# Patient Record
Sex: MALE | Race: WHITE | Employment: OTHER | URBAN - METROPOLITAN AREA
[De-identification: names, ages, dates, MRNs, and addresses within clinical notes are randomized per-mention and may not be internally consistent; named-entity substitution may affect disease eponyms.]

---

## 2017-03-27 ENCOUNTER — OFFICE VISIT (OUTPATIENT)
Dept: INTERNAL MEDICINE CLINIC | Age: 77
End: 2017-03-27

## 2017-03-27 VITALS
OXYGEN SATURATION: 96 % | DIASTOLIC BLOOD PRESSURE: 60 MMHG | BODY MASS INDEX: 18.46 KG/M2 | SYSTOLIC BLOOD PRESSURE: 122 MMHG | RESPIRATION RATE: 18 BRPM | HEART RATE: 64 BPM | HEIGHT: 69 IN | WEIGHT: 124.6 LBS

## 2017-03-27 DIAGNOSIS — I10 ESSENTIAL HYPERTENSION: ICD-10-CM

## 2017-03-27 DIAGNOSIS — Z00.00 ROUTINE GENERAL MEDICAL EXAMINATION AT A HEALTH CARE FACILITY: ICD-10-CM

## 2017-03-27 DIAGNOSIS — Z13.1 SCREENING FOR DIABETES MELLITUS: ICD-10-CM

## 2017-03-27 DIAGNOSIS — Z13.29 SCREENING FOR THYROID DISORDER: ICD-10-CM

## 2017-03-27 DIAGNOSIS — Z00.00 MEDICARE ANNUAL WELLNESS VISIT, SUBSEQUENT: Primary | ICD-10-CM

## 2017-03-27 DIAGNOSIS — Z13.220 LIPID SCREENING: ICD-10-CM

## 2017-03-27 LAB
A/G RATIO: 1.6 (ref 1.1–2.2)
ALBUMIN SERPL-MCNC: 4.1 G/DL (ref 3.4–5)
ALP BLD-CCNC: 73 U/L (ref 40–129)
ALT SERPL-CCNC: 22 U/L (ref 10–40)
ANION GAP SERPL CALCULATED.3IONS-SCNC: 18 MMOL/L (ref 3–16)
AST SERPL-CCNC: 26 U/L (ref 15–37)
BASOPHILS ABSOLUTE: 0.1 K/UL (ref 0–0.2)
BASOPHILS RELATIVE PERCENT: 1.2 %
BILIRUB SERPL-MCNC: 0.5 MG/DL (ref 0–1)
BUN BLDV-MCNC: 15 MG/DL (ref 7–20)
CALCIUM SERPL-MCNC: 9.6 MG/DL (ref 8.3–10.6)
CHLORIDE BLD-SCNC: 104 MMOL/L (ref 99–110)
CHOLESTEROL, TOTAL: 177 MG/DL (ref 0–199)
CO2: 19 MMOL/L (ref 21–32)
CREAT SERPL-MCNC: 0.8 MG/DL (ref 0.8–1.3)
EOSINOPHILS ABSOLUTE: 0.1 K/UL (ref 0–0.6)
EOSINOPHILS RELATIVE PERCENT: 2.4 %
GFR AFRICAN AMERICAN: >60
GFR NON-AFRICAN AMERICAN: >60
GLOBULIN: 2.6 G/DL
GLUCOSE BLD-MCNC: 87 MG/DL (ref 70–99)
HCT VFR BLD CALC: 41.7 % (ref 40.5–52.5)
HDLC SERPL-MCNC: 92 MG/DL (ref 40–60)
HEMOGLOBIN: 13.9 G/DL (ref 13.5–17.5)
LDL CHOLESTEROL CALCULATED: 75 MG/DL
LYMPHOCYTES ABSOLUTE: 1.3 K/UL (ref 1–5.1)
LYMPHOCYTES RELATIVE PERCENT: 26 %
MCH RBC QN AUTO: 30.2 PG (ref 26–34)
MCHC RBC AUTO-ENTMCNC: 33.3 G/DL (ref 31–36)
MCV RBC AUTO: 90.7 FL (ref 80–100)
MONOCYTES ABSOLUTE: 0.5 K/UL (ref 0–1.3)
MONOCYTES RELATIVE PERCENT: 9.6 %
NEUTROPHILS ABSOLUTE: 3.1 K/UL (ref 1.7–7.7)
NEUTROPHILS RELATIVE PERCENT: 60.8 %
PDW BLD-RTO: 14.4 % (ref 12.4–15.4)
PLATELET # BLD: 196 K/UL (ref 135–450)
PMV BLD AUTO: 11 FL (ref 5–10.5)
POTASSIUM SERPL-SCNC: 4.6 MMOL/L (ref 3.5–5.1)
RBC # BLD: 4.6 M/UL (ref 4.2–5.9)
SODIUM BLD-SCNC: 141 MMOL/L (ref 136–145)
T4 FREE: 1.2 NG/DL (ref 0.9–1.8)
TOTAL PROTEIN: 6.7 G/DL (ref 6.4–8.2)
TRIGL SERPL-MCNC: 48 MG/DL (ref 0–150)
TSH SERPL DL<=0.05 MIU/L-ACNC: 4.1 UIU/ML (ref 0.27–4.2)
VLDLC SERPL CALC-MCNC: 10 MG/DL
WBC # BLD: 5.1 K/UL (ref 4–11)

## 2017-03-27 PROCEDURE — 36415 COLL VENOUS BLD VENIPUNCTURE: CPT | Performed by: FAMILY MEDICINE

## 2017-03-27 PROCEDURE — G0438 PPPS, INITIAL VISIT: HCPCS | Performed by: FAMILY MEDICINE

## 2017-03-27 RX ORDER — TERBINAFINE HYDROCHLORIDE 250 MG/1
250 TABLET ORAL DAILY
Qty: 90 TABLET | Refills: 0 | Status: SHIPPED | OUTPATIENT
Start: 2017-03-27 | End: 2017-06-25

## 2017-03-27 RX ORDER — MIRTAZAPINE 15 MG/1
15 TABLET, ORALLY DISINTEGRATING ORAL NIGHTLY
Qty: 30 TABLET | Refills: 3 | Status: SHIPPED | OUTPATIENT
Start: 2017-03-27 | End: 2017-11-13 | Stop reason: SDUPTHER

## 2017-03-27 RX ORDER — TADALAFIL 20 MG/1
20 TABLET ORAL PRN
Qty: 10 TABLET | Refills: 5 | Status: SHIPPED | OUTPATIENT
Start: 2017-03-27 | End: 2017-07-24 | Stop reason: SDUPTHER

## 2017-03-27 ASSESSMENT — LIFESTYLE VARIABLES
HAS A RELATIVE, FRIEND, DOCTOR, OR ANOTHER HEALTH PROFESSIONAL EXPRESSED CONCERN ABOUT YOUR DRINKING OR SUGGESTED YOU CUT DOWN: 0
AUDIT TOTAL SCORE: 4
HOW OFTEN DO YOU HAVE A DRINK CONTAINING ALCOHOL: 4
HOW OFTEN DO YOU HAVE SIX OR MORE DRINKS ON ONE OCCASION: 0
HOW MANY STANDARD DRINKS CONTAINING ALCOHOL DO YOU HAVE ON A TYPICAL DAY: 0
HOW OFTEN DURING THE LAST YEAR HAVE YOU HAD A FEELING OF GUILT OR REMORSE AFTER DRINKING: 0
HOW OFTEN DURING THE LAST YEAR HAVE YOU FOUND THAT YOU WERE NOT ABLE TO STOP DRINKING ONCE YOU HAD STARTED: 0
HAVE YOU OR SOMEONE ELSE BEEN INJURED AS A RESULT OF YOUR DRINKING: 0
HOW OFTEN DURING THE LAST YEAR HAVE YOU BEEN UNABLE TO REMEMBER WHAT HAPPENED THE NIGHT BEFORE BECAUSE YOU HAD BEEN DRINKING: 0
HOW OFTEN DURING THE LAST YEAR HAVE YOU NEEDED AN ALCOHOLIC DRINK FIRST THING IN THE MORNING TO GET YOURSELF GOING AFTER A NIGHT OF HEAVY DRINKING: 0
HOW OFTEN DURING THE LAST YEAR HAVE YOU FAILED TO DO WHAT WAS NORMALLY EXPECTED FROM YOU BECAUSE OF DRINKING: 0
AUDIT-C TOTAL SCORE: 4

## 2017-03-27 ASSESSMENT — PATIENT HEALTH QUESTIONNAIRE - PHQ9: SUM OF ALL RESPONSES TO PHQ QUESTIONS 1-9: 2

## 2017-03-27 ASSESSMENT — ANXIETY QUESTIONNAIRES: GAD7 TOTAL SCORE: 2

## 2017-05-03 ENCOUNTER — HOSPITAL ENCOUNTER (OUTPATIENT)
Dept: CT IMAGING | Age: 77
Discharge: HOME | End: 2017-05-03
Attending: INTERNAL MEDICINE | Admitting: INTERNAL MEDICINE

## 2017-05-03 DIAGNOSIS — D15.0 BENIGN NEOPLASM OF THYMUS: ICD-10-CM

## 2017-05-03 DIAGNOSIS — C37 THYMOMA, MALIGNANT (HCC): ICD-10-CM

## 2017-05-05 PROBLEM — Z71.2 ENCOUNTER TO DISCUSS TEST RESULTS: Status: ACTIVE | Noted: 2017-05-05

## 2017-07-24 RX ORDER — TADALAFIL 20 MG/1
20 TABLET ORAL PRN
Qty: 30 TABLET | Refills: 0 | Status: SHIPPED | OUTPATIENT
Start: 2017-07-24 | End: 2018-05-08 | Stop reason: SDUPTHER

## 2017-11-08 ENCOUNTER — HOSPITAL ENCOUNTER (OUTPATIENT)
Dept: CT IMAGING | Age: 77
Discharge: HOME | End: 2017-11-08
Attending: NURSE PRACTITIONER | Admitting: NURSE PRACTITIONER

## 2017-11-08 DIAGNOSIS — D49.89 THYMOMA: ICD-10-CM

## 2017-11-08 DIAGNOSIS — D15.0 BENIGN NEOPLASM OF THYMUS: ICD-10-CM

## 2017-11-08 LAB
GFR AFRICAN AMERICAN: >60
GFR NON-AFRICAN AMERICAN: >60
PERFORMED ON: NORMAL
POC CREATININE: 0.8 MG/DL (ref 0.8–1.3)
POC SAMPLE TYPE: NORMAL

## 2017-11-13 ENCOUNTER — OFFICE VISIT (OUTPATIENT)
Dept: INTERNAL MEDICINE CLINIC | Age: 77
End: 2017-11-13

## 2017-11-13 VITALS
HEART RATE: 58 BPM | SYSTOLIC BLOOD PRESSURE: 138 MMHG | WEIGHT: 122.2 LBS | TEMPERATURE: 98.6 F | OXYGEN SATURATION: 99 % | DIASTOLIC BLOOD PRESSURE: 72 MMHG | BODY MASS INDEX: 18.1 KG/M2

## 2017-11-13 DIAGNOSIS — Z71.89 ENCOUNTER TO DISCUSS TREATMENT OPTIONS: ICD-10-CM

## 2017-11-13 DIAGNOSIS — I10 ESSENTIAL HYPERTENSION: ICD-10-CM

## 2017-11-13 DIAGNOSIS — D49.89 THYMOMA: ICD-10-CM

## 2017-11-13 DIAGNOSIS — Z23 NEED FOR INFLUENZA VACCINATION: ICD-10-CM

## 2017-11-13 PROCEDURE — G8419 CALC BMI OUT NRM PARAM NOF/U: HCPCS | Performed by: FAMILY MEDICINE

## 2017-11-13 PROCEDURE — 1123F ACP DISCUSS/DSCN MKR DOCD: CPT | Performed by: FAMILY MEDICINE

## 2017-11-13 PROCEDURE — 90686 IIV4 VACC NO PRSV 0.5 ML IM: CPT | Performed by: FAMILY MEDICINE

## 2017-11-13 PROCEDURE — G8427 DOCREV CUR MEDS BY ELIG CLIN: HCPCS | Performed by: FAMILY MEDICINE

## 2017-11-13 PROCEDURE — G8484 FLU IMMUNIZE NO ADMIN: HCPCS | Performed by: FAMILY MEDICINE

## 2017-11-13 PROCEDURE — G0008 ADMIN INFLUENZA VIRUS VAC: HCPCS | Performed by: FAMILY MEDICINE

## 2017-11-13 PROCEDURE — 99214 OFFICE O/P EST MOD 30 MIN: CPT | Performed by: FAMILY MEDICINE

## 2017-11-13 PROCEDURE — 1036F TOBACCO NON-USER: CPT | Performed by: FAMILY MEDICINE

## 2017-11-13 PROCEDURE — 4040F PNEUMOC VAC/ADMIN/RCVD: CPT | Performed by: FAMILY MEDICINE

## 2017-11-13 RX ORDER — TRIAMCINOLONE ACETONIDE 0.25 MG/G
CREAM TOPICAL EVERY 4 HOURS PRN
COMMUNITY
End: 2018-08-06 | Stop reason: SDUPTHER

## 2017-11-13 RX ORDER — MIRTAZAPINE 30 MG/1
30 TABLET, FILM COATED ORAL NIGHTLY
Qty: 30 TABLET | Refills: 3 | Status: SHIPPED | OUTPATIENT
Start: 2017-11-13 | End: 2018-08-06

## 2017-11-13 RX ORDER — TRIAMCINOLONE ACETONIDE 0.25 MG/G
CREAM TOPICAL EVERY 4 HOURS PRN
Qty: 80 G | Refills: 1 | Status: CANCELLED | OUTPATIENT
Start: 2017-11-13

## 2017-11-13 ASSESSMENT — ENCOUNTER SYMPTOMS
DIARRHEA: 0
TROUBLE SWALLOWING: 0
BLOOD IN STOOL: 0
ABDOMINAL PAIN: 0
SHORTNESS OF BREATH: 0
VOICE CHANGE: 0
CONSTIPATION: 0

## 2017-11-14 ENCOUNTER — TELEPHONE (OUTPATIENT)
Dept: INTERNAL MEDICINE CLINIC | Age: 77
End: 2017-11-14

## 2017-11-14 NOTE — PROGRESS NOTES
Vaccine Information Sheet, \"Influenza - Inactivated\"  given to [employer], or parent/legal guardian of  [employer] and verbalized understanding. Patient responses:    Have you ever had a reaction to a flu vaccine? No  Are you able to eat eggs without adverse effects? Yes  Do you have any current illness? No  Have you ever had Guillian [place] Syndrome? No    Flu vaccine given per order. Please see immunization tab.
normal.       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 he thinks he is losing weight and my be related time I think the weight loss is primarily due to poor oral intake. Will increase the dose of Remeron to 30 mg daily to help stimulate appetite   2. Encounter to discuss treatment options -as above. His oncologist recommended possible ablation or radiation treatment Patient is undecided so he was advised second opinion at the [facility]   3. Essential hypertension -controlled continue low-salt diet   4. Need for influenza vaccination -given            Plan:      As above RTC in 1-2 mos and PRN.  Blood tests next year March 2018

## 2018-03-29 ENCOUNTER — OFFICE VISIT (OUTPATIENT)
Dept: INTERNAL MEDICINE CLINIC | Age: 78
End: 2018-03-29

## 2018-03-29 VITALS
HEIGHT: 68 IN | HEART RATE: 110 BPM | WEIGHT: 131 LBS | BODY MASS INDEX: 19.85 KG/M2 | DIASTOLIC BLOOD PRESSURE: 74 MMHG | OXYGEN SATURATION: 99 % | SYSTOLIC BLOOD PRESSURE: 156 MMHG

## 2018-03-29 DIAGNOSIS — Z13.29 THYROID DISORDER SCREEN: ICD-10-CM

## 2018-03-29 DIAGNOSIS — Z13.1 DIABETES MELLITUS SCREENING: ICD-10-CM

## 2018-03-29 DIAGNOSIS — I10 ESSENTIAL HYPERTENSION: Primary | ICD-10-CM

## 2018-03-29 DIAGNOSIS — Z12.5 SCREENING PSA (PROSTATE SPECIFIC ANTIGEN): ICD-10-CM

## 2018-03-29 DIAGNOSIS — Z13.220 LIPID SCREENING: ICD-10-CM

## 2018-03-29 DIAGNOSIS — D49.89 THYMOMA: ICD-10-CM

## 2018-03-29 DIAGNOSIS — R00.0 TACHYCARDIA: ICD-10-CM

## 2018-03-29 LAB
A/G RATIO: 1.9 (ref 1.1–2.2)
ALBUMIN SERPL-MCNC: 4.5 G/DL (ref 3.4–5)
ALP BLD-CCNC: 70 U/L (ref 40–129)
ALT SERPL-CCNC: 17 U/L (ref 10–40)
ANION GAP SERPL CALCULATED.3IONS-SCNC: 13 MMOL/L (ref 3–16)
AST SERPL-CCNC: 19 U/L (ref 15–37)
BASOPHILS ABSOLUTE: 0.1 K/UL (ref 0–0.2)
BASOPHILS RELATIVE PERCENT: 0.8 %
BILIRUB SERPL-MCNC: 0.8 MG/DL (ref 0–1)
BUN BLDV-MCNC: 18 MG/DL (ref 7–20)
CALCIUM SERPL-MCNC: 9.3 MG/DL (ref 8.3–10.6)
CHLORIDE BLD-SCNC: 101 MMOL/L (ref 99–110)
CHOLESTEROL, TOTAL: 193 MG/DL (ref 0–199)
CO2: 24 MMOL/L (ref 21–32)
CREAT SERPL-MCNC: 0.8 MG/DL (ref 0.8–1.3)
EOSINOPHILS ABSOLUTE: 0.1 K/UL (ref 0–0.6)
EOSINOPHILS RELATIVE PERCENT: 2.1 %
GFR AFRICAN AMERICAN: >60
GFR NON-AFRICAN AMERICAN: >60
GLOBULIN: 2.4 G/DL
GLUCOSE BLD-MCNC: 112 MG/DL (ref 70–99)
HCT VFR BLD CALC: 42.4 % (ref 40.5–52.5)
HDLC SERPL-MCNC: 94 MG/DL (ref 40–60)
HEMOGLOBIN: 14.2 G/DL (ref 13.5–17.5)
LDL CHOLESTEROL CALCULATED: 87 MG/DL
LYMPHOCYTES ABSOLUTE: 1.5 K/UL (ref 1–5.1)
LYMPHOCYTES RELATIVE PERCENT: 22.8 %
MCH RBC QN AUTO: 30.8 PG (ref 26–34)
MCHC RBC AUTO-ENTMCNC: 33.5 G/DL (ref 31–36)
MCV RBC AUTO: 92.1 FL (ref 80–100)
MONOCYTES ABSOLUTE: 0.5 K/UL (ref 0–1.3)
MONOCYTES RELATIVE PERCENT: 7.9 %
NEUTROPHILS ABSOLUTE: 4.3 K/UL (ref 1.7–7.7)
NEUTROPHILS RELATIVE PERCENT: 66.4 %
PDW BLD-RTO: 13.9 % (ref 12.4–15.4)
PLATELET # BLD: 217 K/UL (ref 135–450)
PMV BLD AUTO: 10.8 FL (ref 5–10.5)
POTASSIUM SERPL-SCNC: 4.3 MMOL/L (ref 3.5–5.1)
PROSTATE SPECIFIC ANTIGEN: 0.29 NG/ML (ref 0–4)
RBC # BLD: 4.61 M/UL (ref 4.2–5.9)
SODIUM BLD-SCNC: 138 MMOL/L (ref 136–145)
T4 FREE: 1.2 NG/DL (ref 0.9–1.8)
TOTAL PROTEIN: 6.9 G/DL (ref 6.4–8.2)
TRIGL SERPL-MCNC: 58 MG/DL (ref 0–150)
TSH SERPL DL<=0.05 MIU/L-ACNC: 4.37 UIU/ML (ref 0.27–4.2)
VLDLC SERPL CALC-MCNC: 12 MG/DL
WBC # BLD: 6.4 K/UL (ref 4–11)

## 2018-03-29 PROCEDURE — G8420 CALC BMI NORM PARAMETERS: HCPCS | Performed by: FAMILY MEDICINE

## 2018-03-29 PROCEDURE — G8482 FLU IMMUNIZE ORDER/ADMIN: HCPCS | Performed by: FAMILY MEDICINE

## 2018-03-29 PROCEDURE — 1036F TOBACCO NON-USER: CPT | Performed by: FAMILY MEDICINE

## 2018-03-29 PROCEDURE — G8427 DOCREV CUR MEDS BY ELIG CLIN: HCPCS | Performed by: FAMILY MEDICINE

## 2018-03-29 PROCEDURE — 36415 COLL VENOUS BLD VENIPUNCTURE: CPT | Performed by: FAMILY MEDICINE

## 2018-03-29 PROCEDURE — 1123F ACP DISCUSS/DSCN MKR DOCD: CPT | Performed by: FAMILY MEDICINE

## 2018-03-29 PROCEDURE — 4040F PNEUMOC VAC/ADMIN/RCVD: CPT | Performed by: FAMILY MEDICINE

## 2018-03-29 PROCEDURE — 99214 OFFICE O/P EST MOD 30 MIN: CPT | Performed by: FAMILY MEDICINE

## 2018-03-29 RX ORDER — METOPROLOL SUCCINATE 25 MG/1
25 TABLET, EXTENDED RELEASE ORAL DAILY
Qty: 30 TABLET | Refills: 3 | Status: SHIPPED | OUTPATIENT
Start: 2018-03-29 | End: 2018-05-08 | Stop reason: SDUPTHER

## 2018-03-29 ASSESSMENT — ENCOUNTER SYMPTOMS
ABDOMINAL PAIN: 0
BLOOD IN STOOL: 0
TROUBLE SWALLOWING: 0
CONSTIPATION: 0
SHORTNESS OF BREATH: 0
DIARRHEA: 0
VOICE CHANGE: 0

## 2018-03-29 NOTE — PROGRESS NOTES
Subjective:      Patient ID: Thomas Slaughter is a 65 y.o. male. HPI  Patient with history of thymoma and borderline hypertension presented to the office for follow-up and for blood tests. He is a runner and and usually participate in a marathon. He goes to [facility] once or twice a year -it came back last October 2017 and is planning to go back there May 2018 after the [employer]  Review of Systems   Constitutional: Negative for activity change. HENT: Negative for trouble swallowing and voice change. Eyes: Negative for visual disturbance. Respiratory: Negative for shortness of breath. Cardiovascular: Negative for chest pain and leg swelling. Gastrointestinal: Negative for abdominal pain, blood in stool, constipation and diarrhea. Genitourinary: Negative for difficulty urinating, dysuria, frequency, hematuria and scrotal swelling. Musculoskeletal: Negative for arthralgias and myalgias. Skin: Negative for rash. Neurological: Negative for dizziness. Psychiatric/Behavioral: Negative for behavioral problems. Objective:   Physical Exam   Constitutional: He is oriented to person, place, and time. He appears well-developed and well-nourished. No distress. HENT:   Head: Normocephalic. Eyes: Conjunctivae are normal.   Neck: Neck supple. No thyromegaly present. Cardiovascular: Normal rate, regular rhythm and normal heart sounds. No murmur heard. Pulmonary/Chest: Breath sounds normal. No respiratory distress. He has no wheezes. He has no rales. Abdominal: Soft. He exhibits no distension. Musculoskeletal: Normal range of motion. He exhibits no edema. Neurological: He is alert and oriented to person, place, and time. Skin: Skin is warm. No rash noted. Psychiatric: He has a normal mood and affect. His behavior is normal.       Assessment:      1. Essential hypertension-Start Toprol-XL 25 mg daily. Advise low salt diet    2.  Tachycardia -Heart rate of 110/min. start beta

## 2018-05-08 ENCOUNTER — OFFICE VISIT (OUTPATIENT)
Dept: INTERNAL MEDICINE CLINIC | Age: 78
End: 2018-05-08

## 2018-05-08 VITALS
DIASTOLIC BLOOD PRESSURE: 72 MMHG | SYSTOLIC BLOOD PRESSURE: 130 MMHG | HEART RATE: 114 BPM | OXYGEN SATURATION: 96 % | RESPIRATION RATE: 18 BRPM

## 2018-05-08 DIAGNOSIS — R37 SEXUAL DYSFUNCTION: ICD-10-CM

## 2018-05-08 DIAGNOSIS — R73.01 IFG (IMPAIRED FASTING GLUCOSE): ICD-10-CM

## 2018-05-08 DIAGNOSIS — D49.89 THYMOMA: ICD-10-CM

## 2018-05-08 DIAGNOSIS — I10 ESSENTIAL HYPERTENSION: Primary | ICD-10-CM

## 2018-05-08 DIAGNOSIS — R00.0 CHRONIC TACHYCARDIA: ICD-10-CM

## 2018-05-08 LAB — HBA1C MFR BLD: 5.6 %

## 2018-05-08 PROCEDURE — 83036 HEMOGLOBIN GLYCOSYLATED A1C: CPT | Performed by: FAMILY MEDICINE

## 2018-05-08 PROCEDURE — G8427 DOCREV CUR MEDS BY ELIG CLIN: HCPCS | Performed by: FAMILY MEDICINE

## 2018-05-08 PROCEDURE — 4040F PNEUMOC VAC/ADMIN/RCVD: CPT | Performed by: FAMILY MEDICINE

## 2018-05-08 PROCEDURE — 1036F TOBACCO NON-USER: CPT | Performed by: FAMILY MEDICINE

## 2018-05-08 PROCEDURE — 1123F ACP DISCUSS/DSCN MKR DOCD: CPT | Performed by: FAMILY MEDICINE

## 2018-05-08 PROCEDURE — 99214 OFFICE O/P EST MOD 30 MIN: CPT | Performed by: FAMILY MEDICINE

## 2018-05-08 PROCEDURE — G8420 CALC BMI NORM PARAMETERS: HCPCS | Performed by: FAMILY MEDICINE

## 2018-05-08 RX ORDER — METOPROLOL SUCCINATE 50 MG/1
50 TABLET, EXTENDED RELEASE ORAL DAILY
Qty: 90 TABLET | Refills: 3 | Status: SHIPPED | OUTPATIENT
Start: 2018-05-08 | End: 2018-10-27 | Stop reason: ALTCHOICE

## 2018-05-08 RX ORDER — TADALAFIL 20 MG/1
20 TABLET ORAL PRN
Qty: 30 TABLET | Refills: 0 | Status: SHIPPED | OUTPATIENT
Start: 2018-05-08 | End: 2018-10-27 | Stop reason: ALTCHOICE

## 2018-05-08 ASSESSMENT — PATIENT HEALTH QUESTIONNAIRE - PHQ9
1. LITTLE INTEREST OR PLEASURE IN DOING THINGS: 0
2. FEELING DOWN, DEPRESSED OR HOPELESS: 0
SUM OF ALL RESPONSES TO PHQ9 QUESTIONS 1 & 2: 0
SUM OF ALL RESPONSES TO PHQ QUESTIONS 1-9: 0

## 2018-05-08 ASSESSMENT — ENCOUNTER SYMPTOMS
SHORTNESS OF BREATH: 0
TROUBLE SWALLOWING: 0
DIARRHEA: 0
BLOOD IN STOOL: 0
CONSTIPATION: 0
VOICE CHANGE: 0
ABDOMINAL PAIN: 0

## 2018-05-09 ENCOUNTER — HOSPITAL ENCOUNTER (OUTPATIENT)
Dept: CT IMAGING | Age: 78
Discharge: HOME | End: 2018-05-09
Attending: INTERNAL MEDICINE | Admitting: INTERNAL MEDICINE

## 2018-05-09 DIAGNOSIS — C37 MALIGNANT NEOPLASM OF THYMUS (HCC): ICD-10-CM

## 2018-05-09 LAB
GFR AFRICAN AMERICAN: >60
GFR NON-AFRICAN AMERICAN: >60
PERFORMED ON: ABNORMAL
POC CREATININE: 0.7 MG/DL (ref 0.8–1.3)
POC SAMPLE TYPE: ABNORMAL

## 2018-08-06 ENCOUNTER — OFFICE VISIT (OUTPATIENT)
Dept: INTERNAL MEDICINE CLINIC | Age: 78
End: 2018-08-06

## 2018-08-06 VITALS
OXYGEN SATURATION: 97 % | HEART RATE: 54 BPM | SYSTOLIC BLOOD PRESSURE: 108 MMHG | DIASTOLIC BLOOD PRESSURE: 64 MMHG | WEIGHT: 121.4 LBS | HEIGHT: 67 IN | RESPIRATION RATE: 18 BRPM | BODY MASS INDEX: 19.06 KG/M2

## 2018-08-06 DIAGNOSIS — Z00.00 MEDICARE ANNUAL WELLNESS VISIT, SUBSEQUENT: Primary | ICD-10-CM

## 2018-08-06 DIAGNOSIS — Z00.00 ROUTINE GENERAL MEDICAL EXAMINATION AT A HEALTH CARE FACILITY: ICD-10-CM

## 2018-08-06 PROCEDURE — 4040F PNEUMOC VAC/ADMIN/RCVD: CPT | Performed by: FAMILY MEDICINE

## 2018-08-06 PROCEDURE — G0439 PPPS, SUBSEQ VISIT: HCPCS | Performed by: FAMILY MEDICINE

## 2018-08-06 RX ORDER — TRIAMCINOLONE ACETONIDE 0.25 MG/G
CREAM TOPICAL EVERY 4 HOURS PRN
Qty: 1 TUBE | Refills: 3 | Status: SHIPPED | OUTPATIENT
Start: 2018-08-06 | End: 2018-12-05 | Stop reason: SDUPTHER

## 2018-08-06 ASSESSMENT — LIFESTYLE VARIABLES
HOW OFTEN DURING THE LAST YEAR HAVE YOU FOUND THAT YOU WERE NOT ABLE TO STOP DRINKING ONCE YOU HAD STARTED: 0
AUDIT TOTAL SCORE: 4
HAVE YOU OR SOMEONE ELSE BEEN INJURED AS A RESULT OF YOUR DRINKING: 0
HOW OFTEN DO YOU HAVE A DRINK CONTAINING ALCOHOL: 4
HOW OFTEN DURING THE LAST YEAR HAVE YOU BEEN UNABLE TO REMEMBER WHAT HAPPENED THE NIGHT BEFORE BECAUSE YOU HAD BEEN DRINKING: 0
AUDIT-C TOTAL SCORE: 4
HOW MANY STANDARD DRINKS CONTAINING ALCOHOL DO YOU HAVE ON A TYPICAL DAY: 0
HOW OFTEN DURING THE LAST YEAR HAVE YOU HAD A FEELING OF GUILT OR REMORSE AFTER DRINKING: 0
HOW OFTEN DO YOU HAVE SIX OR MORE DRINKS ON ONE OCCASION: 0
HAS A RELATIVE, FRIEND, DOCTOR, OR ANOTHER HEALTH PROFESSIONAL EXPRESSED CONCERN ABOUT YOUR DRINKING OR SUGGESTED YOU CUT DOWN: 0
HOW OFTEN DURING THE LAST YEAR HAVE YOU FAILED TO DO WHAT WAS NORMALLY EXPECTED FROM YOU BECAUSE OF DRINKING: 0
HOW OFTEN DURING THE LAST YEAR HAVE YOU NEEDED AN ALCOHOLIC DRINK FIRST THING IN THE MORNING TO GET YOURSELF GOING AFTER A NIGHT OF HEAVY DRINKING: 0

## 2018-08-06 ASSESSMENT — PATIENT HEALTH QUESTIONNAIRE - PHQ9: SUM OF ALL RESPONSES TO PHQ QUESTIONS 1-9: 1

## 2018-08-06 ASSESSMENT — ANXIETY QUESTIONNAIRES: GAD7 TOTAL SCORE: 1

## 2018-08-06 NOTE — PROGRESS NOTES
from Last 3 Encounters:   08/06/18 121 lb 6.4 oz (55.1 kg)   03/29/18 131 lb (59.4 kg)   11/13/17 122 lb 3.2 oz (55.4 kg)     Vitals:    08/06/18 1104   BP: 108/64   Pulse: 54   Resp: 18   SpO2: 97%   Weight: 121 lb 6.4 oz (55.1 kg)   Height: 5' 7\" (1.702 m)       General Appearance: alert and oriented to person, place and time, well developed and well- nourished, in no acute distress  Skin: warm and dry, no rash or erythema  Head: normocephalic and atraumatic  Eyes: pupils equal, round, and reactive to light, extraocular eye movements intact, conjunctivae normal  ENT: tympanic membrane, external ear and ear canal normal bilaterally, nose without deformity, nasal mucosa and turbinates normal without polyps  Neck: supple and non-tender without mass, no thyromegaly or thyroid nodules, no cervical lymphadenopathy  Pulmonary/Chest: clear to auscultation bilaterally- no wheezes, rales or rhonchi, normal air movement, no respiratory distress  Cardiovascular: normal rate, regular rhythm, normal S1 and S2, no murmurs, rubs, clicks, or gallops, distal pulses intact, no carotid bruits  Abdomen: soft, non-tender, non-distended, normal bowel sounds, no masses or organomegaly  Extremities: no cyanosis, clubbing or edema  Musculoskeletal: normal range of motion, no joint swelling, deformity or tenderness  Neurologic: reflexes normal and symmetric, no cranial nerve deficit, gait, coordination and speech normal    Patient's complete Health Risk Assessment and screening values have been reviewed and are found in Flowsheets. The following problems were reviewed today and where indicated follow up appointments were made and/or referrals ordered.     Positive Risk Factor Screenings with Interventions:     General Health:  General  In general, how would you say your health is?: Good  In the past 7 days, have you experienced any of the following?: None of These  Do you get the social and emotional support that you need?: Yes  Do you have a Living Will?: (!) No (**see additional notes)  General Health Risk Interventions:  · No Living Will: additional information provided    Safety:  Safety  Do you have working smoke detectors?: (!) No  Have all throw rugs been removed or fastened?: Yes  Do you have non-slip mats in all bathtubs?: (!) No  Do all of your stairways have a railing or banister?: Yes  Are your doorways, halls and stairs free of clutter?: Yes  Do you always fasten your seatbelt when you are in a car?: Yes  Safety Interventions:  · None indicated    Personalized Preventive Plan   Current Health Maintenance Status  Immunization History   Administered Date(s) Administered    Influenza Virus Vaccine 02/09/2015, 01/18/2016, 01/13/2017    Influenza, High Dose 12/02/2013    Influenza, Quadv, 3 yrs and older, IM, Preservative Free 11/13/2017    Pneumococcal 13-valent Conjugate ([id number]) 03/27/2017    Pneumococcal Conjugate 7-valent 01/09/2004    Pneumococcal Polysaccharide ([id number]) 12/02/2013    Tdap (Boostrix, Adacel) 03/30/2018    Tetanus 02/03/2009    Zoster Live (Zostavax) 02/10/2015        Health Maintenance   Topic Date Due    Shingles Vaccine (1 of 2 - 2 Dose Series) 04/10/2015    Flu vaccine (1) 09/01/2018    Potassium monitoring  03/29/2019    Creatinine monitoring  03/29/2019    Colon cancer screen colonoscopy  12/16/2019    DTaP/Tdap/Td vaccine (2 - Td) 03/30/2028    Pneumococcal low/med risk  Completed     Recommendations for Preventive Services Due: see orders.   Recommended screening schedule for the next 5-10 years is provided to the patient in written form: see Patient Instructions/AVS.

## 2018-08-06 NOTE — PROGRESS NOTES
Discussed advanced directives with patient. They state that they do not have anything in place at this time. I gave them blank copies of 1) DNR form, 2) [address], 3) Power of [employer], and 4) Organ donor registration. I also gave them instruction information for these. Asked that at this point, they take them home, review the information, and their practitioner will discuss this further with them at the next office visit.

## 2018-08-06 NOTE — PATIENT INSTRUCTIONS
Personalized Preventive Plan for Luis F Sow - 8/6/2018  Medicare offers a range of preventive health benefits. Some of the tests and screenings are paid in full while other may be subject to a deductible, co-insurance, and/or copay. Some of these benefits include a comprehensive review of your medical history including lifestyle, illnesses that may run in your family, and various assessments and screenings as appropriate. After reviewing your medical record and screening and assessments performed today your provider may have ordered immunizations, labs, imaging, and/or referrals for you. A list of these orders (if applicable) as well as your Preventive Care list are included within your After Visit Summary for your review. Other Preventive Recommendations:    · A preventive eye exam performed by an eye specialist is recommended every 1-2 years to screen for glaucoma; cataracts, macular degeneration, and other eye disorders. · A preventive dental visit is recommended every 6 months. · Try to get at least 150 minutes of exercise per week or 10,000 steps per day on a pedometer . · Order or download the FREE \"Exercise & Physical Activity: Your Everyday Guide\" from [employer]. Call 2-189.842.1473 or search [employer] online. · You need 9553-0349 mg of calcium and 8688-5679 IU of vitamin D per day. It is possible to meet your calcium requirement with diet alone, but a vitamin D supplement is usually necessary to meet this goal.  · When exposed to the sun, use a sunscreen that protects against both UVA and UVB radiation with an SPF of 30 or greater. Reapply every 2 to 3 hours or after sweating, drying off with a towel, or swimming. · Always wear a seat belt when traveling in a car. Always wear a helmet when riding a bicycle or motorcycle.

## 2018-09-26 PROBLEM — Z71.2 ENCOUNTER TO DISCUSS TEST RESULTS: Status: RESOLVED | Noted: 2017-05-05 | Resolved: 2018-09-26

## 2018-10-01 ENCOUNTER — NURSE ONLY (OUTPATIENT)
Dept: INTERNAL MEDICINE CLINIC | Age: 78
End: 2018-10-01
Payer: MEDICARE

## 2018-10-01 DIAGNOSIS — Z23 FLU VACCINE NEED: Primary | ICD-10-CM

## 2018-10-01 PROCEDURE — 90682 RIV4 VACC RECOMBINANT DNA IM: CPT | Performed by: FAMILY MEDICINE

## 2018-10-01 PROCEDURE — G0008 ADMIN INFLUENZA VIRUS VAC: HCPCS | Performed by: FAMILY MEDICINE

## 2018-10-27 ENCOUNTER — APPOINTMENT (OUTPATIENT)
Dept: GENERAL RADIOLOGY | Age: 78
DRG: 193 | End: 2018-10-27
Payer: MEDICARE

## 2018-10-27 ENCOUNTER — HOSPITAL ENCOUNTER (INPATIENT)
Age: 78
LOS: 8 days | Discharge: HOME | DRG: 193 | End: 2018-11-04
Attending: EMERGENCY MEDICINE | Admitting: INTERNAL MEDICINE
Payer: MEDICARE

## 2018-10-27 ENCOUNTER — APPOINTMENT (OUTPATIENT)
Dept: CT IMAGING | Age: 78
DRG: 193 | End: 2018-10-27
Payer: MEDICARE

## 2018-10-27 DIAGNOSIS — R79.89 ELEVATED TSH: ICD-10-CM

## 2018-10-27 DIAGNOSIS — I48.91 ATRIAL FIBRILLATION WITH RAPID VENTRICULAR RESPONSE (HCC): Primary | ICD-10-CM

## 2018-10-27 DIAGNOSIS — J18.9 PNEUMONIA DUE TO ORGANISM: ICD-10-CM

## 2018-10-27 DIAGNOSIS — R22.2 CHEST WALL MASS: ICD-10-CM

## 2018-10-27 LAB
ANION GAP SERPL CALCULATED.3IONS-SCNC: 14 MMOL/L (ref 3–16)
BASOPHILS ABSOLUTE: 0.1 K/UL (ref 0–0.2)
BASOPHILS RELATIVE PERCENT: 1.2 %
BUN BLDV-MCNC: 19 MG/DL (ref 7–20)
CALCIUM SERPL-MCNC: 9.3 MG/DL (ref 8.3–10.6)
CHLORIDE BLD-SCNC: 104 MMOL/L (ref 99–110)
CO2: 23 MMOL/L (ref 21–32)
CREAT SERPL-MCNC: 0.8 MG/DL (ref 0.8–1.3)
D DIMER: 832 NG/ML DDU (ref 0–229)
EOSINOPHILS ABSOLUTE: 0.1 K/UL (ref 0–0.6)
EOSINOPHILS RELATIVE PERCENT: 1.2 %
GFR AFRICAN AMERICAN: >60
GFR NON-AFRICAN AMERICAN: >60
GLUCOSE BLD-MCNC: 92 MG/DL (ref 70–99)
HCT VFR BLD CALC: 39.9 % (ref 40.5–52.5)
HEMOGLOBIN: 13.3 G/DL (ref 13.5–17.5)
LYMPHOCYTES ABSOLUTE: 1.4 K/UL (ref 1–5.1)
LYMPHOCYTES RELATIVE PERCENT: 17.9 %
MCH RBC QN AUTO: 30.8 PG (ref 26–34)
MCHC RBC AUTO-ENTMCNC: 33.3 G/DL (ref 31–36)
MCV RBC AUTO: 92.5 FL (ref 80–100)
MONOCYTES ABSOLUTE: 0.8 K/UL (ref 0–1.3)
MONOCYTES RELATIVE PERCENT: 11 %
NEUTROPHILS ABSOLUTE: 5.3 K/UL (ref 1.7–7.7)
NEUTROPHILS RELATIVE PERCENT: 68.7 %
PDW BLD-RTO: 14.1 % (ref 12.4–15.4)
PLATELET # BLD: 381 K/UL (ref 135–450)
PMV BLD AUTO: 10 FL (ref 5–10.5)
POTASSIUM SERPL-SCNC: 3.5 MMOL/L (ref 3.5–5.1)
RBC # BLD: 4.31 M/UL (ref 4.2–5.9)
SODIUM BLD-SCNC: 141 MMOL/L (ref 136–145)
T4 FREE: 1.5 NG/DL (ref 0.9–1.8)
TROPONIN: <0.01 NG/ML
TSH REFLEX: 5.77 UIU/ML (ref 0.27–4.2)
WBC # BLD: 7.7 K/UL (ref 4–11)

## 2018-10-27 PROCEDURE — 1200000000 HC SEMI PRIVATE

## 2018-10-27 PROCEDURE — 87801 DETECT AGNT MULT DNA AMPLI: CPT

## 2018-10-27 PROCEDURE — 84484 ASSAY OF TROPONIN QUANT: CPT

## 2018-10-27 PROCEDURE — 96365 THER/PROPH/DIAG IV INF INIT: CPT

## 2018-10-27 PROCEDURE — 96361 HYDRATE IV INFUSION ADD-ON: CPT

## 2018-10-27 PROCEDURE — 2580000003 HC RX 258: Performed by: INTERNAL MEDICINE

## 2018-10-27 PROCEDURE — 80048 BASIC METABOLIC PNL TOTAL CA: CPT

## 2018-10-27 PROCEDURE — 71260 CT THORAX DX C+: CPT

## 2018-10-27 PROCEDURE — 6370000000 HC RX 637 (ALT 250 FOR IP): Performed by: NURSE PRACTITIONER

## 2018-10-27 PROCEDURE — 6370000000 HC RX 637 (ALT 250 FOR IP): Performed by: INTERNAL MEDICINE

## 2018-10-27 PROCEDURE — 6360000002 HC RX W HCPCS: Performed by: INTERNAL MEDICINE

## 2018-10-27 PROCEDURE — 2580000003 HC RX 258: Performed by: EMERGENCY MEDICINE

## 2018-10-27 PROCEDURE — 96375 TX/PRO/DX INJ NEW DRUG ADDON: CPT

## 2018-10-27 PROCEDURE — 84439 ASSAY OF FREE THYROXINE: CPT

## 2018-10-27 PROCEDURE — 85025 COMPLETE CBC W/AUTO DIFF WBC: CPT

## 2018-10-27 PROCEDURE — 6360000004 HC RX CONTRAST MEDICATION: Performed by: EMERGENCY MEDICINE

## 2018-10-27 PROCEDURE — 99285 EMERGENCY DEPT VISIT HI MDM: CPT

## 2018-10-27 PROCEDURE — 85379 FIBRIN DEGRADATION QUANT: CPT

## 2018-10-27 PROCEDURE — 87040 BLOOD CULTURE FOR BACTERIA: CPT

## 2018-10-27 PROCEDURE — 2500000003 HC RX 250 WO HCPCS: Performed by: EMERGENCY MEDICINE

## 2018-10-27 PROCEDURE — 6360000002 HC RX W HCPCS: Performed by: EMERGENCY MEDICINE

## 2018-10-27 PROCEDURE — 96367 TX/PROPH/DG ADDL SEQ IV INF: CPT

## 2018-10-27 PROCEDURE — 93005 ELECTROCARDIOGRAM TRACING: CPT | Performed by: EMERGENCY MEDICINE

## 2018-10-27 PROCEDURE — 36415 COLL VENOUS BLD VENIPUNCTURE: CPT

## 2018-10-27 PROCEDURE — 71046 X-RAY EXAM CHEST 2 VIEWS: CPT

## 2018-10-27 PROCEDURE — 84443 ASSAY THYROID STIM HORMONE: CPT

## 2018-10-27 RX ORDER — DILTIAZEM HYDROCHLORIDE 5 MG/ML
7.5 INJECTION INTRAVENOUS ONCE
Status: COMPLETED | OUTPATIENT
Start: 2018-10-27 | End: 2018-10-27

## 2018-10-27 RX ORDER — ASPIRIN 81 MG/1
81 TABLET, CHEWABLE ORAL DAILY
Status: DISCONTINUED | OUTPATIENT
Start: 2018-10-27 | End: 2018-11-04 | Stop reason: HOSPADM

## 2018-10-27 RX ORDER — 0.9 % SODIUM CHLORIDE 0.9 %
250 INTRAVENOUS SOLUTION INTRAVENOUS ONCE
Status: COMPLETED | OUTPATIENT
Start: 2018-10-27 | End: 2018-10-27

## 2018-10-27 RX ORDER — SODIUM CHLORIDE 0.9 % (FLUSH) 0.9 %
10 SYRINGE (ML) INJECTION PRN
Status: DISCONTINUED | OUTPATIENT
Start: 2018-10-27 | End: 2018-11-04 | Stop reason: HOSPADM

## 2018-10-27 RX ORDER — ONDANSETRON 2 MG/ML
4 INJECTION INTRAMUSCULAR; INTRAVENOUS EVERY 6 HOURS PRN
Status: DISCONTINUED | OUTPATIENT
Start: 2018-10-27 | End: 2018-11-04 | Stop reason: HOSPADM

## 2018-10-27 RX ORDER — 0.9 % SODIUM CHLORIDE 0.9 %
500 INTRAVENOUS SOLUTION INTRAVENOUS ONCE
Status: COMPLETED | OUTPATIENT
Start: 2018-10-27 | End: 2018-10-27

## 2018-10-27 RX ORDER — SODIUM CHLORIDE 0.9 % (FLUSH) 0.9 %
10 SYRINGE (ML) INJECTION EVERY 12 HOURS SCHEDULED
Status: DISCONTINUED | OUTPATIENT
Start: 2018-10-27 | End: 2018-11-04 | Stop reason: HOSPADM

## 2018-10-27 RX ORDER — ALBUTEROL SULFATE 2.5 MG/3ML
2.5 SOLUTION RESPIRATORY (INHALATION)
Status: DISCONTINUED | OUTPATIENT
Start: 2018-10-27 | End: 2018-11-04 | Stop reason: HOSPADM

## 2018-10-27 RX ADMIN — SODIUM CHLORIDE 500 ML: 9 INJECTION, SOLUTION INTRAVENOUS at 11:43

## 2018-10-27 RX ADMIN — AZITHROMYCIN MONOHYDRATE 500 MG: 500 INJECTION, POWDER, LYOPHILIZED, FOR SOLUTION INTRAVENOUS at 14:38

## 2018-10-27 RX ADMIN — SODIUM CHLORIDE 250 ML: 9 INJECTION, SOLUTION INTRAVENOUS at 10:54

## 2018-10-27 RX ADMIN — Medication 10 ML: at 20:44

## 2018-10-27 RX ADMIN — ASPIRIN 81 MG CHEWABLE TABLET 81 MG: 81 TABLET CHEWABLE at 18:49

## 2018-10-27 RX ADMIN — METOPROLOL TARTRATE 25 MG: 25 TABLET ORAL at 23:56

## 2018-10-27 RX ADMIN — ENOXAPARIN SODIUM 50 MG: 60 INJECTION SUBCUTANEOUS at 20:44

## 2018-10-27 RX ADMIN — DILTIAZEM HYDROCHLORIDE 7.5 MG: 5 INJECTION INTRAVENOUS at 10:54

## 2018-10-27 RX ADMIN — IOPAMIDOL 75 ML: 755 INJECTION, SOLUTION INTRAVENOUS at 12:09

## 2018-10-27 RX ADMIN — DILTIAZEM HYDROCHLORIDE 5 MG/HR: 5 INJECTION INTRAVENOUS at 20:44

## 2018-10-27 RX ADMIN — CEFTRIAXONE 1 G: 1 INJECTION, POWDER, FOR SOLUTION INTRAMUSCULAR; INTRAVENOUS at 13:58

## 2018-10-27 ASSESSMENT — ENCOUNTER SYMPTOMS
EYE REDNESS: 0
ABDOMINAL PAIN: 0
SORE THROAT: 0
COUGH: 1
RHINORRHEA: 0
SHORTNESS OF BREATH: 1

## 2018-10-27 ASSESSMENT — PAIN DESCRIPTION - PROGRESSION: CLINICAL_PROGRESSION: NOT CHANGED

## 2018-10-27 ASSESSMENT — PAIN SCALES - GENERAL
PAINLEVEL_OUTOF10: 0
PAINLEVEL_OUTOF10: 1

## 2018-10-27 ASSESSMENT — PAIN DESCRIPTION - FREQUENCY: FREQUENCY: INTERMITTENT

## 2018-10-27 ASSESSMENT — PAIN DESCRIPTION - DESCRIPTORS: DESCRIPTORS: DULL

## 2018-10-27 ASSESSMENT — PAIN DESCRIPTION - LOCATION: LOCATION: BACK

## 2018-10-27 ASSESSMENT — PAIN DESCRIPTION - PAIN TYPE: TYPE: ACUTE PAIN

## 2018-10-27 ASSESSMENT — PAIN DESCRIPTION - ONSET: ONSET: ON-GOING

## 2018-10-27 ASSESSMENT — PAIN DESCRIPTION - ORIENTATION: ORIENTATION: MID

## 2018-10-27 NOTE — ED PROVIDER NOTES
sounds are normal. He exhibits no distension and no mass. There is no tenderness. There is no guarding. Thin. Soft. Nontender nondistended. Musculoskeletal: Normal range of motion. No leg swelling. No calf tenderness. Neurological: He is alert and oriented to person, place, and time. Skin: Skin is warm and dry. He is not diaphoretic. Psychiatric: He has a normal mood and affect. His behavior is normal.   Nursing note and vitals reviewed. DIAGNOSTIC RESULTS     EKG: All EKG's are interpreted by the Emergency Department Physician who either signs or Co-signsthis chart in the absence of a cardiologist.  The Ekg interpreted by me shows  atrial fibrillation with a rate of 160  Axis is   Normal  QTc is  460 msec  Intervals and Durations are unremarkable. ST Segments: Lateral ST depression - may be rate related  When compared to an EKG performed on March 20, 2017 the patient sinus rhythm has been replaced with atrial fibrillation with rapid ventricular response. There is new lateral ST depression          RADIOLOGY:   Non-plain filmimages such as CT, Ultrasound and MRI are read by the radiologist. Plain radiographic images are visualized and preliminarily interpreted by the emergency physician with the below findings:        Interpretation per the Radiologist below, if available at the time ofthis note:    CT CHEST PULMONARY EMBOLISM W CONTRAST   Final Result   No gross findings of pulmonary embolism. Bilateral heterogeneous ground-glass and nodular infiltrate with mucous   plugging, likely reflecting pneumonia. Follow-up to resolution is   recommended. 5.2 cm anterior right chest wall mass is similar to slightly increased in   size as compared to prior. XR CHEST STANDARD (2 VW)   Final Result   Right perihilar mass is increased in size as compared to prior. Increasing left basilar pulmonary opacity, which may reflect progressive   fibrosis or superimposed pneumonia. ventricular response ([address]):   Chest wall mass:   Elevated TSH:   Pneumonia due to organism:   Diagnosis management comments: DDX: Atrial Fib secondary to: Ischemia, electrolyte abnormality, thyroid disease, PE, other. Workup of the patient revealed no white count. Mild anemia at 13.3, 39.9. Troponin normal.  Free T4 normal.  D-dimer elevated but CT of the chest shows no evidence of pulmonary emboli. There are some nonacute findings on the patient's chest CT to include pneumonia. While the patient has pneumonia and his initial pulse is 128 his pulse is now in the 70s and he has no fever no white counts I do not suspect that he is septic. I did send blood cultures in the event the patient was to worsen or have ordered some IV antibiotic for him. The patient will need to be admitted for his new onset atrial fibrillation. I will consult the hospitalist service to see if they feel this patient would be appropriate for their team.    CRITICAL CARE TIME   Total Critical Care time was 0 minutes, excluding separately reportable procedures. There was a high probability of clinically significant/life threatening deterioration in the patient's condition which required my urgent intervention. CONSULTS:  IP CONSULT TO INTERNAL MEDICINE    PROCEDURES:  Unless otherwise noted below, none     Procedures    FINAL IMPRESSION      1. Atrial fibrillation with rapid ventricular response ([address]    2. Pneumonia due to organism    3. Chest wall mass    4. Elevated TSH          DISPOSITION/PLAN   DISPOSITION Decision To Admit 10/27/2018 01:24:33 PM      PATIENT REFERRED TO:  No follow-up provider specified.     DISCHARGE MEDICATIONS:  New Prescriptions    No medications on file          (Please note that portions of this note were completed with a voice recognition program.Efforts were made to edit the dictations but occasionally words are mis-transcribed.)    Maren Gaitan MD (electronically signed)  Attending

## 2018-10-27 NOTE — PROGRESS NOTES
Call placed to pharmacy to let them know that the patient will need a Cardizem drip and that he is moving to [id number].  Electronically signed by Andrzej Cervantes RN on 10/27/2018 at 7:13 PM

## 2018-10-27 NOTE — PROGRESS NOTES
Pt transferred to [address] [place] [address]. Nurse in room with patient.  Electronically signed by Gianfranco Ivan RN on 10/27/2018 at 7:12 PM

## 2018-10-28 LAB
BASOPHILS ABSOLUTE: 0.1 K/UL (ref 0–0.2)
BASOPHILS RELATIVE PERCENT: 0.9 %
EOSINOPHILS ABSOLUTE: 0.1 K/UL (ref 0–0.6)
EOSINOPHILS RELATIVE PERCENT: 2.1 %
HCT VFR BLD CALC: 34.3 % (ref 40.5–52.5)
HEMOGLOBIN: 11.7 G/DL (ref 13.5–17.5)
L. PNEUMOPHILA SEROGP 1 UR AG: NORMAL
LYMPHOCYTES ABSOLUTE: 1.2 K/UL (ref 1–5.1)
LYMPHOCYTES RELATIVE PERCENT: 19.2 %
MCH RBC QN AUTO: 31.3 PG (ref 26–34)
MCHC RBC AUTO-ENTMCNC: 34.2 G/DL (ref 31–36)
MCV RBC AUTO: 91.6 FL (ref 80–100)
MONOCYTES ABSOLUTE: 0.6 K/UL (ref 0–1.3)
MONOCYTES RELATIVE PERCENT: 9.9 %
NEUTROPHILS ABSOLUTE: 4.1 K/UL (ref 1.7–7.7)
NEUTROPHILS RELATIVE PERCENT: 67.9 %
PDW BLD-RTO: 14.2 % (ref 12.4–15.4)
PLATELET # BLD: 302 K/UL (ref 135–450)
PMV BLD AUTO: 10.3 FL (ref 5–10.5)
RBC # BLD: 3.74 M/UL (ref 4.2–5.9)
REPORT: NORMAL
STREP PNEUMONIAE ANTIGEN, URINE: NORMAL
WBC # BLD: 6.1 K/UL (ref 4–11)

## 2018-10-28 PROCEDURE — 6360000002 HC RX W HCPCS: Performed by: INTERNAL MEDICINE

## 2018-10-28 PROCEDURE — 1200000000 HC SEMI PRIVATE

## 2018-10-28 PROCEDURE — 6370000000 HC RX 637 (ALT 250 FOR IP): Performed by: INTERNAL MEDICINE

## 2018-10-28 PROCEDURE — 93010 ELECTROCARDIOGRAM REPORT: CPT | Performed by: INTERNAL MEDICINE

## 2018-10-28 PROCEDURE — 36415 COLL VENOUS BLD VENIPUNCTURE: CPT

## 2018-10-28 PROCEDURE — 85025 COMPLETE CBC W/AUTO DIFF WBC: CPT

## 2018-10-28 PROCEDURE — 2580000003 HC RX 258: Performed by: INTERNAL MEDICINE

## 2018-10-28 PROCEDURE — 94760 N-INVAS EAR/PLS OXIMETRY 1: CPT

## 2018-10-28 PROCEDURE — 99223 1ST HOSP IP/OBS HIGH 75: CPT | Performed by: INTERNAL MEDICINE

## 2018-10-28 PROCEDURE — 87449 NOS EACH ORGANISM AG IA: CPT

## 2018-10-28 PROCEDURE — 6370000000 HC RX 637 (ALT 250 FOR IP): Performed by: NURSE PRACTITIONER

## 2018-10-28 RX ORDER — POTASSIUM CHLORIDE 20 MEQ/1
20 TABLET, EXTENDED RELEASE ORAL
Status: DISCONTINUED | OUTPATIENT
Start: 2018-10-28 | End: 2018-10-31

## 2018-10-28 RX ADMIN — CEFTRIAXONE 1 G: 1 INJECTION, POWDER, FOR SOLUTION INTRAMUSCULAR; INTRAVENOUS at 10:05

## 2018-10-28 RX ADMIN — AZITHROMYCIN MONOHYDRATE 500 MG: 500 INJECTION, POWDER, LYOPHILIZED, FOR SOLUTION INTRAVENOUS at 09:02

## 2018-10-28 RX ADMIN — POTASSIUM CHLORIDE 20 MEQ: 20 TABLET, EXTENDED RELEASE ORAL at 12:58

## 2018-10-28 RX ADMIN — ENOXAPARIN SODIUM: 60 INJECTION SUBCUTANEOUS at 22:13

## 2018-10-28 RX ADMIN — Medication 10 ML: at 22:14

## 2018-10-28 RX ADMIN — ENOXAPARIN SODIUM 50 MG: 60 INJECTION SUBCUTANEOUS at 09:02

## 2018-10-28 RX ADMIN — METOPROLOL TARTRATE 25 MG: 25 TABLET ORAL at 09:01

## 2018-10-28 RX ADMIN — METOPROLOL TARTRATE 25 MG: 25 TABLET ORAL at 22:14

## 2018-10-28 RX ADMIN — Medication 10 ML: at 09:02

## 2018-10-28 RX ADMIN — ASPIRIN 81 MG CHEWABLE TABLET 81 MG: 81 TABLET CHEWABLE at 09:01

## 2018-10-28 ASSESSMENT — PAIN SCALES - GENERAL: PAINLEVEL_OUTOF10: 0

## 2018-10-28 NOTE — PROGRESS NOTES
non-distended with normal bowel sounds. Musculoskeletal: No clubbing, cyanosis or edema bilaterally. Full range of motion without deformity. Skin: Skin color, texture, turgor normal.  No rashes or lesions. Neurologic:  Neurovascularly intact without any focal sensory/motor deficits. Cranial nerves: II-XII intact, grossly non-focal.  Psychiatric: Alert and oriented, thought content appropriate, normal insight  Capillary Refill: Brisk,< 3 seconds   Peripheral Pulses: +2 palpable, equal bilaterally       Labs:   Recent Labs      10/27/18   1043  10/28/18   0524   WBC  7.7  6.1   HGB  13.3*  11.7*   HCT  39.9*  34.3*   PLT  381  302     Recent Labs      10/27/18   1043   NA  141   K  3.5   CL  104   CO2  23   BUN  19   CREATININE  0.8   CALCIUM  9.3     No results for input(s): AST, ALT, BILIDIR, BILITOT, ALKPHOS in the last 72 hours. No results for input(s): INR in the last 72 hours. Recent Labs      10/27/18   1043  10/27/18   1602  10/27/18   2018   TROPONINI  <0.01  <0.01  <0.01       Urinalysis:    Lab Results   Component Value Date    NITRU Negative 03/20/2017    WBCUA 2 03/20/2017    RBCUA 6 03/20/2017    BLOODU Negative 03/20/2017    SPECGRAV 1.017 03/20/2017    GLUCOSEU Negative 03/20/2017       Radiology:  CT CHEST PULMONARY EMBOLISM W CONTRAST   Final Result   No gross findings of pulmonary embolism. Bilateral heterogeneous ground-glass and nodular infiltrate with mucous   plugging, likely reflecting pneumonia. Follow-up to resolution is   recommended. 5.2 cm anterior right chest wall mass is similar to slightly increased in   size as compared to prior. XR CHEST STANDARD (2 VW)   Final Result   Right perihilar mass is increased in size as compared to prior. Increasing left basilar pulmonary opacity, which may reflect progressive   fibrosis or superimposed pneumonia.                  Assessment/Plan:    Active Hospital Problems    Diagnosis Date Noted    Pneumonia [J18.9]

## 2018-10-29 LAB
ANION GAP SERPL CALCULATED.3IONS-SCNC: 11 MMOL/L (ref 3–16)
BUN BLDV-MCNC: 13 MG/DL (ref 7–20)
CALCIUM SERPL-MCNC: 8.9 MG/DL (ref 8.3–10.6)
CHLORIDE BLD-SCNC: 106 MMOL/L (ref 99–110)
CO2: 24 MMOL/L (ref 21–32)
CREAT SERPL-MCNC: 0.7 MG/DL (ref 0.8–1.3)
GFR AFRICAN AMERICAN: >60
GFR NON-AFRICAN AMERICAN: >60
GLUCOSE BLD-MCNC: 98 MG/DL (ref 70–99)
POTASSIUM SERPL-SCNC: 4.4 MMOL/L (ref 3.5–5.1)
SODIUM BLD-SCNC: 141 MMOL/L (ref 136–145)

## 2018-10-29 PROCEDURE — 1200000000 HC SEMI PRIVATE

## 2018-10-29 PROCEDURE — 6370000000 HC RX 637 (ALT 250 FOR IP): Performed by: INTERNAL MEDICINE

## 2018-10-29 PROCEDURE — 6360000002 HC RX W HCPCS: Performed by: INTERNAL MEDICINE

## 2018-10-29 PROCEDURE — 36415 COLL VENOUS BLD VENIPUNCTURE: CPT

## 2018-10-29 PROCEDURE — 99233 SBSQ HOSP IP/OBS HIGH 50: CPT | Performed by: INTERNAL MEDICINE

## 2018-10-29 PROCEDURE — 97161 PT EVAL LOW COMPLEX 20 MIN: CPT

## 2018-10-29 PROCEDURE — 2580000003 HC RX 258: Performed by: INTERNAL MEDICINE

## 2018-10-29 PROCEDURE — G8988 SELF CARE GOAL STATUS: HCPCS

## 2018-10-29 PROCEDURE — G8978 MOBILITY CURRENT STATUS: HCPCS

## 2018-10-29 PROCEDURE — 97165 OT EVAL LOW COMPLEX 30 MIN: CPT

## 2018-10-29 PROCEDURE — G8979 MOBILITY GOAL STATUS: HCPCS

## 2018-10-29 PROCEDURE — 2060000000 HC ICU INTERMEDIATE R&B

## 2018-10-29 PROCEDURE — 97116 GAIT TRAINING THERAPY: CPT

## 2018-10-29 PROCEDURE — 6370000000 HC RX 637 (ALT 250 FOR IP): Performed by: NURSE PRACTITIONER

## 2018-10-29 PROCEDURE — 80048 BASIC METABOLIC PNL TOTAL CA: CPT

## 2018-10-29 PROCEDURE — 93306 TTE W/DOPPLER COMPLETE: CPT

## 2018-10-29 PROCEDURE — 94760 N-INVAS EAR/PLS OXIMETRY 1: CPT

## 2018-10-29 PROCEDURE — G8987 SELF CARE CURRENT STATUS: HCPCS

## 2018-10-29 PROCEDURE — 97530 THERAPEUTIC ACTIVITIES: CPT

## 2018-10-29 RX ORDER — DIPHENHYDRAMINE HCL 25 MG
25 TABLET ORAL NIGHTLY PRN
Status: DISCONTINUED | OUTPATIENT
Start: 2018-10-29 | End: 2018-11-04 | Stop reason: HOSPADM

## 2018-10-29 RX ADMIN — ENOXAPARIN SODIUM 50 MG: 60 INJECTION SUBCUTANEOUS at 08:32

## 2018-10-29 RX ADMIN — DIPHENHYDRAMINE HCL 25 MG: 25 TABLET ORAL at 21:50

## 2018-10-29 RX ADMIN — METOPROLOL TARTRATE 25 MG: 25 TABLET ORAL at 21:50

## 2018-10-29 RX ADMIN — CEFTRIAXONE 1 G: 1 INJECTION, POWDER, FOR SOLUTION INTRAMUSCULAR; INTRAVENOUS at 10:49

## 2018-10-29 RX ADMIN — POTASSIUM CHLORIDE 20 MEQ: 20 TABLET, EXTENDED RELEASE ORAL at 08:32

## 2018-10-29 RX ADMIN — APIXABAN 5 MG: 5 TABLET, FILM COATED ORAL at 17:34

## 2018-10-29 RX ADMIN — AMIODARONE HYDROCHLORIDE 1 MG/MIN: 50 INJECTION, SOLUTION INTRAVENOUS at 15:33

## 2018-10-29 RX ADMIN — Medication 10 ML: at 08:33

## 2018-10-29 RX ADMIN — AZITHROMYCIN MONOHYDRATE 500 MG: 500 INJECTION, POWDER, LYOPHILIZED, FOR SOLUTION INTRAVENOUS at 08:32

## 2018-10-29 RX ADMIN — ASPIRIN 81 MG CHEWABLE TABLET 81 MG: 81 TABLET CHEWABLE at 08:32

## 2018-10-29 RX ADMIN — DEXTROSE MONOHYDRATE 150 MG: 50 INJECTION, SOLUTION INTRAVENOUS at 15:16

## 2018-10-29 RX ADMIN — METOPROLOL TARTRATE 25 MG: 25 TABLET ORAL at 08:32

## 2018-10-29 ASSESSMENT — PAIN SCALES - GENERAL
PAINLEVEL_OUTOF10: 0

## 2018-10-29 NOTE — CARE COORDINATION
Met w/pt and son in law to address barriers to dc. Pt gave me permission to speak with visitors present. HOME: pt lives w/son in a [address], 1 [address] home. Pt droves self here and intends to drive self home-I deferred to MD.    DME: no    ACTIVE SERVICES: no    TRANSPORTATION: self or family    PHARMACY: [facility] [place], denies difficulty obtaining/taking meds. Anticipate pt will need Eliquis at dc. Pt agrees to meds to beds for this medication. He wants the rest to be sent to his home pharmacy.       PCP:  Marilin Mills    DEMOGRAPHICS: verified address/phone number as correct    INSURANCE:  [facility]    HD/PD/O2: no    THERAPY RECS: home w/assist of family prn, no equipment recs    F/U: eliquis script    PLAN: home no needs, send eliquis script to retail pharmacy    Obi Woodruff, RN  Case management  [phone]

## 2018-10-29 NOTE — PROGRESS NOTES
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 He noticed that he was  starting to feel short of breath and was having  cough with some foamy  sputum production. He also started to get weak. He came back to  [place] yesterday. His symptoms persisted and he came to the  Emergency Room and started to have rapid atrial fibrillation and also  evidence of pneumonia. He denied any chest tightness, any dizziness  or passing out spells.     Subjective:  Pt with no acute overnight events. Still has A fib & has RVR with minimal exertion    Objective:   /82   Pulse 83   Temp 98.4 °F (36.9 °C) (Oral)   Resp 16   Ht 5' 9\" (1.753 m)   Wt 118 lb 6.2 oz (53.7 kg)   SpO2 96%   BMI 17.48 kg/m²     Intake/Output Summary (Last 24 hours) at 10/29/18 1427  Last data filed at 10/29/18 1405   Gross per 24 hour   Intake              480 ml   Output                0 ml   Net              480 ml     Wt Readings from Last 3 Encounters:   10/29/18 118 lb 6.2 oz (53.7 kg)   08/06/18 121 lb 6.4 oz (55.1 kg)   03/29/18 131 lb (59.4 kg)     Telemetry:A fib    Physical Exam:  General:  NAD, Awake, alert and oriented X4  Skin:  Warm and dry  Neck:  Supple, no JVP appreciated, no bruit  Chest:  Clear to auscultation, no wheezes/rhonchi/rales  Cardiovascular:  Regular rate.  S1S2  Abdomen:  Soft, nontender, +bowel sounds  Extremities:  No LE edema    Cardiac Diagnosis:  hypertension and atrial fibrillation    Medications:    cefTRIAXone (ROCEPHIN) IV  1 g Intravenous Q24H    And    azithromycin  500 mg Intravenous Q24H    apixaban  5 mg Oral BID    potassium chloride  20 mEq Oral Daily with breakfast    sodium chloride flush  10 mL Intravenous 2 times per day    aspirin  81 mg Oral Daily    metoprolol tartrate  25 mg Oral BID      diltiazem (CARDIZEM) 125 mg in dextrose 5% 125 mL infusion

## 2018-10-29 NOTE — PROGRESS NOTES
9.3  8.9     No results for input(s): AST, ALT, BILIDIR, BILITOT, ALKPHOS in the last 72 hours. No results for input(s): INR in the last 72 hours. Recent Labs      10/27/18   1043  10/27/18   1602  10/27/18   2018   TROPONINI  <0.01  <0.01  <0.01       Urinalysis:    Lab Results   Component Value Date    NITRU Negative 03/20/2017    WBCUA 2 03/20/2017    RBCUA 6 03/20/2017    BLOODU Negative 03/20/2017    SPECGRAV 1.017 03/20/2017    GLUCOSEU Negative 03/20/2017       Radiology:  CT CHEST PULMONARY EMBOLISM W CONTRAST   Final Result   No gross findings of pulmonary embolism. Bilateral heterogeneous ground-glass and nodular infiltrate with mucous   plugging, likely reflecting pneumonia. Follow-up to resolution is   recommended. 5.2 cm anterior right chest wall mass is similar to slightly increased in   size as compared to prior. XR CHEST STANDARD (2 VW)   Final Result   Right perihilar mass is increased in size as compared to prior. Increasing left basilar pulmonary opacity, which may reflect progressive   fibrosis or superimposed pneumonia. Assessment/Plan:    Active Hospital Problems    Diagnosis Date Noted    Pneumonia [J18.9] 10/27/2018     1. Pneumonia, community-acquired, Continue ceftriaxone and azithromycin, one blood culture is positive for coag neg staph, I believe that is a contaminant, change to po antibiotics upon discharge likely in am.   2. Atrial fibrillation with rapid ventricular response, patient has past history of tachycardia episodes but was never diagnosed with atrial fibrillation. CHADS2 score is 3, requiring anticoagulation. On therapeutic Lovenox, will change to eliquis, discussed with patient in details risk of stroke and bleeding with [address] [place] [address], he verbalized understanding and ok to continue [address] [place] [address]. Amelia Leonardo Echocardiogram pending. 3. 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  Follow-up with own oncologist as outpatient. 4. Hypertension, controlled  5.

## 2018-10-30 ENCOUNTER — APPOINTMENT (OUTPATIENT)
Dept: GENERAL RADIOLOGY | Age: 78
DRG: 193 | End: 2018-10-30
Payer: MEDICARE

## 2018-10-30 LAB
A/G RATIO: 1.3 (ref 1.1–2.2)
ALBUMIN SERPL-MCNC: 4 G/DL (ref 3.4–5)
ALP BLD-CCNC: 124 U/L (ref 40–129)
ALT SERPL-CCNC: 123 U/L (ref 10–40)
ANION GAP SERPL CALCULATED.3IONS-SCNC: 18 MMOL/L (ref 3–16)
AST SERPL-CCNC: 183 U/L (ref 15–37)
BASE EXCESS ARTERIAL: -9 MMOL/L (ref -3–3)
BASOPHILS ABSOLUTE: 0 K/UL (ref 0–0.2)
BASOPHILS RELATIVE PERCENT: 0.5 %
BILIRUB SERPL-MCNC: 0.7 MG/DL (ref 0–1)
BLOOD CULTURE, ROUTINE: ABNORMAL
BUN BLDV-MCNC: 18 MG/DL (ref 7–20)
CALCIUM SERPL-MCNC: 9.6 MG/DL (ref 8.3–10.6)
CARBOXYHEMOGLOBIN ARTERIAL: 0.7 % (ref 0–1.5)
CHLORIDE BLD-SCNC: 105 MMOL/L (ref 99–110)
CO2: 18 MMOL/L (ref 21–32)
CREAT SERPL-MCNC: 1 MG/DL (ref 0.8–1.3)
EOSINOPHILS ABSOLUTE: 0 K/UL (ref 0–0.6)
EOSINOPHILS RELATIVE PERCENT: 0.4 %
GFR AFRICAN AMERICAN: >60
GFR NON-AFRICAN AMERICAN: >60
GLOBULIN: 3.1 G/DL
GLUCOSE BLD-MCNC: 114 MG/DL (ref 70–99)
HCO3 ARTERIAL: 14.3 MMOL/L (ref 21–29)
HCT VFR BLD CALC: 42.6 % (ref 40.5–52.5)
HEMOGLOBIN, ART, EXTENDED: 12.8 G/DL (ref 13.5–17.5)
HEMOGLOBIN: 14 G/DL (ref 13.5–17.5)
LACTIC ACID: 3.6 MMOL/L (ref 0.4–2)
LACTIC ACID: 4.8 MMOL/L (ref 0.4–2)
LACTIC ACID: 8.6 MMOL/L (ref 0.4–2)
LYMPHOCYTES ABSOLUTE: 1.8 K/UL (ref 1–5.1)
LYMPHOCYTES RELATIVE PERCENT: 18.5 %
MAGNESIUM: 2.4 MG/DL (ref 1.8–2.4)
MCH RBC QN AUTO: 30.6 PG (ref 26–34)
MCHC RBC AUTO-ENTMCNC: 32.8 G/DL (ref 31–36)
MCV RBC AUTO: 93.3 FL (ref 80–100)
METHEMOGLOBIN ARTERIAL: 0.7 %
MONOCYTES ABSOLUTE: 0.7 K/UL (ref 0–1.3)
MONOCYTES RELATIVE PERCENT: 7.1 %
NEUTROPHILS ABSOLUTE: 7.1 K/UL (ref 1.7–7.7)
NEUTROPHILS RELATIVE PERCENT: 73.5 %
O2 CONTENT ARTERIAL: 17 ML/DL
O2 SAT, ARTERIAL: 93.8 %
O2 THERAPY: ABNORMAL
ORGANISM: ABNORMAL
ORGANISM: ABNORMAL
PCO2 ARTERIAL: 24.7 MMHG (ref 35–45)
PDW BLD-RTO: 14.7 % (ref 12.4–15.4)
PH ARTERIAL: 7.38 (ref 7.35–7.45)
PLATELET # BLD: 515 K/UL (ref 135–450)
PMV BLD AUTO: 10.7 FL (ref 5–10.5)
PO2 ARTERIAL: 72.8 MMHG (ref 75–108)
POTASSIUM SERPL-SCNC: 4.9 MMOL/L (ref 3.5–5.1)
PRO-BNP: 5509 PG/ML (ref 0–449)
PROCALCITONIN: 0.08 NG/ML (ref 0–0.15)
PROCALCITONIN: 0.29 NG/ML (ref 0–0.15)
RBC # BLD: 4.57 M/UL (ref 4.2–5.9)
SODIUM BLD-SCNC: 141 MMOL/L (ref 136–145)
TCO2 ARTERIAL: 15 MMOL/L
TOTAL PROTEIN: 7.1 G/DL (ref 6.4–8.2)
WBC # BLD: 9.6 K/UL (ref 4–11)

## 2018-10-30 PROCEDURE — 2580000003 HC RX 258: Performed by: INTERNAL MEDICINE

## 2018-10-30 PROCEDURE — 36415 COLL VENOUS BLD VENIPUNCTURE: CPT

## 2018-10-30 PROCEDURE — 83605 ASSAY OF LACTIC ACID: CPT

## 2018-10-30 PROCEDURE — 6370000000 HC RX 637 (ALT 250 FOR IP): Performed by: INTERNAL MEDICINE

## 2018-10-30 PROCEDURE — 82803 BLOOD GASES ANY COMBINATION: CPT

## 2018-10-30 PROCEDURE — 3430000000 HC RX DIAGNOSTIC RADIOPHARMACEUTICAL: Performed by: INTERNAL MEDICINE

## 2018-10-30 PROCEDURE — 93005 ELECTROCARDIOGRAM TRACING: CPT | Performed by: INTERNAL MEDICINE

## 2018-10-30 PROCEDURE — 84145 PROCALCITONIN (PCT): CPT

## 2018-10-30 PROCEDURE — 83880 ASSAY OF NATRIURETIC PEPTIDE: CPT

## 2018-10-30 PROCEDURE — 1200000000 HC SEMI PRIVATE

## 2018-10-30 PROCEDURE — 99223 1ST HOSP IP/OBS HIGH 75: CPT | Performed by: INTERNAL MEDICINE

## 2018-10-30 PROCEDURE — 83735 ASSAY OF MAGNESIUM: CPT

## 2018-10-30 PROCEDURE — 6360000002 HC RX W HCPCS: Performed by: INTERNAL MEDICINE

## 2018-10-30 PROCEDURE — 71045 X-RAY EXAM CHEST 1 VIEW: CPT

## 2018-10-30 PROCEDURE — A9502 TC99M TETROFOSMIN: HCPCS | Performed by: INTERNAL MEDICINE

## 2018-10-30 PROCEDURE — 80053 COMPREHEN METABOLIC PANEL: CPT

## 2018-10-30 PROCEDURE — 94760 N-INVAS EAR/PLS OXIMETRY 1: CPT

## 2018-10-30 PROCEDURE — 2060000000 HC ICU INTERMEDIATE R&B

## 2018-10-30 PROCEDURE — 94664 DEMO&/EVAL PT USE INHALER: CPT

## 2018-10-30 PROCEDURE — 36600 WITHDRAWAL OF ARTERIAL BLOOD: CPT

## 2018-10-30 PROCEDURE — 99233 SBSQ HOSP IP/OBS HIGH 50: CPT | Performed by: INTERNAL MEDICINE

## 2018-10-30 PROCEDURE — 85025 COMPLETE CBC W/AUTO DIFF WBC: CPT

## 2018-10-30 RX ORDER — AMIODARONE HYDROCHLORIDE 200 MG/1
200 TABLET ORAL DAILY
Status: DISCONTINUED | OUTPATIENT
Start: 2018-10-30 | End: 2018-10-30

## 2018-10-30 RX ORDER — SODIUM CHLORIDE 9 MG/ML
INJECTION, SOLUTION INTRAVENOUS
Status: DISPENSED
Start: 2018-10-30 | End: 2018-10-31

## 2018-10-30 RX ORDER — MORPHINE SULFATE 2 MG/ML
0.5 INJECTION, SOLUTION INTRAMUSCULAR; INTRAVENOUS ONCE
Status: COMPLETED | OUTPATIENT
Start: 2018-10-30 | End: 2018-10-30

## 2018-10-30 RX ORDER — ACETAMINOPHEN 325 MG/1
650 TABLET ORAL EVERY 6 HOURS PRN
Status: DISCONTINUED | OUTPATIENT
Start: 2018-10-30 | End: 2018-11-04 | Stop reason: HOSPADM

## 2018-10-30 RX ORDER — AMIODARONE HYDROCHLORIDE 200 MG/1
200 TABLET ORAL 2 TIMES DAILY
Status: DISCONTINUED | OUTPATIENT
Start: 2018-10-30 | End: 2018-10-31

## 2018-10-30 RX ORDER — SODIUM CHLORIDE 9 MG/ML
INJECTION, SOLUTION INTRAVENOUS CONTINUOUS
Status: DISCONTINUED | OUTPATIENT
Start: 2018-10-30 | End: 2018-11-03

## 2018-10-30 RX ORDER — 0.9 % SODIUM CHLORIDE 0.9 %
30 INTRAVENOUS SOLUTION INTRAVENOUS ONCE
Status: COMPLETED | OUTPATIENT
Start: 2018-10-30 | End: 2018-10-30

## 2018-10-30 RX ORDER — 0.9 % SODIUM CHLORIDE 0.9 %
250 INTRAVENOUS SOLUTION INTRAVENOUS ONCE
Status: COMPLETED | OUTPATIENT
Start: 2018-10-30 | End: 2018-10-30

## 2018-10-30 RX ORDER — METOPROLOL SUCCINATE 50 MG/1
50 TABLET, EXTENDED RELEASE ORAL DAILY
Status: DISCONTINUED | OUTPATIENT
Start: 2018-10-30 | End: 2018-11-04 | Stop reason: HOSPADM

## 2018-10-30 RX ORDER — AMIODARONE HYDROCHLORIDE 200 MG/1
200 TABLET ORAL DAILY
Status: DISCONTINUED | OUTPATIENT
Start: 2018-11-04 | End: 2018-10-31

## 2018-10-30 RX ADMIN — MORPHINE SULFATE 0.5 MG: 2 INJECTION, SOLUTION INTRAMUSCULAR; INTRAVENOUS at 11:09

## 2018-10-30 RX ADMIN — MORPHINE SULFATE 0.5 MG: 2 INJECTION, SOLUTION INTRAMUSCULAR; INTRAVENOUS at 12:14

## 2018-10-30 RX ADMIN — Medication 10 ML: at 09:25

## 2018-10-30 RX ADMIN — CEFTRIAXONE 1 G: 1 INJECTION, POWDER, FOR SOLUTION INTRAMUSCULAR; INTRAVENOUS at 10:41

## 2018-10-30 RX ADMIN — SODIUM CHLORIDE 250 ML: 9 INJECTION, SOLUTION INTRAVENOUS at 18:39

## 2018-10-30 RX ADMIN — AMIODARONE HYDROCHLORIDE 200 MG: 200 TABLET ORAL at 21:18

## 2018-10-30 RX ADMIN — AMIODARONE HYDROCHLORIDE 200 MG: 200 TABLET ORAL at 09:25

## 2018-10-30 RX ADMIN — APIXABAN 5 MG: 5 TABLET, FILM COATED ORAL at 21:18

## 2018-10-30 RX ADMIN — Medication 10 ML: at 21:18

## 2018-10-30 RX ADMIN — APIXABAN 5 MG: 5 TABLET, FILM COATED ORAL at 09:25

## 2018-10-30 RX ADMIN — SODIUM CHLORIDE 1662 ML: 9 INJECTION, SOLUTION INTRAVENOUS at 13:30

## 2018-10-30 RX ADMIN — ASPIRIN 81 MG CHEWABLE TABLET 81 MG: 81 TABLET CHEWABLE at 09:25

## 2018-10-30 RX ADMIN — TETROFOSMIN 10 MILLICURIE: 0.23 INJECTION, POWDER, LYOPHILIZED, FOR SOLUTION INTRAVENOUS at 09:20

## 2018-10-30 RX ADMIN — POTASSIUM CHLORIDE 20 MEQ: 20 TABLET, EXTENDED RELEASE ORAL at 09:25

## 2018-10-30 RX ADMIN — ONDANSETRON 4 MG: 2 INJECTION INTRAMUSCULAR; INTRAVENOUS at 10:41

## 2018-10-30 RX ADMIN — AZITHROMYCIN MONOHYDRATE 500 MG: 500 INJECTION, POWDER, LYOPHILIZED, FOR SOLUTION INTRAVENOUS at 09:25

## 2018-10-30 RX ADMIN — ACETAMINOPHEN 650 MG: 325 TABLET, FILM COATED ORAL at 09:25

## 2018-10-30 RX ADMIN — METOPROLOL SUCCINATE 50 MG: 50 TABLET, EXTENDED RELEASE ORAL at 09:25

## 2018-10-30 ASSESSMENT — PAIN DESCRIPTION - LOCATION
LOCATION: GENERALIZED

## 2018-10-30 ASSESSMENT — PAIN DESCRIPTION - PAIN TYPE
TYPE: ACUTE PAIN

## 2018-10-30 ASSESSMENT — PAIN SCALES - GENERAL
PAINLEVEL_OUTOF10: 10
PAINLEVEL_OUTOF10: 8
PAINLEVEL_OUTOF10: 0
PAINLEVEL_OUTOF10: 10
PAINLEVEL_OUTOF10: 10
PAINLEVEL_OUTOF10: 8
PAINLEVEL_OUTOF10: 10
PAINLEVEL_OUTOF10: 7

## 2018-10-30 NOTE — PROGRESS NOTES
Physical Therapy  Follow-up attempt    Entered patient room to the patient sitting up in bed moaning. He refuses PT, states he needs morphine. Placed call to nursing and they are aware. Will follow later as time permits.     Electronically signed by Gianni Cadet PT on 10/30/2018 at 11:34 AM

## 2018-10-30 NOTE — PLAN OF CARE
Sepsis Focus Note     Suspected source:  lungs  Blood cultures collected before antibiotics:  Yes  Empiric Antibiotics given:  Yes  Fluid resuscitation (30 ml/kg) given:  Yes  Initial Lactate: 8.6  Repeat Lactate: pending     Initial fluid resuscitation has been given. The following was performed to re-assess volume status and tissue perfusion. Focused Sepsis Exam Performed:  /67   Pulse 56   Temp 97.2 °F (36.2 °C) (Axillary)   Resp 18   Ht 5' 9\" (1.753 m)   Wt 122 lb 2.2 oz (55.4 kg)   SpO2 95%   BMI 18.04 kg/m²   Cardiovascular exam shows regular rate and rhythm with normal S1/S2 without murmurs, rubs or gallops. Pulmonary exam shows a normal respiratory effort, clear to auscultation, bilaterally without Rales/Wheezes/Rhonchi. Skin exam shows normal color/perfusion. Extremity exam shows brisk capillary refill. Peripheral pulses were 2+ in the lower extremities.

## 2018-10-30 NOTE — PROGRESS NOTES
13   CREATININE  0.8  0.7*   CALCIUM  9.3  8.9     No results for input(s): AST, ALT, BILIDIR, BILITOT, ALKPHOS in the last 72 hours. No results for input(s): INR in the last 72 hours. Recent Labs      10/27/18   1043  10/27/18   1602  10/27/18   2018   TROPONINI  <0.01  <0.01  <0.01       Urinalysis:    Lab Results   Component Value Date    NITRU Negative 03/20/2017    WBCUA 2 03/20/2017    RBCUA 6 03/20/2017    BLOODU Negative 03/20/2017    SPECGRAV 1.017 03/20/2017    GLUCOSEU Negative 03/20/2017       Radiology:  CT CHEST PULMONARY EMBOLISM W CONTRAST   Final Result   No gross findings of pulmonary embolism. Bilateral heterogeneous ground-glass and nodular infiltrate with mucous   plugging, likely reflecting pneumonia. Follow-up to resolution is   recommended. 5.2 cm anterior right chest wall mass is similar to slightly increased in   size as compared to prior. XR CHEST STANDARD (2 VW)   Final Result   Right perihilar mass is increased in size as compared to prior. Increasing left basilar pulmonary opacity, which may reflect progressive   fibrosis or superimposed pneumonia. Assessment/Plan:    Active Hospital Problems    Diagnosis Date Noted    Pneumonia [J18.9] 10/27/2018     1. Pneumonia, community-acquired, Continue ceftriaxone and azithromycin, one blood culture is positive for coag neg staph, I believe that is a contaminant, change to po antibiotics upon discharge, clinically improving. 2. Atrial fibrillation with rapid ventricular response, patient has past history of tachycardia episodes but was never diagnosed with atrial fibrillation. CHADS2 score is 3, requiring anticoagulation. Was on therapeutic Lovenox, changed to eliquis, discussed with patient in details risk of stroke and bleeding with [address] [place] [address], he verbalized understanding and ok to continue [address] [place] [address]. Started on amiodarone drip by cardiology. Echocardiogram with depressed EF, might need further workup.   3.

## 2018-10-31 ENCOUNTER — APPOINTMENT (OUTPATIENT)
Dept: ULTRASOUND IMAGING | Age: 78
DRG: 193 | End: 2018-10-31
Payer: MEDICARE

## 2018-10-31 LAB
A/G RATIO: 1.6 (ref 1.1–2.2)
ACETAMINOPHEN LEVEL: <5 UG/ML (ref 10–30)
ALBUMIN SERPL-MCNC: 3.5 G/DL (ref 3.4–5)
ALP BLD-CCNC: 95 U/L (ref 40–129)
ALT SERPL-CCNC: 3771 U/L (ref 10–40)
ANION GAP SERPL CALCULATED.3IONS-SCNC: 14 MMOL/L (ref 3–16)
AST SERPL-CCNC: >7000 U/L (ref 15–37)
BASOPHILS ABSOLUTE: 0.1 K/UL (ref 0–0.2)
BASOPHILS RELATIVE PERCENT: 0.4 %
BILIRUB SERPL-MCNC: 0.8 MG/DL (ref 0–1)
BUN BLDV-MCNC: 26 MG/DL (ref 7–20)
CALCIUM SERPL-MCNC: 8.8 MG/DL (ref 8.3–10.6)
CHLORIDE BLD-SCNC: 106 MMOL/L (ref 99–110)
CO2: 19 MMOL/L (ref 21–32)
CREAT SERPL-MCNC: 1.2 MG/DL (ref 0.8–1.3)
EKG ATRIAL RATE: 300 BPM
EKG ATRIAL RATE: 34 BPM
EKG DIAGNOSIS: NORMAL
EKG DIAGNOSIS: NORMAL
EKG Q-T INTERVAL: 282 MS
EKG Q-T INTERVAL: 428 MS
EKG QRS DURATION: 90 MS
EKG QRS DURATION: 98 MS
EKG QTC CALCULATION (BAZETT): 416 MS
EKG QTC CALCULATION (BAZETT): 460 MS
EKG R AXIS: 80 DEGREES
EKG R AXIS: 88 DEGREES
EKG T AXIS: -7 DEGREES
EKG T AXIS: 62 DEGREES
EKG VENTRICULAR RATE: 160 BPM
EKG VENTRICULAR RATE: 57 BPM
EOSINOPHILS ABSOLUTE: 0 K/UL (ref 0–0.6)
EOSINOPHILS RELATIVE PERCENT: 0.1 %
GAMMA GLUTAMYL TRANSFERASE: 83 U/L (ref 8–61)
GFR AFRICAN AMERICAN: >60
GFR NON-AFRICAN AMERICAN: 58
GLOBULIN: 2.2 G/DL
GLUCOSE BLD-MCNC: 90 MG/DL (ref 70–99)
HAV IGM SER IA-ACNC: NORMAL
HCT VFR BLD CALC: 37.9 % (ref 40.5–52.5)
HEMOGLOBIN: 12.2 G/DL (ref 13.5–17.5)
HEPATITIS B CORE IGM ANTIBODY: NORMAL
HEPATITIS B SURFACE ANTIGEN INTERPRETATION: NORMAL
HEPATITIS C ANTIBODY INTERPRETATION: NORMAL
INR BLD: 4.64 (ref 0.86–1.14)
LACTIC ACID: 2 MMOL/L (ref 0.4–2)
LACTIC ACID: 3.5 MMOL/L (ref 0.4–2)
LACTIC ACID: 3.5 MMOL/L (ref 0.4–2)
LACTIC ACID: 6 MMOL/L (ref 0.4–2)
LYMPHOCYTES ABSOLUTE: 2.2 K/UL (ref 1–5.1)
LYMPHOCYTES RELATIVE PERCENT: 13.7 %
MAGNESIUM: 2.4 MG/DL (ref 1.8–2.4)
MCH RBC QN AUTO: 30.3 PG (ref 26–34)
MCHC RBC AUTO-ENTMCNC: 32.3 G/DL (ref 31–36)
MCV RBC AUTO: 94.1 FL (ref 80–100)
MONOCYTES ABSOLUTE: 1.3 K/UL (ref 0–1.3)
MONOCYTES RELATIVE PERCENT: 8.2 %
NEUTROPHILS ABSOLUTE: 12.3 K/UL (ref 1.7–7.7)
NEUTROPHILS RELATIVE PERCENT: 77.6 %
ORGANISM: ABNORMAL
PDW BLD-RTO: 14.9 % (ref 12.4–15.4)
PLATELET # BLD: 332 K/UL (ref 135–450)
PMV BLD AUTO: 10.3 FL (ref 5–10.5)
POTASSIUM SERPL-SCNC: 5.6 MMOL/L (ref 3.5–5.1)
PROCALCITONIN: 0.74 NG/ML (ref 0–0.15)
PROTHROMBIN TIME: 52.9 SEC (ref 9.8–13)
RBC # BLD: 4.03 M/UL (ref 4.2–5.9)
REPORT: NORMAL
RESPIRATORY PANEL PCR: ABNORMAL
RESPIRATORY PANEL PCR: ABNORMAL
SODIUM BLD-SCNC: 139 MMOL/L (ref 136–145)
TOTAL CK: 395 U/L (ref 39–308)
TOTAL PROTEIN: 5.7 G/DL (ref 6.4–8.2)
WBC # BLD: 15.9 K/UL (ref 4–11)

## 2018-10-31 PROCEDURE — 6370000000 HC RX 637 (ALT 250 FOR IP): Performed by: INTERNAL MEDICINE

## 2018-10-31 PROCEDURE — 87798 DETECT AGENT NOS DNA AMP: CPT

## 2018-10-31 PROCEDURE — 87070 CULTURE OTHR SPECIMN AEROBIC: CPT

## 2018-10-31 PROCEDURE — 80074 ACUTE HEPATITIS PANEL: CPT

## 2018-10-31 PROCEDURE — 6360000002 HC RX W HCPCS: Performed by: INTERNAL MEDICINE

## 2018-10-31 PROCEDURE — 94640 AIRWAY INHALATION TREATMENT: CPT

## 2018-10-31 PROCEDURE — 82550 ASSAY OF CK (CPK): CPT

## 2018-10-31 PROCEDURE — 1200000000 HC SEMI PRIVATE

## 2018-10-31 PROCEDURE — 97530 THERAPEUTIC ACTIVITIES: CPT

## 2018-10-31 PROCEDURE — 2580000003 HC RX 258: Performed by: INTERNAL MEDICINE

## 2018-10-31 PROCEDURE — 87205 SMEAR GRAM STAIN: CPT

## 2018-10-31 PROCEDURE — 99223 1ST HOSP IP/OBS HIGH 75: CPT | Performed by: INTERNAL MEDICINE

## 2018-10-31 PROCEDURE — 97116 GAIT TRAINING THERAPY: CPT

## 2018-10-31 PROCEDURE — 84145 PROCALCITONIN (PCT): CPT

## 2018-10-31 PROCEDURE — 94664 DEMO&/EVAL PT USE INHALER: CPT

## 2018-10-31 PROCEDURE — 99232 SBSQ HOSP IP/OBS MODERATE 35: CPT | Performed by: INTERNAL MEDICINE

## 2018-10-31 PROCEDURE — 80053 COMPREHEN METABOLIC PANEL: CPT

## 2018-10-31 PROCEDURE — 97535 SELF CARE MNGMENT TRAINING: CPT

## 2018-10-31 PROCEDURE — 83735 ASSAY OF MAGNESIUM: CPT

## 2018-10-31 PROCEDURE — 2060000000 HC ICU INTERMEDIATE R&B

## 2018-10-31 PROCEDURE — 76705 ECHO EXAM OF ABDOMEN: CPT

## 2018-10-31 PROCEDURE — 87633 RESP VIRUS 12-25 TARGETS: CPT

## 2018-10-31 PROCEDURE — G0480 DRUG TEST DEF 1-7 CLASSES: HCPCS

## 2018-10-31 PROCEDURE — 94760 N-INVAS EAR/PLS OXIMETRY 1: CPT

## 2018-10-31 PROCEDURE — 83605 ASSAY OF LACTIC ACID: CPT

## 2018-10-31 PROCEDURE — 6370000000 HC RX 637 (ALT 250 FOR IP): Performed by: NURSE PRACTITIONER

## 2018-10-31 PROCEDURE — 87581 M.PNEUMON DNA AMP PROBE: CPT

## 2018-10-31 PROCEDURE — 87486 CHLMYD PNEUM DNA AMP PROBE: CPT

## 2018-10-31 PROCEDURE — 85610 PROTHROMBIN TIME: CPT

## 2018-10-31 PROCEDURE — 93010 ELECTROCARDIOGRAM REPORT: CPT | Performed by: INTERNAL MEDICINE

## 2018-10-31 PROCEDURE — 36415 COLL VENOUS BLD VENIPUNCTURE: CPT

## 2018-10-31 PROCEDURE — 82977 ASSAY OF GGT: CPT

## 2018-10-31 PROCEDURE — 85025 COMPLETE CBC W/AUTO DIFF WBC: CPT

## 2018-10-31 RX ORDER — 0.9 % SODIUM CHLORIDE 0.9 %
500 INTRAVENOUS SOLUTION INTRAVENOUS ONCE
Status: COMPLETED | OUTPATIENT
Start: 2018-10-31 | End: 2018-10-31

## 2018-10-31 RX ORDER — OSELTAMIVIR PHOSPHATE 30 MG/1
30 CAPSULE ORAL 2 TIMES DAILY
Status: DISCONTINUED | OUTPATIENT
Start: 2018-10-31 | End: 2018-11-04

## 2018-10-31 RX ORDER — PHYTONADIONE 5 MG/1
5 TABLET ORAL ONCE
Status: COMPLETED | OUTPATIENT
Start: 2018-10-31 | End: 2018-10-31

## 2018-10-31 RX ADMIN — SODIUM CHLORIDE: 9 INJECTION, SOLUTION INTRAVENOUS at 00:00

## 2018-10-31 RX ADMIN — CEFTRIAXONE 1 G: 1 INJECTION, POWDER, FOR SOLUTION INTRAMUSCULAR; INTRAVENOUS at 11:06

## 2018-10-31 RX ADMIN — POTASSIUM CHLORIDE 20 MEQ: 20 TABLET, EXTENDED RELEASE ORAL at 09:46

## 2018-10-31 RX ADMIN — APIXABAN 5 MG: 5 TABLET, FILM COATED ORAL at 09:46

## 2018-10-31 RX ADMIN — PHYTONADIONE 5 MG: 5 TABLET ORAL at 15:52

## 2018-10-31 RX ADMIN — Medication 10 ML: at 09:48

## 2018-10-31 RX ADMIN — AMIODARONE HYDROCHLORIDE 200 MG: 200 TABLET ORAL at 09:47

## 2018-10-31 RX ADMIN — ASPIRIN 81 MG CHEWABLE TABLET 81 MG: 81 TABLET CHEWABLE at 09:47

## 2018-10-31 RX ADMIN — METOPROLOL SUCCINATE 50 MG: 50 TABLET, EXTENDED RELEASE ORAL at 09:47

## 2018-10-31 RX ADMIN — ALBUTEROL SULFATE 2.5 MG: 2.5 SOLUTION RESPIRATORY (INHALATION) at 02:06

## 2018-10-31 RX ADMIN — AZITHROMYCIN MONOHYDRATE 500 MG: 500 INJECTION, POWDER, LYOPHILIZED, FOR SOLUTION INTRAVENOUS at 09:47

## 2018-10-31 RX ADMIN — DIPHENHYDRAMINE HCL 25 MG: 25 TABLET ORAL at 22:45

## 2018-10-31 RX ADMIN — CEFEPIME HYDROCHLORIDE 1 G: 1 INJECTION, POWDER, FOR SOLUTION INTRAMUSCULAR; INTRAVENOUS at 19:03

## 2018-10-31 RX ADMIN — OSELTAMIVIR PHOSPHATE 30 MG: 30 CAPSULE ORAL at 15:52

## 2018-10-31 RX ADMIN — Medication 10 ML: at 20:16

## 2018-10-31 RX ADMIN — SODIUM CHLORIDE 500 ML: 9 INJECTION, SOLUTION INTRAVENOUS at 15:54

## 2018-10-31 ASSESSMENT — PAIN SCALES - GENERAL
PAINLEVEL_OUTOF10: 0

## 2018-10-31 NOTE — CONSULTS
may be below the detection limit of the test.   Normal Range:Presumptive Negative    Narrative:     ORDER#: [id number]                          ORDERED BY: Ashley Hurley  SOURCE: Urine Clean Catch                  COLLECTED:  10/28/18 13:00  ANTIBIOTICS AT PRIMO. :                      RECEIVED :  10/28/18 13:06  Performed at:  [facility]  [address] [facility]   Phone [phone]   Legionella Antigen, Urine [[id number]] Collected: 10/28/18 1300   Order Status: Completed Specimen: Urine, clean catch Updated: 10/28/18 1525    L. pneumophila Serogp 1 Ur Ag --    Presumptive Negative   Presumptive negative suggests no recent or current infections   with Legionella pneumophilia serogroup 1. Infection to   Legionella cannot be ruled out since other serogroups and   species may cause infection, antigen may not be present in   early infection, or level of antigen may be below the   detection limit. Normal Range: Presumptive Negative    Narrative:     ORDER#: [id number]                          ORDERED BY: HUMERA Roach  SOURCE: Urine Clean Catch                  COLLECTED:  10/28/18 13:00  ANTIBIOTICS AT [facility]. :                      RECEIVED :  10/28/18 13:06  Performed at:  [facility]  [address] [facility], [place] [address] [address]   Phone [phone]   Culture Blood #2 [[id number]] Collected: 10/27/18 1406   Order Status: Completed Specimen: Blood Updated: 10/28/18 1515    Culture, Blood 2 No Growth to date.  Any change in status will be called. Narrative:     ORDER#: [id number]                          ORDERED BY: Manoj Sauceda  SOURCE: Blood Antecubital-Right            COLLECTED:  10/27/18 14:06  ANTIBIOTICS AT [facility]. :                      RECEIVED :  10/27/18 14:17  If child <=2 yrs old please draw pediatric bottle. ~Blood Culture #2  Performed at:  [address] [place] [address].,

## 2018-10-31 NOTE — PROGRESS NOTES
Physical Therapy  Facility/Department: [facility] 5N PROGRESSIVE CARE  Daily Treatment Note  This note serves as D/C summary if patient is discharged prior to next treatment session. Assessment: Pt agreeable to attempting activity, able to ambulate several extended trials without AD support, supervision, no LOB. He has been completing mobility tasks in room without assist.  PT continues to anticipate safe return home upon D/C with prn assist.  Pt would likely benefit from f/u with cardiopulmonary rehab to progress his endurance. NAME: Glenn Sanchez  : 1940  MRN: [id number]    Date of Service: 10/31/2018    Discharge Recommendations:  Home with assist PRN   PT Equipment Recommendations  Equipment Needed: No    Patient Diagnosis(es): The primary encounter diagnosis was Atrial fibrillation with rapid ventricular response ([address] Diagnoses of Pneumonia due to organism, Chest wall mass, and Elevated TSH were also pertinent to this visit. has a past medical history of Erectile dysfunction; Hypertension; Osteoarthritis; Pneumonia; Thymoma, benign; and Tubulovillous adenoma polyp of colon. has a past surgical history that includes Prostate surgery; Hemorrhoid surgery; and Thymectomy. Restrictions  Restrictions/Precautions  Restrictions/Precautions: Fall Risk  Subjective   General  Chart Reviewed: Yes  Additional Pertinent Hx: Pt presented to ED 10/27 with cough and SOB. Diagnosed with pneumonia and afib with RVR. PMHx: HTN, OA  Family / Caregiver Present: Yes  Referring Practitioner: Mike Tavares MD  Subjective  Subjective: Pt agreeable to session. Pain Screening  Patient Currently in Pain: Denies  Vital Signs  Patient Currently in Pain: Denies       Orientation  Orientation  Overall Orientation Status: Within Functional Limits     Objective      Bed mobility  Supine to Sit: Independent  Sit to Supine: Independent     Transfers  Sit to Stand: Independent  Stand to sit:  Independent Ambulation  Ambulation?: Yes  Ambulation 1  Surface: level tile  Device: No Device  Assistance: Supervision  Quality of Gait: step through gait pattern, decreased valarie, steady with turns  Distance: 140', 300'  Comments: HR in 50s at rest; Increased to mid 60's with ambulation. Pt steady without AD. Other Activities: Other (see comment)  Comment: Pt able to don socks without assist; however, SPO2 decreased to high 80's with this activity (pt leaned back to supine position in order to lift leg up to don sock). Assessment   Body structures, Functions, Activity limitations: Decreased endurance  Assessment: Pt agreeable to attempting activity, able to ambulate several extended trials without AD support, supervision, no LOB. He has been completing mobility tasks in room without assist.  PT continues to anticipate safe return home upon D/C with prn assist.  Pt would likely benefit from f/u with cardiopulmonary rehab to progress his endurance. Activity Tolerance  Activity Tolerance: Patient Tolerated treatment well;Patient limited by endurance     Goals  Short term goals  Time Frame for Short term goals: 2 days  Short term goal 1: Pt will ambulate 200' without AD mod I  Short term goal 2: Pt will negotiate 12 steps with HR and supervision  Short term goal 3: Pt will perform transfers mod I  Patient Goals   Patient goals : \"to go home today\"    Plan    Plan  Times per week: 3-5x/wk  Current Treatment Recommendations: Functional Mobility Training, Gait Training, Stair training, Endurance Training  Safety Devices  Type of devices:  All fall risk precautions in place, Call light within reach, Left in bed  Restraints  Initially in place: No     Therapy Time   Individual Concurrent Group Co-treatment   Time In 1330         Time Out 1354         Minutes 24         Timed Code Treatment Minutes: 24 Minutes       Leisa Litten, PT    Electronically signed by Leisa Litten, PT [id number] on 10/31/2018 at 1:59

## 2018-10-31 NOTE — PROGRESS NOTES
chloride  20 mEq Oral Daily with breakfast    sodium chloride flush  10 mL Intravenous 2 times per day    aspirin  81 mg Oral Daily       Continuous Infusions:   sodium chloride 75 mL/hr at 10/31/18 0000    amiodarone 450mg/250ml D5W infusion 1 mg/min (10/29/18 1533)    diltiazem (CARDIZEM) 125 mg in dextrose 5% 125 mL infusion Stopped (10/28/18 0111)       PRN Meds:  acetaminophen, regadenoson, diphenhydrAMINE, sodium chloride flush, magnesium hydroxide, ondansetron, albuterol    Labs reviewed:  CBC: Recent Labs      10/30/18   0913  10/31/18   0552   WBC  9.6  15.9*   HGB  14.0  12.2*   HCT  42.6  37.9*   MCV  93.3  94.1   PLT  515*  332     BMP: Recent Labs      10/29/18   0559  10/30/18   0913  10/31/18   0552   NA  141  141  139   K  4.4  4.9  5.6*   CL  106  105  106   CO2  24  18*  19*   BUN  13  18  26*   CREATININE  0.7*  1.0  1.2     LIVER PROFILE:   Recent Labs      10/30/18   0913  10/31/18   0552   AST  183*  >7,000*   ALT  123*  3,771*   BILITOT  0.7  0.8   ALKPHOS  124  95     PT/INR: No results for input(s): PROTIME, INR in the last 72 hours. APTT: No results for input(s): APTT in the last 72 hours. UA:No results for input(s): NITRITE, COLORU, PHUR, LABCAST, WBCUA, RBCUA, MUCUS, TRICHOMONAS, YEAST, BACTERIA, CLARITYU, SPECGRAV, LEUKOCYTESUR, UROBILINOGEN, BILIRUBINUR, BLOODU, GLUCOSEU, AMORPHOUS in the last 72 hours. Invalid input(s): [facility]  No results for input(s): PH, PCO2, PO2 in the last 72 hours. Films:  Radiology Review:  Pertinent images / reports were reviewed as a part of this visit.         Thank you for this consult,    Luis Herring [address] [place] [address] Pulmonary, Critical Care, and Sleep Medicine

## 2018-11-01 PROBLEM — E43 SEVERE MALNUTRITION (HCC): Status: ACTIVE | Noted: 2018-11-01

## 2018-11-01 LAB
A/G RATIO: 1 (ref 1.1–2.2)
ALBUMIN SERPL-MCNC: 2.8 G/DL (ref 3.4–5)
ALP BLD-CCNC: 103 U/L (ref 40–129)
ALT SERPL-CCNC: 2476 U/L (ref 10–40)
ANION GAP SERPL CALCULATED.3IONS-SCNC: 11 MMOL/L (ref 3–16)
AST SERPL-CCNC: 2175 U/L (ref 15–37)
BASOPHILS ABSOLUTE: 0.1 K/UL (ref 0–0.2)
BASOPHILS RELATIVE PERCENT: 0.9 %
BILIRUB SERPL-MCNC: 1.1 MG/DL (ref 0–1)
BUN BLDV-MCNC: 24 MG/DL (ref 7–20)
CALCIUM SERPL-MCNC: 8.5 MG/DL (ref 8.3–10.6)
CHLORIDE BLD-SCNC: 104 MMOL/L (ref 99–110)
CO2: 20 MMOL/L (ref 21–32)
CREAT SERPL-MCNC: 1 MG/DL (ref 0.8–1.3)
CULTURE, BLOOD 2: NORMAL
EOSINOPHILS ABSOLUTE: 0 K/UL (ref 0–0.6)
EOSINOPHILS RELATIVE PERCENT: 0.2 %
GFR AFRICAN AMERICAN: >60
GFR NON-AFRICAN AMERICAN: >60
GLOBULIN: 2.8 G/DL
GLUCOSE BLD-MCNC: 89 MG/DL (ref 70–99)
HCT VFR BLD CALC: 43.1 % (ref 40.5–52.5)
HEMOGLOBIN: 14.5 G/DL (ref 13.5–17.5)
INR BLD: 2.75 (ref 0.86–1.14)
LACTIC ACID: 1.9 MMOL/L (ref 0.4–2)
LACTIC ACID: 2.1 MMOL/L (ref 0.4–2)
LYMPHOCYTES ABSOLUTE: 1.1 K/UL (ref 1–5.1)
LYMPHOCYTES RELATIVE PERCENT: 10.8 %
MCH RBC QN AUTO: 30.9 PG (ref 26–34)
MCHC RBC AUTO-ENTMCNC: 33.6 G/DL (ref 31–36)
MCV RBC AUTO: 92 FL (ref 80–100)
MONOCYTES ABSOLUTE: 0.7 K/UL (ref 0–1.3)
MONOCYTES RELATIVE PERCENT: 6.7 %
NEUTROPHILS ABSOLUTE: 8.5 K/UL (ref 1.7–7.7)
NEUTROPHILS RELATIVE PERCENT: 81.4 %
PDW BLD-RTO: 14.6 % (ref 12.4–15.4)
PLATELET # BLD: 361 K/UL (ref 135–450)
PMV BLD AUTO: 9.9 FL (ref 5–10.5)
POTASSIUM SERPL-SCNC: 4.7 MMOL/L (ref 3.5–5.1)
PROTHROMBIN TIME: 31.3 SEC (ref 9.8–13)
RBC # BLD: 4.69 M/UL (ref 4.2–5.9)
SODIUM BLD-SCNC: 135 MMOL/L (ref 136–145)
TOTAL PROTEIN: 5.6 G/DL (ref 6.4–8.2)
WBC # BLD: 10.4 K/UL (ref 4–11)

## 2018-11-01 PROCEDURE — 99232 SBSQ HOSP IP/OBS MODERATE 35: CPT | Performed by: INTERNAL MEDICINE

## 2018-11-01 PROCEDURE — 6370000000 HC RX 637 (ALT 250 FOR IP): Performed by: INTERNAL MEDICINE

## 2018-11-01 PROCEDURE — 2580000003 HC RX 258: Performed by: INTERNAL MEDICINE

## 2018-11-01 PROCEDURE — 94760 N-INVAS EAR/PLS OXIMETRY 1: CPT

## 2018-11-01 PROCEDURE — 2060000000 HC ICU INTERMEDIATE R&B

## 2018-11-01 PROCEDURE — 97535 SELF CARE MNGMENT TRAINING: CPT

## 2018-11-01 PROCEDURE — 85025 COMPLETE CBC W/AUTO DIFF WBC: CPT

## 2018-11-01 PROCEDURE — 85610 PROTHROMBIN TIME: CPT

## 2018-11-01 PROCEDURE — 6360000002 HC RX W HCPCS: Performed by: INTERNAL MEDICINE

## 2018-11-01 PROCEDURE — 6370000000 HC RX 637 (ALT 250 FOR IP): Performed by: NURSE PRACTITIONER

## 2018-11-01 PROCEDURE — 80053 COMPREHEN METABOLIC PANEL: CPT

## 2018-11-01 PROCEDURE — 87040 BLOOD CULTURE FOR BACTERIA: CPT

## 2018-11-01 PROCEDURE — 36415 COLL VENOUS BLD VENIPUNCTURE: CPT

## 2018-11-01 PROCEDURE — 97530 THERAPEUTIC ACTIVITIES: CPT

## 2018-11-01 PROCEDURE — 83605 ASSAY OF LACTIC ACID: CPT

## 2018-11-01 PROCEDURE — 1200000000 HC SEMI PRIVATE

## 2018-11-01 RX ORDER — LANOLIN ALCOHOL/MO/W.PET/CERES
3 CREAM (GRAM) TOPICAL NIGHTLY PRN
Status: DISCONTINUED | OUTPATIENT
Start: 2018-11-01 | End: 2018-11-04 | Stop reason: HOSPADM

## 2018-11-01 RX ADMIN — SODIUM CHLORIDE: 9 INJECTION, SOLUTION INTRAVENOUS at 04:46

## 2018-11-01 RX ADMIN — CEFEPIME HYDROCHLORIDE 1 G: 1 INJECTION, POWDER, FOR SOLUTION INTRAMUSCULAR; INTRAVENOUS at 18:13

## 2018-11-01 RX ADMIN — OSELTAMIVIR PHOSPHATE 30 MG: 30 CAPSULE ORAL at 20:48

## 2018-11-01 RX ADMIN — Medication 3 MG: at 00:50

## 2018-11-01 RX ADMIN — Medication 10 ML: at 20:48

## 2018-11-01 RX ADMIN — METOPROLOL SUCCINATE 50 MG: 50 TABLET, EXTENDED RELEASE ORAL at 09:19

## 2018-11-01 RX ADMIN — OSELTAMIVIR PHOSPHATE 30 MG: 30 CAPSULE ORAL at 09:19

## 2018-11-01 RX ADMIN — ASPIRIN 81 MG CHEWABLE TABLET 81 MG: 81 TABLET CHEWABLE at 09:18

## 2018-11-01 RX ADMIN — CEFEPIME HYDROCHLORIDE 1 G: 1 INJECTION, POWDER, FOR SOLUTION INTRAMUSCULAR; INTRAVENOUS at 04:47

## 2018-11-01 RX ADMIN — SODIUM CHLORIDE: 9 INJECTION, SOLUTION INTRAVENOUS at 18:15

## 2018-11-01 ASSESSMENT — PAIN SCALES - GENERAL: PAINLEVEL_OUTOF10: 0

## 2018-11-01 NOTE — PROGRESS NOTES
yeast     Lactic Acid, Plasma    Collection Time: 10/31/18 11:55 AM   Result Value Ref Range    Lactic Acid 6.0 (HH) 0.4 - 2.0 mmol/L   Protime-INR    Collection Time: 10/31/18 11:55 AM   Result Value Ref Range    Protime 52.9 (H) 9.8 - 13.0 sec    INR 4.64 (HH) 0.86 - 1.14   Hepatitis Panel, Acute    Collection Time: 10/31/18 11:55 AM   Result Value Ref Range    Hep A IgM Non-reactive Non-reactive    Hep B Core Ab, IgM Non-reactive Non-reactive    Hep B S Ag Interp Non-reactive Non-reactive    Hep C Ab Interp Non-reactive Non-reactive   Respiratory Panel, Film Array    Collection Time: 10/31/18  1:04 PM   Result Value Ref Range    Respiratory Panel PCR      Organism Influenza A H1-2009 by PCR (A)     Respiratory Panel PCR       POSITIVE FOR  See additional report for complete Respiratory Pathogen Panel     Respiratory Panel Film Array Report    Collection Time: 10/31/18  1:04 PM   Result Value Ref Range    Report SEE IMAGE    Lactic Acid, Plasma    Collection Time: 10/31/18  7:41 PM   Result Value Ref Range    Lactic Acid 2.0 0.4 - 2.0 mmol/L   Lactic Acid, Plasma    Collection Time: 10/31/18 11:56 PM   Result Value Ref Range    Lactic Acid 2.1 (H) 0.4 - 2.0 mmol/L     Other Labs    Imaging  US ABDOMEN LIMITED   Final Result   Mild right upper quadrant ascites. XR CHEST PORTABLE   Final Result   Stable right infrahilar masslike opacity. Generalized interstitial prominence and bibasilar atelectasis/scarring. Persistent elevation of the right hemidiaphragm. CT CHEST PULMONARY EMBOLISM W CONTRAST   Final Result   No gross findings of pulmonary embolism. Bilateral heterogeneous ground-glass and nodular infiltrate with mucous   plugging, likely reflecting pneumonia. Follow-up to resolution is   recommended. 5.2 cm anterior right chest wall mass is similar to slightly increased in   size as compared to prior.          XR CHEST STANDARD (2 VW)   Final Result   Right perihilar mass is

## 2018-11-01 NOTE — PROGRESS NOTES
perihilar mass is increased in size as compared to prior. Increasing left basilar pulmonary opacity, which may reflect progressive   fibrosis or superimposed pneumonia. All the pertinent images and reports for the current Hospitalization were reviewed by me     Scheduled Meds:   oseltamivir  30 mg Oral BID    cefepime  1 g Intravenous Q12H    metoprolol succinate  50 mg Oral Daily    sodium chloride flush  10 mL Intravenous 2 times per day    aspirin  81 mg Oral Daily       Continuous Infusions:   sodium chloride 75 mL/hr at 11/01/18 0446       PRN Meds:  melatonin, acetaminophen, regadenoson, diphenhydrAMINE, sodium chloride flush, magnesium hydroxide, ondansetron, albuterol      Assessment:   Sepsis  Pneumonia  Abnormal Lfts with Hepatitis picture as AST/ALT very elevated ? Drug induced he was on Azithromycin and Amiodarone    INR elevated  A FIB from PNA  Influenza A +  Recent Trip to [facility] ? Metastatic followed by   Lactic acidosis   WBC elevation      Hypothermia, confusion and lactic acid elevation likely from septic process and given Influenza there is risk for secondary bacterial infectious process- will adjust IV abx        Labs, Microbiology, Radiology and all the pertinent results from current hospitalization and  care every where were reviewed  by me as a part of the evaluation   Plan:   1. Tamiflu x 30 mg x bid x 7 days   2. Cont  IV Cefepime   3. Off Amiodarone and Azithromycin  4. Blood cx done   5. Trend WBC and Lactic acid better   6. Isolation for Influenza  7. Cont supportive care   8. Check immune globulins     Discussed with patient/Family d/w RN     Thanks for allowing me to participate in your patient's care and please call me with any questions or concerns.     Chai Galarza MD  Infectious Disease  [facility]) Physician  Phone: [phone]   Fax : [phone]

## 2018-11-01 NOTE — PROGRESS NOTES
Nutrition Assessment    Type and Reason for Visit: Initial (LOS)    Nutrition Recommendations:   Start [employer] bid     Malnutrition Assessment:  · Malnutrition Status: Meets the criteria for severe malnutrition  · Context: Acute illness or injury  · Findings of the 6 clinical characteristics of malnutrition (Minimum of 2 out of 6 clinical characteristics is required to make the diagnosis of moderate or severe Protein Calorie Malnutrition based on AND/ASPEN Guidelines):  1. Energy Intake-Less than or equal to 75%, greater than or equal to 5 days    2. Weight Loss-Unable to assess,    3. Fat Loss-Severe subcutaneous fat loss, Fat overlying the ribs  4. Muscle Loss-Severe muscle mass loss, Temples (temporalis muscle), Clavicles (pectoralis and deltoids)  5. Fluid Accumulation-No significant fluid accumulation, Extremities  6.  Strength-Not measured    Nutrition Diagnosis:   · Problem: Inadequate oral intake  · Etiology: related to Insufficient energy/nutrient consumption     Signs and symptoms:  as evidenced by Severe muscle loss, Severe loss of subcutaneous fat, Weight loss, Patient report of    Nutrition Assessment:  · Subjective Assessment: Pt admitted & found to have pneumonia. PMH includes; HTN, Thyoma, marathon runner. Diet advanced to general. Intake reported at less than usual, 25-50%. Agreed to [employer] bid. Pt reported that UBW was 150#, 15 years ago with slow loss over time. Current UBW is 120 lbs per pt. · Nutrition-Focused Physical Findings: BM 10/31. Pt +5.9 liters. Noted trace edema to lower extremities.   · Wound Type: None  · Current Nutrition Therapies:  · Oral Diet Orders: General   · Oral Diet intake: 26-50%  · Anthropometric Measures:  · Ht: 5' 9\" (175.3 cm)   · Current Body Wt: 123 lb (55.8 kg)  · Admission Body Wt: 120 lb (54.4 kg)  · Usual Body Wt: 120 lb (54.4 kg)  · % Weight Change: 20% loss but time frame not known,     · Ideal Body Wt: 160 lb (72.6 kg), % Ideal Body 75%  · BMI

## 2018-11-01 NOTE — PROGRESS NOTES
of tachycardia episodes but was never diagnosed with atrial fibrillation. CHADS2 score is 3, requiring anticoagulation, on hold for now due to high INR. 3. Elevated LFTs with hepatitis picture, hep panel negative, GI, ID consulted. Awaiting LFT this am, US noted. 4. Elevated LA, improved with iv fluids  5. Acute metabolic encephalopathy, improved. 6. influenza A, tamiflu. 7. Thymoma, follow up outpatient. 8. Hypertension, controlled  9. Elevated TSH, repeat as outpatient.      DVT Prophylaxis: SCD  Diet: DIET GENERAL;  Code Status: Full Code        Dispo - 2-3 days    Houssam Carmencita Moritz, MD

## 2018-11-01 NOTE — PROGRESS NOTES
Occupational Therapy  Facility/Department: [address] PROGRESSIVE CARE  Daily Treatment Note  NAME: Art Flowers  : 1940  MRN: [id number]    Date of Service: 2018    Discharge Recommendations:  Home with assist PRN       Patient Diagnosis(es): The primary encounter diagnosis was Atrial fibrillation with rapid ventricular response ([address] Diagnoses of Pneumonia due to organism, Chest wall mass, and Elevated TSH were also pertinent to this visit. has a past medical history of Erectile dysfunction; Hypertension; Osteoarthritis; Pneumonia; Thymoma, benign; and Tubulovillous adenoma polyp of colon. has a past surgical history that includes Prostate surgery; Hemorrhoid surgery; and Thymectomy. Restrictions  Restrictions/Precautions  Restrictions/Precautions: Fall Risk  Position Activity Restriction  Other position/activity restrictions: IV  Subjective   General  Chart Reviewed: Yes  Patient assessed for rehabilitation services?: Yes  Additional Pertinent Hx: Patient is a 77 y.o. male who presents d/t pneumonia and A fib with RVR. Family / Caregiver Present: No  Referring Practitioner: Anuradha Tapia MD  Diagnosis: elevated LFT, A-fib, RVR, CAP, cardiomyopathy  Subjective  Subjective: Pt seen BS. Pt in bed and agreeable to OT   General Comment  Comments: . Pain Assessment  Patient Currently in Pain: No  Vital Signs  Patient Currently in Pain: No   Orientation  Orientation  Overall Orientation Status: Within Functional Limits  Objective    ADL  Grooming: Setup;Supervision  UE Bathing: Setup;Supervision  LE Bathing: Setup;Supervision  UE Dressing:  (assist to doff/sindy shirt with IV still attached.  Anticipate pt would be Independent without IV in place)  LE Dressing: Setup;Supervision (to change undershorts, pants.)        Standing Balance  Activity: SB/Supervsion for ADL's and amb in room-pt with up ad ritesh orders (IV in tow)  Wheelchair Bed Transfers  Wheelchair/Bed - Technique: Ambulating  Equipment Used: Bed (chair with arms, recliner)  Level of Asssistance: Supervision  Wheelchair Transfers Comments: no device  Bed mobility  Supine to Sit: Supervision  Sit to Supine: Unable to assess (up to chair)         Cognition  Overall Cognitive Status: Exceptions  Cognition Comment: Mild confusion      Assessment   Performance deficits / Impairments: Decreased functional mobility ; Decreased ADL status; Decreased high-level IADLs  Assessment: Pt tolerated treatment well this pm. Pt limited by decreased activity tolerance, endurance and required SBA/Supervision for ADL's, functional transfers and mob (no device). Pt's 02 sts WNL on room air. Prognosis: Good  Patient Education: Role of OT, d/c planning  REQUIRES OT FOLLOW UP: Yes  Activity Tolerance  Activity Tolerance: Patient Tolerated treatment well  Safety Devices  Safety Devices in place: Yes (RNSolange)  Type of devices: Call light within reach; Left in chair;Nurse notified          Plan   Plan  Times per week: 3-5x  Times per day: Daily  Current Treatment Recommendations: Functional Mobility Training, Equipment Evaluation, Education, & procurement, Self-Care / ADL, Patient/Caregiver Education & Training, Cognitive Reorientation       AM-PAC Score        AM-[facility] Inpatient Daily Activity Raw Score: 21  AM-PAC Inpatient ADL T-Scale Score : 44.27  ADL Inpatient CMS 0-100% Score: 32.79  ADL Inpatient CMS G-Code Modifier : CJ    Goals  Short term goals  Time Frame for Short term goals: status: goals ongoing  Short term goal 1: Fxl mobility/transfers to/from BR independently. Short term goal 2: Bathing mod I  Short term goal 3: Dressing mod I. Short term goal 4: 3 grooming tasks at sink independently. Short term goal 5: Toileting mod I. Patient Goals   Patient goals : \"To be able to get back home and get back to normal\".         Therapy Time   Individual Concurrent Group Co-treatment   Time In 1530         Time Out 1554         Minutes 24

## 2018-11-02 LAB
A/G RATIO: 1.2 (ref 1.1–2.2)
ALBUMIN SERPL-MCNC: 3.1 G/DL (ref 3.4–5)
ALP BLD-CCNC: 109 U/L (ref 40–129)
ALT SERPL-CCNC: 2143 U/L (ref 10–40)
ANION GAP SERPL CALCULATED.3IONS-SCNC: 12 MMOL/L (ref 3–16)
AST SERPL-CCNC: 1383 U/L (ref 15–37)
BASOPHILS ABSOLUTE: 0.1 K/UL (ref 0–0.2)
BASOPHILS RELATIVE PERCENT: 0.9 %
BILIRUB SERPL-MCNC: 1 MG/DL (ref 0–1)
BUN BLDV-MCNC: 17 MG/DL (ref 7–20)
CALCIUM SERPL-MCNC: 8.6 MG/DL (ref 8.3–10.6)
CHLORIDE BLD-SCNC: 107 MMOL/L (ref 99–110)
CO2: 20 MMOL/L (ref 21–32)
CREAT SERPL-MCNC: 0.8 MG/DL (ref 0.8–1.3)
CULTURE, RESPIRATORY: NORMAL
EOSINOPHILS ABSOLUTE: 0.1 K/UL (ref 0–0.6)
EOSINOPHILS RELATIVE PERCENT: 1.4 %
GFR AFRICAN AMERICAN: >60
GFR NON-AFRICAN AMERICAN: >60
GLOBULIN: 2.6 G/DL
GLUCOSE BLD-MCNC: 90 MG/DL (ref 70–99)
GRAM STAIN RESULT: NORMAL
HCT VFR BLD CALC: 39.1 % (ref 40.5–52.5)
HEMOGLOBIN: 13.4 G/DL (ref 13.5–17.5)
IGA: 326 MG/DL (ref 70–400)
IGG: 1102 MG/DL (ref 700–1600)
IGM: 38 MG/DL (ref 40–230)
INR BLD: 1.68 (ref 0.86–1.14)
LYMPHOCYTES ABSOLUTE: 1.4 K/UL (ref 1–5.1)
LYMPHOCYTES RELATIVE PERCENT: 14.1 %
MCH RBC QN AUTO: 31.1 PG (ref 26–34)
MCHC RBC AUTO-ENTMCNC: 34.1 G/DL (ref 31–36)
MCV RBC AUTO: 91.2 FL (ref 80–100)
MONOCYTES ABSOLUTE: 0.7 K/UL (ref 0–1.3)
MONOCYTES RELATIVE PERCENT: 6.9 %
NEUTROPHILS ABSOLUTE: 7.3 K/UL (ref 1.7–7.7)
NEUTROPHILS RELATIVE PERCENT: 76.7 %
PDW BLD-RTO: 14.4 % (ref 12.4–15.4)
PLATELET # BLD: 340 K/UL (ref 135–450)
PMV BLD AUTO: 10.1 FL (ref 5–10.5)
POTASSIUM SERPL-SCNC: 4.2 MMOL/L (ref 3.5–5.1)
PROTHROMBIN TIME: 19.2 SEC (ref 9.8–13)
RBC # BLD: 4.29 M/UL (ref 4.2–5.9)
SODIUM BLD-SCNC: 139 MMOL/L (ref 136–145)
TOTAL PROTEIN: 5.7 G/DL (ref 6.4–8.2)
WBC # BLD: 9.5 K/UL (ref 4–11)

## 2018-11-02 PROCEDURE — 6370000000 HC RX 637 (ALT 250 FOR IP): Performed by: NURSE PRACTITIONER

## 2018-11-02 PROCEDURE — 97116 GAIT TRAINING THERAPY: CPT

## 2018-11-02 PROCEDURE — 80053 COMPREHEN METABOLIC PANEL: CPT

## 2018-11-02 PROCEDURE — 6370000000 HC RX 637 (ALT 250 FOR IP): Performed by: INTERNAL MEDICINE

## 2018-11-02 PROCEDURE — 2580000003 HC RX 258: Performed by: INTERNAL MEDICINE

## 2018-11-02 PROCEDURE — 99232 SBSQ HOSP IP/OBS MODERATE 35: CPT | Performed by: INTERNAL MEDICINE

## 2018-11-02 PROCEDURE — 85025 COMPLETE CBC W/AUTO DIFF WBC: CPT

## 2018-11-02 PROCEDURE — 82784 ASSAY IGA/IGD/IGG/IGM EACH: CPT

## 2018-11-02 PROCEDURE — 6360000002 HC RX W HCPCS: Performed by: INTERNAL MEDICINE

## 2018-11-02 PROCEDURE — 94760 N-INVAS EAR/PLS OXIMETRY 1: CPT

## 2018-11-02 PROCEDURE — G8978 MOBILITY CURRENT STATUS: HCPCS

## 2018-11-02 PROCEDURE — 99232 SBSQ HOSP IP/OBS MODERATE 35: CPT | Performed by: NURSE PRACTITIONER

## 2018-11-02 PROCEDURE — 85610 PROTHROMBIN TIME: CPT

## 2018-11-02 PROCEDURE — 97110 THERAPEUTIC EXERCISES: CPT

## 2018-11-02 PROCEDURE — 1200000000 HC SEMI PRIVATE

## 2018-11-02 PROCEDURE — 93308 TTE F-UP OR LMTD: CPT

## 2018-11-02 PROCEDURE — 97530 THERAPEUTIC ACTIVITIES: CPT

## 2018-11-02 PROCEDURE — 36415 COLL VENOUS BLD VENIPUNCTURE: CPT

## 2018-11-02 PROCEDURE — 2060000000 HC ICU INTERMEDIATE R&B

## 2018-11-02 RX ADMIN — SODIUM CHLORIDE: 9 INJECTION, SOLUTION INTRAVENOUS at 20:49

## 2018-11-02 RX ADMIN — CEFEPIME HYDROCHLORIDE 1 G: 1 INJECTION, POWDER, FOR SOLUTION INTRAMUSCULAR; INTRAVENOUS at 18:42

## 2018-11-02 RX ADMIN — Medication 10 ML: at 20:49

## 2018-11-02 RX ADMIN — Medication 10 ML: at 10:24

## 2018-11-02 RX ADMIN — OSELTAMIVIR PHOSPHATE 30 MG: 30 CAPSULE ORAL at 20:49

## 2018-11-02 RX ADMIN — METOPROLOL SUCCINATE 50 MG: 50 TABLET, EXTENDED RELEASE ORAL at 10:17

## 2018-11-02 RX ADMIN — SODIUM CHLORIDE: 9 INJECTION, SOLUTION INTRAVENOUS at 05:43

## 2018-11-02 RX ADMIN — CEFEPIME HYDROCHLORIDE 1 G: 1 INJECTION, POWDER, FOR SOLUTION INTRAMUSCULAR; INTRAVENOUS at 05:42

## 2018-11-02 RX ADMIN — APIXABAN 5 MG: 5 TABLET, FILM COATED ORAL at 14:10

## 2018-11-02 RX ADMIN — DIPHENHYDRAMINE HCL 25 MG: 25 TABLET ORAL at 03:28

## 2018-11-02 RX ADMIN — ASPIRIN 81 MG CHEWABLE TABLET 81 MG: 81 TABLET CHEWABLE at 10:17

## 2018-11-02 RX ADMIN — APIXABAN 5 MG: 5 TABLET, FILM COATED ORAL at 20:49

## 2018-11-02 RX ADMIN — OSELTAMIVIR PHOSPHATE 30 MG: 30 CAPSULE ORAL at 10:17

## 2018-11-02 ASSESSMENT — PAIN SCALES - GENERAL
PAINLEVEL_OUTOF10: 0
PAINLEVEL_OUTOF10: 0

## 2018-11-02 NOTE — PROGRESS NOTES
Intravenous 2 times per day    aspirin  81 mg Oral Daily       Continuous Infusions:   sodium chloride 75 mL/hr at 11/02/18 0543       PRN Meds:  melatonin, acetaminophen, regadenoson, diphenhydrAMINE, sodium chloride flush, magnesium hydroxide, ondansetron, albuterol    Labs reviewed:  CBC:   Recent Labs      10/31/18   0552  11/01/18   1234  11/02/18   0713   WBC  15.9*  10.4  9.5   HGB  12.2*  14.5  13.4*   HCT  37.9*  43.1  39.1*   MCV  94.1  92.0  91.2   PLT  332  361  340     BMP:   Recent Labs      10/31/18   0552  11/01/18   1234  11/02/18   0713   NA  139  135*  139   K  5.6*  4.7  4.2   CL  106  104  107   CO2  19*  20*  20*   BUN  26*  24*  17   CREATININE  1.2  1.0  0.8     LIVER PROFILE:   Recent Labs      10/31/18   0552  11/01/18   1234  11/02/18   0713   AST  >7,000*  2,175*  1,383*   ALT  3,771*  2,476*  2,143*   BILITOT  0.8  1.1*  1.0   ALKPHOS  95  103  109     PT/INR:   Recent Labs      10/31/18   1155  11/01/18   1234  11/02/18   0713   PROTIME  52.9*  31.3*  19.2*   INR  4.64*  2.75*  1.68*     APTT: No results for input(s): APTT in the last 72 hours. UA:No results for input(s): NITRITE, COLORU, PHUR, LABCAST, WBCUA, RBCUA, MUCUS, TRICHOMONAS, YEAST, BACTERIA, CLARITYU, SPECGRAV, LEUKOCYTESUR, UROBILINOGEN, BILIRUBINUR, BLOODU, GLUCOSEU, AMORPHOUS in the last 72 hours. Invalid input(s): Nell Shetty  No results for input(s): PH, PCO2, PO2 in the last 72 hours. Films:  Radiology Review:  Pertinent images / reports were reviewed as a part of this visit.       Thank you for this consult,    uLis Herring [address] [place] [address] Pulmonary, Critical Care, and Sleep Medicine

## 2018-11-02 NOTE — PROGRESS NOTES
Respiratory Panel Film Array Report [[id number]] Collected: 10/31/18 1304   Order Status: Completed Updated: 10/31/18 1458    Report SEE IMAGE   Narrative:     CALL Spencer Dueñas [phone],  Microbiology results called to and read back by Rc Pantoja, 10/31/2018  14:58, by [facility]  Referred out by:  [facility]  [address] [facility] [employer] [place] [address]   Phone [phone]   Respiratory Panel, Film Array [[id number]] (Abnormal) Collected: 10/31/18 1304   Order Status: Completed Specimen: Nasal from Nasopharyngeal Updated: 10/31/18 1456    Respiratory Panel PCR --    Organism Influenza A H1-2009 by PCR (A)    Respiratory Panel PCR --    POSITIVE FOR   See additional report for complete Respiratory Pathogen Panel    Narrative:     ORDER#: [id number]                          ORDERED BY: LILIAN Serra  SOURCE: Nasopharyngeal                     COLLECTED:  10/31/18 13:04  ANTIBIOTICS AT [facility]IMO. :                      RECEIVED :  10/31/18 13:13  Performed at:  [facility]  [address] [facility] [address] [employer] [facility] [address] [address]   Phone [phone]   Legionella Antigen, Urine [[id number]]    Order Status: Canceled Specimen: Urine, clean catch    Culture Blood #1 [[id number]] (Abnormal) Collected: 10/27/18 1406   Order Status: Completed Specimen: Blood from Blood Updated: 10/30/18 1043    Blood Culture, Routine --    Organism Staphylococcus coagulase negative DNA Detected (A)    Blood Culture, Routine See additional report for complete BCID panel. Organism Staphylococcus coagulase-negative (A)    Blood Culture, Routine --    POSITIVE for   This organism was isolated from one bottle only. Susceptibility testing is not routinely done as this   organism frequently represents skin contamination. Additional testing can be ordered by calling the   Microbiology Department within 30 days.     Narrative:     ORDER#: [id number]                          ORDERED BY: HUMERA TATE  SOURCE: Urine Clean Catch                  COLLECTED:  10/28/18 13:00  ANTIBIOTICS AT [facility]. :                      RECEIVED :  10/28/18 13:06  Performed at:  [facility]  [address] [facility] [place] [address]   Phone [phone]   Culture Blood #2 [[id number]] Collected: 10/27/18 1406   Order Status: Completed Specimen: Blood Updated: 10/28/18 1515    Culture, Blood 2 No Growth to date.  Any change in status will be called. Narrative:     ORDER#: [id number]                          ORDERED BY: Cuba Harris  SOURCE: Blood Antecubital-Right            COLLECTED:  10/27/18 14:06  ANTIBIOTICS AT [facility]. :                      RECEIVED :  10/27/18 14:17  If child <=2 yrs old please draw pediatric bottle. ~Blood Culture #2  Performed at:  [facility]  [address] [facility] [place] [address]   Phone [phone]   Culture, Blood PCR Report [[id number]] Collected: 10/27/18 1406   Order Status: Completed Updated: 10/28/18 1122    Report SEE IMAGE   Narrative:     Referred out by:  [facility]  [address] [facility] [place] [address]   Phone [phone]         IMAGING:    US ABDOMEN LIMITED   Final Result   Mild right upper quadrant ascites. XR CHEST PORTABLE   Final Result   Stable right infrahilar masslike opacity. Generalized interstitial prominence and bibasilar atelectasis/scarring. Persistent elevation of the right hemidiaphragm. CT CHEST PULMONARY EMBOLISM W CONTRAST   Final Result   No gross findings of pulmonary embolism. Bilateral heterogeneous ground-glass and nodular infiltrate with mucous   plugging, likely reflecting pneumonia. Follow-up to resolution is   recommended. 5.2 cm anterior right chest wall mass is similar to slightly increased in   size as compared to prior.          XR CHEST STANDARD (2 VW)   Final Result   Right perihilar mass is

## 2018-11-02 NOTE — PROGRESS NOTES
Lymphocytes # 1.4 1.0 - 5.1 K/uL    Monocytes # 0.7 0.0 - 1.3 K/uL    Eosinophils # 0.1 0.0 - 0.6 K/uL    Basophils # 0.1 0.0 - 0.2 K/uL   Comprehensive Metabolic Panel    Collection Time: 11/02/18  7:13 AM   Result Value Ref Range    Sodium 139 136 - 145 mmol/L    Potassium 4.2 3.5 - 5.1 mmol/L    Chloride 107 99 - 110 mmol/L    CO2 20 (L) 21 - 32 mmol/L    Anion Gap 12 3 - 16    Glucose 90 70 - 99 mg/dL    BUN 17 7 - 20 mg/dL    CREATININE 0.8 0.8 - 1.3 mg/dL    GFR Non-African American >60 >60    GFR African American >60 >60    Calcium 8.6 8.3 - 10.6 mg/dL    Total Protein 5.7 (L) 6.4 - 8.2 g/dL    Alb 3.1 (L) 3.4 - 5.0 g/dL    Albumin/Globulin Ratio 1.2 1.1 - 2.2    Total Bilirubin 1.0 0.0 - 1.0 mg/dL    Alkaline Phosphatase 109 40 - 129 U/L    ALT 2,143 (H) 10 - 40 U/L    AST 1,383 (H) 15 - 37 U/L    Globulin 2.6 g/dL   Protime-INR    Collection Time: 11/02/18  7:13 AM   Result Value Ref Range    Protime 19.2 (H) 9.8 - 13.0 sec    INR 1.68 (H) 0.86 - 1.14   IgG, IgA, IgM    Collection Time: 11/02/18  7:13 AM   Result Value Ref Range    IgA 326.0 70.0 - 400.0 mg/dL    IgG 1102.0 700.0 - 1600.0 mg/dL    IgM 38.0 (L) 40.0 - 230.0 mg/dL     Other Labs    Imaging  US ABDOMEN LIMITED   Final Result   Mild right upper quadrant ascites. XR CHEST PORTABLE   Final Result   Stable right infrahilar masslike opacity. Generalized interstitial prominence and bibasilar atelectasis/scarring. Persistent elevation of the right hemidiaphragm. CT CHEST PULMONARY EMBOLISM W CONTRAST   Final Result   No gross findings of pulmonary embolism. Bilateral heterogeneous ground-glass and nodular infiltrate with mucous   plugging, likely reflecting pneumonia. Follow-up to resolution is   recommended. 5.2 cm anterior right chest wall mass is similar to slightly increased in   size as compared to prior.          XR CHEST STANDARD (2 VW)   Final Result   Right perihilar mass is increased in size as compared to

## 2018-11-02 NOTE — PROGRESS NOTES
Casey Daugherty   Daily Progress Note      Admit Date:  10/27/2018    Reason for follow up visit: Atrial fib    CC: \"Feeling some better today. SOB and cough better. \"    Interval History:  Feeling better. BP noted. Maintaining SR    Subjective:  Pt with no acute overnight cardiac events. Denies chest pain, palpitations, SOB, edema, dizziness. Review of Systems:   · Constitutional:  No fevers, chills. · Eyes: No visual changes or diplopia. No scleral icterus. · ENT: No headaches or vertigo. No mouth sores or sore throat. · Cardiovascular: as reviewed in HPI  · Respiratory: + cough improving. No hematemesis. · Gastrointestinal: No abdominal pain, appetite loss, blood in stools. No change in bowel or bladder habits. · Genitourinary: No dysuria, trouble voiding, or hematuria. · Musculoskeletal:  No joint complaints. · Integumentary: No rash or pruritis. · Neurological: No headache, diplopia,  numbness or tingling. · Psychiatric: No anxiety or depression. · Endocrine: No temperature intolerance. No excessive thirst, fluid intake, or urination. No tremor. · Hematologic/Lymphatic: No abnormal bruising or bleeding, blood clots or swollen lymph nodes. · Allergic/Immunologic: No nasal congestion or hives. Objective:   /74   Pulse 59   Temp 98 °F (36.7 °C) (Oral)   Resp 18   Ht 5' 9\" (1.753 m)   Wt 128 lb 4.9 oz (58.2 kg)   SpO2 92%   BMI 18.95 kg/m²     Intake/Output Summary (Last 24 hours) at 11/02/18 0832  Last data filed at 11/02/18 0544   Gross per 24 hour   Intake             2506 ml   Output              600 ml   Net             1906 ml     Wt Readings from Last 3 Encounters:   11/02/18 128 lb 4.9 oz (58.2 kg)   08/06/18 121 lb 6.4 oz (55.1 kg)   03/29/18 131 lb (59.4 kg)       Physical Exam:  General: In no acute distress. Awake, alert, and oriented X4. Resting comfortably in bed  Skin:  Warm and dry. No new appearing rashes or lesions. Neck:  Supple.  No JVD or

## 2018-11-02 NOTE — PROGRESS NOTES
Hospitalist Progress Note      PCP: Fahad Navas MD    Date of Admission: 10/27/2018        Subjective: feels ok, no chest pain or SOB, still feeling weak, no fever or chills. Medications:  Reviewed    Infusion Medications    sodium chloride 75 mL/hr at 11/02/18 0543     Scheduled Medications    oseltamivir  30 mg Oral BID    cefepime  1 g Intravenous Q12H    metoprolol succinate  50 mg Oral Daily    sodium chloride flush  10 mL Intravenous 2 times per day    aspirin  81 mg Oral Daily     PRN Meds: melatonin, acetaminophen, regadenoson, diphenhydrAMINE, sodium chloride flush, magnesium hydroxide, ondansetron, albuterol      Intake/Output Summary (Last 24 hours) at 11/02/18 0704  Last data filed at 11/02/18 0544   Gross per 24 hour   Intake             2506 ml   Output              600 ml   Net             1906 ml       Physical Exam Performed:    /74   Pulse 59   Temp 98 °F (36.7 °C) (Oral)   Resp 18   Ht 5' 9\" (1.753 m)   Wt 128 lb 4.9 oz (58.2 kg)   SpO2 92%   BMI 18.95 kg/m²     General appearance: No apparent distress. Neck: Supple  Respiratory:  Normal respiratory effort. Clear to auscultation, bilaterally without Rales/Wheezes/Rhonchi. Cardiovascular: Regular rate and rhythm with normal S1/S2 without murmurs, rubs or gallops. Abdomen: Soft, non-tender  Musculoskeletal: No clubbing  Skin: Skin color, texture, turgor normal.  No rashes or lesions.   Neurologic:  No focal weakness   Psychiatric: Alert and oriented  Capillary Refill: Brisk,< 3 seconds         Labs:   Recent Labs      10/30/18   0913  10/31/18   0552  11/01/18   1234   WBC  9.6  15.9*  10.4   HGB  14.0  12.2*  14.5   HCT  42.6  37.9*  43.1   PLT  515*  332  361     Recent Labs      10/30/18   0913  10/31/18   0552  11/01/18   1234   NA  141  139  135*   K  4.9  5.6*  4.7   CL  105  106  104   CO2  18*  19*  20*   BUN  18  26*  24*   CREATININE  1.0  1.2  1.0   CALCIUM  9.6  8.8  8.5     Recent Labs

## 2018-11-02 NOTE — PROGRESS NOTES
Physical Therapy    Facility/Department: [address] PROGRESSIVE CARE  Daily Treatment Note    This note serves as patient discharge summary if pt discharges prior to next PT visit      NAME: Dilia Maciel  : 1940  MRN: [id number]    Date of Service: 2018    Discharge Recommendations:  Home with assist PRN   Dilia Maciel scored a 23/24 on the AM-PAC short mobility form. At this time, no further PT is recommended upon discharge. Assessment: Pt agreeable to attempting activity, able to ambulate several extended trials without AD support, supervision, no LOB. He has been completing mobility tasks in room without assist.  PT continues to anticipate safe return home upon D/C with prn assist.  Pt would likely benefit from f/u with cardiopulmonary rehab to progress his endurance. PT Equipment Recommendations  Equipment Needed: No    Patient Diagnosis(es): The primary encounter diagnosis was Atrial fibrillation with rapid ventricular response ([address] Diagnoses of Pneumonia due to organism, Chest wall mass, and Elevated TSH were also pertinent to this visit. has a past medical history of Erectile dysfunction; Hypertension; Osteoarthritis; Pneumonia; Thymoma, benign; and Tubulovillous adenoma polyp of colon. has a past surgical history that includes Prostate surgery; Hemorrhoid surgery; and Thymectomy. Restrictions  Restrictions/Precautions  Restrictions/Precautions: Contact Precautions  Position Activity Restriction  Other position/activity restrictions: 18:  Droplet precautions. IV          Subjective  General  Chart Reviewed: Yes  Additional Pertinent Hx: 78 yo male presented to ED 10/27 with cough and SOB. Diagnosed with pneumonia and afib with RVR. PMHx: HTN, OA  Response To Previous Treatment: Patient with no complaints from previous session.   Family / Caregiver Present: No  Referring Practitioner: Lilly Harris MD  General Comment  Comments: Pt reclined in bed upon

## 2018-11-03 LAB
ALBUMIN SERPL-MCNC: 2.8 G/DL (ref 3.4–5)
ALP BLD-CCNC: 100 U/L (ref 40–129)
ALT SERPL-CCNC: 1630 U/L (ref 10–40)
AST SERPL-CCNC: 852 U/L (ref 15–37)
BILIRUB SERPL-MCNC: 1 MG/DL (ref 0–1)
BILIRUBIN DIRECT: 0.5 MG/DL (ref 0–0.3)
BILIRUBIN, INDIRECT: 0.5 MG/DL (ref 0–1)
INR BLD: 2.12 (ref 0.86–1.14)
PROTHROMBIN TIME: 24.2 SEC (ref 9.8–13)
TOTAL PROTEIN: 5.1 G/DL (ref 6.4–8.2)

## 2018-11-03 PROCEDURE — 2580000003 HC RX 258: Performed by: INTERNAL MEDICINE

## 2018-11-03 PROCEDURE — 36415 COLL VENOUS BLD VENIPUNCTURE: CPT

## 2018-11-03 PROCEDURE — 99232 SBSQ HOSP IP/OBS MODERATE 35: CPT | Performed by: INTERNAL MEDICINE

## 2018-11-03 PROCEDURE — 6370000000 HC RX 637 (ALT 250 FOR IP): Performed by: INTERNAL MEDICINE

## 2018-11-03 PROCEDURE — 85610 PROTHROMBIN TIME: CPT

## 2018-11-03 PROCEDURE — 6360000002 HC RX W HCPCS: Performed by: INTERNAL MEDICINE

## 2018-11-03 PROCEDURE — 6370000000 HC RX 637 (ALT 250 FOR IP): Performed by: NURSE PRACTITIONER

## 2018-11-03 PROCEDURE — 94760 N-INVAS EAR/PLS OXIMETRY 1: CPT

## 2018-11-03 PROCEDURE — 2060000000 HC ICU INTERMEDIATE R&B

## 2018-11-03 PROCEDURE — 80076 HEPATIC FUNCTION PANEL: CPT

## 2018-11-03 RX ADMIN — CEFEPIME HYDROCHLORIDE 1 G: 1 INJECTION, POWDER, FOR SOLUTION INTRAMUSCULAR; INTRAVENOUS at 17:54

## 2018-11-03 RX ADMIN — APIXABAN 5 MG: 5 TABLET, FILM COATED ORAL at 20:30

## 2018-11-03 RX ADMIN — OSELTAMIVIR PHOSPHATE 30 MG: 30 CAPSULE ORAL at 20:30

## 2018-11-03 RX ADMIN — Medication 10 ML: at 20:30

## 2018-11-03 RX ADMIN — APIXABAN 5 MG: 5 TABLET, FILM COATED ORAL at 09:15

## 2018-11-03 RX ADMIN — METOPROLOL SUCCINATE 50 MG: 50 TABLET, EXTENDED RELEASE ORAL at 09:16

## 2018-11-03 RX ADMIN — OSELTAMIVIR PHOSPHATE 30 MG: 30 CAPSULE ORAL at 09:15

## 2018-11-03 RX ADMIN — ASPIRIN 81 MG CHEWABLE TABLET 81 MG: 81 TABLET CHEWABLE at 09:16

## 2018-11-03 RX ADMIN — CEFEPIME HYDROCHLORIDE 1 G: 1 INJECTION, POWDER, FOR SOLUTION INTRAMUSCULAR; INTRAVENOUS at 05:02

## 2018-11-03 ASSESSMENT — PAIN SCALES - GENERAL
PAINLEVEL_OUTOF10: 0

## 2018-11-03 NOTE — PROGRESS NOTES
Hospitalist Progress Note      PCP: Shannon Lennon MD    Date of Admission: 10/27/2018        Subjective: feels better, no fever, chills or nausea, no abdominal pain. Medications:  Reviewed    Infusion Medications   Scheduled Medications    apixaban  5 mg Oral BID    oseltamivir  30 mg Oral BID    cefepime  1 g Intravenous Q12H    metoprolol succinate  50 mg Oral Daily    sodium chloride flush  10 mL Intravenous 2 times per day    aspirin  81 mg Oral Daily     PRN Meds: perflutren lipid microspheres, melatonin, acetaminophen, regadenoson, diphenhydrAMINE, sodium chloride flush, magnesium hydroxide, ondansetron, albuterol      Intake/Output Summary (Last 24 hours) at 11/03/18 1205  Last data filed at 11/03/18 1000   Gross per 24 hour   Intake             3550 ml   Output              425 ml   Net             3125 ml       Physical Exam Performed:    /65   Pulse 60   Temp 97.8 °F (36.6 °C) (Oral)   Resp 17   Ht 5' 9\" (1.753 m)   Wt 133 lb 13.1 oz (60.7 kg)   SpO2 92%   BMI 19.76 kg/m²     General appearance: No apparent distress  Neck: Supple  Respiratory:  Normal respiratory effort. Clear to auscultation, bilaterally without Rales/Wheezes/Rhonchi. Cardiovascular: Regular rate and rhythm with normal S1/S2 without murmurs, rubs or gallops. Abdomen: Soft, non-tender  Musculoskeletal: No clubbing  Skin: Skin color, texture, turgor normal.  No rashes or lesions.   Neurologic:  No focal weakness   Psychiatric: Alert and oriented  Capillary Refill: Brisk,< 3 seconds         Labs:   Recent Labs      11/01/18   1234  11/02/18   0713   WBC  10.4  9.5   HGB  14.5  13.4*   HCT  43.1  39.1*   PLT  361  340     Recent Labs      11/01/18   1234  11/02/18   0713   NA  135*  139   K  4.7  4.2   CL  104  107   CO2  20*  20*   BUN  24*  17   CREATININE  1.0  0.8   CALCIUM  8.5  8.6     Recent Labs      11/01/18   1234  11/02/18   0713  11/03/18   0602   AST  2,175*  1,383*  852*   ALT  2,476*  2,143*

## 2018-11-03 NOTE — PROGRESS NOTES
Pulmonary Progress Note    CC: Multifocal pneumonia  Influenza A  Transaminitis  Thymoma     Subjective:  He reports mild exertional shortness of breath. He has an intermittent cough. ROS:  +VILLELA  +cough  No vomiting     PHYSICAL EXAM:  Blood pressure 134/70, pulse 51, temperature 98 °F (36.7 °C), temperature source Oral, resp. rate 18, height 5' 9\" (1.753 m), weight 133 lb 13.1 oz (60.7 kg), SpO2 92 %. on RA    Intake/Output Summary (Last 24 hours) at 11/03/18 1628  Last data filed at 11/03/18 1206   Gross per 24 hour   Intake           2907.5 ml   Output              200 ml   Net           2707.5 ml       Gen: Well developed; well nourished  Eyes: No scleral icterus. No conjunctival injection. ENT: External appearance of ears and nose normal.  Neck: Trachea midline. No obvious mass. No visible thyroid enlargement    Respiratory: Clear to auscultation bilaterally, no accessory muscle use  Cardiovascular: Regular rate and rhythm, no appreciable murmurs. +BLE edema. Skin: Warm and dry. No rashes or ulcers on visible areas. Normal texture and turgor. Known right anterior chest wall lesion  Musculoskeletal: No cyanosis, clubbing or joint deformity.     Psychiatric: Normal mood and affect; exhibits normal insight and judgement     Medications:  Current Facility-Administered Medications: perflutren lipid microspheres (DEFINITY) injection 1.65 mg, 1.5 mL, Intravenous, ONCE PRN  apixaban (ELIQUIS) tablet 5 mg, 5 mg, Oral, BID  melatonin tablet 3 mg, 3 mg, Oral, Nightly PRN  oseltamivir (TAMIFLU) capsule 30 mg, 30 mg, Oral, BID  cefepime (MAXIPIME) 1 g IVPB minibag, 1 g, Intravenous, Q12H  acetaminophen (TYLENOL) tablet 650 mg, 650 mg, Oral, Q6H PRN  metoprolol succinate (TOPROL XL) extended release tablet 50 mg, 50 mg, Oral, Daily  regadenoson (LEXISCAN) injection 0.4 mg, 0.4 mg, Intravenous, ONCE PRN  diphenhydrAMINE (BENADRYL) tablet 25 mg, 25 mg, Oral, Nightly PRN  sodium chloride flush 0.9 % injection 10 mL, 10 mL, Critical Care and Sleep

## 2018-11-03 NOTE — PROGRESS NOTES
oseltamivir (TAMIFLU) capsule 30 mg BID   cefepime (MAXIPIME) 1 g IVPB minibag Q12H   acetaminophen (TYLENOL) tablet 650 mg Q6H PRN   metoprolol succinate (TOPROL XL) extended release tablet 50 mg Daily   regadenoson (LEXISCAN) injection 0.4 mg ONCE PRN   0.9 % sodium chloride infusion Continuous   diphenhydrAMINE (BENADRYL) tablet 25 mg Nightly PRN   sodium chloride flush 0.9 % injection 10 mL 2 times per day   sodium chloride flush 0.9 % injection 10 mL PRN   magnesium hydroxide (MILK OF MAGNESIA) 400 MG/5ML suspension 30 mL Daily PRN   ondansetron (ZOFRAN) injection 4 mg Q6H PRN   albuterol (PROVENTIL) nebulizer solution 2.5 mg Q2H PRN   aspirin chewable tablet 81 mg Daily       Allergies:  Patient has no known allergies. Review of Systems:     Constitutional: negative  Eyes: negative  Ears, nose, mouth, throat, and face: negative  Respiratory: negative  Cardiovascular: negative  Gastrointestinal: negative  Genitourinary:negative  Integument/breast: negative  Hematologic/lymphatic: negative  Musculoskeletal:negative  Neurological: negative  Behavioral/Psych: negative  Endocrine: negative  Allergic/Immunologic: negative        Objective:     PHYSICAL EXAM:      Vitals:    11/03/18 1105   BP: 108/65   Pulse: 60   Resp:    Temp: 97.8 °F (36.6 °C)   SpO2: 92%    Weight: 133 lb 13.1 oz (60.7 kg)       General Appearance:  Alert, cooperative, no distress, appears stated age. Head:  Normocephalic, without obvious abnormality, atraumatic. Eyes:  Pupils equal and round. No scleral icterus. Mouth: Moist mucosa, no pharyngeal erythema. Nose: Nares normal. No drainage or sinus tenderness. Neck: Supple, symmetrical, trachea midline. No adenopathy. No tenderness/mass/nodules. No carotid bruit or elevated JVD. Lungs:   Respiratory Effort: Normal   Auscultation: Clear to auscultation bilaterally, respirations unlabored. No wheeze, rales   Chest Wall:  No tenderness or deformity.    Cardiovascular:    Pulses Palpation: normal   Ascultation: Regular rate, S1/ S2 normal. No murmur, rub, or gallop. 2+ radial and pedal pulses, symmetric  Carotid  Femoral   Abdomen and Gastrointestinal:   Soft, non-tender, bowel sounds active. Liver and Spleen  Masses   Musculoskeletal: No muscle wasting  Back  Gait   Extremities: Extremities normal, atraumatic. No cyanosis or edema. No cyanosis clubbing       Skin: Inspection and palpation performed, no rashes or lesions. Pysch: Normal mood and affect.  Alert and oriented to time place person   Neurologic: Normal gross motor and sensory exam.       Labs     CBC: Lab Results   Component Value Date    WBC 9.5 11/02/2018    RBC 4.29 11/02/2018    HGB 13.4 11/02/2018    HCT 39.1 11/02/2018    MCV 91.2 11/02/2018    RDW 14.4 11/02/2018     11/02/2018     CMP:  Lab Results   Component Value Date     11/02/2018    K 4.2 11/02/2018     11/02/2018    CO2 20 11/02/2018    BUN 17 11/02/2018    CREATININE 0.8 11/02/2018    GFRAA >60 11/02/2018    AGRATIO 1.2 11/02/2018    LABGLOM >60 11/02/2018    GLUCOSE 90 11/02/2018    PROT 5.1 11/03/2018    CALCIUM 8.6 11/02/2018    BILITOT 1.0 11/03/2018    ALKPHOS 100 11/03/2018     11/03/2018    ALT 1,630 11/03/2018     PT/INR:  No results found for: PTINR  HgBA1c:  Lab Results   Component Value Date    LABA1C 5.6 05/08/2018     Lab Results   Component Value Date    RXLDYRO 049 (H) 10/31/2018    TROPONINI <0.01 10/27/2018       Cardiac, Vascular and Imaging Data all Personally Reviewed in Detail by Myself      EKG: NSR    Echocardiogram: Left ventricular cavity size is normal.   Normal left ventricular wall thickness.   Difficult to estimate EF due to increase rate and rhythm but appears   depressed   Mitral valve leaflets appear mildly thickened.   Mild mitral regurgitation.   The left atrium is dilated.   Aortic valve appears sclerotic but opens adequately.   Trivial aortic regurgitation.   Tricuspid valve is structurally

## 2018-11-04 VITALS
OXYGEN SATURATION: 95 % | DIASTOLIC BLOOD PRESSURE: 74 MMHG | HEIGHT: 69 IN | RESPIRATION RATE: 18 BRPM | TEMPERATURE: 97.8 F | SYSTOLIC BLOOD PRESSURE: 111 MMHG | HEART RATE: 56 BPM | BODY MASS INDEX: 20.02 KG/M2 | WEIGHT: 135.14 LBS

## 2018-11-04 LAB
A/G RATIO: 1.1 (ref 1.1–2.2)
ALBUMIN SERPL-MCNC: 2.9 G/DL (ref 3.4–5)
ALP BLD-CCNC: 101 U/L (ref 40–129)
ALT SERPL-CCNC: 1429 U/L (ref 10–40)
ANION GAP SERPL CALCULATED.3IONS-SCNC: 12 MMOL/L (ref 3–16)
AST SERPL-CCNC: 549 U/L (ref 15–37)
BASOPHILS ABSOLUTE: 0.1 K/UL (ref 0–0.2)
BASOPHILS RELATIVE PERCENT: 0.8 %
BILIRUB SERPL-MCNC: 1.1 MG/DL (ref 0–1)
BUN BLDV-MCNC: 14 MG/DL (ref 7–20)
CALCIUM SERPL-MCNC: 8.5 MG/DL (ref 8.3–10.6)
CHLORIDE BLD-SCNC: 103 MMOL/L (ref 99–110)
CO2: 22 MMOL/L (ref 21–32)
CREAT SERPL-MCNC: 0.7 MG/DL (ref 0.8–1.3)
EOSINOPHILS ABSOLUTE: 0.2 K/UL (ref 0–0.6)
EOSINOPHILS RELATIVE PERCENT: 2.2 %
GFR AFRICAN AMERICAN: >60
GFR NON-AFRICAN AMERICAN: >60
GLOBULIN: 2.7 G/DL
GLUCOSE BLD-MCNC: 95 MG/DL (ref 70–99)
HCT VFR BLD CALC: 38.1 % (ref 40.5–52.5)
HEMOGLOBIN: 12.7 G/DL (ref 13.5–17.5)
INR BLD: 1.83 (ref 0.86–1.14)
LYMPHOCYTES ABSOLUTE: 1.1 K/UL (ref 1–5.1)
LYMPHOCYTES RELATIVE PERCENT: 14.5 %
MCH RBC QN AUTO: 30.6 PG (ref 26–34)
MCHC RBC AUTO-ENTMCNC: 33.3 G/DL (ref 31–36)
MCV RBC AUTO: 91.9 FL (ref 80–100)
MONOCYTES ABSOLUTE: 0.7 K/UL (ref 0–1.3)
MONOCYTES RELATIVE PERCENT: 8.5 %
NEUTROPHILS ABSOLUTE: 5.8 K/UL (ref 1.7–7.7)
NEUTROPHILS RELATIVE PERCENT: 74 %
PDW BLD-RTO: 14.8 % (ref 12.4–15.4)
PLATELET # BLD: 311 K/UL (ref 135–450)
PMV BLD AUTO: 9.8 FL (ref 5–10.5)
POTASSIUM SERPL-SCNC: 3.6 MMOL/L (ref 3.5–5.1)
PROTHROMBIN TIME: 20.9 SEC (ref 9.8–13)
RBC # BLD: 4.15 M/UL (ref 4.2–5.9)
SODIUM BLD-SCNC: 137 MMOL/L (ref 136–145)
TOTAL PROTEIN: 5.6 G/DL (ref 6.4–8.2)
WBC # BLD: 7.9 K/UL (ref 4–11)

## 2018-11-04 PROCEDURE — 6370000000 HC RX 637 (ALT 250 FOR IP): Performed by: INTERNAL MEDICINE

## 2018-11-04 PROCEDURE — 80053 COMPREHEN METABOLIC PANEL: CPT

## 2018-11-04 PROCEDURE — 6360000002 HC RX W HCPCS: Performed by: INTERNAL MEDICINE

## 2018-11-04 PROCEDURE — 2580000003 HC RX 258: Performed by: INTERNAL MEDICINE

## 2018-11-04 PROCEDURE — 6370000000 HC RX 637 (ALT 250 FOR IP): Performed by: NURSE PRACTITIONER

## 2018-11-04 PROCEDURE — 36415 COLL VENOUS BLD VENIPUNCTURE: CPT

## 2018-11-04 PROCEDURE — 85610 PROTHROMBIN TIME: CPT

## 2018-11-04 PROCEDURE — 85025 COMPLETE CBC W/AUTO DIFF WBC: CPT

## 2018-11-04 RX ORDER — OSELTAMIVIR PHOSPHATE 75 MG/1
75 CAPSULE ORAL 2 TIMES DAILY
Qty: 7 CAPSULE | Refills: 0 | Status: SHIPPED | OUTPATIENT
Start: 2018-11-04 | End: 2018-11-07

## 2018-11-04 RX ORDER — OSELTAMIVIR PHOSPHATE 30 MG/1
30 CAPSULE ORAL 2 TIMES DAILY
Qty: 6 CAPSULE | Refills: 0 | Status: SHIPPED | OUTPATIENT
Start: 2018-11-04 | End: 2018-11-04 | Stop reason: HOSPADM

## 2018-11-04 RX ORDER — OSELTAMIVIR PHOSPHATE 75 MG/1
75 CAPSULE ORAL 2 TIMES DAILY
Status: DISCONTINUED | OUTPATIENT
Start: 2018-11-04 | End: 2018-11-04 | Stop reason: HOSPADM

## 2018-11-04 RX ORDER — METOPROLOL SUCCINATE 50 MG/1
50 TABLET, EXTENDED RELEASE ORAL DAILY
Qty: 30 TABLET | Refills: 0 | Status: SHIPPED | OUTPATIENT
Start: 2018-11-05 | End: 2018-12-05 | Stop reason: SDUPTHER

## 2018-11-04 RX ADMIN — CEFEPIME HYDROCHLORIDE 1 G: 1 INJECTION, POWDER, FOR SOLUTION INTRAMUSCULAR; INTRAVENOUS at 05:30

## 2018-11-04 RX ADMIN — APIXABAN 5 MG: 5 TABLET, FILM COATED ORAL at 09:07

## 2018-11-04 RX ADMIN — Medication 10 ML: at 09:07

## 2018-11-04 RX ADMIN — OSELTAMIVIR PHOSPHATE 30 MG: 30 CAPSULE ORAL at 09:07

## 2018-11-04 RX ADMIN — METOPROLOL SUCCINATE 50 MG: 50 TABLET, EXTENDED RELEASE ORAL at 09:06

## 2018-11-04 RX ADMIN — ASPIRIN 81 MG CHEWABLE TABLET 81 MG: 81 TABLET CHEWABLE at 09:06

## 2018-11-04 ASSESSMENT — PAIN SCALES - GENERAL: PAINLEVEL_OUTOF10: 0

## 2018-11-04 NOTE — PROGRESS NOTES
All verbal and written discharge instructions given to the pt regarding new meds and follow up appointments. Pt verbalized understandings. Iv taken out-catheter intact. Pt discharge to home ambulatory. Family members present at discharge.  Electronically signed by Thaddeus Hughes RN on 11/4/2018 at 2:56 PM

## 2018-11-04 NOTE — DISCHARGE SUMMARY
Hospital Medicine Discharge Summary    Patient ID: Kash Wilcox      Patient's PCP: Ofelia Coelho MD    Admit Date: 10/27/2018     Discharge Date:   11/04/2018    Admitting Physician: Margarita Wilson MD     Discharge Physician: Jorge Desir MD     Discharge Diagnoses: Active Hospital Problems    Diagnosis Date Noted    Multifocal pneumonia [J18.9]     Influenza A (H1N1) [J10.1]     Transaminitis [R74.0]     Cardiomyopathy ([address] [I42.9]     Severe malnutrition ([address] [E43] 11/01/2018    Chest wall mass [R22.2]     Sepsis ([address] [A41.9]     Influenza A [J10.1]     Lactic acid acidosis [E87.2]     Abnormal LFTs [R94.5]     Atrial fibrillation with rapid ventricular response ([address] [I48.91]     Pneumonia due to organism [J18.9] 10/27/2018       The patient was seen and examined on day of discharge and this discharge summary is in conjunction with any daily progress note from day of discharge. Hospital Course:     From HPI:\"78 y.o. male who presented to [facility] with weakness, and palpitations.     Patient has noticed shortness of breath past two weeks, started when he was on vacation in [facility]. He also had cough with white foamy sputum. Past two days he started feeling progressively weak and had to come to the ED today  In the ED, he was also found to have AF with RVR. Never had AF before, but review of PCP notes showed he had episodes of sinus tachycardia in the past.     No blood in sputum, no chest pain, no pleuritic chest pain, no fever or chills. Nothing that makes the symptoms better or worse. No other accompanying symptoms except for occasional palpitations.      Is known to have benign thymoma. Following with oncology.  \"      1.  Possible Pneumonia, community-acquired, was on ceftriaxone and azithromycin, azithromycin discontinued and ceftriaxone changed to cefepime by ID, one blood culture is positive for coag neg staph, I believe that is a contaminant, had elevated LA , continue to clinically improve. Called and discussed with ID on call, Dr Mamie Rios, will discontinue antibiotics, will keep tamiflu for full 7 days. Patient advised to seek immediate medical help in case of any worsening. 2. Atrial fibrillation with rapid ventricular response, patient has past history of tachycardia episodes but was never diagnosed with atrial fibrillation. CHADS2 score is 3, requiring anticoagulation, restarted on eliquis. 3. Elevated LFTs with hepatitis picture, hep panel negative, GI, ID consulted. Per GI suspecting ischemic hepatitis, US noted, LFTs continue to improve. 4. Elevated LA, improved with iv fluids  5. Acute metabolic encephalopathy, improved. 6. influenza A, tamiflu, no upper respiratory symptoms. 7. Thymoma, follow up outpatient. 8. Hypertension, controlled  9. Elevated TSH, repeat as outpatient. Physical Exam Performed:     BP (!) 158/81   Pulse 62   Temp 99 °F (37.2 °C) (Oral)   Resp 18   Ht 5' 9\" (1.753 m)   Wt 135 lb 2.3 oz (61.3 kg)   SpO2 95%   BMI 19.96 kg/m²       General appearance:  No apparent distress  HEENT:  Normal cephalic  Neck: Supple  Respiratory:  Normal respiratory effort. Clear to auscultation, bilaterally without Rales/Wheezes/Rhonchi. Cardiovascular:  Regular rate and rhythm with normal S1/S2 without murmurs, rubs or gallops. Abdomen: Soft, non-tender  Musculoskeletal:  No clubbing  Skin: Skin color, texture, turgor normal.  No rashes or lesions. Neurologic:  No focal weakness   Psychiatric:  Alert and oriented  Capillary Refill: Brisk,< 3 seconds         Labs:  For convenience and continuity at follow-up the following most recent labs are provided:      CBC:    Lab Results   Component Value Date    WBC 7.9 11/04/2018    HGB 12.7 11/04/2018    HCT 38.1 11/04/2018     11/04/2018       Renal:    Lab Results   Component Value Date     11/04/2018    K 3.6 11/04/2018     11/04/2018    CO2 22 11/04/2018 BUN 14 11/04/2018    CREATININE 0.7 11/04/2018    CALCIUM 8.5 11/04/2018         Significant Diagnostic Studies    Radiology:   US ABDOMEN LIMITED   Final Result   Mild right upper quadrant ascites. XR CHEST PORTABLE   Final Result   Stable right infrahilar masslike opacity. Generalized interstitial prominence and bibasilar atelectasis/scarring. Persistent elevation of the right hemidiaphragm. CT CHEST PULMONARY EMBOLISM W CONTRAST   Final Result   No gross findings of pulmonary embolism. Bilateral heterogeneous ground-glass and nodular infiltrate with mucous   plugging, likely reflecting pneumonia. Follow-up to resolution is   recommended. 5.2 cm anterior right chest wall mass is similar to slightly increased in   size as compared to prior. XR CHEST STANDARD (2 VW)   Final Result   Right perihilar mass is increased in size as compared to prior. Increasing left basilar pulmonary opacity, which may reflect progressive   fibrosis or superimposed pneumonia.                 Consults:     IP CONSULT TO CARDIOLOGY  IP CONSULT TO PULMONOLOGY  IP CONSULT TO GI  IP CONSULT TO INFECTIOUS DISEASES    Disposition:  home     Condition at Discharge: Stable    Discharge Instructions/Follow-up:  PCP, GI, PULMONARY     Code Status:  Full Code     Activity: activity as tolerated    Diet: cardiac diet      Discharge Medications:     Current Discharge Medication List           Details   apixaban (ELIQUIS) 5 MG TABS tablet Take 1 tablet by mouth 2 times daily  Qty: 60 tablet, Refills: 0      oseltamivir (TAMIFLU) 30 MG capsule Take 1 capsule by mouth 2 times daily  Qty: 6 capsule, Refills: 0      metoprolol succinate (TOPROL XL) 50 MG extended release tablet Take 1 tablet by mouth daily  Qty: 30 tablet, Refills: 0              Details   triamcinolone (KENALOG) 0.025 % cream Apply topically every 4 hours as needed (rash) Apply Topically  Qty: 1 Tube, Refills: 3      Multiple Vitamins-Minerals

## 2018-11-04 NOTE — PLAN OF CARE
Problem: Pain:  Goal: Pain level will decrease  Pain level will decrease   Outcome: Ongoing    Goal: Control of acute pain  Control of acute pain   Outcome: Ongoing    Goal: Control of chronic pain  Control of chronic pain   Outcome: Ongoing      Problem: Skin Integrity:  Goal: Will show no infection signs and symptoms  Will show no infection signs and symptoms   Outcome: Ongoing    Goal: Absence of new skin breakdown  Absence of new skin breakdown   Outcome: Ongoing      Problem: Falls - Risk of:  Goal: Will remain free from falls  Will remain free from falls   Outcome: Ongoing      Problem:  Activity:  Goal: Ability to tolerate increased activity will improve  Ability to tolerate increased activity will improve   Outcome: Ongoing    Goal: Expression of feelings of increased energy will increase  Expression of feelings of increased energy will increase   Outcome: Ongoing      Problem: Cardiac:  Goal: Ability to maintain an adequate cardiac output will improve  Ability to maintain an adequate cardiac output will improve   Outcome: Ongoing      Problem: Airway Clearance - Ineffective:  Goal: Clear lung sounds  Clear lung sounds   Outcome: Ongoing    Goal: Ability to maintain a clear airway will improve  Ability to maintain a clear airway will improve   Outcome: Ongoing      Problem: Gas Exchange - Impaired:  Goal: Levels of oxygenation will improve  Levels of oxygenation will improve   Outcome: Ongoing      Problem: Nutrition  Goal: Optimal nutrition therapy  Outcome: Ongoing

## 2018-11-05 ENCOUNTER — TELEPHONE (OUTPATIENT)
Dept: PHARMACY | Facility: CLINIC | Age: 78
End: 2018-11-05

## 2018-11-05 ENCOUNTER — CARE COORDINATION (OUTPATIENT)
Dept: CASE MANAGEMENT | Age: 78
End: 2018-11-05

## 2018-11-05 ENCOUNTER — TELEPHONE (OUTPATIENT)
Dept: PULMONOLOGY | Age: 78
End: 2018-11-05

## 2018-11-05 NOTE — LETTER
[address], [address]  Phone: [phone]  Fax: [place] [address]           11/06/18     Dear Adrián Allison,    We tried to reach you recently, after your hospital stay, to review your medications. I was unable to reach you on the telephone. We understand that medications can be confusing after a hospital stay. If you are interested in a pharmacist reviewing your medications, please call [phone] option #7. If we do not hear from you after 2 weeks, we will assume you do not have questions or concerns.          Sincerely,     Dougie Youssef, PharmD  [facility]  Phone: [phone], option 7

## 2018-11-05 NOTE — TELEPHONE ENCOUNTER
----- Message from Kassi Morin MD sent at 11/3/2018  5:44 PM EDT -----  Please give patient an appointment to see me for hospital follow-up at next available time. He needs a chest x-ray prior.

## 2018-11-06 LAB
BLOOD CULTURE, ROUTINE: NORMAL
CULTURE, BLOOD 2: NORMAL

## 2018-11-06 NOTE — TELEPHONE ENCOUNTER
Second and final attempt made to contact patient in regards to post-discharge medication review. Left message for patient to return my call. Will send letter to patient today and will sign off at this time.        Case Watts, PharmD, [address]: [phone] C: [phone], toll free [phone], option 1800 [facility] Call Made?: Yes  [facility] ([facility] Select Patient?: Yes  Total # of Interventions Recommended: 0  Total # Interventions Accepted: 0  Outreach Status: Patient Unreachable  Care Coordinator Outreach to Patient?: No  Provider Contacted?: No  Waiting on response from: n/a  Time Spent (min): 15

## 2018-11-06 NOTE — CARE COORDINATION
[facility] Transitions Initial Follow Up Call    Call within 2 business days of discharge: Yes    Patient: Art Flowers Patient : 1940   MRN: [id number]  Reason for Admission: There are no discharge diagnoses documented for the most recent discharge. Discharge Date: 18 RARS: Readmission Risk Score: 15       Facility: [address] [place] [address],[address] Transitions 24 Hour Call    Care Transitions Interventions         Follow Up: Second and final attempt at 24 hour discharge call, no answer, CTC left VM with contact information and request for return call. CTC will remain available. No future appointments.     Noe Lamb RN

## 2018-11-20 ENCOUNTER — TELEPHONE (OUTPATIENT)
Dept: CARDIOLOGY CLINIC | Age: 78
End: 2018-11-20

## 2018-11-20 NOTE — TELEPHONE ENCOUNTER
Please call patient back and schedule an appt with Dr. Mindy Contreras, since he was followed in the hospital by him.     Thank you

## 2018-11-20 NOTE — TELEPHONE ENCOUNTER
Gely Vaz, please see note below. Could you please advise how soon the pt should be seen and if seeing Radha Zhang CNP is ok. Thanks.

## 2018-11-30 ENCOUNTER — TELEPHONE (OUTPATIENT)
Dept: INTERNAL MEDICINE CLINIC | Age: 78
End: 2018-11-30

## 2018-12-05 ENCOUNTER — OFFICE VISIT (OUTPATIENT)
Dept: INTERNAL MEDICINE CLINIC | Age: 78
End: 2018-12-05
Payer: MEDICARE

## 2018-12-05 VITALS
RESPIRATION RATE: 18 BRPM | HEART RATE: 98 BPM | BODY MASS INDEX: 17.57 KG/M2 | WEIGHT: 119 LBS | SYSTOLIC BLOOD PRESSURE: 122 MMHG | DIASTOLIC BLOOD PRESSURE: 66 MMHG | OXYGEN SATURATION: 96 %

## 2018-12-05 DIAGNOSIS — E43 SEVERE MALNUTRITION (HCC): ICD-10-CM

## 2018-12-05 DIAGNOSIS — I48.91 ATRIAL FIBRILLATION WITH RAPID VENTRICULAR RESPONSE (HCC): ICD-10-CM

## 2018-12-05 DIAGNOSIS — J10.1 INFLUENZA A (H1N1): ICD-10-CM

## 2018-12-05 DIAGNOSIS — J18.9 MULTIFOCAL PNEUMONIA: Primary | ICD-10-CM

## 2018-12-05 DIAGNOSIS — D49.89 THYMOMA: ICD-10-CM

## 2018-12-05 DIAGNOSIS — A41.9 SEPSIS, DUE TO UNSPECIFIED ORGANISM: ICD-10-CM

## 2018-12-05 PROBLEM — I48.0 PAF (PAROXYSMAL ATRIAL FIBRILLATION) (HCC): Status: ACTIVE | Noted: 2018-12-05

## 2018-12-05 PROCEDURE — 4040F PNEUMOC VAC/ADMIN/RCVD: CPT | Performed by: FAMILY MEDICINE

## 2018-12-05 PROCEDURE — 1101F PT FALLS ASSESS-DOCD LE1/YR: CPT | Performed by: FAMILY MEDICINE

## 2018-12-05 PROCEDURE — 1123F ACP DISCUSS/DSCN MKR DOCD: CPT | Performed by: FAMILY MEDICINE

## 2018-12-05 PROCEDURE — G8427 DOCREV CUR MEDS BY ELIG CLIN: HCPCS | Performed by: FAMILY MEDICINE

## 2018-12-05 PROCEDURE — G8419 CALC BMI OUT NRM PARAM NOF/U: HCPCS | Performed by: FAMILY MEDICINE

## 2018-12-05 PROCEDURE — G8482 FLU IMMUNIZE ORDER/ADMIN: HCPCS | Performed by: FAMILY MEDICINE

## 2018-12-05 PROCEDURE — 1036F TOBACCO NON-USER: CPT | Performed by: FAMILY MEDICINE

## 2018-12-05 PROCEDURE — 99214 OFFICE O/P EST MOD 30 MIN: CPT | Performed by: FAMILY MEDICINE

## 2018-12-05 RX ORDER — OSELTAMIVIR PHOSPHATE 30 MG/1
CAPSULE ORAL 2 TIMES DAILY
COMMUNITY
End: 2018-12-05 | Stop reason: ALTCHOICE

## 2018-12-05 RX ORDER — TRIAMCINOLONE ACETONIDE 0.25 MG/G
CREAM TOPICAL 2 TIMES DAILY
Qty: 1 TUBE | Refills: 3 | Status: SHIPPED | OUTPATIENT
Start: 2018-12-05 | End: 2019-06-28

## 2018-12-05 RX ORDER — METOPROLOL SUCCINATE 50 MG/1
TABLET, EXTENDED RELEASE ORAL
Qty: 90 TABLET | Refills: 1 | Status: SHIPPED | OUTPATIENT
Start: 2018-12-05 | End: 2018-12-27 | Stop reason: SDUPTHER

## 2018-12-05 ASSESSMENT — ENCOUNTER SYMPTOMS
EYE REDNESS: 0
SORE THROAT: 0
SHORTNESS OF BREATH: 0
DIARRHEA: 0
EYE DISCHARGE: 0
CONSTIPATION: 0
BLOOD IN STOOL: 0
BACK PAIN: 0
NAUSEA: 0
VOICE CHANGE: 0
ABDOMINAL PAIN: 0
TROUBLE SWALLOWING: 0
VOMITING: 0
COUGH: 0
WHEEZING: 0

## 2018-12-05 NOTE — PROGRESS NOTES
TABS tablet Take 1 tablet by mouth 2 times daily Yes Shen Norton MD   triamcinolone (KENALOG) 0.025 % cream Apply topically 2 times daily Apply Topically Yes Shen Norton MD   Multiple Vitamins-Minerals (MULTIVITAMIN PO) Take by mouth daily  Historical Provider, MD        Social History   Substance Use Topics    Smoking status: Never Smoker    Smokeless tobacco: Never Used    Alcohol use Yes      Comment: socially        Vitals:    12/05/18 1132   BP: 122/66   Pulse: 98   Resp: 18   SpO2: 96%   Weight: 119 lb (54 kg)     Estimated body mass index is 17.57 kg/m² as calculated from the following:    Height as of 10/27/18: 5' 9\" (1.753 m). Weight as of this encounter: 119 lb (54 kg). Physical Exam   Constitutional: He is oriented to person, place, and time. He appears well-developed and well-nourished. No distress. HENT:   Head: Normocephalic. Eyes: Conjunctivae are normal.   Neck: Neck supple. No thyromegaly present. Cardiovascular: Normal rate, regular rhythm and normal heart sounds. No murmur heard. Pulmonary/Chest: Breath sounds normal. No respiratory distress. He has no wheezes. He has no rales. Abdominal: Soft. He exhibits no distension. Musculoskeletal: Normal range of motion. He exhibits no edema. Neurological: He is alert and oriented to person, place, and time. Skin: Skin is warm. No rash noted. Psychiatric: He has a normal mood and affect. His behavior is normal.       ASSESSMENT/PLAN:  1. Multifocal pneumonia  Resolved    2. Sepsis, due to unspecified organism ([address]  Resolved    3. Influenza A (H1N1)  Treated    4. Atrial fibrillation with rapid ventricular response ([address]  Now with controlled V. Rate. Continue Toprol-XL 50 mg in A.m. and 25 mg at PM    5. 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  Will consider referral to the thoracic surgeon for second opinion     Follow-up on

## 2018-12-07 ENCOUNTER — OFFICE VISIT (OUTPATIENT)
Dept: CARDIOLOGY CLINIC | Age: 78
End: 2018-12-07
Payer: MEDICARE

## 2018-12-07 VITALS
OXYGEN SATURATION: 98 % | WEIGHT: 122 LBS | BODY MASS INDEX: 18.07 KG/M2 | HEIGHT: 69 IN | HEART RATE: 84 BPM | SYSTOLIC BLOOD PRESSURE: 118 MMHG | DIASTOLIC BLOOD PRESSURE: 64 MMHG

## 2018-12-07 DIAGNOSIS — I42.9 CARDIOMYOPATHY, UNSPECIFIED TYPE (HCC): ICD-10-CM

## 2018-12-07 DIAGNOSIS — I10 ESSENTIAL HYPERTENSION: ICD-10-CM

## 2018-12-07 DIAGNOSIS — I50.22 CHRONIC SYSTOLIC CONGESTIVE HEART FAILURE (HCC): ICD-10-CM

## 2018-12-07 DIAGNOSIS — I48.0 PAF (PAROXYSMAL ATRIAL FIBRILLATION) (HCC): Primary | ICD-10-CM

## 2018-12-07 PROCEDURE — G8419 CALC BMI OUT NRM PARAM NOF/U: HCPCS | Performed by: INTERNAL MEDICINE

## 2018-12-07 PROCEDURE — 1036F TOBACCO NON-USER: CPT | Performed by: INTERNAL MEDICINE

## 2018-12-07 PROCEDURE — 1101F PT FALLS ASSESS-DOCD LE1/YR: CPT | Performed by: INTERNAL MEDICINE

## 2018-12-07 PROCEDURE — 99214 OFFICE O/P EST MOD 30 MIN: CPT | Performed by: INTERNAL MEDICINE

## 2018-12-07 PROCEDURE — G8482 FLU IMMUNIZE ORDER/ADMIN: HCPCS | Performed by: INTERNAL MEDICINE

## 2018-12-07 PROCEDURE — 93000 ELECTROCARDIOGRAM COMPLETE: CPT | Performed by: INTERNAL MEDICINE

## 2018-12-07 PROCEDURE — 4040F PNEUMOC VAC/ADMIN/RCVD: CPT | Performed by: INTERNAL MEDICINE

## 2018-12-07 PROCEDURE — G8427 DOCREV CUR MEDS BY ELIG CLIN: HCPCS | Performed by: INTERNAL MEDICINE

## 2018-12-07 PROCEDURE — 1123F ACP DISCUSS/DSCN MKR DOCD: CPT | Performed by: INTERNAL MEDICINE

## 2018-12-07 RX ORDER — LISINOPRIL 10 MG/1
10 TABLET ORAL DAILY
Qty: 30 TABLET | Refills: 5 | Status: SHIPPED | OUTPATIENT
Start: 2018-12-07 | End: 2019-05-28 | Stop reason: SDUPTHER

## 2018-12-11 ENCOUNTER — TELEPHONE (OUTPATIENT)
Dept: PULMONOLOGY | Age: 78
End: 2018-12-11

## 2018-12-11 DIAGNOSIS — J18.9 MULTIFOCAL PNEUMONIA: Primary | ICD-10-CM

## 2018-12-11 NOTE — TELEPHONE ENCOUNTER
Last  Note in November mentions need for HFU appt and CXR, do you want it done prior to his appt on 12/18? If so, please issue an order.

## 2018-12-14 LAB
ALBUMIN SERPL-MCNC: 4.2 G/DL (ref 3.4–5)
ALP BLD-CCNC: 79 U/L (ref 40–129)
ALT SERPL-CCNC: 16 U/L (ref 10–40)
AST SERPL-CCNC: 19 U/L (ref 15–37)
BILIRUB SERPL-MCNC: 1.2 MG/DL (ref 0–1)
BILIRUBIN DIRECT: 0.4 MG/DL (ref 0–0.3)
BILIRUBIN, INDIRECT: 0.8 MG/DL (ref 0–1)
TOTAL PROTEIN: 7 G/DL (ref 6.4–8.2)

## 2018-12-18 ENCOUNTER — HOSPITAL ENCOUNTER (OUTPATIENT)
Dept: GENERAL RADIOLOGY | Age: 78
Discharge: HOME | End: 2018-12-18
Payer: MEDICARE

## 2018-12-18 ENCOUNTER — HOSPITAL ENCOUNTER (OUTPATIENT)
Age: 78
Discharge: HOME | End: 2018-12-18
Payer: MEDICARE

## 2018-12-18 ENCOUNTER — OFFICE VISIT (OUTPATIENT)
Dept: PULMONOLOGY | Age: 78
End: 2018-12-18
Payer: MEDICARE

## 2018-12-18 VITALS
HEART RATE: 73 BPM | BODY MASS INDEX: 17.8 KG/M2 | SYSTOLIC BLOOD PRESSURE: 127 MMHG | OXYGEN SATURATION: 98 % | HEIGHT: 69 IN | TEMPERATURE: 97.6 F | RESPIRATION RATE: 16 BRPM | DIASTOLIC BLOOD PRESSURE: 82 MMHG | WEIGHT: 120.2 LBS

## 2018-12-18 DIAGNOSIS — J18.9 MULTIFOCAL PNEUMONIA: Primary | ICD-10-CM

## 2018-12-18 DIAGNOSIS — J18.9 MULTIFOCAL PNEUMONIA: ICD-10-CM

## 2018-12-18 DIAGNOSIS — D49.89 THYMOMA: ICD-10-CM

## 2018-12-18 DIAGNOSIS — R91.1 PULMONARY NODULE: ICD-10-CM

## 2018-12-18 PROCEDURE — 4040F PNEUMOC VAC/ADMIN/RCVD: CPT | Performed by: INTERNAL MEDICINE

## 2018-12-18 PROCEDURE — 71046 X-RAY EXAM CHEST 2 VIEWS: CPT

## 2018-12-18 PROCEDURE — 1101F PT FALLS ASSESS-DOCD LE1/YR: CPT | Performed by: INTERNAL MEDICINE

## 2018-12-18 PROCEDURE — G8482 FLU IMMUNIZE ORDER/ADMIN: HCPCS | Performed by: INTERNAL MEDICINE

## 2018-12-18 PROCEDURE — G8419 CALC BMI OUT NRM PARAM NOF/U: HCPCS | Performed by: INTERNAL MEDICINE

## 2018-12-18 PROCEDURE — G8427 DOCREV CUR MEDS BY ELIG CLIN: HCPCS | Performed by: INTERNAL MEDICINE

## 2018-12-18 PROCEDURE — 1123F ACP DISCUSS/DSCN MKR DOCD: CPT | Performed by: INTERNAL MEDICINE

## 2018-12-18 PROCEDURE — 99213 OFFICE O/P EST LOW 20 MIN: CPT | Performed by: INTERNAL MEDICINE

## 2018-12-18 PROCEDURE — 1036F TOBACCO NON-USER: CPT | Performed by: INTERNAL MEDICINE

## 2018-12-21 ENCOUNTER — HOSPITAL ENCOUNTER (OUTPATIENT)
Dept: NON INVASIVE DIAGNOSTICS | Age: 78
Discharge: HOME | End: 2018-12-21
Payer: MEDICARE

## 2018-12-21 DIAGNOSIS — I50.22 CHRONIC SYSTOLIC CONGESTIVE HEART FAILURE (HCC): ICD-10-CM

## 2018-12-21 DIAGNOSIS — I48.0 PAF (PAROXYSMAL ATRIAL FIBRILLATION) (HCC): ICD-10-CM

## 2018-12-21 LAB
LV EF: 51 %
LVEF MODALITY: NORMAL

## 2018-12-21 PROCEDURE — 78452 HT MUSCLE IMAGE SPECT MULT: CPT

## 2018-12-21 PROCEDURE — 3430000000 HC RX DIAGNOSTIC RADIOPHARMACEUTICAL: Performed by: INTERNAL MEDICINE

## 2018-12-21 PROCEDURE — A9502 TC99M TETROFOSMIN: HCPCS | Performed by: INTERNAL MEDICINE

## 2018-12-21 PROCEDURE — 93017 CV STRESS TEST TRACING ONLY: CPT

## 2018-12-21 RX ADMIN — TETROFOSMIN 10 MILLICURIE: 0.23 INJECTION, POWDER, LYOPHILIZED, FOR SOLUTION INTRAVENOUS at 08:59

## 2018-12-21 RX ADMIN — TETROFOSMIN 30 MILLICURIE: 0.23 INJECTION, POWDER, LYOPHILIZED, FOR SOLUTION INTRAVENOUS at 10:16

## 2018-12-27 RX ORDER — METOPROLOL SUCCINATE 50 MG/1
TABLET, EXTENDED RELEASE ORAL
Qty: 90 TABLET | Refills: 1 | Status: SHIPPED | OUTPATIENT
Start: 2018-12-27 | End: 2019-04-24 | Stop reason: SDUPTHER

## 2019-01-02 ENCOUNTER — OFFICE VISIT (OUTPATIENT)
Dept: CARDIOLOGY CLINIC | Age: 79
End: 2019-01-02
Payer: MEDICARE

## 2019-01-02 VITALS
HEART RATE: 58 BPM | BODY MASS INDEX: 18.22 KG/M2 | OXYGEN SATURATION: 98 % | HEIGHT: 69 IN | SYSTOLIC BLOOD PRESSURE: 130 MMHG | WEIGHT: 123 LBS | DIASTOLIC BLOOD PRESSURE: 84 MMHG

## 2019-01-02 DIAGNOSIS — I50.22 CHRONIC SYSTOLIC HEART FAILURE (HCC): ICD-10-CM

## 2019-01-02 DIAGNOSIS — I42.9 CARDIOMYOPATHY, UNSPECIFIED TYPE (HCC): ICD-10-CM

## 2019-01-02 DIAGNOSIS — I48.0 PAF (PAROXYSMAL ATRIAL FIBRILLATION) (HCC): Primary | ICD-10-CM

## 2019-01-02 PROCEDURE — G8427 DOCREV CUR MEDS BY ELIG CLIN: HCPCS | Performed by: INTERNAL MEDICINE

## 2019-01-02 PROCEDURE — G8482 FLU IMMUNIZE ORDER/ADMIN: HCPCS | Performed by: INTERNAL MEDICINE

## 2019-01-02 PROCEDURE — 99214 OFFICE O/P EST MOD 30 MIN: CPT | Performed by: INTERNAL MEDICINE

## 2019-01-02 PROCEDURE — 1101F PT FALLS ASSESS-DOCD LE1/YR: CPT | Performed by: INTERNAL MEDICINE

## 2019-01-02 PROCEDURE — 1123F ACP DISCUSS/DSCN MKR DOCD: CPT | Performed by: INTERNAL MEDICINE

## 2019-01-02 PROCEDURE — G8419 CALC BMI OUT NRM PARAM NOF/U: HCPCS | Performed by: INTERNAL MEDICINE

## 2019-01-02 PROCEDURE — 93000 ELECTROCARDIOGRAM COMPLETE: CPT | Performed by: INTERNAL MEDICINE

## 2019-01-02 PROCEDURE — 4040F PNEUMOC VAC/ADMIN/RCVD: CPT | Performed by: INTERNAL MEDICINE

## 2019-01-02 PROCEDURE — 1036F TOBACCO NON-USER: CPT | Performed by: INTERNAL MEDICINE

## 2019-01-07 ENCOUNTER — TELEPHONE (OUTPATIENT)
Dept: CARDIOLOGY CLINIC | Age: 79
End: 2019-01-07

## 2019-01-08 PROCEDURE — 93225 XTRNL ECG REC<48 HRS REC: CPT | Performed by: INTERNAL MEDICINE

## 2019-02-07 ENCOUNTER — TELEPHONE (OUTPATIENT)
Dept: CARDIOLOGY CLINIC | Age: 79
End: 2019-02-07

## 2019-02-13 PROCEDURE — 93227 XTRNL ECG REC<48 HR R&I: CPT | Performed by: INTERNAL MEDICINE

## 2019-02-14 DIAGNOSIS — I48.0 PAF (PAROXYSMAL ATRIAL FIBRILLATION) (HCC): ICD-10-CM

## 2019-03-11 ENCOUNTER — HOSPITAL ENCOUNTER (OUTPATIENT)
Dept: CT IMAGING | Age: 79
Discharge: HOME | End: 2019-03-11
Payer: MEDICARE

## 2019-03-11 DIAGNOSIS — C37 THYMUS CANCER (HCC): ICD-10-CM

## 2019-03-11 LAB
GFR AFRICAN AMERICAN: >60
GFR NON-AFRICAN AMERICAN: >60
PERFORMED ON: NORMAL
POC CREATININE: 1.1 MG/DL (ref 0.8–1.3)
POC SAMPLE TYPE: NORMAL

## 2019-03-11 PROCEDURE — 71260 CT THORAX DX C+: CPT

## 2019-03-11 PROCEDURE — 6360000004 HC RX CONTRAST MEDICATION: Performed by: INTERNAL MEDICINE

## 2019-03-11 PROCEDURE — 82565 ASSAY OF CREATININE: CPT

## 2019-03-11 RX ADMIN — IOPAMIDOL 75 ML: 755 INJECTION, SOLUTION INTRAVENOUS at 09:42

## 2019-04-09 ENCOUNTER — OFFICE VISIT (OUTPATIENT)
Dept: CARDIOLOGY CLINIC | Age: 79
End: 2019-04-09
Payer: MEDICARE

## 2019-04-09 VITALS
DIASTOLIC BLOOD PRESSURE: 66 MMHG | HEIGHT: 70 IN | HEART RATE: 60 BPM | OXYGEN SATURATION: 98 % | WEIGHT: 125 LBS | BODY MASS INDEX: 17.9 KG/M2 | SYSTOLIC BLOOD PRESSURE: 118 MMHG

## 2019-04-09 DIAGNOSIS — I42.9 CARDIOMYOPATHY, UNSPECIFIED TYPE (HCC): ICD-10-CM

## 2019-04-09 DIAGNOSIS — I48.0 PAF (PAROXYSMAL ATRIAL FIBRILLATION) (HCC): Primary | ICD-10-CM

## 2019-04-09 PROCEDURE — 4040F PNEUMOC VAC/ADMIN/RCVD: CPT | Performed by: INTERNAL MEDICINE

## 2019-04-09 PROCEDURE — 1036F TOBACCO NON-USER: CPT | Performed by: INTERNAL MEDICINE

## 2019-04-09 PROCEDURE — G8419 CALC BMI OUT NRM PARAM NOF/U: HCPCS | Performed by: INTERNAL MEDICINE

## 2019-04-09 PROCEDURE — G8427 DOCREV CUR MEDS BY ELIG CLIN: HCPCS | Performed by: INTERNAL MEDICINE

## 2019-04-09 PROCEDURE — 93000 ELECTROCARDIOGRAM COMPLETE: CPT | Performed by: INTERNAL MEDICINE

## 2019-04-09 PROCEDURE — 99214 OFFICE O/P EST MOD 30 MIN: CPT | Performed by: INTERNAL MEDICINE

## 2019-04-09 PROCEDURE — 1123F ACP DISCUSS/DSCN MKR DOCD: CPT | Performed by: INTERNAL MEDICINE

## 2019-04-09 NOTE — PROGRESS NOTES
[address]      Cardiology Consult    Damian Mosqueda  1940        CC: \"Everything is good. \"     HPI:  The patient is 77 y.o. male with a past medical history significant for atrial fibrillation, hypertension, and systolic heart failure who presents today as a follow up. He was admitted 10/27 - 18 for worsening shortness of breath and weakness. He is a marathon runner and first noticed the symptoms while in [facility]. In the ER he was noted to be in Atrial fibrillation with RVR and also evidence of pneumonia. He had an echocardiogram completed that showed an LVEF of 30-35%. He was treated with amiodarone but was discontinued due to abnormal LFTs. He was discharged home on a BB and Eliquis     Today he is here for management of his afib. He says that he is feeling well. He is inquiring about his holter monitor. Patient denies exertional chest pain/pressure, dyspnea at rest, VILLELA, PND, orthopnea, palpitations, lightheadedness, weight changes, changes in LE edema, and syncope.       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    also sessile serrated adenoma     Past Surgical History:   Procedure Laterality Date    HEMORRHOID SURGERY      PROSTATE SURGERY      S/P TURP    THYMECTOMY       Family History   Problem Relation Age of Onset    Heart Disease Father     Cancer Sister         Colon    Cancer Brother         Colon    Heart Disease Brother     Heart Disease Brother      Social History     Tobacco Use    Smoking status: Former Smoker     Packs/day: 1.00     Types: Cigarettes     Start date: 1955     Last attempt to quit: 1993     Years since quittin.2    Smokeless tobacco: Never Used   Substance Use Topics    Alcohol use: Yes     Comment: socially  Drug use: No       Allergies   Allergen Reactions    Amiodarone      Current Outpatient Medications   Medication Sig Dispense Refill    apixaban (ELIQUIS) 5 MG TABS tablet Take 1 tablet by mouth 2 times daily 60 tablet 1    metoprolol succinate (TOPROL XL) 50 MG extended release tablet Take 1 tablet in AM and 1/2 tablet at PM 90 tablet 1    lisinopril (PRINIVIL;ZESTRIL) 10 MG tablet Take 1 tablet by mouth daily 30 tablet 5    GuaiFENesin (MUCUS RELIEF ADULT PO) Take by mouth as needed       triamcinolone (KENALOG) 0.025 % cream Apply topically 2 times daily Apply Topically 1 Tube 3    Multiple Vitamins-Minerals (MULTIVITAMIN PO) Take by mouth daily       No current facility-administered medications for this visit. Review of Systems:  · Constitutional: no unanticipated weight loss. There's been no change in energy level, sleep pattern, or activity level. No fevers, chills. · Eyes: No visual changes or diplopia. No scleral icterus. · ENT: No Headaches, hearing loss or vertigo. No mouth sores or sore throat. · Cardiovascular: as reviewed in HPI  · Respiratory: No cough or wheezing, no sputum production. No hematemesis. · Gastrointestinal: No abdominal pain, appetite loss, blood in stools. No change in bowel or bladder habits. · Genitourinary: No dysuria, trouble voiding, or hematuria. · Musculoskeletal:  No gait disturbance, no joint complaints. · Integumentary: No rash or pruritis. · Neurological: No headache, diplopia, change in muscle strength, numbness or tingling. · Psychiatric: No anxiety or depression. · Endocrine: No temperature intolerance. No excessive thirst, fluid intake, or urination. No tremor. · Hematologic/Lymphatic: No abnormal bruising or bleeding, blood clots or swollen lymph nodes. · Allergic/Immunologic: No nasal congestion or hives.     Physical Exam:   /66   Pulse 60   Ht 5' 9.5\" (1.765 m)   Wt 125 lb (56.7 kg) Comment: did not wish to remove shoes  SpO2 98%   BMI 18.19 kg/m²   Wt Readings from Last 3 Encounters:   04/09/19 125 lb (56.7 kg)   01/02/19 123 lb (55.8 kg)   12/18/18 120 lb 3.2 oz (54.5 kg)     Constitutional: He is oriented to person, place, and time. He appears well-developed and well-nourished. In no acute distress. Head: Normocephalic and atraumatic. Pupils equal and round. Neck: Neck supple. No JVP or carotid bruit appreciated. No mass and no thyromegaly present. No lymphadenopathy present. Cardiovascular: regularly irregular . Normal heart sounds. Exam reveals no gallop and no friction rub. mumur at the base  Pulmonary/Chest: Effort normal and breath sounds normal. No respiratory distress. He has no wheezes, rhonchi or rales. Abdominal: Soft, non-tender. Bowel sounds are normal. He exhibits no organomegaly, mass or bruit. Extremities: No edema, cyanosis, or clubbing. Pulses are 2+ radial/dorsalis pedis/posterior tibial/carotid bilaterally. Neurological: No gross cranial nerve deficit. Coordination normal.   Skin: Skin is warm and dry. There is no rash or diaphoresis. Psychiatric: He has a normal mood and affect. His speech is normal and behavior is normal.     Lab Review:   FLP:    Lab Results   Component Value Date    TRIG 58 03/29/2018    HDL 94 03/29/2018    LDLCALC 87 03/29/2018    LABVLDL 12 03/29/2018     BUN/Creatinine:    Lab Results   Component Value Date    BUN 14 11/04/2018    CREATININE 1.1 03/11/2019    CREATININE 0.7 11/04/2018       EKG Interpretation: 12/7/18 SR with PACs      1/2/19 Sinus bradycardia with PAC's    Image Review:   Echo 10/29/18  Left ventricular cavity size is normal.  Normal left ventricular wall thickness. Difficult to estimate EF due to increase rate and rhythm but appears depressed  Mitral valve leaflets appear mildly thickened. Mild mitral regurgitation. The left atrium is dilated. Aortic valve appears sclerotic but opens adequately. Trivial aortic regurgitation.   Tricuspid valve is structurally normal.  Mild tricuspid regurgitation with RVSP of 26 mmHg. The right atrium is dilated. Suggest repeat limited echo with bubble study to r/o ASD or PFO     Echo limited 11/2/18   Left ventricular cavity size is normal.  Normal left ventricular wall thickness. Ejection fraction is visually estimated to be 30-35%, though difficult to adequately assess due to arrhythmia. Severe hypo to akinesis of the septum. Abnormal (paradoxical) septal motion is present. Tricuspid valve appears thickened with redundancy. The right atrium is mildly dilated. IVC not well visualized. A bubble study was performed and fails to show evidence of shunting. Stress 12/21/18  1. Technically a satisfactory study.    2. Positive treadmill exercise stress portion of the study.    3. No evidence of Ischemia by Myocardial Perfusion Imaging.    4. Gated Study shows normal LV size and Systolic function; EF is 69%.    5. The patient exercised for 6 min. on the Ramírez protocol to achieve a HR of    133, which is 94% of PMHR. The study was stopped due to elevated BP. There    was no chest pain . 1mm ST depression in lateral leads.    6. Diastolic BP crept upto 311 with peak exercise     Event monitor 2/13/19  NSR. Few episodes if PSVT. Assessment/Plan:     Paroxsymal atrial fibrillation  Patient denies any symptoms of palpitation dizziness or shortness of breath EKG today confirms NSR with PAC's. [place] Continue Eliquis 5 mg BID. event monitor  from 2/2019 showed sinus rhythm with few episodes of PSVT. Mary Lou Crowley Will repeat CMP and Mg level. Cardiomyopathy/Chronic systolic heart failure  LVEF 30-35 %. Pt appears euvolemic today. Continue with medical therapy including B-blocker and will lisinopril 5 mg daily. Encouraged a low sodium diet. I have told him that he can gradually start exercising.   I will order a repeat echocardiogram.      Abnormal LFTs  Improved, amiodarone discontinued.       Follow up in 4 months    Thank you very much for allowing me to participate in the care of your patient. Please do not hesitate to contact me if you have any questions. This note was scribed in the presence of Dr Doug Llanos, by Christopher Florez RN    Physician Attestation:  The scribes documentation has been prepared under my direction and personally reviewed by me in its entirety. I confirm that the note above accurately reflects all work, treatment, procedures, and medical decision making performed by me. Sincerely,  Leslie Estrella M.D.       [facility], [address] [place], [place] [address]LukasJose Charlton [facility]  Ph: [phone]  Fax: [phone]

## 2019-04-10 DIAGNOSIS — I42.9 CARDIOMYOPATHY, UNSPECIFIED TYPE (HCC): ICD-10-CM

## 2019-04-10 DIAGNOSIS — I48.0 PAF (PAROXYSMAL ATRIAL FIBRILLATION) (HCC): ICD-10-CM

## 2019-04-10 LAB
A/G RATIO: 1.7 (ref 1.1–2.2)
ALBUMIN SERPL-MCNC: 4.3 G/DL (ref 3.4–5)
ALP BLD-CCNC: 76 U/L (ref 40–129)
ALT SERPL-CCNC: 18 U/L (ref 10–40)
ANION GAP SERPL CALCULATED.3IONS-SCNC: 15 MMOL/L (ref 3–16)
AST SERPL-CCNC: 22 U/L (ref 15–37)
BILIRUB SERPL-MCNC: 0.9 MG/DL (ref 0–1)
BUN BLDV-MCNC: 20 MG/DL (ref 7–20)
CALCIUM SERPL-MCNC: 9.7 MG/DL (ref 8.3–10.6)
CHLORIDE BLD-SCNC: 106 MMOL/L (ref 99–110)
CO2: 24 MMOL/L (ref 21–32)
CREAT SERPL-MCNC: 0.9 MG/DL (ref 0.8–1.3)
GFR AFRICAN AMERICAN: >60
GFR NON-AFRICAN AMERICAN: >60
GLOBULIN: 2.6 G/DL
GLUCOSE BLD-MCNC: 100 MG/DL (ref 70–99)
MAGNESIUM: 2.2 MG/DL (ref 1.8–2.4)
POTASSIUM SERPL-SCNC: 4.7 MMOL/L (ref 3.5–5.1)
SODIUM BLD-SCNC: 145 MMOL/L (ref 136–145)
TOTAL PROTEIN: 6.9 G/DL (ref 6.4–8.2)

## 2019-04-25 RX ORDER — METOPROLOL SUCCINATE 50 MG/1
TABLET, EXTENDED RELEASE ORAL
Qty: 135 TABLET | Refills: 1 | Status: SHIPPED | OUTPATIENT
Start: 2019-04-25 | End: 2019-06-28 | Stop reason: SDUPTHER

## 2019-05-15 ENCOUNTER — HOSPITAL ENCOUNTER (OUTPATIENT)
Dept: NON INVASIVE DIAGNOSTICS | Age: 79
Discharge: HOME | End: 2019-05-15
Payer: MEDICARE

## 2019-05-15 DIAGNOSIS — I42.9 CARDIOMYOPATHY, UNSPECIFIED TYPE (HCC): ICD-10-CM

## 2019-05-15 DIAGNOSIS — I48.0 PAF (PAROXYSMAL ATRIAL FIBRILLATION) (HCC): ICD-10-CM

## 2019-05-15 LAB
LV EF: 43 %
LVEF MODALITY: NORMAL

## 2019-05-15 PROCEDURE — 93306 TTE W/DOPPLER COMPLETE: CPT

## 2019-05-29 RX ORDER — LISINOPRIL 10 MG/1
TABLET ORAL
Qty: 90 TABLET | Refills: 4 | Status: SHIPPED | OUTPATIENT
Start: 2019-05-29 | End: 2019-06-28 | Stop reason: SDUPTHER

## 2019-06-28 ENCOUNTER — OFFICE VISIT (OUTPATIENT)
Dept: INTERNAL MEDICINE CLINIC | Age: 79
End: 2019-06-28
Payer: MEDICARE

## 2019-06-28 VITALS
SYSTOLIC BLOOD PRESSURE: 136 MMHG | WEIGHT: 123 LBS | DIASTOLIC BLOOD PRESSURE: 62 MMHG | OXYGEN SATURATION: 98 % | HEART RATE: 56 BPM | BODY MASS INDEX: 17.9 KG/M2

## 2019-06-28 DIAGNOSIS — E43 SEVERE MALNUTRITION (HCC): ICD-10-CM

## 2019-06-28 DIAGNOSIS — I48.0 PAF (PAROXYSMAL ATRIAL FIBRILLATION) (HCC): ICD-10-CM

## 2019-06-28 DIAGNOSIS — Z13.220 LIPID SCREENING: ICD-10-CM

## 2019-06-28 DIAGNOSIS — D49.89 THYMOMA: ICD-10-CM

## 2019-06-28 DIAGNOSIS — I10 ESSENTIAL HYPERTENSION: Primary | ICD-10-CM

## 2019-06-28 DIAGNOSIS — Z13.29 THYROID DISORDER SCREENING: ICD-10-CM

## 2019-06-28 DIAGNOSIS — I50.22 CHRONIC SYSTOLIC CHF (CONGESTIVE HEART FAILURE) (HCC): ICD-10-CM

## 2019-06-28 DIAGNOSIS — Z13.1 DIABETES MELLITUS SCREENING: ICD-10-CM

## 2019-06-28 LAB
BASOPHILS ABSOLUTE: 0.1 K/UL (ref 0–0.2)
BASOPHILS RELATIVE PERCENT: 1.1 %
EOSINOPHILS ABSOLUTE: 0.2 K/UL (ref 0–0.6)
EOSINOPHILS RELATIVE PERCENT: 3.3 %
HCT VFR BLD CALC: 40 % (ref 40.5–52.5)
HEMOGLOBIN: 13.3 G/DL (ref 13.5–17.5)
LYMPHOCYTES ABSOLUTE: 1.3 K/UL (ref 1–5.1)
LYMPHOCYTES RELATIVE PERCENT: 26.7 %
MCH RBC QN AUTO: 31.2 PG (ref 26–34)
MCHC RBC AUTO-ENTMCNC: 33.2 G/DL (ref 31–36)
MCV RBC AUTO: 93.9 FL (ref 80–100)
MONOCYTES ABSOLUTE: 0.4 K/UL (ref 0–1.3)
MONOCYTES RELATIVE PERCENT: 8.6 %
NEUTROPHILS ABSOLUTE: 2.9 K/UL (ref 1.7–7.7)
NEUTROPHILS RELATIVE PERCENT: 60.3 %
PDW BLD-RTO: 14.4 % (ref 12.4–15.4)
PLATELET # BLD: 160 K/UL (ref 135–450)
PMV BLD AUTO: 11.3 FL (ref 5–10.5)
RBC # BLD: 4.26 M/UL (ref 4.2–5.9)
WBC # BLD: 4.8 K/UL (ref 4–11)

## 2019-06-28 PROCEDURE — 1123F ACP DISCUSS/DSCN MKR DOCD: CPT | Performed by: FAMILY MEDICINE

## 2019-06-28 PROCEDURE — G8419 CALC BMI OUT NRM PARAM NOF/U: HCPCS | Performed by: FAMILY MEDICINE

## 2019-06-28 PROCEDURE — 99214 OFFICE O/P EST MOD 30 MIN: CPT | Performed by: FAMILY MEDICINE

## 2019-06-28 PROCEDURE — 36415 COLL VENOUS BLD VENIPUNCTURE: CPT | Performed by: FAMILY MEDICINE

## 2019-06-28 PROCEDURE — 4040F PNEUMOC VAC/ADMIN/RCVD: CPT | Performed by: FAMILY MEDICINE

## 2019-06-28 PROCEDURE — G8427 DOCREV CUR MEDS BY ELIG CLIN: HCPCS | Performed by: FAMILY MEDICINE

## 2019-06-28 PROCEDURE — 1036F TOBACCO NON-USER: CPT | Performed by: FAMILY MEDICINE

## 2019-06-28 RX ORDER — MIRTAZAPINE 15 MG/1
15 TABLET, FILM COATED ORAL NIGHTLY
Qty: 30 TABLET | Refills: 2 | Status: SHIPPED | OUTPATIENT
Start: 2019-06-28 | End: 2019-11-01

## 2019-06-28 RX ORDER — LISINOPRIL 10 MG/1
10 TABLET ORAL DAILY
Qty: 90 TABLET | Refills: 1 | Status: SHIPPED | OUTPATIENT
Start: 2019-06-28 | End: 2019-11-01 | Stop reason: SDUPTHER

## 2019-06-28 RX ORDER — METOPROLOL SUCCINATE 50 MG/1
TABLET, EXTENDED RELEASE ORAL
Qty: 135 TABLET | Refills: 1 | Status: SHIPPED | OUTPATIENT
Start: 2019-06-28 | End: 2019-11-01 | Stop reason: SDUPTHER

## 2019-06-28 ASSESSMENT — ENCOUNTER SYMPTOMS
BLOOD IN STOOL: 0
ABDOMINAL PAIN: 0
TROUBLE SWALLOWING: 0
SHORTNESS OF BREATH: 0
DIARRHEA: 0
VOICE CHANGE: 0
CONSTIPATION: 0

## 2019-06-28 ASSESSMENT — PATIENT HEALTH QUESTIONNAIRE - PHQ9
SUM OF ALL RESPONSES TO PHQ9 QUESTIONS 1 & 2: 0
SUM OF ALL RESPONSES TO PHQ QUESTIONS 1-9: 0
2. FEELING DOWN, DEPRESSED OR HOPELESS: 0
SUM OF ALL RESPONSES TO PHQ QUESTIONS 1-9: 0
1. LITTLE INTEREST OR PLEASURE IN DOING THINGS: 0

## 2019-06-28 NOTE — PROGRESS NOTES
2019     Jeanmarie Gannon (:  1940) is a 77 y.o. male, here for evaluation of the following medical concerns:  Patient presented to the office for follow-up. About 6 months ago 2018 he was admitted at [facility] due to pneumonia related to influenza A, developed sepsis-treated. Then he later developed atrial fibrillation with rapid ventricular response. His echocardiogram showed depressed depressed LV function estimated ejection fraction at 30 to 35%. Patient was treated with beta-blocker, ACE and is started on long-term anticoagulation Eliquis. Follow-up echocardiogram May 15, 2019 which showed improvement of LV function with ejection fraction estimated at 40 to 45%. Thus 2018 catheter ablation was considered but was told he needed Holter monitor to show evidence of recurrent atrial fibrillation and if he did will be referred to elective physiologist.  Patient is stated that she has no more recurrence of palpitation. His breathing is better, he is feeling better,has no leg edema. He is not sure if he can go back to running. He participate in marathon  Congestive Heart Failure   Presents for follow-up visit. Pertinent negatives include no abdominal pain, chest pain, palpitations or shortness of breath. The symptoms have been stable. Compliance with diet is %. Compliance with medications is %. Hypertension:  Home blood pressure monitoring: No.  He is adherent to a low sodium diet. Patient denies chest pain and shortness of breath. Antihypertensive medication side effects: no medication side effects noted. Use of agents associated with hypertension: none.                                         Sodium (mmol/L)   Date Value   04/10/2019 145    BUN (mg/dL)   Date Value   04/10/2019 20    Glucose (mg/dL)   Date Value   04/10/2019 100 (H)      Potassium (mmol/L)   Date Value   04/10/2019 4.7    CREATININE (mg/dL)   Date Value   04/10/2019 0.9       He has  history of thymoma currently on observation management. He goes to Dr. Maria Antonia Gonzalez of Systems   Constitutional: Negative for activity change. HENT: Negative for trouble swallowing and voice change. Eyes: Negative for visual disturbance. Respiratory: Negative for shortness of breath. Cardiovascular: Negative for chest pain, palpitations and leg swelling. Gastrointestinal: Negative for abdominal pain, blood in stool, constipation and diarrhea. Genitourinary: Negative for difficulty urinating, dysuria, frequency, hematuria and scrotal swelling. Musculoskeletal: Negative for arthralgias and myalgias. Skin: Negative for rash. Neurological: Negative for dizziness. Psychiatric/Behavioral: Negative for behavioral problems. Prior to Visit Medications    Medication Sig Taking? Authorizing Provider   apixaban (ELIQUIS) 5 MG TABS tablet Take 1 tablet by mouth 2 times daily Pt needs to come in and be seen before more rx's are given.  Yes Jaylen Medina MD   lisinopril (PRINIVIL;ZESTRIL) 10 MG tablet Take 1 tablet by mouth daily Yes Jaylen Medina MD   metoprolol succinate (TOPROL XL) 50 MG extended release tablet TAKE ONE TABLET BY MOUTH EVERY MORNING AND TAKE ONE-HALF TABLET BY MOUTH EVERY EVENING Yes Jaylen Medina MD   mirtazapine (REMERON) 15 MG tablet Take 1 tablet by mouth nightly Yes Jaylen Medina MD   Multiple Vitamins-Minerals (MULTIVITAMIN PO) Take by mouth daily Yes Historical Provider, MD        Social History     Tobacco Use    Smoking status: Former Smoker     Packs/day: 1.00     Types: Cigarettes     Start date: 1955     Last attempt to quit: 1993     Years since quittin.5    Smokeless tobacco: Never Used   Substance Use Topics    Alcohol use: Yes     Comment: socially        Vitals:    19 0856   BP: 136/62   Pulse: 56   SpO2: 98%   Weight: 123 lb (55.8 kg)     Estimated body mass index is 17.9 kg/m² as calculated from the following: Height as of 4/9/19: 5' 9.5\" (1.765 m). Weight as of this encounter: 123 lb (55.8 kg). Physical Exam   Constitutional: He is oriented to person, place, and time. He appears well-developed and well-nourished. No distress. HENT:   Head: Normocephalic. Eyes: Conjunctivae are normal.   Neck: Neck supple. No thyromegaly present. Cardiovascular: Normal rate, regular rhythm and normal heart sounds. No murmur heard. Pulmonary/Chest: Breath sounds normal. No respiratory distress. He has no wheezes. He has no rales. Abdominal: Soft. He exhibits no distension. Musculoskeletal: Normal range of motion. He exhibits no edema. Neurological: He is alert and oriented to person, place, and time. Skin: Skin is warm. No rash noted. Psychiatric: He has a normal mood and affect. His behavior is normal.       ASSESSMENT/PLAN:  1. Essential hypertension  Controlled. Continue lisinopril 10 mg daily and Toprol-XL 50 mg daily    2. Chronic systolic CHF (congestive heart failure) (HCC)  His LV systolic function was depressed following episode of A. fib with rapid ventricular response. Ejection fraction was estimated at 30 to 35% in November 2, 2018. Six mos later  LV function improved with ejection fraction estimated at 40 to 45% per echo dated 5/15/2019. Continue lisinopril and Toprol. Follow-up with cardiologist.  It does not appear that he can tolerate competitive running like running in marathon  so before engaging in that kind of activities should have clearance from his cardiologist    3. PAF (paroxysmal atrial fibrillation) (HCC)  Controlled ventricular rate. Patient did not tolerate amiodarone due to abnormal LFT. He is doing fairly well with  beta-blocker Toprol-XL 50 mg daily. He is on long-term anticoagulation with Eliquis 5 mg twice daily. The last visit with a cardiologist catheter ablation was considered so patient has to discuss this option on his next visit    4. Thymoma  Stable.   He goes to oncologist Dr. Forrest Kinds    5. Severe malnutrition [facility])  Patient stated he has poor appetite and requested something. Will prescribe Remeron 15 mg at night    6. Thyroid disorder screening  - T4, Free  - TSH without Reflex    7. Lipid screening  - Lipid, Fasting    8. Diabetes mellitus screening  - Comprehensive Metabolic Panel, Fasting      RTC in 3 mos. He plans of going back to [facility] very soon.   Was told to checkup with us before he goes out of the country    An electronic signature was used to authenticate this note.    --Nereida Carbajal MD on 6/28/2019 at 4:47 PM

## 2019-06-29 LAB
A/G RATIO: 1.8 (ref 1.1–2.2)
ALBUMIN SERPL-MCNC: 4.3 G/DL (ref 3.4–5)
ALP BLD-CCNC: 70 U/L (ref 40–129)
ALT SERPL-CCNC: 17 U/L (ref 10–40)
ANION GAP SERPL CALCULATED.3IONS-SCNC: 14 MMOL/L (ref 3–16)
AST SERPL-CCNC: 21 U/L (ref 15–37)
BILIRUB SERPL-MCNC: 0.9 MG/DL (ref 0–1)
BUN BLDV-MCNC: 17 MG/DL (ref 7–20)
CALCIUM SERPL-MCNC: 9.5 MG/DL (ref 8.3–10.6)
CHLORIDE BLD-SCNC: 105 MMOL/L (ref 99–110)
CHOLESTEROL, FASTING: 169 MG/DL (ref 0–199)
CO2: 24 MMOL/L (ref 21–32)
CREAT SERPL-MCNC: 0.8 MG/DL (ref 0.8–1.3)
GFR AFRICAN AMERICAN: >60
GFR NON-AFRICAN AMERICAN: >60
GLOBULIN: 2.4 G/DL
GLUCOSE FASTING: 99 MG/DL (ref 70–99)
HDLC SERPL-MCNC: 78 MG/DL (ref 40–60)
LDL CHOLESTEROL CALCULATED: 76 MG/DL
POTASSIUM SERPL-SCNC: 4.4 MMOL/L (ref 3.5–5.1)
SODIUM BLD-SCNC: 143 MMOL/L (ref 136–145)
T4 FREE: 1.3 NG/DL (ref 0.9–1.8)
TOTAL PROTEIN: 6.7 G/DL (ref 6.4–8.2)
TRIGLYCERIDE, FASTING: 77 MG/DL (ref 0–150)
TSH SERPL DL<=0.05 MIU/L-ACNC: 3.98 UIU/ML (ref 0.27–4.2)
VLDLC SERPL CALC-MCNC: 15 MG/DL

## 2019-07-05 RX ORDER — CEPHALEXIN 500 MG/1
500 CAPSULE ORAL 3 TIMES DAILY
Qty: 9 CAPSULE | Refills: 0 | Status: SHIPPED | OUTPATIENT
Start: 2019-07-05 | End: 2019-07-08

## 2019-07-06 ENCOUNTER — PATIENT MESSAGE (OUTPATIENT)
Dept: INTERNAL MEDICINE CLINIC | Age: 79
End: 2019-07-06

## 2019-07-09 ENCOUNTER — TELEPHONE (OUTPATIENT)
Dept: INTERNAL MEDICINE CLINIC | Age: 79
End: 2019-07-09
Payer: MEDICARE

## 2019-07-09 DIAGNOSIS — R30.0 DYSURIA: Primary | ICD-10-CM

## 2019-07-09 LAB
BILIRUBIN, POC: NEGATIVE
BLOOD URINE, POC: NEGATIVE
CLARITY, POC: CLEAR
COLOR, POC: NORMAL
GLUCOSE URINE, POC: NEGATIVE
KETONES, POC: NEGATIVE
LEUKOCYTE EST, POC: NEGATIVE
NITRITE, POC: NEGATIVE
PH, POC: 5
PROTEIN, POC: NEGATIVE
SPECIFIC GRAVITY, POC: 1
UROBILINOGEN, POC: 1

## 2019-07-09 PROCEDURE — 81002 URINALYSIS NONAUTO W/O SCOPE: CPT | Performed by: FAMILY MEDICINE

## 2019-07-09 RX ORDER — TRIAMCINOLONE ACETONIDE 5 MG/G
CREAM TOPICAL EVERY 4 HOURS PRN
Qty: 15 G | Refills: 3 | Status: SHIPPED | OUTPATIENT
Start: 2019-07-09 | End: 2021-11-24 | Stop reason: SDUPTHER

## 2019-07-09 NOTE — TELEPHONE ENCOUNTER
BECK PLEASE SEND SCRIPT TO Cameron Cason        triamcinolone (KENALOG) 0.025 % cream [[id number]]  DISCONTINUED     Order Details   Dose: -- Route: Topical Frequency: EVERY 4 HOURS PRN for rash   Dispense Quantity: 1 Tube Refills: 3 Fills remaining: --           Sig: Apply topically every 4 hours as needed (rash) Apply Topically          Discontinue Date:  12/5/2018 11:59 Discontinue User:  Mata Terrazas MD Discontinue Reason:  REORDER   Written Date: 08/06/18 Expiration Date: 08/06/19     Start Date: 08/06/18 End Date: 12/05/18     Ordering Provider:  -- Authorizing Provider:  Mata Terrazas MD Ordering User:  Mata Terrazas MD             Original Order:  triamcinolone (KENALOG) 0.025 % cream [[id number]]      Pharmacy:  [facility] [address] [phone] - F [phone]

## 2019-08-02 ENCOUNTER — OFFICE VISIT (OUTPATIENT)
Dept: INTERNAL MEDICINE CLINIC | Age: 79
End: 2019-08-02
Payer: MEDICARE

## 2019-08-02 VITALS
BODY MASS INDEX: 17.96 KG/M2 | HEART RATE: 53 BPM | OXYGEN SATURATION: 98 % | RESPIRATION RATE: 18 BRPM | SYSTOLIC BLOOD PRESSURE: 138 MMHG | WEIGHT: 123.4 LBS | DIASTOLIC BLOOD PRESSURE: 64 MMHG

## 2019-08-02 DIAGNOSIS — I50.22 CHRONIC SYSTOLIC CHF (CONGESTIVE HEART FAILURE), NYHA CLASS 1 (HCC): ICD-10-CM

## 2019-08-02 DIAGNOSIS — D49.89 THYMOMA: ICD-10-CM

## 2019-08-02 DIAGNOSIS — I10 ESSENTIAL HYPERTENSION: ICD-10-CM

## 2019-08-02 DIAGNOSIS — I48.0 PAF (PAROXYSMAL ATRIAL FIBRILLATION) (HCC): ICD-10-CM

## 2019-08-02 PROCEDURE — G8419 CALC BMI OUT NRM PARAM NOF/U: HCPCS | Performed by: FAMILY MEDICINE

## 2019-08-02 PROCEDURE — 1036F TOBACCO NON-USER: CPT | Performed by: FAMILY MEDICINE

## 2019-08-02 PROCEDURE — 4040F PNEUMOC VAC/ADMIN/RCVD: CPT | Performed by: FAMILY MEDICINE

## 2019-08-02 PROCEDURE — 1123F ACP DISCUSS/DSCN MKR DOCD: CPT | Performed by: FAMILY MEDICINE

## 2019-08-02 PROCEDURE — G8427 DOCREV CUR MEDS BY ELIG CLIN: HCPCS | Performed by: FAMILY MEDICINE

## 2019-08-02 PROCEDURE — 99214 OFFICE O/P EST MOD 30 MIN: CPT | Performed by: FAMILY MEDICINE

## 2019-08-03 PROBLEM — J18.9 PNEUMONIA DUE TO ORGANISM: Status: RESOLVED | Noted: 2018-10-27 | Resolved: 2019-08-03

## 2019-08-03 ASSESSMENT — ENCOUNTER SYMPTOMS
CONSTIPATION: 0
DIARRHEA: 0
BLOOD IN STOOL: 0
TROUBLE SWALLOWING: 0
VOICE CHANGE: 0
SHORTNESS OF BREATH: 0
ABDOMINAL PAIN: 0

## 2019-08-03 NOTE — PROGRESS NOTES
behavioral problems. Prior to Visit Medications    Medication Sig Taking? Authorizing Provider   triamcinolone (ARISTOCORT) 0.5 % cream Apply topically every 4 hours as needed (eczema) Apply topically 3 times daily. Yes Timmy Velazquez MD   apixaban (ELIQUIS) 5 MG TABS tablet Take 1 tablet by mouth 2 times daily Pt needs to come in and be seen before more rx's are given. Yes Timmy Velazquez MD   lisinopril (PRINIVIL;ZESTRIL) 10 MG tablet Take 1 tablet by mouth daily Yes Timmy Velazquez MD   metoprolol succinate (TOPROL XL) 50 MG extended release tablet TAKE ONE TABLET BY MOUTH EVERY MORNING AND TAKE ONE-HALF TABLET BY MOUTH EVERY EVENING Yes Timmy Velazquez MD   mirtazapine (REMERON) 15 MG tablet Take 1 tablet by mouth nightly Yes Timmy Velazquez MD   Multiple Vitamins-Minerals (MULTIVITAMIN PO) Take by mouth daily Yes Historical Provider, MD        Social History     Tobacco Use    Smoking status: Former Smoker     Packs/day: 1.00     Types: Cigarettes     Start date: 1955     Last attempt to quit: 1993     Years since quittin.6    Smokeless tobacco: Never Used   Substance Use Topics    Alcohol use: Yes     Comment: socially        Vitals:    19 1103   BP: 138/64   Pulse: 53   Resp: 18   SpO2: 98%   Weight: 123 lb 6.4 oz (56 kg)     Estimated body mass index is 17.96 kg/m² as calculated from the following:    Height as of 19: 5' 9.5\" (1.765 m). Weight as of this encounter: 123 lb 6.4 oz (56 kg). Physical Exam   Constitutional: He is oriented to person, place, and time. He appears well-developed and well-nourished. No distress. HENT:   Head: Normocephalic. Eyes: Conjunctivae are normal.   Neck: Neck supple. No thyromegaly present. Cardiovascular: Normal rate, regular rhythm and normal heart sounds. No murmur heard. Pulmonary/Chest: Breath sounds normal. No respiratory distress. He has no wheezes. He has no rales. Abdominal: Soft. He exhibits no distension. Musculoskeletal: Normal range of motion. He exhibits no edema. Neurological: He is alert and oriented to person, place, and time. Skin: Skin is warm. No rash noted. Psychiatric: He has a normal mood and affect. His behavior is normal.       ASSESSMENT/PLAN:  1. Essential hypertension  Controlled. Continue lisinopril 10 mg daily and Toprol-XL 50 mg daily. 2. Chronic systolic CHF (congestive heart failure), NYHA class 1 ([address]  His cardiac function was decompensated following A. fib with rapid ventricular rate. At the time echo showed ejection fraction of 30 to 35%. But stress test December 21, 2018 shows normal LV size and systolic function and ejection fraction improved to 51%. Continue Toprol and lisinopril. 3. PAF (paroxysmal atrial fibrillation) (HCC)  Controlled ventricular rate. Continue Toprol XL 50 mg daily and long-term anticoagulation with Eliquis 5 mg twice daily. 4. Thymoma  Stable. And asymptomatic.   Follow-up with oncologist Dr. Ariela Fair      [facility] in 3 mos and PRN    An electronic signature was used to authenticate this note.    --Nam Andersen MD on 8/3/2019 at 2:24 PM

## 2019-08-15 ENCOUNTER — OFFICE VISIT (OUTPATIENT)
Dept: CARDIOLOGY CLINIC | Age: 79
End: 2019-08-15
Payer: MEDICARE

## 2019-08-15 VITALS
OXYGEN SATURATION: 98 % | HEIGHT: 69 IN | BODY MASS INDEX: 18.07 KG/M2 | DIASTOLIC BLOOD PRESSURE: 60 MMHG | WEIGHT: 122 LBS | SYSTOLIC BLOOD PRESSURE: 136 MMHG | HEART RATE: 50 BPM

## 2019-08-15 DIAGNOSIS — I42.9 CARDIOMYOPATHY, UNSPECIFIED TYPE (HCC): ICD-10-CM

## 2019-08-15 DIAGNOSIS — I48.0 PAF (PAROXYSMAL ATRIAL FIBRILLATION) (HCC): Primary | ICD-10-CM

## 2019-08-15 PROCEDURE — 1123F ACP DISCUSS/DSCN MKR DOCD: CPT | Performed by: INTERNAL MEDICINE

## 2019-08-15 PROCEDURE — G8419 CALC BMI OUT NRM PARAM NOF/U: HCPCS | Performed by: INTERNAL MEDICINE

## 2019-08-15 PROCEDURE — 99214 OFFICE O/P EST MOD 30 MIN: CPT | Performed by: INTERNAL MEDICINE

## 2019-08-15 PROCEDURE — 93000 ELECTROCARDIOGRAM COMPLETE: CPT | Performed by: INTERNAL MEDICINE

## 2019-08-15 PROCEDURE — 4040F PNEUMOC VAC/ADMIN/RCVD: CPT | Performed by: INTERNAL MEDICINE

## 2019-08-15 PROCEDURE — G8427 DOCREV CUR MEDS BY ELIG CLIN: HCPCS | Performed by: INTERNAL MEDICINE

## 2019-08-15 PROCEDURE — 1036F TOBACCO NON-USER: CPT | Performed by: INTERNAL MEDICINE

## 2019-08-15 NOTE — PROGRESS NOTES
[email or web address]    Smokeless tobacco: Never Used   Substance Use Topics    Alcohol use: Yes     Comment: socially    Drug use: No       Allergies   Allergen Reactions    Amiodarone      Current Outpatient Medications   Medication Sig Dispense Refill    triamcinolone (ARISTOCORT) 0.5 % cream Apply topically every 4 hours as needed (eczema) Apply topically 3 times daily. 15 g 3    apixaban (ELIQUIS) 5 MG TABS tablet Take 1 tablet by mouth 2 times daily Pt needs to come in and be seen before more rx's are given. 180 tablet 1    lisinopril (PRINIVIL;ZESTRIL) 10 MG tablet Take 1 tablet by mouth daily 90 tablet 1    metoprolol succinate (TOPROL XL) 50 MG extended release tablet TAKE ONE TABLET BY MOUTH EVERY MORNING AND TAKE ONE-HALF TABLET BY MOUTH EVERY EVENING 135 tablet 1    mirtazapine (REMERON) 15 MG tablet Take 1 tablet by mouth nightly 30 tablet 2    Multiple Vitamins-Minerals (MULTIVITAMIN PO) Take by mouth daily       No current facility-administered medications for this visit. Review of Systems:  · Constitutional: no unanticipated weight loss. There's been no change in energy level, sleep pattern, or activity level. No fevers, chills. · Eyes: No visual changes or diplopia. No scleral icterus. · ENT: No Headaches, hearing loss or vertigo. No mouth sores or sore throat. · Cardiovascular: as reviewed in HPI  · Respiratory: No cough or wheezing, no sputum production. No hematemesis. · Gastrointestinal: No abdominal pain, appetite loss, blood in stools. No change in bowel or bladder habits. · Genitourinary: No dysuria, trouble voiding, or hematuria. · Musculoskeletal:  No gait disturbance, no joint complaints. · Integumentary: No rash or pruritis. · Neurological: No headache, diplopia, change in muscle strength, numbness or tingling. · Psychiatric: No anxiety or depression. · Endocrine: No temperature intolerance. No excessive thirst, fluid intake, or urination.  No

## 2019-09-09 ENCOUNTER — HOSPITAL ENCOUNTER (OUTPATIENT)
Dept: CT IMAGING | Age: 79
Discharge: HOME | End: 2019-09-09
Payer: MEDICARE

## 2019-09-09 DIAGNOSIS — C37: ICD-10-CM

## 2019-09-09 PROCEDURE — 82565 ASSAY OF CREATININE: CPT

## 2019-09-09 PROCEDURE — 71260 CT THORAX DX C+: CPT

## 2019-09-09 PROCEDURE — 6360000004 HC RX CONTRAST MEDICATION: Performed by: INTERNAL MEDICINE

## 2019-09-09 RX ADMIN — IOPAMIDOL 75 ML: 755 INJECTION, SOLUTION INTRAVENOUS at 10:08

## 2019-09-22 NOTE — TELEPHONE ENCOUNTER
He thought you were going to give him something for losing weight.   [facility] [phone]
Per Dr Zak Menard increased from 15 mg to 30mg. The patient has been notified of this information on VM and call if any question.
Unknown if ever smoked

## 2019-09-27 ENCOUNTER — NURSE ONLY (OUTPATIENT)
Dept: INTERNAL MEDICINE CLINIC | Age: 79
End: 2019-09-27
Payer: MEDICARE

## 2019-09-27 DIAGNOSIS — Z23 NEED FOR INFLUENZA VACCINATION: Primary | ICD-10-CM

## 2019-09-27 PROCEDURE — 90653 IIV ADJUVANT VACCINE IM: CPT | Performed by: FAMILY MEDICINE

## 2019-09-27 PROCEDURE — G0008 ADMIN INFLUENZA VIRUS VAC: HCPCS | Performed by: FAMILY MEDICINE

## 2019-09-27 NOTE — PROGRESS NOTES
Vaccine Information Sheet, \"Influenza - Inactivated\"  given to Israel Milton, or parent/legal guardian of  Israel Milton and verbalized understanding. Patient responses:    Have you ever had a reaction to a flu vaccine? No  Do you have any current illness? No  Have you ever had Guillian Hiddenite Syndrome? No  Do you have a serious allergy to any of the follow: Neomycin, Polymyxin, Thimerosal, eggs or egg products? No    Flu vaccine given per order. Please see immunization tab. Risks and benefits explained. Current VIS given.

## 2019-11-01 ENCOUNTER — OFFICE VISIT (OUTPATIENT)
Dept: INTERNAL MEDICINE CLINIC | Age: 79
End: 2019-11-01
Payer: MEDICARE

## 2019-11-01 VITALS
DIASTOLIC BLOOD PRESSURE: 70 MMHG | BODY MASS INDEX: 18.55 KG/M2 | WEIGHT: 125.6 LBS | RESPIRATION RATE: 16 BRPM | SYSTOLIC BLOOD PRESSURE: 132 MMHG | HEART RATE: 50 BPM | OXYGEN SATURATION: 96 %

## 2019-11-01 DIAGNOSIS — I48.0 PAF (PAROXYSMAL ATRIAL FIBRILLATION) (HCC): ICD-10-CM

## 2019-11-01 DIAGNOSIS — I10 ESSENTIAL HYPERTENSION: Primary | ICD-10-CM

## 2019-11-01 DIAGNOSIS — D49.89 THYMOMA: ICD-10-CM

## 2019-11-01 DIAGNOSIS — I50.22 CHRONIC SYSTOLIC CHF (CONGESTIVE HEART FAILURE), NYHA CLASS 1 (HCC): ICD-10-CM

## 2019-11-01 PROCEDURE — G8420 CALC BMI NORM PARAMETERS: HCPCS | Performed by: FAMILY MEDICINE

## 2019-11-01 PROCEDURE — 3288F FALL RISK ASSESSMENT DOCD: CPT | Performed by: FAMILY MEDICINE

## 2019-11-01 PROCEDURE — 4040F PNEUMOC VAC/ADMIN/RCVD: CPT | Performed by: FAMILY MEDICINE

## 2019-11-01 PROCEDURE — 1123F ACP DISCUSS/DSCN MKR DOCD: CPT | Performed by: FAMILY MEDICINE

## 2019-11-01 PROCEDURE — 99214 OFFICE O/P EST MOD 30 MIN: CPT | Performed by: FAMILY MEDICINE

## 2019-11-01 PROCEDURE — G8482 FLU IMMUNIZE ORDER/ADMIN: HCPCS | Performed by: FAMILY MEDICINE

## 2019-11-01 PROCEDURE — 1036F TOBACCO NON-USER: CPT | Performed by: FAMILY MEDICINE

## 2019-11-01 PROCEDURE — G8427 DOCREV CUR MEDS BY ELIG CLIN: HCPCS | Performed by: FAMILY MEDICINE

## 2019-11-01 RX ORDER — LISINOPRIL 10 MG/1
10 TABLET ORAL DAILY
Qty: 90 TABLET | Refills: 1 | Status: SHIPPED | OUTPATIENT
Start: 2019-11-01 | End: 2020-08-24

## 2019-11-01 RX ORDER — METOPROLOL SUCCINATE 50 MG/1
TABLET, EXTENDED RELEASE ORAL
Qty: 135 TABLET | Refills: 1 | Status: ON HOLD | OUTPATIENT
Start: 2019-11-01 | End: 2019-12-29 | Stop reason: HOSPADM

## 2019-11-01 ASSESSMENT — ENCOUNTER SYMPTOMS
TROUBLE SWALLOWING: 0
CONSTIPATION: 0
BLOOD IN STOOL: 0
SHORTNESS OF BREATH: 0
ABDOMINAL PAIN: 0
VOICE CHANGE: 0
DIARRHEA: 0

## 2019-12-12 RX ORDER — AMOXICILLIN 875 MG/1
875 TABLET, COATED ORAL 2 TIMES DAILY
Qty: 20 TABLET | Refills: 0 | Status: SHIPPED | OUTPATIENT
Start: 2019-12-12 | End: 2019-12-22

## 2019-12-27 ENCOUNTER — APPOINTMENT (OUTPATIENT)
Dept: CT IMAGING | Age: 79
DRG: 308 | End: 2019-12-27
Payer: MEDICARE

## 2019-12-27 ENCOUNTER — APPOINTMENT (OUTPATIENT)
Dept: GENERAL RADIOLOGY | Age: 79
DRG: 308 | End: 2019-12-27
Payer: MEDICARE

## 2019-12-27 ENCOUNTER — HOSPITAL ENCOUNTER (INPATIENT)
Age: 79
LOS: 2 days | Discharge: HOME | DRG: 308 | End: 2019-12-29
Attending: EMERGENCY MEDICINE | Admitting: FAMILY MEDICINE
Payer: MEDICARE

## 2019-12-27 DIAGNOSIS — J90 PLEURAL EFFUSION: Primary | ICD-10-CM

## 2019-12-27 DIAGNOSIS — I48.91 ATRIAL FIBRILLATION, UNSPECIFIED TYPE (HCC): ICD-10-CM

## 2019-12-27 DIAGNOSIS — I48.91 ATRIAL FIBRILLATION WITH RVR (HCC): ICD-10-CM

## 2019-12-27 LAB
ANION GAP SERPL CALCULATED.3IONS-SCNC: 15 MMOL/L (ref 3–16)
APTT: 33.5 SEC (ref 24.2–36.2)
BASOPHILS ABSOLUTE: 0 K/UL (ref 0–0.2)
BASOPHILS RELATIVE PERCENT: 0.6 %
BUN BLDV-MCNC: 17 MG/DL (ref 7–20)
CALCIUM SERPL-MCNC: 9.2 MG/DL (ref 8.3–10.6)
CHLORIDE BLD-SCNC: 103 MMOL/L (ref 99–110)
CO2: 18 MMOL/L (ref 21–32)
CREAT SERPL-MCNC: 0.8 MG/DL (ref 0.8–1.3)
EKG ATRIAL RATE: 163 BPM
EKG DIAGNOSIS: NORMAL
EKG Q-T INTERVAL: 294 MS
EKG QRS DURATION: 88 MS
EKG QTC CALCULATION (BAZETT): 469 MS
EKG R AXIS: 96 DEGREES
EKG T AXIS: -34 DEGREES
EKG VENTRICULAR RATE: 153 BPM
EOSINOPHILS ABSOLUTE: 0.1 K/UL (ref 0–0.6)
EOSINOPHILS RELATIVE PERCENT: 1.4 %
GFR AFRICAN AMERICAN: >60
GFR NON-AFRICAN AMERICAN: >60
GLUCOSE BLD-MCNC: 103 MG/DL (ref 70–99)
HCT VFR BLD CALC: 42.1 % (ref 40.5–52.5)
HEMOGLOBIN: 14 G/DL (ref 13.5–17.5)
INR BLD: 1.71 (ref 0.86–1.14)
LYMPHOCYTES ABSOLUTE: 1.4 K/UL (ref 1–5.1)
LYMPHOCYTES RELATIVE PERCENT: 19.7 %
MCH RBC QN AUTO: 30.7 PG (ref 26–34)
MCHC RBC AUTO-ENTMCNC: 33.3 G/DL (ref 31–36)
MCV RBC AUTO: 92.1 FL (ref 80–100)
MONOCYTES ABSOLUTE: 0.7 K/UL (ref 0–1.3)
MONOCYTES RELATIVE PERCENT: 9.7 %
NEUTROPHILS ABSOLUTE: 4.7 K/UL (ref 1.7–7.7)
NEUTROPHILS RELATIVE PERCENT: 68.6 %
PDW BLD-RTO: 14.1 % (ref 12.4–15.4)
PLATELET # BLD: 186 K/UL (ref 135–450)
PLATELET SLIDE REVIEW: ADEQUATE
PMV BLD AUTO: 10.7 FL (ref 5–10.5)
POTASSIUM REFLEX MAGNESIUM: 4.3 MMOL/L (ref 3.5–5.1)
PRO-BNP: 2266 PG/ML (ref 0–449)
PROTHROMBIN TIME: 20 SEC (ref 10–13.2)
RBC # BLD: 4.57 M/UL (ref 4.2–5.9)
SLIDE REVIEW: ABNORMAL
SODIUM BLD-SCNC: 136 MMOL/L (ref 136–145)
WBC # BLD: 6.9 K/UL (ref 4–11)

## 2019-12-27 PROCEDURE — 96365 THER/PROPH/DIAG IV INF INIT: CPT

## 2019-12-27 PROCEDURE — 85025 COMPLETE CBC W/AUTO DIFF WBC: CPT

## 2019-12-27 PROCEDURE — 93005 ELECTROCARDIOGRAM TRACING: CPT | Performed by: EMERGENCY MEDICINE

## 2019-12-27 PROCEDURE — 36415 COLL VENOUS BLD VENIPUNCTURE: CPT

## 2019-12-27 PROCEDURE — 71046 X-RAY EXAM CHEST 2 VIEWS: CPT

## 2019-12-27 PROCEDURE — 2500000003 HC RX 250 WO HCPCS: Performed by: EMERGENCY MEDICINE

## 2019-12-27 PROCEDURE — 96376 TX/PRO/DX INJ SAME DRUG ADON: CPT

## 2019-12-27 PROCEDURE — 85610 PROTHROMBIN TIME: CPT

## 2019-12-27 PROCEDURE — 6370000000 HC RX 637 (ALT 250 FOR IP): Performed by: FAMILY MEDICINE

## 2019-12-27 PROCEDURE — 94760 N-INVAS EAR/PLS OXIMETRY 1: CPT

## 2019-12-27 PROCEDURE — 80048 BASIC METABOLIC PNL TOTAL CA: CPT

## 2019-12-27 PROCEDURE — 99285 EMERGENCY DEPT VISIT HI MDM: CPT

## 2019-12-27 PROCEDURE — 2580000003 HC RX 258: Performed by: EMERGENCY MEDICINE

## 2019-12-27 PROCEDURE — 1200000000 HC SEMI PRIVATE

## 2019-12-27 PROCEDURE — 83880 ASSAY OF NATRIURETIC PEPTIDE: CPT

## 2019-12-27 PROCEDURE — 93010 ELECTROCARDIOGRAM REPORT: CPT | Performed by: INTERNAL MEDICINE

## 2019-12-27 PROCEDURE — 71250 CT THORAX DX C-: CPT

## 2019-12-27 PROCEDURE — 85730 THROMBOPLASTIN TIME PARTIAL: CPT

## 2019-12-27 RX ORDER — ONDANSETRON 2 MG/ML
4 INJECTION INTRAMUSCULAR; INTRAVENOUS EVERY 6 HOURS PRN
Status: DISCONTINUED | OUTPATIENT
Start: 2019-12-27 | End: 2019-12-29 | Stop reason: HOSPADM

## 2019-12-27 RX ORDER — DILTIAZEM HYDROCHLORIDE 5 MG/ML
10 INJECTION INTRAVENOUS ONCE
Status: COMPLETED | OUTPATIENT
Start: 2019-12-27 | End: 2019-12-27

## 2019-12-27 RX ORDER — SODIUM CHLORIDE 0.9 % (FLUSH) 0.9 %
10 SYRINGE (ML) INJECTION EVERY 12 HOURS SCHEDULED
Status: DISCONTINUED | OUTPATIENT
Start: 2019-12-27 | End: 2019-12-29 | Stop reason: HOSPADM

## 2019-12-27 RX ORDER — ACETAMINOPHEN 325 MG/1
650 TABLET ORAL EVERY 4 HOURS PRN
Status: DISCONTINUED | OUTPATIENT
Start: 2019-12-27 | End: 2019-12-29 | Stop reason: HOSPADM

## 2019-12-27 RX ORDER — LISINOPRIL 10 MG/1
10 TABLET ORAL DAILY
Status: DISCONTINUED | OUTPATIENT
Start: 2019-12-27 | End: 2019-12-29 | Stop reason: HOSPADM

## 2019-12-27 RX ORDER — SODIUM CHLORIDE 0.9 % (FLUSH) 0.9 %
10 SYRINGE (ML) INJECTION PRN
Status: DISCONTINUED | OUTPATIENT
Start: 2019-12-27 | End: 2019-12-29 | Stop reason: HOSPADM

## 2019-12-27 RX ORDER — METOPROLOL SUCCINATE 25 MG/1
25 TABLET, EXTENDED RELEASE ORAL DAILY
Status: DISCONTINUED | OUTPATIENT
Start: 2019-12-27 | End: 2019-12-28

## 2019-12-27 RX ADMIN — APIXABAN 5 MG: 5 TABLET, FILM COATED ORAL at 22:50

## 2019-12-27 RX ADMIN — DILTIAZEM HYDROCHLORIDE 10 MG: 5 INJECTION INTRAVENOUS at 16:01

## 2019-12-27 RX ADMIN — METOPROLOL SUCCINATE 25 MG: 25 TABLET, EXTENDED RELEASE ORAL at 22:50

## 2019-12-27 RX ADMIN — DILTIAZEM HYDROCHLORIDE 5 MG/HR: 5 INJECTION INTRAVENOUS at 20:05

## 2019-12-27 ASSESSMENT — PAIN SCALES - GENERAL
PAINLEVEL_OUTOF10: 0

## 2019-12-28 LAB
ANION GAP SERPL CALCULATED.3IONS-SCNC: 14 MMOL/L (ref 3–16)
BASOPHILS ABSOLUTE: 0.1 K/UL (ref 0–0.2)
BASOPHILS RELATIVE PERCENT: 1.1 %
BUN BLDV-MCNC: 13 MG/DL (ref 7–20)
CALCIUM SERPL-MCNC: 9.1 MG/DL (ref 8.3–10.6)
CHLORIDE BLD-SCNC: 101 MMOL/L (ref 99–110)
CO2: 23 MMOL/L (ref 21–32)
CREAT SERPL-MCNC: 0.8 MG/DL (ref 0.8–1.3)
EOSINOPHILS ABSOLUTE: 0.1 K/UL (ref 0–0.6)
EOSINOPHILS RELATIVE PERCENT: 1.7 %
GFR AFRICAN AMERICAN: >60
GFR NON-AFRICAN AMERICAN: >60
GLUCOSE BLD-MCNC: 142 MG/DL (ref 70–99)
HCT VFR BLD CALC: 40.8 % (ref 40.5–52.5)
HEMOGLOBIN: 13.8 G/DL (ref 13.5–17.5)
LYMPHOCYTES ABSOLUTE: 1.5 K/UL (ref 1–5.1)
LYMPHOCYTES RELATIVE PERCENT: 20.1 %
MAGNESIUM: 1.8 MG/DL (ref 1.8–2.4)
MCH RBC QN AUTO: 31 PG (ref 26–34)
MCHC RBC AUTO-ENTMCNC: 33.9 G/DL (ref 31–36)
MCV RBC AUTO: 91.4 FL (ref 80–100)
MONOCYTES ABSOLUTE: 0.6 K/UL (ref 0–1.3)
MONOCYTES RELATIVE PERCENT: 7.5 %
NEUTROPHILS ABSOLUTE: 5.1 K/UL (ref 1.7–7.7)
NEUTROPHILS RELATIVE PERCENT: 69.6 %
PDW BLD-RTO: 14 % (ref 12.4–15.4)
PLATELET # BLD: 194 K/UL (ref 135–450)
PMV BLD AUTO: 10.8 FL (ref 5–10.5)
POTASSIUM REFLEX MAGNESIUM: 3.8 MMOL/L (ref 3.5–5.1)
RBC # BLD: 4.47 M/UL (ref 4.2–5.9)
REPORT: NORMAL
RESPIRATORY PANEL PCR: NORMAL
SODIUM BLD-SCNC: 138 MMOL/L (ref 136–145)
WBC # BLD: 7.3 K/UL (ref 4–11)

## 2019-12-28 PROCEDURE — 6360000002 HC RX W HCPCS: Performed by: INTERNAL MEDICINE

## 2019-12-28 PROCEDURE — 85025 COMPLETE CBC W/AUTO DIFF WBC: CPT

## 2019-12-28 PROCEDURE — 2060000000 HC ICU INTERMEDIATE R&B

## 2019-12-28 PROCEDURE — 6370000000 HC RX 637 (ALT 250 FOR IP): Performed by: FAMILY MEDICINE

## 2019-12-28 PROCEDURE — 83735 ASSAY OF MAGNESIUM: CPT

## 2019-12-28 PROCEDURE — 2500000003 HC RX 250 WO HCPCS: Performed by: EMERGENCY MEDICINE

## 2019-12-28 PROCEDURE — 6370000000 HC RX 637 (ALT 250 FOR IP): Performed by: INTERNAL MEDICINE

## 2019-12-28 PROCEDURE — 2500000003 HC RX 250 WO HCPCS: Performed by: INTERNAL MEDICINE

## 2019-12-28 PROCEDURE — 2580000003 HC RX 258: Performed by: EMERGENCY MEDICINE

## 2019-12-28 PROCEDURE — 99223 1ST HOSP IP/OBS HIGH 75: CPT | Performed by: INTERNAL MEDICINE

## 2019-12-28 PROCEDURE — 0100U HC RESPIRPTHGN MULT REV TRANS & AMP PRB TECH 21 TRGT: CPT

## 2019-12-28 PROCEDURE — 36415 COLL VENOUS BLD VENIPUNCTURE: CPT

## 2019-12-28 PROCEDURE — 2580000003 HC RX 258: Performed by: FAMILY MEDICINE

## 2019-12-28 PROCEDURE — 94760 N-INVAS EAR/PLS OXIMETRY 1: CPT

## 2019-12-28 PROCEDURE — 6360000002 HC RX W HCPCS: Performed by: NURSE PRACTITIONER

## 2019-12-28 PROCEDURE — 80048 BASIC METABOLIC PNL TOTAL CA: CPT

## 2019-12-28 RX ORDER — METOPROLOL SUCCINATE 50 MG/1
50 TABLET, EXTENDED RELEASE ORAL 2 TIMES DAILY
Status: DISCONTINUED | OUTPATIENT
Start: 2019-12-28 | End: 2019-12-28

## 2019-12-28 RX ORDER — METOPROLOL TARTRATE 5 MG/5ML
5 INJECTION INTRAVENOUS ONCE
Status: COMPLETED | OUTPATIENT
Start: 2019-12-28 | End: 2019-12-28

## 2019-12-28 RX ORDER — FUROSEMIDE 10 MG/ML
20 INJECTION INTRAMUSCULAR; INTRAVENOUS ONCE
Status: COMPLETED | OUTPATIENT
Start: 2019-12-28 | End: 2019-12-28

## 2019-12-28 RX ORDER — DIGOXIN 0.25 MG/ML
125 INJECTION INTRAMUSCULAR; INTRAVENOUS ONCE
Status: COMPLETED | OUTPATIENT
Start: 2019-12-28 | End: 2019-12-28

## 2019-12-28 RX ORDER — METOPROLOL TARTRATE 50 MG/1
50 TABLET, FILM COATED ORAL 2 TIMES DAILY
Status: DISCONTINUED | OUTPATIENT
Start: 2019-12-28 | End: 2019-12-29 | Stop reason: HOSPADM

## 2019-12-28 RX ADMIN — SODIUM CHLORIDE, PRESERVATIVE FREE 10 ML: 5 INJECTION INTRAVENOUS at 21:11

## 2019-12-28 RX ADMIN — APIXABAN 5 MG: 5 TABLET, FILM COATED ORAL at 09:47

## 2019-12-28 RX ADMIN — DIGOXIN 125 MCG: 0.25 INJECTION INTRAMUSCULAR; INTRAVENOUS at 19:06

## 2019-12-28 RX ADMIN — METOPROLOL SUCCINATE 50 MG: 50 TABLET, EXTENDED RELEASE ORAL at 21:11

## 2019-12-28 RX ADMIN — APIXABAN 5 MG: 5 TABLET, FILM COATED ORAL at 21:11

## 2019-12-28 RX ADMIN — FUROSEMIDE 20 MG: 10 INJECTION, SOLUTION INTRAMUSCULAR; INTRAVENOUS at 12:22

## 2019-12-28 RX ADMIN — METOPROLOL SUCCINATE 25 MG: 25 TABLET, EXTENDED RELEASE ORAL at 09:47

## 2019-12-28 RX ADMIN — LISINOPRIL 10 MG: 10 TABLET ORAL at 09:47

## 2019-12-28 RX ADMIN — DILTIAZEM HYDROCHLORIDE 2.5 MG/HR: 5 INJECTION INTRAVENOUS at 05:50

## 2019-12-28 RX ADMIN — METOPROLOL TARTRATE 5 MG: 5 INJECTION INTRAVENOUS at 19:06

## 2019-12-28 ASSESSMENT — PAIN SCALES - GENERAL
PAINLEVEL_OUTOF10: 0

## 2019-12-29 VITALS
HEIGHT: 69 IN | TEMPERATURE: 98.5 F | BODY MASS INDEX: 18.22 KG/M2 | RESPIRATION RATE: 16 BRPM | DIASTOLIC BLOOD PRESSURE: 78 MMHG | OXYGEN SATURATION: 96 % | SYSTOLIC BLOOD PRESSURE: 120 MMHG | WEIGHT: 123.02 LBS | HEART RATE: 83 BPM

## 2019-12-29 LAB
ANION GAP SERPL CALCULATED.3IONS-SCNC: 14 MMOL/L (ref 3–16)
BASOPHILS ABSOLUTE: 0 K/UL (ref 0–0.2)
BASOPHILS RELATIVE PERCENT: 0.8 %
BUN BLDV-MCNC: 17 MG/DL (ref 7–20)
CALCIUM SERPL-MCNC: 8.8 MG/DL (ref 8.3–10.6)
CHLORIDE BLD-SCNC: 106 MMOL/L (ref 99–110)
CO2: 21 MMOL/L (ref 21–32)
CREAT SERPL-MCNC: 0.8 MG/DL (ref 0.8–1.3)
EOSINOPHILS ABSOLUTE: 0.2 K/UL (ref 0–0.6)
EOSINOPHILS RELATIVE PERCENT: 2.8 %
GFR AFRICAN AMERICAN: >60
GFR NON-AFRICAN AMERICAN: >60
GLUCOSE BLD-MCNC: 87 MG/DL (ref 70–99)
HCT VFR BLD CALC: 40.6 % (ref 40.5–52.5)
HEMOGLOBIN: 13.5 G/DL (ref 13.5–17.5)
LYMPHOCYTES ABSOLUTE: 1.4 K/UL (ref 1–5.1)
LYMPHOCYTES RELATIVE PERCENT: 22.6 %
MCH RBC QN AUTO: 30.5 PG (ref 26–34)
MCHC RBC AUTO-ENTMCNC: 33.2 G/DL (ref 31–36)
MCV RBC AUTO: 91.7 FL (ref 80–100)
MONOCYTES ABSOLUTE: 0.7 K/UL (ref 0–1.3)
MONOCYTES RELATIVE PERCENT: 11.3 %
NEUTROPHILS ABSOLUTE: 3.9 K/UL (ref 1.7–7.7)
NEUTROPHILS RELATIVE PERCENT: 62.5 %
PDW BLD-RTO: 13.7 % (ref 12.4–15.4)
PLATELET # BLD: 160 K/UL (ref 135–450)
PMV BLD AUTO: 10.8 FL (ref 5–10.5)
POTASSIUM REFLEX MAGNESIUM: 3.9 MMOL/L (ref 3.5–5.1)
PRO-BNP: 1532 PG/ML (ref 0–449)
RBC # BLD: 4.43 M/UL (ref 4.2–5.9)
SODIUM BLD-SCNC: 141 MMOL/L (ref 136–145)
WBC # BLD: 6.2 K/UL (ref 4–11)

## 2019-12-29 PROCEDURE — 6370000000 HC RX 637 (ALT 250 FOR IP): Performed by: FAMILY MEDICINE

## 2019-12-29 PROCEDURE — 99233 SBSQ HOSP IP/OBS HIGH 50: CPT | Performed by: INTERNAL MEDICINE

## 2019-12-29 PROCEDURE — 85025 COMPLETE CBC W/AUTO DIFF WBC: CPT

## 2019-12-29 PROCEDURE — 6360000002 HC RX W HCPCS: Performed by: NURSE PRACTITIONER

## 2019-12-29 PROCEDURE — 2580000003 HC RX 258: Performed by: FAMILY MEDICINE

## 2019-12-29 PROCEDURE — 80048 BASIC METABOLIC PNL TOTAL CA: CPT

## 2019-12-29 PROCEDURE — 83880 ASSAY OF NATRIURETIC PEPTIDE: CPT

## 2019-12-29 PROCEDURE — 6370000000 HC RX 637 (ALT 250 FOR IP): Performed by: NURSE PRACTITIONER

## 2019-12-29 PROCEDURE — 36415 COLL VENOUS BLD VENIPUNCTURE: CPT

## 2019-12-29 PROCEDURE — 94760 N-INVAS EAR/PLS OXIMETRY 1: CPT

## 2019-12-29 RX ORDER — DIGOXIN 0.25 MG/ML
500 INJECTION INTRAMUSCULAR; INTRAVENOUS ONCE
Status: COMPLETED | OUTPATIENT
Start: 2019-12-29 | End: 2019-12-29

## 2019-12-29 RX ORDER — DIGOXIN 125 MCG
125 TABLET ORAL DAILY
Status: DISCONTINUED | OUTPATIENT
Start: 2019-12-30 | End: 2019-12-29 | Stop reason: HOSPADM

## 2019-12-29 RX ORDER — METOPROLOL TARTRATE 50 MG/1
50 TABLET, FILM COATED ORAL 2 TIMES DAILY
Qty: 60 TABLET | Refills: 3 | Status: SHIPPED | OUTPATIENT
Start: 2019-12-29 | End: 2020-04-29 | Stop reason: SDUPTHER

## 2019-12-29 RX ORDER — FUROSEMIDE 20 MG/1
20 TABLET ORAL DAILY
Status: DISCONTINUED | OUTPATIENT
Start: 2019-12-29 | End: 2019-12-29 | Stop reason: HOSPADM

## 2019-12-29 RX ORDER — FUROSEMIDE 20 MG/1
20 TABLET ORAL DAILY
Qty: 60 TABLET | Refills: 3 | Status: SHIPPED | OUTPATIENT
Start: 2019-12-30 | End: 2020-08-26 | Stop reason: SDUPTHER

## 2019-12-29 RX ORDER — DIGOXIN 125 MCG
125 TABLET ORAL DAILY
Qty: 30 TABLET | Refills: 3 | Status: SHIPPED | OUTPATIENT
Start: 2019-12-30 | End: 2020-04-29 | Stop reason: SDUPTHER

## 2019-12-29 RX ADMIN — LISINOPRIL 10 MG: 10 TABLET ORAL at 08:23

## 2019-12-29 RX ADMIN — APIXABAN 5 MG: 5 TABLET, FILM COATED ORAL at 08:23

## 2019-12-29 RX ADMIN — METOPROLOL TARTRATE 50 MG: 50 TABLET, FILM COATED ORAL at 08:23

## 2019-12-29 RX ADMIN — DIGOXIN 500 MCG: 0.25 INJECTION INTRAMUSCULAR; INTRAVENOUS at 14:51

## 2019-12-29 RX ADMIN — SODIUM CHLORIDE, PRESERVATIVE FREE 10 ML: 5 INJECTION INTRAVENOUS at 08:24

## 2019-12-29 RX ADMIN — FUROSEMIDE 20 MG: 20 TABLET ORAL at 14:11

## 2019-12-29 ASSESSMENT — PAIN SCALES - GENERAL
PAINLEVEL_OUTOF10: 0

## 2019-12-30 ENCOUNTER — CARE COORDINATION (OUTPATIENT)
Dept: CASE MANAGEMENT | Age: 79
End: 2019-12-30

## 2019-12-31 ENCOUNTER — CARE COORDINATION (OUTPATIENT)
Dept: CASE MANAGEMENT | Age: 79
End: 2019-12-31

## 2020-01-02 ENCOUNTER — CARE COORDINATION (OUTPATIENT)
Dept: CASE MANAGEMENT | Age: 80
End: 2020-01-02

## 2020-01-02 ENCOUNTER — TELEPHONE (OUTPATIENT)
Dept: INTERNAL MEDICINE CLINIC | Age: 80
End: 2020-01-02

## 2020-01-02 PROCEDURE — 1111F DSCHRG MED/CURRENT MED MERGE: CPT | Performed by: FAMILY MEDICINE

## 2020-01-02 NOTE — TELEPHONE ENCOUNTER
Dr Justo Quinteros office called. Pt is scheduled for colonoscopy in a couple of weeks. They would like to stop his Eliquis for a couple of days prior. Instructions? I told them that I would review this with Dr Mat Skinner tomorrow, Friday, 1/3/20. When he returns, and call them back at:     Jan,  [facility]:  [phone] direct line    [phone] main line.

## 2020-01-02 NOTE — CARE COORDINATION
Niru [facility] Transitions Initial Follow Up Call    Call within 2 business days of discharge: Yes    Patient: Jimmy Crump Patient : 1940   MRN: [id number]  Reason for Admission: pleural effusion  Discharge Date: 19 RARS: Readmission Risk Score: 10      Last Discharge [facility]       Complaint Diagnosis Description Type Department Provider    19 Cough Pleural effusion . .. ED to Hosp-Admission (Discharged) (ADMITTED) YUKO 5N Ariela Elizabeth, ; PLASTIQ&fsboWOW. .. Spoke with: patient, [facility]    Non-face-to-face services provided:  Obtained and reviewed discharge summary and/or continuity of care documents  Assessment and support for treatment adherence and medication management-1111f completed    Care Transitions 24 Hour Call    Do you have any ongoing symptoms?:  Yes  Patient-reported symptoms:  Shortness of Breath  Do you have a copy of your discharge instructions?:  Yes  Do you have all of your prescriptions and are they filled?:  Yes  Have you scheduled your follow up appointment?:  No  Were you discharged with any Home Care or Post Acute Services:  No  Care Transitions Interventions                             Spoke with patient, Lennette Krabbe, he stated his weight today was 123#, which is the same as discharge weight. He stated did receive copy of discharge instructions. He stated the only time he is SOB is when he is laying down flat. He stated has no CP and no edema. Writer discussed with patient the importance of low sodium diet and educated the patient on foods that were high in sodium. Patient stated had appointment with Dr. Joy Adams suggested patient discuss low sodium diet with Dr. Anitra Matthews. Fluid restriction was also discussed. Medication review (9756R) completed with patient. Patient stated would call Dr. Misael Anderson and Dr. Omar Fortune about F/U appointments. Denies needs at present time. Agreeable to f/u calls.   Educated on

## 2020-01-03 ENCOUNTER — OFFICE VISIT (OUTPATIENT)
Dept: CARDIOLOGY CLINIC | Age: 80
End: 2020-01-03
Payer: MEDICARE

## 2020-01-03 ENCOUNTER — TELEPHONE (OUTPATIENT)
Dept: CARDIOLOGY CLINIC | Age: 80
End: 2020-01-03

## 2020-01-03 VITALS
DIASTOLIC BLOOD PRESSURE: 60 MMHG | HEART RATE: 68 BPM | SYSTOLIC BLOOD PRESSURE: 110 MMHG | WEIGHT: 123 LBS | HEIGHT: 69 IN | BODY MASS INDEX: 18.22 KG/M2 | OXYGEN SATURATION: 98 %

## 2020-01-03 PROCEDURE — G8419 CALC BMI OUT NRM PARAM NOF/U: HCPCS | Performed by: INTERNAL MEDICINE

## 2020-01-03 PROCEDURE — 1036F TOBACCO NON-USER: CPT | Performed by: INTERNAL MEDICINE

## 2020-01-03 PROCEDURE — 99214 OFFICE O/P EST MOD 30 MIN: CPT | Performed by: INTERNAL MEDICINE

## 2020-01-03 PROCEDURE — G8427 DOCREV CUR MEDS BY ELIG CLIN: HCPCS | Performed by: INTERNAL MEDICINE

## 2020-01-03 PROCEDURE — 4040F PNEUMOC VAC/ADMIN/RCVD: CPT | Performed by: INTERNAL MEDICINE

## 2020-01-03 PROCEDURE — G8482 FLU IMMUNIZE ORDER/ADMIN: HCPCS | Performed by: INTERNAL MEDICINE

## 2020-01-03 PROCEDURE — 93000 ELECTROCARDIOGRAM COMPLETE: CPT | Performed by: INTERNAL MEDICINE

## 2020-01-03 PROCEDURE — 1111F DSCHRG MED/CURRENT MED MERGE: CPT | Performed by: INTERNAL MEDICINE

## 2020-01-03 PROCEDURE — 1123F ACP DISCUSS/DSCN MKR DOCD: CPT | Performed by: INTERNAL MEDICINE

## 2020-01-03 NOTE — PROGRESS NOTES
bruising or bleeding, blood clots or swollen lymph nodes. · Allergic/Immunologic: No nasal congestion or hives. Physical Exam:   /60 (Site: Left Upper Arm, Position: Sitting)   Pulse 68   Ht 5' 9\" (1.753 m)   Wt 123 lb (55.8 kg)   SpO2 98%   BMI 18.16 kg/m²   Wt Readings from Last 3 Encounters:   01/03/20 123 lb (55.8 kg)   12/29/19 123 lb 0.3 oz (55.8 kg)   11/01/19 125 lb 9.6 oz (57 kg)     Constitutional: He is oriented to person, place, and time. He appears well-developed and well-nourished. In no acute distress. Head: Normocephalic and atraumatic. Pupils equal and round. Neck: Neck supple. No JVP or carotid bruit appreciated. No mass and no thyromegaly present. No lymphadenopathy present. Cardiovascular: irregularly irregular . Normal heart sounds. Exam reveals no gallop and no friction DZK.4/-5/8 systolic mumur at the base  Pulmonary/Chest: Effort normal and breath sounds normal. No respiratory distress. He has no wheezes, rhonchi or rales. Abdominal: Soft, non-tender. Bowel sounds are normal. He exhibits no organomegaly, mass or bruit. Extremities: No edema, cyanosis, or clubbing. Pulses are 2+ radial/dorsalis pedis/posterior tibial/carotid bilaterally. Neurological: No gross cranial nerve deficit. Coordination normal.   Skin: Skin is warm and dry. There is no rash or diaphoresis. Psychiatric: He has a normal mood and affect.  His speech is normal and behavior is normal.     Lab Review:   FLP:    Lab Results   Component Value Date    TRIG 58 03/29/2018    HDL 78 06/28/2019    LDLCALC 76 06/28/2019    LABVLDL 15 06/28/2019     BUN/Creatinine:    Lab Results   Component Value Date    BUN 17 12/29/2019    CREATININE 0.8 12/29/2019       EKG Interpretation: 12/7/18 SR with PACs      1/2/19 Sinus bradycardia with PAC's     8/15/19 Sinus bradycardia with PAC's     1/3/20 Atrial fibrillation    Image Review:   Echo 10/29/18  Left ventricular cavity size is normal.  Normal left ventricular wall thickness. Difficult to estimate EF due to increase rate and rhythm but appears depressed  Mitral valve leaflets appear mildly thickened. Mild mitral regurgitation. The left atrium is dilated. Aortic valve appears sclerotic but opens adequately. Trivial aortic regurgitation. Tricuspid valve is structurally normal.  Mild tricuspid regurgitation with RVSP of 26 mmHg. The right atrium is dilated. Suggest repeat limited echo with bubble study to r/o ASD or PFO     Echo limited 11/2/18   Left ventricular cavity size is normal.  Normal left ventricular wall thickness. Ejection fraction is visually estimated to be 30-35%, though difficult to adequately assess due to arrhythmia. Severe hypo to akinesis of the septum. Abnormal (paradoxical) septal motion is present. Tricuspid valve appears thickened with redundancy. The right atrium is mildly dilated. IVC not well visualized. A bubble study was performed and fails to show evidence of shunting. Stress 12/21/18  1. Technically a satisfactory study.    2. Positive treadmill exercise stress portion of the study.    3. No evidence of Ischemia by Myocardial Perfusion Imaging.    4. Gated Study shows normal LV size and Systolic function; EF is 52%.    5. The patient exercised for 6 min. on the Ramírez protocol to achieve a HR of    133, which is 94% of PMHR. The study was stopped due to elevated BP. There    was no chest pain . 1mm ST depression in lateral leads.    6. Diastolic BP crept upto 221 with peak exercise     ECHO 5/15/19  Mitral valve leaflets appear thickened with mitral valve prolapse. moderate mitral regurgitation is present. The left atrium is mildly dilated. Tricuspid valve appear redundant. Mild to moderate tricuspid regurgitation. The right atrium is moderately dilated. IVC size is normal (<2.1 cm) but collapses < 50% with respiration consistent  with elevated RA pressure (8 mmHg).   Estimated pulmonary artery systolic pressure is mildly elevated at 34 mmHg  assuming a right atrial pressure of 8 mmHg. Ejection fraction is visually estimated to be 40-45%. Event monitor 2/13/19  NSR. Few episodes if PSVT. Assessment/Plan:     Paroxsymal atrial fibrillation  He was recently in the hospital on 12/27/19-12/29/19 with increased heart rate and shortness of breath. His EKG today confirms atrial fibrillation today with CVR. . I have discussed with him about a cardioversion since he congestive heart failure during his recent hospitalization. I have discussed with him the risks/benefits and he is agreeable. I have briefly discussed with him about an ablation. Continue Eliquis 5 mg BID    Cardiomyopathy/Chronic systolic heart failure  Recently hospitalized for increased shortness of breath. BNP at that time was 2,266. He appears compensated on exam today. LVEF 40-45 %. Continue with medical therapy including B-blocker and lisinopril 10 mg daily. Encouraged a low sodium diet and daily weights.        Follow up in 3 months. He had his flu vaccine this season. Thank you very much for allowing me to participate in the care of your patient. Please do not hesitate to contact me if you have any questions. This note was scribed in the presence of Dr Brian Rosales, by Maia Myrick RN    Physician Attestation:  The scribes documentation has been prepared under my direction and personally reviewed by me in its entirety. I confirm that the note above accurately reflects all work, treatment, procedures, and medical decision making performed by me. Sincerely,  Santy Bustamante M.D.       [address] [place] [address] Jose Minor [facility]  Ph: [phone]  Fax: [phone]

## 2020-01-07 ENCOUNTER — OFFICE VISIT (OUTPATIENT)
Dept: INTERNAL MEDICINE CLINIC | Age: 80
End: 2020-01-07
Payer: MEDICARE

## 2020-01-07 VITALS
OXYGEN SATURATION: 98 % | WEIGHT: 122 LBS | SYSTOLIC BLOOD PRESSURE: 110 MMHG | BODY MASS INDEX: 18.07 KG/M2 | DIASTOLIC BLOOD PRESSURE: 64 MMHG | HEIGHT: 69 IN | HEART RATE: 78 BPM

## 2020-01-07 PROCEDURE — 1111F DSCHRG MED/CURRENT MED MERGE: CPT | Performed by: FAMILY MEDICINE

## 2020-01-07 PROCEDURE — 99495 TRANSJ CARE MGMT MOD F2F 14D: CPT | Performed by: FAMILY MEDICINE

## 2020-01-07 PROCEDURE — G8510 SCR DEP NEG, NO PLAN REQD: HCPCS | Performed by: FAMILY MEDICINE

## 2020-01-07 ASSESSMENT — PATIENT HEALTH QUESTIONNAIRE - PHQ9
1. LITTLE INTEREST OR PLEASURE IN DOING THINGS: 0
SUM OF ALL RESPONSES TO PHQ QUESTIONS 1-9: 0
2. FEELING DOWN, DEPRESSED OR HOPELESS: 0
SUM OF ALL RESPONSES TO PHQ9 QUESTIONS 1 & 2: 0
SUM OF ALL RESPONSES TO PHQ QUESTIONS 1-9: 0

## 2020-01-07 ASSESSMENT — ENCOUNTER SYMPTOMS
SHORTNESS OF BREATH: 0
VOICE CHANGE: 0
TROUBLE SWALLOWING: 0
CONSTIPATION: 0
ABDOMINAL PAIN: 0
BLOOD IN STOOL: 0
DIARRHEA: 0

## 2020-01-07 NOTE — PROGRESS NOTES
Post-Discharge Transitional Care Management Services or Hospital Follow Up      Brayan Schmidt [id number]   YOB: 1940    Date of Office Visit:  1/7/2020  Date of Hospital Admission: 12/27/19  Date of Hospital Discharge: 12/29/19  Readmission Risk Score(high >=14%. Medium >=10%):Readmission Risk Score: 10    Care management risk score Rising risk (score 2-5) and Complex Care (Scores >=6): 5     Non face to face  following discharge, date last encounter closed (first attempt may have been earlier): 1/2/2020  3:41 PM 1/2/2020  3:41 PM    Call initiated 2 business days of discharge: Yes     Patient Active Problem List   Diagnosis    OA (osteoarthritis)    Thymoma    Essential hypertension    Encounter to discuss treatment options    Chronic tachycardia    Chest wall mass    Severe malnutrition ([address]    Cardiomyopathy ([address]    PAF (paroxysmal atrial fibrillation) (HCC)    Chronic systolic CHF (congestive heart failure), NYHA class 1 ([address]    Atrial fibrillation with RVR ([address]       Allergies   Allergen Reactions    Amiodarone        Medications listed as ordered at the time of discharge from [facility], [facility] Medication Instructions [place]:    Printed on:01/07/20 9233   Medication Information                      apixaban (ELIQUIS) 5 MG TABS tablet  Take 1 tablet by mouth 2 times daily Pt needs to come in and be seen before more rx's are given. digoxin (LANOXIN) 125 MCG tablet  Take 1 tablet by mouth daily             furosemide (LASIX) 20 MG tablet  Take 1 tablet by mouth daily             lisinopril (PRINIVIL;ZESTRIL) 10 MG tablet  Take 1 tablet by mouth daily             metoprolol tartrate (LOPRESSOR) 50 MG tablet  Take 1 tablet by mouth 2 times daily             Multiple Vitamins-Minerals (MULTIVITAMIN PO)  Take by mouth daily             triamcinolone (ARISTOCORT) 0.5 % cream  Apply topically every 4 hours as needed (eczema) Apply topically 3 times daily. Medications marked \"taking\" at this time  Outpatient Medications Marked as Taking for the 1/7/20 encounter (Office Visit) with Carlie Durán MD   Medication Sig Dispense Refill    metoprolol tartrate (LOPRESSOR) 50 MG tablet Take 1 tablet by mouth 2 times daily 60 tablet 3    digoxin (LANOXIN) 125 MCG tablet Take 1 tablet by mouth daily 30 tablet 3    furosemide (LASIX) 20 MG tablet Take 1 tablet by mouth daily 60 tablet 3    apixaban (ELIQUIS) 5 MG TABS tablet Take 1 tablet by mouth 2 times daily Pt needs to come in and be seen before more rx's are given. 180 tablet 1    lisinopril (PRINIVIL;ZESTRIL) 10 MG tablet Take 1 tablet by mouth daily 90 tablet 1    triamcinolone (ARISTOCORT) 0.5 % cream Apply topically every 4 hours as needed (eczema) Apply topically 3 times daily. 15 g 3    Multiple Vitamins-Minerals (MULTIVITAMIN PO) Take by mouth daily          Medications patient taking as of now reconciled against medications ordered at time of hospital discharge: Yes    Chief Complaint   Patient presents with   [address] from Hospital       HPI    Inpatient course: Discharge summary reviewed- see chart. Interval history/Current status:     Du Mckay is a [de-identified] y.o. male here for evaluation for follow up following a recent hospitalization for \"pleural effusion,\" atrial fibrillation and congestive heart failure. Patient reports he was admitted due to shortness of breath and cough. Patient has a history of congestive heart failure and reports compliance with his Lasix medication. Patient has history of hypertension and reports compliance with his Toprol XL and Lisinopril. Patient is reports he is concerned about his heart issues and about the prognosis of his thymoma. Review of Systems   Constitutional: Negative for activity change. HENT: Negative for trouble swallowing and voice change. Eyes: Negative for visual disturbance. Respiratory: Negative for shortness of breath.

## 2020-01-08 NOTE — PROGRESS NOTES
[facility] time____0830________        Surgery time___1000_________    Take the following medications with a sip of water: Follow your MD/Surgeons pre-procedure instructions regarding your medications    Do not eat or drink anything after 12:00 midnight prior to your surgery. This includes water chewing gum, mints and ice chips. You may brush your teeth and gargle the morning of your surgery, but do not swallow the water     Please see your family doctor/pediatrician for a history and physical and/or concerning medications. Bring any test results/reports from your physicians office. If you are under the care of a heart doctor or specialist doctor, please be aware that you may be asked to them for clearance    You may be asked to stop blood thinners such as Coumadin, Plavix, Fragmin, Lovenox, etc., or any anti-inflammatories such as:  Aspirin, Ibuprofen, Advil, Naproxen prior to your surgery. We also ask that you stop any OTC medications such as fish oil, vitamin E, glucosamine, garlic, Multivitamins, COQ 10, etc.    We ask that you do not smoke 24 hours prior to surgery  We ask that you do not  drink any alcoholic beverages 24 hours prior to surgery     You must make arrangements for a responsible adult to take you home after your surgery. For your safety you will not be allowed to leave alone or drive yourself home. Your surgery will be cancelled if you do not have a ride home. Also for your safety, it is strongly suggested that someone stay with you the first 24 hours after your surgery. A parent or legal guardian must accompany a child scheduled for surgery and plan to stay at the hospital until the child is discharged. Please do not bring other children with you. For your comfort, please wear simple loose fitting clothing to the hospital.  Please do not bring valuables.     Do not wear any make-up or nail polish on your fingers or toes      For your safety, please do not wear any jewelry or body piercing's on the day of surgery. All jewelry must be removed. If you have dentures, they will be removed before going to operating room. For your convenience, we will provide you with a container. If you wear contact lenses or glasses, they will be removed, please bring a case for them. If you have a living will and a durable power of  for healthcare, please bring in a copy. As part of our patient safety program to minimize surgical site infections, we ask you to do the following:    · Please notify your surgeon if you develop any illness between         now and the  day of your surgery. · This includes a cough, cold, fever, sore throat, nausea,         or vomiting, and diarrhea, etc.  ·  Please notify your surgeon if you experience dizziness, shortness         of breath or blurred vision between now and the time of your surgery. Do not shave your operative site 96 hours prior to surgery. For face and neck surgery, men may use an electric razor 48 hours   prior to surgery. You may shower the night before surgery or the morning of   your surgery with an antibacterial soap. You will need to bring a photo ID and insurance card    [facility] has an onsite pharmacy, would you like to utilize our pharmacy     If you will be staying overnight and use a C-pap machine, please bring   your C-pap to hospital     Our goal is to provide you with excellent care, therefore, visitors will be limited to two(2) in the room at a time so that we may focus on providing this care for you. Please contact pre-admission testing if you have any further questions. [facility] phone number:  [phone] [facility] fax number:  [phone]  Please note these are generalized instructions for all surgical cases, you may be provided with more specific instructions according to your surgery.

## 2020-01-10 ENCOUNTER — HOSPITAL ENCOUNTER (OUTPATIENT)
Dept: CARDIAC CATH/INVASIVE PROCEDURES | Age: 80
Discharge: HOME | End: 2020-01-10
Payer: MEDICARE

## 2020-01-10 VITALS
HEIGHT: 69 IN | DIASTOLIC BLOOD PRESSURE: 78 MMHG | WEIGHT: 121.69 LBS | SYSTOLIC BLOOD PRESSURE: 142 MMHG | HEART RATE: 68 BPM | BODY MASS INDEX: 18.02 KG/M2 | RESPIRATION RATE: 16 BRPM

## 2020-01-10 PROCEDURE — 92960 CARDIOVERSION ELECTRIC EXT: CPT

## 2020-01-10 PROCEDURE — 2500000003 HC RX 250 WO HCPCS

## 2020-01-10 PROCEDURE — 92960 CARDIOVERSION ELECTRIC EXT: CPT | Performed by: INTERNAL MEDICINE

## 2020-01-10 PROCEDURE — 2580000003 HC RX 258

## 2020-01-10 PROCEDURE — 93005 ELECTROCARDIOGRAM TRACING: CPT | Performed by: INTERNAL MEDICINE

## 2020-01-10 RX ORDER — SODIUM CHLORIDE 0.9 % (FLUSH) 0.9 %
10 SYRINGE (ML) INJECTION EVERY 12 HOURS SCHEDULED
Status: DISCONTINUED | OUTPATIENT
Start: 2020-01-10 | End: 2020-01-11 | Stop reason: HOSPADM

## 2020-01-10 RX ORDER — SODIUM CHLORIDE 9 MG/ML
INJECTION, SOLUTION INTRAVENOUS CONTINUOUS
Status: DISCONTINUED | OUTPATIENT
Start: 2020-01-10 | End: 2020-01-11 | Stop reason: HOSPADM

## 2020-01-10 RX ORDER — SODIUM CHLORIDE 0.9 % (FLUSH) 0.9 %
10 SYRINGE (ML) INJECTION PRN
Status: DISCONTINUED | OUTPATIENT
Start: 2020-01-10 | End: 2020-01-11 | Stop reason: HOSPADM

## 2020-01-10 NOTE — PRE SEDATION
Brief Pre-Op Note/Sedation Assessment      Karen Mosqueda  1940  Room/bed info not found      [id number]  2:13 PM    Planned Procedure: Cardiac Catheterization Procedure    Post Procedure Plan: Return to same level of care    Consent: I have discussed with the patient and/or the patient representative the indication, alternatives, and the possible risks and/or complications of the planned procedure and the anesthesia methods. The patient and/or patient representative appear to understand and agree to proceed. Chief Complaint: Dyspnea      Indications for Cath Procedure: Other A fib  Anginal Classification within 2 weeks:  No symptoms  NYHA Heart Failure Class within 2 weeks: Class II - Symptoms of HF on ordinary exertion, Newly Diagnosed? No, Heart Failure Type: Unknown  Is Cath Lab Visit Valve-related?: No  Surgical Risk: Low  Functional Type: Unknown    Anti- Anginal Meds within 2 weeks:   Yes: Beta Blockers    Stress or Imaging Studies Performed:  None     Vital Signs:  BP (!) 142/78   Pulse 68   Resp 16   Ht 5' 9\" (1.753 m)   Wt 121 lb 11.1 oz (55.2 kg)   BMI 17.97 kg/m²     Allergies:   Allergies   Allergen Reactions    Amiodarone        Past Medical History:  Past Medical History:   Diagnosis Date    At[facility]     Cardiomyopathy ([facility]     Erectile dysfunction     Hypertension     Osteoarthritis     Pneumonia     Thymoma, benign     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    also sessile serrated adenoma    Wears glasses          Surgical History:  Past Surgical History:   Procedure Laterality Date    HEMORRHOID SURGERY      PROSTATE SURGERY      S/P TURP    THYMECTOMY           Medications:  Current Outpatient Medications   Medication Sig Dispense Refill    metoprolol tartrate (LOPRESSOR) 50 MG tablet Take 1 tablet by mouth 2 times daily 60 tablet 3    digoxin (LANOXIN) 125 MCG tablet Take 1 tablet by mouth daily 30 tablet 3    furosemide (LASIX) 20 MG tablet Take 1 tablet by mouth daily 60 tablet 3    apixaban (ELIQUIS) 5 MG TABS tablet Take 1 tablet by mouth 2 times daily Pt needs to come in and be seen before more rx's are given. 180 tablet 1    lisinopril (PRINIVIL;ZESTRIL) 10 MG tablet Take 1 tablet by mouth daily 90 tablet 1    triamcinolone (ARISTOCORT) 0.5 % cream Apply topically every 4 hours as needed (eczema) Apply topically 3 times daily. 15 g 3    Multiple Vitamins-Minerals (MULTIVITAMIN PO) Take by mouth daily       Current Facility-Administered Medications   Medication Dose Route Frequency Provider Last Rate Last Dose    0.9 % sodium chloride infusion   Intravenous Continuous Keith Will MD        sodium chloride flush 0.9 % injection 10 mL  10 mL Intravenous 2 times per day Keith Will MD        sodium chloride flush 0.9 % injection 10 mL  10 mL Intravenous PRN Pat Laureano MD               Pre-Sedation:    Pre-Sedation Documentation and Exam:  I have personally completed a history, physical exam & review of systems for this patient (see notes). Prior History of Anesthesia Complications:   none    Modified Mallampati:  I (soft palate, uvula, fauces, tonsillar pillars visible)    ASA Classification:  Class 3 - A patient with severe systemic disease that limits activity but is not incapacitating      Christy Scale: Activity:  2 - Able to move 4 extremities voluntarily on command  Respiration:  2 - Able to breathe deeply and cough freely  Circulation:  2 - BP+/- 20mmHg of normal  Consciousness:  2 - Fully awake  Oxygen Saturation (color):  2 - Able to maintain oxygen saturation >92% on room air    Sedation/Anesthesia Plan:  Guard the patient's safety and welfare. Minimize physical discomfort and pain. Minimize negative psychological responses to treatment by providing sedation and analgesia and maximize the potential amnesia. Patient to meet pre-procedure discharge plan.     Medication Planned:  midazolam intravenously

## 2020-01-10 NOTE — BRIEF OP NOTE
Brief Postoperative Note    Ashley Mosqueda  YOB: 1940  [id number]    Pre-operative Diagnosis: A fib    Post-operative Diagnosis: Same    Procedure: Cardioversion    Anesthesia: Deep Sedation    Surgeons/Assistants: Daphne Marcelo    Estimated Blood Loss: none    Complications: None    Specimens: Was Not Obtained    Findings: Successful cardioversion using 200 Joules of synchronised current.  Post procedure ECG requested    Electronically signed by Karyn Rocha MD on 1/10/2020 at 2:26 PM

## 2020-01-14 ENCOUNTER — CARE COORDINATION (OUTPATIENT)
Dept: CASE MANAGEMENT | Age: 80
End: 2020-01-14

## 2020-01-14 ENCOUNTER — ANESTHESIA EVENT (OUTPATIENT)
Dept: ENDOSCOPY | Age: 80
End: 2020-01-14
Payer: MEDICARE

## 2020-01-14 LAB
EKG ATRIAL RATE: 80 BPM
EKG DIAGNOSIS: NORMAL
EKG Q-T INTERVAL: 404 MS
EKG QRS DURATION: 90 MS
EKG QTC CALCULATION (BAZETT): 413 MS
EKG R AXIS: 83 DEGREES
EKG T AXIS: -35 DEGREES
EKG VENTRICULAR RATE: 63 BPM

## 2020-01-14 NOTE — CARE COORDINATION
Nir[facility] 45 Transitions Follow Up Call    2020    Patient: Scheryl Solid  Patient : 1940   MRN: [id number]  Reason for Admission: Pleural Effusion  Discharge Date: 19 RARS: Readmission Risk Score: 10         Spoke with: no one    Care Transitions Subsequent and Final Call    Subsequent and Final Calls  Care Transitions Interventions                          Other Interventions:          Final attempt at [facility] outreach. Unable to reach patient. Left message. Informed Devin Oglesby this would be the final CTN call. Encouraged him to reach out to one of his physicians with any future issues or concerns.     Follow Up  Future Appointments   Date Time Provider Rod Villalobos   2020 11:30 AM MD MALCOLM Patterson   4/15/2020  2:45 PM MD MALCOLM Patterson RN

## 2020-01-15 ENCOUNTER — HOSPITAL ENCOUNTER (OUTPATIENT)
Age: 80
Setting detail: OUTPATIENT SURGERY
Discharge: HOME | End: 2020-01-15
Attending: INTERNAL MEDICINE | Admitting: INTERNAL MEDICINE
Payer: MEDICARE

## 2020-01-15 ENCOUNTER — ANESTHESIA (OUTPATIENT)
Dept: ENDOSCOPY | Age: 80
End: 2020-01-15
Payer: MEDICARE

## 2020-01-15 VITALS
RESPIRATION RATE: 18 BRPM | WEIGHT: 119.38 LBS | OXYGEN SATURATION: 98 % | DIASTOLIC BLOOD PRESSURE: 74 MMHG | HEART RATE: 77 BPM | TEMPERATURE: 97 F | SYSTOLIC BLOOD PRESSURE: 120 MMHG | BODY MASS INDEX: 17.68 KG/M2 | HEIGHT: 69 IN

## 2020-01-15 VITALS
SYSTOLIC BLOOD PRESSURE: 142 MMHG | RESPIRATION RATE: 20 BRPM | OXYGEN SATURATION: 99 % | DIASTOLIC BLOOD PRESSURE: 82 MMHG

## 2020-01-15 PROCEDURE — 6360000002 HC RX W HCPCS: Performed by: NURSE ANESTHETIST, CERTIFIED REGISTERED

## 2020-01-15 PROCEDURE — 2500000003 HC RX 250 WO HCPCS: Performed by: NURSE ANESTHETIST, CERTIFIED REGISTERED

## 2020-01-15 PROCEDURE — 2580000003 HC RX 258: Performed by: ANESTHESIOLOGY

## 2020-01-15 PROCEDURE — 7100000010 HC PHASE II RECOVERY - FIRST 15 MIN: Performed by: INTERNAL MEDICINE

## 2020-01-15 PROCEDURE — 7100000000 HC PACU RECOVERY - FIRST 15 MIN: Performed by: INTERNAL MEDICINE

## 2020-01-15 PROCEDURE — 7100000001 HC PACU RECOVERY - ADDTL 15 MIN: Performed by: INTERNAL MEDICINE

## 2020-01-15 PROCEDURE — 7100000011 HC PHASE II RECOVERY - ADDTL 15 MIN: Performed by: INTERNAL MEDICINE

## 2020-01-15 PROCEDURE — 2709999900 HC NON-CHARGEABLE SUPPLY: Performed by: INTERNAL MEDICINE

## 2020-01-15 PROCEDURE — 3700000000 HC ANESTHESIA ATTENDED CARE: Performed by: INTERNAL MEDICINE

## 2020-01-15 PROCEDURE — 3609027000 HC COLONOSCOPY: Performed by: INTERNAL MEDICINE

## 2020-01-15 PROCEDURE — 3700000001 HC ADD 15 MINUTES (ANESTHESIA): Performed by: INTERNAL MEDICINE

## 2020-01-15 RX ORDER — SODIUM CHLORIDE 9 MG/ML
INJECTION, SOLUTION INTRAVENOUS CONTINUOUS
Status: DISCONTINUED | OUTPATIENT
Start: 2020-01-15 | End: 2020-01-15 | Stop reason: HOSPADM

## 2020-01-15 RX ORDER — LIDOCAINE HYDROCHLORIDE 20 MG/ML
INJECTION, SOLUTION EPIDURAL; INFILTRATION; INTRACAUDAL; PERINEURAL PRN
Status: DISCONTINUED | OUTPATIENT
Start: 2020-01-15 | End: 2020-01-15 | Stop reason: SDUPTHER

## 2020-01-15 RX ORDER — SODIUM CHLORIDE 0.9 % (FLUSH) 0.9 %
10 SYRINGE (ML) INJECTION EVERY 12 HOURS SCHEDULED
Status: DISCONTINUED | OUTPATIENT
Start: 2020-01-15 | End: 2020-01-15 | Stop reason: HOSPADM

## 2020-01-15 RX ORDER — PROPOFOL 10 MG/ML
INJECTION, EMULSION INTRAVENOUS PRN
Status: DISCONTINUED | OUTPATIENT
Start: 2020-01-15 | End: 2020-01-15 | Stop reason: SDUPTHER

## 2020-01-15 RX ORDER — SODIUM CHLORIDE 0.9 % (FLUSH) 0.9 %
10 SYRINGE (ML) INJECTION PRN
Status: DISCONTINUED | OUTPATIENT
Start: 2020-01-15 | End: 2020-01-15 | Stop reason: HOSPADM

## 2020-01-15 RX ADMIN — PROPOFOL 100 MG: 10 INJECTION, EMULSION INTRAVENOUS at 10:21

## 2020-01-15 RX ADMIN — PROPOFOL 50 MG: 10 INJECTION, EMULSION INTRAVENOUS at 10:17

## 2020-01-15 RX ADMIN — SODIUM CHLORIDE: 9 INJECTION, SOLUTION INTRAVENOUS at 09:12

## 2020-01-15 RX ADMIN — PROPOFOL 100 MG: 10 INJECTION, EMULSION INTRAVENOUS at 10:11

## 2020-01-15 RX ADMIN — PROPOFOL 50 MG: 10 INJECTION, EMULSION INTRAVENOUS at 10:14

## 2020-01-15 RX ADMIN — LIDOCAINE HYDROCHLORIDE 100 MG: 20 INJECTION, SOLUTION EPIDURAL; INFILTRATION; INTRACAUDAL; PERINEURAL at 10:11

## 2020-01-15 ASSESSMENT — PULMONARY FUNCTION TESTS
PIF_VALUE: 0

## 2020-01-15 ASSESSMENT — PAIN SCALES - GENERAL
PAINLEVEL_OUTOF10: 0

## 2020-01-15 ASSESSMENT — PAIN - FUNCTIONAL ASSESSMENT: PAIN_FUNCTIONAL_ASSESSMENT: 0-10

## 2020-01-15 ASSESSMENT — LIFESTYLE VARIABLES: SMOKING_STATUS: 0

## 2020-01-15 NOTE — ANESTHESIA POSTPROCEDURE EVALUATION
Department of Anesthesiology  Postprocedure Note    Patient: Amando Cox  MRN: [id number]  YOB: 1940  Date of evaluation: 1/15/2020  Time:  12:26 PM     Procedure Summary     Date:  01/15/20 Room / Location:  [address] [place] [address]    Anesthesia Start:  1006 Anesthesia Stop:  2763    Procedure:  COLONOSCOPY (N/A ) Diagnosis:       Family history of colon cancer      (FAMILY HISTORY COLON CANCER)    Surgeon:  Marissa Hong MD Responsible Provider:  Citlalli Stephens MD    Anesthesia Type:  MAC ASA Status:  3          Anesthesia Type: MAC    Christy Phase I: Christy Score: 10    Christy Phase II: Christy Score: 10    Last vitals: Reviewed and per EMR flowsheets. Anesthesia Post Evaluation    Patient location during evaluation: PACU  Patient participation: complete - patient participated  Level of consciousness: awake and alert  Pain score: 0  Airway patency: patent  Nausea & Vomiting: no nausea and no vomiting  Complications: no  Cardiovascular status: blood pressure returned to baseline  Respiratory status: acceptable  Hydration status: euvolemic  Comments: Pt went into Afib during procedure, which he has a known history of. Rate controlled. HD stable. Pt will contact cardiologist tomorrow.

## 2020-01-15 NOTE — ANESTHESIA PRE PROCEDURE
[facility] Department of Anesthesiology  Pre-Anesthesia Evaluation/Consultation       Name:  Mary Samano  : 1940  Age:  [de-identified] y.o. MRN:  [id number]  Date: 1/15/2020           Surgeon: Christa Boast):  Maylon Severin, MD    Procedure: Procedure(s):  COLONOSCOPY     Allergies   Allergen Reactions    Amiodarone      Patient Active Problem List   Diagnosis    OA (osteoarthritis)    Thymoma    Essential hypertension    Encounter to discuss treatment options    Chronic tachycardia    Chest wall mass    Severe malnutrition ([address]    Cardiomyopathy ([address]    PAF (paroxysmal atrial fibrillation) (HCC)    Chronic systolic CHF (congestive heart failure), NYHA class 1 ([address]    Atrial fibrillation, new onset ([address]     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       Current Outpatient Medications on File Prior to Encounter   Medication Sig Dispense Refill    metoprolol tartrate (LOPRESSOR) 50 MG tablet Take 1 tablet by mouth 2 times daily 60 tablet 3    digoxin (LANOXIN) 125 MCG tablet Take 1 tablet by mouth daily 30 tablet 3    furosemide (LASIX) 20 MG tablet Take 1 110/64       BMI  Body mass index is 17.63 kg/m². Estimated body mass index is 17.63 kg/m² as calculated from the following:    Height as of this encounter: 5' 9\" (1.753 m). Weight as of this encounter: 119 lb 6.1 oz (54.2 kg). CBC   Lab Results   Component Value Date    WBC 6.2 12/29/2019    RBC 4.43 12/29/2019    HGB 13.5 12/29/2019    HCT 40.6 12/29/2019    MCV 91.7 12/29/2019    RDW 13.7 12/29/2019     12/29/2019     CMP    Lab Results   Component Value Date     12/29/2019    K 3.9 12/29/2019     12/29/2019    CO2 21 12/29/2019    BUN 17 12/29/2019    CREATININE 0.8 12/29/2019    GFRAA >60 12/29/2019    AGRATIO 1.8 06/28/2019    LABGLOM >60 12/29/2019    GLUCOSE 87 12/29/2019    PROT 6.7 06/28/2019    CALCIUM 8.8 12/29/2019    BILITOT 0.9 06/28/2019    ALKPHOS 70 06/28/2019    AST 21 06/28/2019    ALT 17 06/28/2019     BMP    Lab Results   Component Value Date     12/29/2019    K 3.9 12/29/2019     12/29/2019    CO2 21 12/29/2019    BUN 17 12/29/2019    CREATININE 0.8 12/29/2019    CALCIUM 8.8 12/29/2019    GFRAA >60 12/29/2019    LABGLOM >60 12/29/2019    GLUCOSE 87 12/29/2019     POCGlucose  No results for input(s): GLUCOSE in the last 72 hours.    Coags    Lab Results   Component Value Date    PROTIME 20.0 12/27/2019    INR 1.71 12/27/2019    APTT 33.5 52/52/3930     HCG (If Applicable) No results found for: Erin Do, HCG, HCGQUANT   ABGs   Lab Results   Component Value Date    PHART 7.379 10/30/2018    PO2ART 72.8 10/30/2018    VFN1UVC 24.7 10/30/2018    SBT6EUH 14.3 10/30/2018    BEART -9.0 10/30/2018    M3LTQIOT 93.8 10/30/2018      Type & Screen (If Applicable)  No results found for: LABABO, LABRH                         BMI: Wt Readings from Last 3 Encounters:       NPO Status:   Date of last liquid consumption: 01/14/20   Time of last liquid consumption: 2300   Date of last solid food consumption: 01/13/20      Time of last solid consumption: 2000 Anesthesia Evaluation  Patient summary reviewed no history of anesthetic complications:   Airway: Mallampati: II  TM distance: >3 FB   Neck ROM: full  Mouth opening: > = 3 FB Dental: normal exam         Pulmonary: breath sounds clear to auscultation      (-) COPD, asthma, sleep apnea and not a current smoker                           Cardiovascular:  Exercise tolerance: good (>4 METS),   (+) hypertension:, dysrhythmias: atrial fibrillation, CHF:,         Rhythm: regular  Rate: normal                    Neuro/Psych:      (-) seizures, TIA and CVA           GI/Hepatic/Renal:        (-) GERD, hepatitis, liver disease, no renal disease and no morbid obesity       Endo/Other:        (-) diabetes mellitus, hypothyroidism                ROS comment: Thymoma - no hx of MG Abdominal:           Vascular:     - DVT and PE. Anesthesia Plan      MAC     ASA 3       Induction: intravenous. Anesthetic plan and risks discussed with patient. Plan discussed with CRNA. This pre-anesthesia assessment may be used as a history and physical.    DOS STAFF ADDENDUM:    Pt seen and examined, chart reviewed (including anesthesia, drug and allergy history). No interval changes to history and physical examination. Anesthetic plan, risks, benefits, alternatives, and personnel involved discussed with patient. Patient verbalized an understanding and agrees to proceed.       Kimmy Ramos MD  January 15, 2020  9:08 AM

## 2020-01-15 NOTE — H&P
[facility]   Pre-operative History and Physical    Patient: Danielle Bagley  : 1940  Acct#:         HISTORY OF PRESENT ILLNESS:    The patient is a [de-identified] y.o. 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 Authorizing Provider   metoprolol tartrate (LOPRESSOR) 50 MG tablet Take 1 tablet by mouth 2 times daily 19  Yes AYLIN Stephens CNP   digoxin (LANOXIN) 125 MCG tablet Take 1 tablet by mouth daily 19  Yes AYLIN Stephens CNP   furosemide (LASIX) 20 MG tablet Take 1 tablet by mouth daily 19  Yes AYLIN Stephens CNP   apixaban (ELIQUIS) 5 MG TABS tablet Take 1 tablet by mouth 2 times daily Pt needs to come in and be seen before more rx's are given. 19  Yes Brianna Lwaler MD   lisinopril (PRINIVIL;ZESTRIL) 10 MG tablet Take 1 tablet by mouth daily 19  Yes Brianna Lawler MD   triamcinolone (ARISTOCORT) 0.5 % cream Apply topically every 4 hours as needed (eczema) Apply topically 3 times daily. 19  Yes Brianna Lawler MD   Multiple Vitamins-Minerals (MULTIVITAMIN PO) Take by mouth daily   Yes Historical Provider, MD     Allergies:    Amiodarone  Social History:   Social History     Socioeconomic History    Marital status:       Spouse name: Not on file    Number of children: Not on file    Years of education: Not on file    Highest education level: Not on file Occupational History    Not on file   Social Needs    Financial resource strain: Not on file    Food insecurity:     Worry: Not on file     Inability: Not on file    Transportation needs:     Medical: Not on file     Non-medical: Not on file   Tobacco Use    Smoking status: Former Smoker     Packs/day: 1.00     Types: Cigarettes     Start date: 1955     Last attempt to quit: 1993     Years since quittin.0    Smokeless tobacco: Never Used   Substance and Sexual Activity    Alcohol use: Yes     Comment: socially    Drug use: Not Currently    Sexual activity: Not Currently   Lifestyle    Physical activity:     Days per week: Not on file     Minutes per session: Not on file    Stress: Not on file   Relationships    Social connections:     Talks on phone: Not on file     Gets together: Not on file     Attends [employer] service: Not on file     Active member of club or organization: Not on file     Attends meetings of clubs or organizations: Not on file     Relationship status: Not on file    Intimate partner violence:     Fear of current or ex partner: Not on file     Emotionally abused: Not on file     Physically abused: Not on file     Forced sexual activity: Not on file   Other Topics Concern    Not on file   Social History Narrative    Not on file           Family History:   Family History   Problem Relation Age of Onset    Heart Disease Father     Cancer Sister         Colon    Cancer Brother         Colon    Heart Disease Brother     Heart Disease Brother          PHYSICAL EXAM:      /82   Pulse 61   Temp 98.5 °F (36.9 °C) (Temporal)   Resp 16   Ht 5' 9\" (1.753 m)   Wt 119 lb 6.1 oz (54.2 kg)   SpO2 98%   BMI 17.63 kg/m²  I        Heart: Normal    Lungs: Normal    Abdomen: Normal      ASA Grade: ASA 3 - Patient with moderate systemic disease with functional limitations    II (soft palate, uvula, fauces visible)  ASSESSMENT AND PLAN:    1.   Patient is a [de-identified] y.o. male

## 2020-01-16 NOTE — PROCEDURES
[facility]           [address]                               COLONOSCOPY REPORT    PATIENT NAME: Kevin Covert               :        1940  MED REC NO:   [id number]                          ROOM:  ACCOUNT NO:   [de-identified]                           ADMIT DATE: 01/15/2020  PROVIDER:     Suhail Williamson MD      DATE OF PROCEDURE:  01/15/2020    REFERRING PROVIDER:  Lester Ocasio MD    INSTRUMENT USED:  Olympus PCF-H190L. ANESTHESIA:  The patient was premedicated with Diprivan intravenously as  administered by the anesthesiology service. INDICATIONS:  The patient has a history of colonic polyps. PROCEDURE:  The digital and anal exams were remarkable for one prominent  external skin tag. The colonoscope was inserted to the cecum. The prep  was good to fair. Where the prep was fair, lesions such as small polyps  or vascular ectasias could have been missed. There was diverticulosis  throughout the colon. No mucosal lesions were identified. ESTIMATED BLOOD LOSS:  None. IMPRESSION:  Diffuse diverticulosis. PLAN:  Due to age, the patient will not require surveillance  colonoscopies in the future.         Demetrius Dumont MD    D: 01/15/2020 10:42:51       T: 01/15/2020 11:49:06     [id number]  Job#: [id number]     Doc#: [id number]    CC:  Suhail Williamson MD

## 2020-02-21 ENCOUNTER — OFFICE VISIT (OUTPATIENT)
Dept: CARDIOLOGY CLINIC | Age: 80
End: 2020-02-21
Payer: MEDICARE

## 2020-02-21 VITALS
DIASTOLIC BLOOD PRESSURE: 66 MMHG | BODY MASS INDEX: 18.51 KG/M2 | HEART RATE: 50 BPM | WEIGHT: 125 LBS | HEIGHT: 69 IN | SYSTOLIC BLOOD PRESSURE: 118 MMHG | OXYGEN SATURATION: 96 %

## 2020-02-21 PROCEDURE — 1123F ACP DISCUSS/DSCN MKR DOCD: CPT | Performed by: INTERNAL MEDICINE

## 2020-02-21 PROCEDURE — 4040F PNEUMOC VAC/ADMIN/RCVD: CPT | Performed by: INTERNAL MEDICINE

## 2020-02-21 PROCEDURE — 99214 OFFICE O/P EST MOD 30 MIN: CPT | Performed by: INTERNAL MEDICINE

## 2020-02-21 PROCEDURE — 1036F TOBACCO NON-USER: CPT | Performed by: INTERNAL MEDICINE

## 2020-02-21 PROCEDURE — G8419 CALC BMI OUT NRM PARAM NOF/U: HCPCS | Performed by: INTERNAL MEDICINE

## 2020-02-21 PROCEDURE — 93000 ELECTROCARDIOGRAM COMPLETE: CPT | Performed by: INTERNAL MEDICINE

## 2020-02-21 PROCEDURE — G8482 FLU IMMUNIZE ORDER/ADMIN: HCPCS | Performed by: INTERNAL MEDICINE

## 2020-02-21 PROCEDURE — G8427 DOCREV CUR MEDS BY ELIG CLIN: HCPCS | Performed by: INTERNAL MEDICINE

## 2020-02-21 NOTE — PROGRESS NOTES
Smoking status: Former Smoker     Packs/day: 1.00     Types: Cigarettes     Start date: 1955     Last attempt to quit: 1993     Years since quittin.1    Smokeless tobacco: Never Used   Substance Use Topics    Alcohol use: Yes     Comment: socially    Drug use: Not Currently       Allergies   Allergen Reactions    Amiodarone      Current Outpatient Medications   Medication Sig Dispense Refill    metoprolol tartrate (LOPRESSOR) 50 MG tablet Take 1 tablet by mouth 2 times daily 60 tablet 3    digoxin (LANOXIN) 125 MCG tablet Take 1 tablet by mouth daily 30 tablet 3    furosemide (LASIX) 20 MG tablet Take 1 tablet by mouth daily 60 tablet 3    apixaban (ELIQUIS) 5 MG TABS tablet Take 1 tablet by mouth 2 times daily Pt needs to come in and be seen before more rx's are given. 180 tablet 1    lisinopril (PRINIVIL;ZESTRIL) 10 MG tablet Take 1 tablet by mouth daily 90 tablet 1    Multiple Vitamins-Minerals (MULTIVITAMIN PO) Take by mouth daily      triamcinolone (ARISTOCORT) 0.5 % cream Apply topically every 4 hours as needed (eczema) Apply topically 3 times daily. 15 g 3     No current facility-administered medications for this visit. Review of Systems:  · Constitutional: no unanticipated weight loss. There's been no change in energy level, sleep pattern, or activity level. No fevers, chills. · Eyes: No visual changes or diplopia. No scleral icterus. · ENT: No Headaches, hearing loss or vertigo. No mouth sores or sore throat. · Cardiovascular: as reviewed in HPI  · Respiratory: No cough or wheezing, no sputum production. No hematemesis. · Gastrointestinal: No abdominal pain, appetite loss, blood in stools. No change in bowel or bladder habits. · Genitourinary: No dysuria, trouble voiding, or hematuria. · Musculoskeletal:  No gait disturbance, no joint complaints. · Integumentary: No rash or pruritis.   · Neurological: No headache, diplopia, change in muscle strength, numbness or tingling. · Psychiatric: No anxiety or depression. · Endocrine: No temperature intolerance. No excessive thirst, fluid intake, or urination. No tremor. · Hematologic/Lymphatic: No abnormal bruising or bleeding, blood clots or swollen lymph nodes. · Allergic/Immunologic: No nasal congestion or hives. Physical Exam:   /66   Pulse 50   Ht 5' 9\" (1.753 m)   Wt 125 lb (56.7 kg) Comment: with shoes  SpO2 96%   BMI 18.46 kg/m²   Wt Readings from Last 3 Encounters:   02/21/20 125 lb (56.7 kg)   01/15/20 119 lb 6.1 oz (54.2 kg)   01/10/20 121 lb 11.1 oz (55.2 kg)     Physical Exam:   Constitutional: The patient is oriented to person, place, and time. Appears well-developed and well-nourished. In no acute distress. Head: Normocephalic and atraumatic. Pupils equal and round. Neck: Neck supple. No JVP or carotid bruit appreciated. No mass and no thyromegaly present. No lymphadenopathy present. Cardiovascular: Irregularly irregular. Normal heart sounds. Exam reveals no gallop and no friction rub. 8-6/0 systolic murmur at the base  Pulmonary/Chest: Effort normal and breath sounds normal. No respiratory distress. No wheezes, rhonchi or rales. Abdominal: Soft, non-tender. Bowel sounds are normal. Exhibits no organomegaly, mass or bruit. Extremities: No edema. No cyanosis or clubbing. Pulses are 2+ radial and carotid bilaterally. Neurological: No gross cranial nerve deficit. Coordination normal.   Skin: Skin is warm and dry. There is no rash or diaphoresis. Psychiatric: Patient has a normal mood and affect.  Speech is normal and behavior is normal.       Lab Review:   FLP:    Lab Results   Component Value Date    TRIG 58 03/29/2018    HDL 78 06/28/2019    LDLCALC 76 06/28/2019    LABVLDL 15 06/28/2019     BUN/Creatinine:    Lab Results   Component Value Date    BUN 17 12/29/2019    CREATININE 0.8 12/29/2019       EKG Interpretation: 12/7/18 SR with PACs      1/2/19 Sinus bradycardia with PAC's     8/15/19 Sinus bradycardia with PAC's     1/3/20 Atrial fibrillation     2/21/2020 Atrial fibrillation    Image Review:   Echo 10/29/18  Left ventricular cavity size is normal.  Normal left ventricular wall thickness. Difficult to estimate EF due to increase rate and rhythm but appears depressed  Mitral valve leaflets appear mildly thickened. Mild mitral regurgitation. The left atrium is dilated. Aortic valve appears sclerotic but opens adequately. Trivial aortic regurgitation. Tricuspid valve is structurally normal.  Mild tricuspid regurgitation with RVSP of 26 mmHg. The right atrium is dilated. Suggest repeat limited echo with bubble study to r/o ASD or PFO     Echo limited 11/2/18   Left ventricular cavity size is normal.  Normal left ventricular wall thickness. Ejection fraction is visually estimated to be 30-35%, though difficult to adequately assess due to arrhythmia. Severe hypo to akinesis of the septum. Abnormal (paradoxical) septal motion is present. Tricuspid valve appears thickened with redundancy. The right atrium is mildly dilated. IVC not well visualized. A bubble study was performed and fails to show evidence of shunting. Stress 12/21/18  1. Technically a satisfactory study.    2. Positive treadmill exercise stress portion of the study.    3. No evidence of Ischemia by Myocardial Perfusion Imaging.    4. Gated Study shows normal LV size and Systolic function; EF is 98%.    5. The patient exercised for 6 min. on the Ramírez protocol to achieve a HR of    133, which is 94% of PMHR. The study was stopped due to elevated BP. There    was no chest pain . 1mm ST depression in lateral leads.    6. Diastolic BP crept upto 461 with peak exercise     ECHO 5/15/19  Mitral valve leaflets appear thickened with mitral valve prolapse. moderate mitral regurgitation is present. The left atrium is mildly dilated. Tricuspid valve appear redundant.   Mild to moderate tricuspid regurgitation. The right atrium is moderately dilated. IVC size is normal (<2.1 cm) but collapses < 50% with respiration consistent  with elevated RA pressure (8 mmHg). Estimated pulmonary artery systolic pressure is mildly elevated at 34 mmHg  assuming a right atrial pressure of 8 mmHg. Ejection fraction is visually estimated to be 40-45%. Event monitor 2/13/19  NSR. Few episodes if PSVT. Assessment/Plan:     Persistent atrial fibrillation  Patient is here here for a follow-up after he underwent cardioversion  1/10/2020. He overall is feeling better but complains of feeling slightly tired EKG today confirms atrial fibrillation. Continue Eliquis 5 mg BID. I will refer him to Dr James Keyes to see if he is a candidate for an ablation. Cardiomyopathy/Chronic systolic heart failure  He appears compensated today. LVEF 40-45 %. Continue with medical therapy including B-blocker and lisinopril 10 mg daily. Encouraged a low sodium diet and daily weights.        Follow up in 6 months. He had a flu vaccine this season. Thank you very much for allowing me to participate in the care of your patient. Please do not hesitate to contact me if you have any questions. This note was scribed in the presence of Dr Marisol Chávez, by Lula Chirinos RN  Physician Attestation:  The scribes documentation has been prepared under my direction and personally reviewed by me in its entirety. I confirm that the note above accurately reflects all work, treatment, procedures, and medical decision making performed by me. Sincerely,  Clyde Castañeda M.D.       [facility], [place], [address] [place], [place] [address]  Ph: [phone]  Fax: [phone]

## 2020-02-21 NOTE — PATIENT INSTRUCTIONS

## 2020-04-02 NOTE — PROGRESS NOTES
atrial fibrillation is inevitable. We discussed different management options for atrial fibrillation including their risks and benefits. These options include use of cardioversion (mainly for persisting atrial fibrillation or atrial flutter) which provides an effective immediate therapy with success rates of 75% or higher, but it provides no short nor long term efficacy. Anti-arrhythmic medications provide a very effective short term therapy, but even with our most potent anti-arrhythmic medication there is limited long term efficacy (clinical studies have shown that 40% of patients remain atrial fibrillation-free after 4 years of follow-up after starting one of the more powerful anti-arrhythmic medication (amiodarone), and, if extrapolated, may have further diminishing success as time goes on). Atrial fibrillation ablation is a potentially curative therapy with very reasonable success rate after a first time procedure and with improving success rates with subsequent procedures. The risks, benefits and alternatives of the atrial fibrillation ablation procedure were discussed with the patient. The risks including, but not limited to, bleeding, infection, radiation exposure, injury to vascular, cardiac and surrounding structures (including pneumothorax), stroke, cardiac perforation, tamponade, need for emergent open heart surgery, need for pacemaker implantation, injury to the phrenic nerve, injury to the esophagus, myocardial infarction and death were discussed in detail. The patient opted to proceed with the atrial fibrillation ablation. We will schedule for a radiofrequency ablation with [employer] Navigation system with a ANSON procedure immediately prior to this ablation. A cardiac CTA will be ordered for pulmonary vein mapping prior to this procedure. We will hold Eliquis for 12 hours prior to procedure. We will order BMP, CBC and Type & Screen prior to the procedure.      In order to for patient to have the

## 2020-04-08 ENCOUNTER — VIRTUAL VISIT (OUTPATIENT)
Dept: CARDIOLOGY CLINIC | Age: 80
End: 2020-04-08
Payer: MEDICARE

## 2020-04-08 ENCOUNTER — OFFICE VISIT (OUTPATIENT)
Dept: CARDIOLOGY CLINIC | Age: 80
End: 2020-04-08
Payer: MEDICARE

## 2020-04-08 VITALS — BODY MASS INDEX: 18.46 KG/M2 | HEIGHT: 69 IN

## 2020-04-08 PROCEDURE — G8427 DOCREV CUR MEDS BY ELIG CLIN: HCPCS | Performed by: INTERNAL MEDICINE

## 2020-04-08 PROCEDURE — 1036F TOBACCO NON-USER: CPT | Performed by: INTERNAL MEDICINE

## 2020-04-08 PROCEDURE — G8419 CALC BMI OUT NRM PARAM NOF/U: HCPCS | Performed by: INTERNAL MEDICINE

## 2020-04-08 PROCEDURE — 1123F ACP DISCUSS/DSCN MKR DOCD: CPT | Performed by: INTERNAL MEDICINE

## 2020-04-08 PROCEDURE — 4040F PNEUMOC VAC/ADMIN/RCVD: CPT | Performed by: INTERNAL MEDICINE

## 2020-04-08 PROCEDURE — 99204 OFFICE O/P NEW MOD 45 MIN: CPT | Performed by: INTERNAL MEDICINE

## 2020-04-08 NOTE — PATIENT INSTRUCTIONS
If you choose to proceed with atrial fibrillation ablation please contact Dr. Judy Boland at [phone]. If you have any questions regarding the atrial fibrillation ablation please contact Dr. Judy Boland at [phone]. Atrial Fibrillation Pre procedure Instructions    As part of your pre procedure work up you will need to get a CT scan of your heart. This scan is completed in order to give Dr. Jeison Black a map of the atrium (chamber of your heart) where he will be doing the ablation. CTA Pre procedure instructions      The office will be in contact to schedule this test in the near future.  -Arrive 20 minutes prior to scheduled testing time  -Nothing to eat or drink for at least 4 hours prior to test   -You will need to get lab work 5-7 days prior to this test (Renal panel) to check your kidney function. You will be receiving intravenous dye for the procedure and the doctor needs to check to make sure your kidneys are functioning properly prior to the test.    Atrial Fibrillation Ablation Pre procedure Instructions    Our office will be in contact with you to schedule your procedure approximately  two months prior to procedure date. The morning of your procedure you will park in the hospital parking lot and report directly to the cath lab to check in.      Pre-Procedure Instructions   1. You will need to fast (nothing to eat or drink) for at least 8 hours prior to procedure. 2. You will need to hold your Eliquis for 12 hours prior to the procedure. 3. Do not use any lotions, creams or perfume the morning of procedure. 4. You will need to complete pre-procedure lab work  5-7 days prior to procedure. Please take the enclosed order to the lab with you. 5. Please have a responsible adult to drive you home after procedure, you will stay overnight. 6. Cath lab will provide you with all post procedure instructions  7.  A 3 month follow up will be scheduled with Dr. Jeison Black post procedure    Patient Education        Atrial Fibrillation: Care Instructions  Your Care Instructions    Atrial fibrillation is an irregular and often fast heartbeat. Treating this condition is important for several reasons. It can cause blood clots, which can travel from your heart to your brain and cause a stroke. If you have a fast heartbeat, you may feel lightheaded, dizzy, and weak. An irregular heartbeat can also increase your risk for heart failure. Atrial fibrillation is often the result of another heart condition, such as high blood pressure or coronary artery disease. Making changes to improve your heart condition will help you stay healthy and active. Follow-up care is a key part of your treatment and safety. Be sure to make and go to all appointments, and call your doctor if you are having problems. It's also a good idea to know your test results and keep a list of the medicines you take. How can you care for yourself at home? Medicines    · Take your medicines exactly as prescribed. Call your doctor if you think you are having a problem with your medicine. You will get more details on the specific medicines your doctor prescribes.     · If your doctor has given you a blood thinner to prevent a stroke, be sure you get instructions about how to take your medicine safely. Blood thinners can cause serious bleeding problems.     · Do not take any vitamins, over-the-counter drugs, or herbal products without talking to your doctor first.    Lifestyle changes    · Do not smoke. Smoking can increase your chance of a stroke and heart attack. If you need help quitting, talk to your doctor about stop-smoking programs and medicines. These can increase your chances of quitting for good.     · Eat a heart-healthy diet.     · Stay at a healthy weight. Lose weight if you need to.     · Limit alcohol to 2 drinks a day for men and 1 drink a day for women.  Too much alcohol can cause health problems.     · Avoid colds and flu. Get a pneumococcal vaccine shot. If you have had one before, ask your doctor whether you need another dose. Get a flu shot every year. If you must be around people with colds or flu, wash your hands often. Activity    · If your doctor recommends it, get more exercise. Walking is a good choice. Bit by bit, increase the amount you walk every day. Try for at least 30 minutes on most days of the week. You also may want to swim, bike, or do other activities. Your doctor may suggest that you join a cardiac rehabilitation program so that you can have help increasing your physical activity safely.     · Start light exercise if your doctor says it is okay. Even a small amount will help you get stronger, have more energy, and manage stress. Walking is an easy way to get exercise. Start out by walking a little more than you did in the hospital. Gradually increase the amount you walk.     · When you exercise, watch for signs that your heart is working too hard. You are pushing too hard if you cannot talk while you are exercising. If you become short of breath or dizzy or have chest pain, sit down and rest immediately.     · Check your pulse regularly. Place two fingers on the artery at the palm side of your wrist, in line with your thumb. If your heartbeat seems uneven or fast, talk to your doctor. When should you call for help? Call 911 anytime you think you may need emergency care. For example, call if:    · You have symptoms of a heart attack. These may include:  ? Chest pain or pressure, or a strange feeling in the chest.  ? Sweating. ? Shortness of breath. ? Nausea or vomiting. ? Pain, pressure, or a strange feeling in the back, neck, jaw, or upper belly or in one or both shoulders or arms. ? Lightheadedness or sudden weakness. ? A fast or irregular heartbeat. After you call  911, the  may tell you to chew 1 adult-strength or 2 to 4 low-dose aspirin. Wait for an ambulance.  Do not try to drive controlled by the heart's electrical system. Sometimes that system misfires. This causes a heartbeat that is too fast and isn't steady. Catheter ablation is a way to get into your heart and fix the problem. Ablation is not surgery. How is catheter ablation done? Your doctor inserts thin tubes called catheters into a blood vessel in your groin, arm, or neck. Then your doctor feeds them into the heart. Wires in the catheters help the doctor find the problem areas. Then he or she uses the wires to send energy to destroy the tiny areas of heart tissue that are causing the problems. It may seem like a bad idea to destroy parts of your heart on purpose. But the areas that are destroyed are very tiny. They should not affect your heart's ability to do its job. You may be awake during the procedure. Or you may be asleep. The doctor will give you medicines to help you feel relaxed and to numb the areas where the catheters go in. You may feel a little uncomfortable, but you should not feel pain. This procedure usually takes 2 to 6 hours. In rare cases, it can take longer. You may stay overnight in the hospital. How long you stay in the hospital depends on the type of ablation you have. What can you expect after catheter ablation? Do not exercise hard or lift anything heavy for a week. You will probably be able to go back to work and to your normal routine in 1 or 2 days. You may have swelling, bruising, or a small lump around the site where the catheters went into your body. These should go away in 3 to 4 weeks. You may have to take some medicines for a while. Follow-up care is a key part of your treatment and safety. Be sure to make and go to all appointments, and call your doctor if you are having problems. It's also a good idea to know your test results and keep a list of the medicines you take. Where can you learn more? Go to https://sukhjinder.TokBox. org and sign in to your [employer] account.  Enter depends on the type of ablation you have. Do not exercise hard or lift anything heavy for a week. You may be able to go back to work and to your normal routine in 1 or 2 days. Follow-up care is a key part of your treatment and safety. Be sure to make and go to all appointments, and call your doctor if you are having problems. It's also a good idea to know your test results and keep a list of the medicines you take. How do you prepare for the procedure?   Procedures can be stressful. This information will help you understand what you can expect. And it will help you safely prepare for your procedure. Preparing for the procedure    · Understand exactly what procedure is planned, along with the risks, benefits, and other options.     · Tell your doctor ALL the medicines, vitamins, supplements, and herbal remedies you take. Some may increase the risk of problems during your procedure. Your doctor will tell you if you should stop taking any of them before the procedure and how soon to do it.     · If you take aspirin or some other blood thinner, ask your doctor if you should stop taking it before your procedure. Make sure that you understand exactly what your doctor wants you to do. These medicines increase the risk of bleeding.     · Make sure your doctor and the hospital have a copy of your advance directive. If you don't have one, you may want to prepare one. It lets others know your health care wishes. It's a good thing to have before any type of surgery or procedure.     · Be sure you have someone to take you home. Anesthesia and pain medicine will make it unsafe for you to drive or get home on your own. Procedures can be stressful. This information will help you understand what you can expect. And it will help you safely prepare for your procedure. What happens on the day of the procedure? · Follow the instructions exactly about when to stop eating and drinking.  If you don't, your procedure may be canceled. If your doctor told you to take your medicines on the day of the procedure, take them with only a sip of water.     · Take a bath or shower before you come in for your procedure. Do not apply lotions, perfumes, deodorants, or nail polish.     · Take off all jewelry and piercings. And take out contact lenses, if you wear them. At the hospital or surgery center   · Bring a picture ID.     · You will be kept comfortable and safe by your anesthesia provider. The anesthesia may make you sleep. Or it may just numb the area being worked on.     · This procedure can take 2 to 6 hours. In rare cases, it can take longer.     · After the procedure, pressure will be applied to the area where the catheter was put in your blood vessel. Then the area may be covered with a bandage or a compression device. This will prevent bleeding.     · Nurses will check your heart rate and blood pressure. The nurse will also check the catheter site for bleeding.     · If the catheter was put in your groin, you will need to lie still and keep your leg straight for several hours. The nurse may put a weighted bag on your leg to keep it still.     · If the catheter was put in your arm, you may be able to sit up and get out of bed right away. But you will need to keep your arm still for at least 1 hour.     · You may have a bruise or a small lump where the catheter was put in your blood vessel. This is normal and will go away. When should you call your doctor? · You have questions or concerns.     · You don't understand how to prepare for your procedure.     · You become ill before the procedure (such as fever, flu, or a cold).     · You need to reschedule or have changed your mind about having the procedure. Where can you learn more? Go to https://Tapingokikieb.SUPR. org and sign in to your [employer] account.  Enter N412 in the Nightpro box to learn more about \"Electrophysiology Study and Catheter Ablation: back, neck, jaw, or upper belly, or in one or both shoulders or arms. ? Lightheadedness or sudden weakness. ? A fast or irregular heartbeat. After you call  911, the  may tell you to chew 1 adult-strength or 2 to 4 low-dose aspirin. Wait for an ambulance. Do not try to drive yourself.     · You have symptoms of a stroke. These may include:  ? Sudden numbness, tingling, weakness, or loss of movement in your face, arm, or leg, especially on only one side of your body. ? Sudden vision changes. ? Sudden trouble speaking. ? Sudden confusion or trouble understanding simple statements. ? Sudden problems with walking or balance. ? A sudden, severe headache that is different from past headaches.    Call your doctor now or seek immediate medical care if:    · You are bleeding from the area where the catheter was put in your blood vessel.     · You have a fast-growing, painful lump at the catheter site.     · You have signs of infection, such as:  ? Increased pain, swelling, warmth, or redness. ? Red streaks leading from the catheter site. ? Pus draining from the catheter site. ? A fever.     · Your leg, arm, or hand is painful, looks blue, or feels cold, numb, or tingly.    Watch closely for any changes in your health, and be sure to contact your doctor if you have any problems. Where can you learn more? Go to https://TradapeVue Technology.GC Aesthetics. org and sign in to your [employer] account. Enter [phone] in the [facility] box to learn more about \"Electrophysiology Study and Catheter Ablation: What to Expect at Home. \"     If you do not have an account, please click on the \"Sign Up Now\" link. Current as of: December 15, 2019Content Version: 12.4  © 7105-4806 Healthwise, Incorporated. Care instructions adapted under license by [facility] ([facility]).  If you have questions about a medical condition or this instruction, always ask your healthcare professional. Javier Munson disclaims any warranty or

## 2020-04-08 NOTE — PROGRESS NOTES
Cardiac Electrophysiology Consultation  Via  Telehealth Evaluation - Audio/Visual (during CKCDS-15 public health emergency)      Date: 2020  Reason for Consultation: Atrial fibrillation  Consult Requesting Physician: Esau Pappas M.D. Primary Care Physician: Juliette Epley, MD    Chief Complaint: no cardiac complaints      HPI: Bibi Matthew (:  1940) is a [de-identified] y.o. male who has requested an audio/video evaluation and has a past medical history significant for atrial fibrillation, hypertension, and systolic heart failure He was first diagnosed with atrial fibrillation in 2018 after presenting to the ED with c/p worsening shortness of breath and weakness. . In the ER he was noted to be in Atrial fibrillation with RVR and also evidence of pneumonia. He had an echocardiogram completed that showed an LVEF of 30-35%. He was treated with amiodarone but was discontinued due to abnormal LFTs. He was discharged home on a BB and Eliquis. Interval History: Today, he present to office virtually to discuss treatment options for his atrial fibrillation. 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 He is compliant with his medications and tolerating them well. He denies chest pain/pressure, tightness, edema, shortness of breath, heart racing, palpitations, lightheadedness, dizziness, syncope, presyncope,  PND or orthopnea. He states that he used to run marathons but has not done so in some time and would like to get back to it.      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 The right atrium is dilated. Suggest repeat limited echo with bubble study to r/o ASD or PFO     Echo limited 11/2/18   Left ventricular cavity size is normal.  Normal left ventricular wall thickness. Ejection fraction is visually estimated to be 30-35%, though difficult to adequately assess due to arrhythmia. Severe hypo to akinesis of the septum. Abnormal (paradoxical) septal motion is present. Tricuspid valve appears thickened with redundancy. The right atrium is mildly dilated. IVC not well visualized. A bubble study was performed and fails to show evidence of shunting. 3. Stress Test:    None    4. Cath:   None    The MCOT, echocardiogram, stress test, and coronary angiography/PCI were reviewed by myself and used for my plan of care. Procedures:  1. Cardioversion 1/10/2020    ASSESSMENT/PLAN:    Persistent atrial fibrillation  -s/p cardioversion 1/10/2020  -[id number] Score 4 (CHF, HTN, AGE)  -On Eliquis 5 mg BID for  thromboembolic risk reduction.  -Tolerating well no signs or symptoms of abnormal bruising or bleeding. -LA volume/Index: 69 ml /40 ml/m2 per Echo 5/15/2019    - Treatment options for atrial fibrillation including cardioversion, anti-arrhythmic medication therapy, rate control strategy, oral anticoagulation, and atrial fibrillation ablation were discussed with patient. Risks, benefits and alternative of each treatment options were explained. All questions answered. We educated the patient that atrial fibrillation is a progressive disease, with more frequent episodes that will ensue. Subsequent episodes usually become more sustained to the extent that many individuals would then develop persistent atrial fibrillation. Once persistence is reached, permanent atrial fibrillation is inevitable. We discussed different management options for atrial fibrillation including their risks and benefits.  These options include use of cardioversion (mainly for persisting atrial fibrillation or atrial flutter) which provides an effective immediate therapy with success rates of 75% or higher, but it provides no short nor long term efficacy. Anti-arrhythmic medications provide a very effective short term therapy, but even with our most potent anti-arrhythmic medication there is limited long term efficacy (clinical studies have shown that 40% of patients remain atrial fibrillation-free after 4 years of follow-up after starting one of the more powerful anti-arrhythmic medication (amiodarone), and, if extrapolated, may have further diminishing success as time goes on). Atrial fibrillation ablation is a potentially curative therapy with very reasonable success rate after a first time procedure and with improving success rates with subsequent procedures. The risks, benefits and alternatives of the atrial fibrillation ablation procedure were discussed with the patient. The risks including, but not limited to, bleeding, infection, radiation exposure, injury to vascular, cardiac and surrounding structures (including pneumothorax), stroke, cardiac perforation, tamponade, need for emergent open heart surgery, need for pacemaker implantation, injury to the phrenic nerve, injury to the esophagus, myocardial infarction and death were discussed in detail. The patient opted to proceed with the atrial fibrillation ablation. We will schedule for a radiofrequency ablation with Carto Navigation system with a ANSON procedure immediately prior to this ablation. A cardiac CTA will be ordered for pulmonary vein mapping prior to this procedure. We will hold Eliquis for 12 hours prior to procedure. We will order BMP, CBC and Type & Screen prior to the procedure. In order to for patient to have the ablation completed at the earliest date and time patient is agreeable to have the procedure completed by my colleague Dr. Darrick Palm.     Cardiomyopathy / chrnoic systolic heart failure  -Ejection

## 2020-04-17 ENCOUNTER — TELEPHONE (OUTPATIENT)
Dept: CARDIOLOGY CLINIC | Age: 80
End: 2020-04-17

## 2020-04-17 DIAGNOSIS — I48.91 ATRIAL FIBRILLATION, UNSPECIFIED TYPE (HCC): ICD-10-CM

## 2020-04-17 DIAGNOSIS — I48.0 PAF (PAROXYSMAL ATRIAL FIBRILLATION) (HCC): ICD-10-CM

## 2020-04-17 LAB
ABO/RH: NORMAL
ALBUMIN SERPL-MCNC: 4.3 G/DL (ref 3.4–5)
ANION GAP SERPL CALCULATED.3IONS-SCNC: 12 MMOL/L (ref 3–16)
ANTIBODY SCREEN: NORMAL
BUN BLDV-MCNC: 14 MG/DL (ref 7–20)
CALCIUM SERPL-MCNC: 9.9 MG/DL (ref 8.3–10.6)
CHLORIDE BLD-SCNC: 100 MMOL/L (ref 99–110)
CO2: 26 MMOL/L (ref 21–32)
CREAT SERPL-MCNC: 0.8 MG/DL (ref 0.8–1.3)
GFR AFRICAN AMERICAN: >60
GFR NON-AFRICAN AMERICAN: >60
GLUCOSE BLD-MCNC: 90 MG/DL (ref 70–99)
HCT VFR BLD CALC: 41.2 % (ref 40.5–52.5)
HEMOGLOBIN: 13.5 G/DL (ref 13.5–17.5)
MCH RBC QN AUTO: 30.4 PG (ref 26–34)
MCHC RBC AUTO-ENTMCNC: 32.9 G/DL (ref 31–36)
MCV RBC AUTO: 92.5 FL (ref 80–100)
PDW BLD-RTO: 15.1 % (ref 12.4–15.4)
PHOSPHORUS: 3.4 MG/DL (ref 2.5–4.9)
PLATELET # BLD: 170 K/UL (ref 135–450)
PMV BLD AUTO: 10.7 FL (ref 5–10.5)
POTASSIUM SERPL-SCNC: 4.8 MMOL/L (ref 3.5–5.1)
RBC # BLD: 4.45 M/UL (ref 4.2–5.9)
SODIUM BLD-SCNC: 138 MMOL/L (ref 136–145)
WBC # BLD: 5.5 K/UL (ref 4–11)

## 2020-04-21 ENCOUNTER — HOSPITAL ENCOUNTER (OUTPATIENT)
Dept: CT IMAGING | Age: 80
Discharge: HOME | End: 2020-04-21
Payer: MEDICARE

## 2020-04-21 PROCEDURE — 75574 CT ANGIO HRT W/3D IMAGE: CPT

## 2020-04-21 PROCEDURE — 71260 CT THORAX DX C+: CPT

## 2020-04-21 PROCEDURE — 6360000004 HC RX CONTRAST MEDICATION: Performed by: FAMILY MEDICINE

## 2020-04-21 RX ADMIN — IOPAMIDOL 75 ML: 755 INJECTION, SOLUTION INTRAVENOUS at 09:11

## 2020-04-29 ENCOUNTER — TELEPHONE (OUTPATIENT)
Dept: INTERNAL MEDICINE CLINIC | Age: 80
End: 2020-04-29

## 2020-04-29 RX ORDER — METOPROLOL TARTRATE 50 MG/1
50 TABLET, FILM COATED ORAL 2 TIMES DAILY
Qty: 180 TABLET | Refills: 1 | Status: ON HOLD
Start: 2020-04-29 | End: 2020-05-09 | Stop reason: HOSPADM

## 2020-04-29 RX ORDER — DIGOXIN 125 MCG
125 TABLET ORAL DAILY
Qty: 90 TABLET | Refills: 1 | Status: ON HOLD
Start: 2020-04-29 | End: 2020-05-09 | Stop reason: HOSPADM

## 2020-04-29 NOTE — TELEPHONE ENCOUNTER
Pt called and requested Eliquis, digoxin 125 mg, Metoprolol    Patient requesting a medication refill.   Medication: apixaban (ELIQUIS) 5 MG TABS tablet    digoxin (LANOXIN) 125 MCG tablet    metoprolol tartrate (LOPRESSOR) 50 MG tablet    Pharmacy: [address] [phone] [place] [phone]   Last office visit: 1/7/2020  Next office visit: Visit date not found

## 2020-05-04 ENCOUNTER — TELEPHONE (OUTPATIENT)
Dept: CARDIOLOGY CLINIC | Age: 80
End: 2020-05-04

## 2020-05-04 ENCOUNTER — PATIENT MESSAGE (OUTPATIENT)
Dept: CARDIOLOGY CLINIC | Age: 80
End: 2020-05-04

## 2020-05-05 ENCOUNTER — OFFICE VISIT (OUTPATIENT)
Dept: PRIMARY CARE CLINIC | Age: 80
End: 2020-05-05

## 2020-05-06 LAB
REPORT: NORMAL
SARS-COV-2: NOT DETECTED
THIS TEST SENT TO: NORMAL

## 2020-05-07 ENCOUNTER — ANESTHESIA EVENT (OUTPATIENT)
Dept: CARDIAC CATH/INVASIVE PROCEDURES | Age: 80
DRG: 274 | End: 2020-05-07
Payer: MEDICARE

## 2020-05-08 ENCOUNTER — ANESTHESIA (OUTPATIENT)
Dept: CARDIAC CATH/INVASIVE PROCEDURES | Age: 80
DRG: 274 | End: 2020-05-08
Payer: MEDICARE

## 2020-05-08 ENCOUNTER — HOSPITAL ENCOUNTER (INPATIENT)
Dept: CARDIAC CATH/INVASIVE PROCEDURES | Age: 80
LOS: 1 days | Discharge: HOME | DRG: 274 | End: 2020-05-09
Attending: INTERNAL MEDICINE | Admitting: INTERNAL MEDICINE
Payer: MEDICARE

## 2020-05-08 VITALS
OXYGEN SATURATION: 100 % | DIASTOLIC BLOOD PRESSURE: 62 MMHG | TEMPERATURE: 96.4 F | RESPIRATION RATE: 4 BRPM | SYSTOLIC BLOOD PRESSURE: 109 MMHG

## 2020-05-08 PROBLEM — Z86.79 S/P ABLATION OF ATRIAL FIBRILLATION: Status: ACTIVE | Noted: 2020-05-08

## 2020-05-08 PROBLEM — Z98.890 S/P ABLATION OF ATRIAL FIBRILLATION: Status: ACTIVE | Noted: 2020-05-08

## 2020-05-08 LAB
ABO/RH: NORMAL
ACTIVATED CLOTTING TIME: 224 SEC (ref 99–130)
ACTIVATED CLOTTING TIME: 275 SEC (ref 99–130)
ACTIVATED CLOTTING TIME: 309 SEC (ref 99–130)
ACTIVATED CLOTTING TIME: 328 SEC (ref 99–130)
ACTIVATED CLOTTING TIME: 332 SEC (ref 99–130)
ACTIVATED CLOTTING TIME: 357 SEC (ref 99–130)
ACTIVATED CLOTTING TIME: 363 SEC (ref 99–130)
ANTIBODY SCREEN: NORMAL
SARS-COV-2, NAAT: NOT DETECTED

## 2020-05-08 PROCEDURE — 86900 BLOOD TYPING SEROLOGIC ABO: CPT

## 2020-05-08 PROCEDURE — 85347 COAGULATION TIME ACTIVATED: CPT

## 2020-05-08 PROCEDURE — C1732 CATH, EP, DIAG/ABL, 3D/VECT: HCPCS

## 2020-05-08 PROCEDURE — 2500000003 HC RX 250 WO HCPCS

## 2020-05-08 PROCEDURE — C1730 CATH, EP, 19 OR FEW ELECT: HCPCS

## 2020-05-08 PROCEDURE — 93657 TX L/R ATRIAL FIB ADDL: CPT

## 2020-05-08 PROCEDURE — 5A2204Z RESTORATION OF CARDIAC RHYTHM, SINGLE: ICD-10-PCS | Performed by: INTERNAL MEDICINE

## 2020-05-08 PROCEDURE — C1759 CATH, INTRA ECHOCARDIOGRAPHY: HCPCS

## 2020-05-08 PROCEDURE — 93622 COMP EP EVAL L VENTR PAC&REC: CPT

## 2020-05-08 PROCEDURE — 2709999900 HC NON-CHARGEABLE SUPPLY

## 2020-05-08 PROCEDURE — 2580000003 HC RX 258: Performed by: NURSE ANESTHETIST, CERTIFIED REGISTERED

## 2020-05-08 PROCEDURE — 2580000003 HC RX 258: Performed by: INTERNAL MEDICINE

## 2020-05-08 PROCEDURE — 6360000002 HC RX W HCPCS: Performed by: NURSE ANESTHETIST, CERTIFIED REGISTERED

## 2020-05-08 PROCEDURE — 93656 COMPRE EP EVAL ABLTJ ATR FIB: CPT

## 2020-05-08 PROCEDURE — 2500000003 HC RX 250 WO HCPCS: Performed by: NURSE ANESTHETIST, CERTIFIED REGISTERED

## 2020-05-08 PROCEDURE — 93662 INTRACARDIAC ECG (ICE): CPT | Performed by: INTERNAL MEDICINE

## 2020-05-08 PROCEDURE — 93655 ICAR CATH ABLTJ DSCRT ARRHYT: CPT

## 2020-05-08 PROCEDURE — 2100000000 HC CCU R&B

## 2020-05-08 PROCEDURE — 7100000000 HC PACU RECOVERY - FIRST 15 MIN

## 2020-05-08 PROCEDURE — 94761 N-INVAS EAR/PLS OXIMETRY MLT: CPT

## 2020-05-08 PROCEDURE — 2700000000 HC OXYGEN THERAPY PER DAY

## 2020-05-08 PROCEDURE — 02K83ZZ MAP CONDUCTION MECHANISM, PERCUTANEOUS APPROACH: ICD-10-PCS | Performed by: INTERNAL MEDICINE

## 2020-05-08 PROCEDURE — 86901 BLOOD TYPING SEROLOGIC RH(D): CPT

## 2020-05-08 PROCEDURE — 3700000001 HC ADD 15 MINUTES (ANESTHESIA)

## 2020-05-08 PROCEDURE — 02583ZZ DESTRUCTION OF CONDUCTION MECHANISM, PERCUTANEOUS APPROACH: ICD-10-PCS | Performed by: INTERNAL MEDICINE

## 2020-05-08 PROCEDURE — 6360000002 HC RX W HCPCS

## 2020-05-08 PROCEDURE — 93655 ICAR CATH ABLTJ DSCRT ARRHYT: CPT | Performed by: INTERNAL MEDICINE

## 2020-05-08 PROCEDURE — C1893 INTRO/SHEATH, FIXED,NON-PEEL: HCPCS

## 2020-05-08 PROCEDURE — 6370000000 HC RX 637 (ALT 250 FOR IP): Performed by: INTERNAL MEDICINE

## 2020-05-08 PROCEDURE — 4A0234Z MEASUREMENT OF CARDIAC ELECTRICAL ACTIVITY, PERCUTANEOUS APPROACH: ICD-10-PCS | Performed by: INTERNAL MEDICINE

## 2020-05-08 PROCEDURE — C1894 INTRO/SHEATH, NON-LASER: HCPCS

## 2020-05-08 PROCEDURE — 93623 PRGRMD STIMJ&PACG IV RX NFS: CPT | Performed by: INTERNAL MEDICINE

## 2020-05-08 PROCEDURE — 93657 TX L/R ATRIAL FIB ADDL: CPT | Performed by: INTERNAL MEDICINE

## 2020-05-08 PROCEDURE — 2580000003 HC RX 258

## 2020-05-08 PROCEDURE — 86850 RBC ANTIBODY SCREEN: CPT

## 2020-05-08 PROCEDURE — 93613 INTRACARDIAC EPHYS 3D MAPG: CPT

## 2020-05-08 PROCEDURE — 3700000000 HC ANESTHESIA ATTENDED CARE

## 2020-05-08 PROCEDURE — 93662 INTRACARDIAC ECG (ICE): CPT

## 2020-05-08 PROCEDURE — 93656 COMPRE EP EVAL ABLTJ ATR FIB: CPT | Performed by: INTERNAL MEDICINE

## 2020-05-08 PROCEDURE — U0002 COVID-19 LAB TEST NON-CDC: HCPCS

## 2020-05-08 PROCEDURE — 93622 COMP EP EVAL L VENTR PAC&REC: CPT | Performed by: INTERNAL MEDICINE

## 2020-05-08 PROCEDURE — 93005 ELECTROCARDIOGRAM TRACING: CPT | Performed by: INTERNAL MEDICINE

## 2020-05-08 PROCEDURE — 93613 INTRACARDIAC EPHYS 3D MAPG: CPT | Performed by: INTERNAL MEDICINE

## 2020-05-08 PROCEDURE — 2720000010 HC SURG SUPPLY STERILE

## 2020-05-08 PROCEDURE — B246ZZZ ULTRASONOGRAPHY OF RIGHT AND LEFT HEART: ICD-10-PCS | Performed by: INTERNAL MEDICINE

## 2020-05-08 PROCEDURE — 7100000011 HC PHASE II RECOVERY - ADDTL 15 MIN

## 2020-05-08 RX ORDER — FLECAINIDE ACETATE 100 MG/1
100 TABLET ORAL EVERY 12 HOURS SCHEDULED
Status: DISCONTINUED | OUTPATIENT
Start: 2020-05-08 | End: 2020-05-09 | Stop reason: HOSPADM

## 2020-05-08 RX ORDER — VECURONIUM BROMIDE 1 MG/ML
INJECTION, POWDER, LYOPHILIZED, FOR SOLUTION INTRAVENOUS PRN
Status: DISCONTINUED | OUTPATIENT
Start: 2020-05-08 | End: 2020-05-08 | Stop reason: SDUPTHER

## 2020-05-08 RX ORDER — MAGNESIUM SULFATE HEPTAHYDRATE 500 MG/ML
INJECTION, SOLUTION INTRAMUSCULAR; INTRAVENOUS PRN
Status: DISCONTINUED | OUTPATIENT
Start: 2020-05-08 | End: 2020-05-08 | Stop reason: SDUPTHER

## 2020-05-08 RX ORDER — FUROSEMIDE 20 MG/1
20 TABLET ORAL DAILY
Status: DISCONTINUED | OUTPATIENT
Start: 2020-05-08 | End: 2020-05-09 | Stop reason: HOSPADM

## 2020-05-08 RX ORDER — ONDANSETRON 2 MG/ML
INJECTION INTRAMUSCULAR; INTRAVENOUS PRN
Status: DISCONTINUED | OUTPATIENT
Start: 2020-05-08 | End: 2020-05-08 | Stop reason: SDUPTHER

## 2020-05-08 RX ORDER — FENTANYL CITRATE 50 UG/ML
INJECTION, SOLUTION INTRAMUSCULAR; INTRAVENOUS PRN
Status: DISCONTINUED | OUTPATIENT
Start: 2020-05-08 | End: 2020-05-08 | Stop reason: SDUPTHER

## 2020-05-08 RX ORDER — LISINOPRIL 10 MG/1
10 TABLET ORAL DAILY
Status: DISCONTINUED | OUTPATIENT
Start: 2020-05-08 | End: 2020-05-09 | Stop reason: HOSPADM

## 2020-05-08 RX ORDER — PROTAMINE SULFATE 10 MG/ML
INJECTION, SOLUTION INTRAVENOUS PRN
Status: DISCONTINUED | OUTPATIENT
Start: 2020-05-08 | End: 2020-05-08 | Stop reason: SDUPTHER

## 2020-05-08 RX ORDER — FUROSEMIDE 10 MG/ML
INJECTION INTRAMUSCULAR; INTRAVENOUS PRN
Status: DISCONTINUED | OUTPATIENT
Start: 2020-05-08 | End: 2020-05-08 | Stop reason: SDUPTHER

## 2020-05-08 RX ORDER — SODIUM CHLORIDE 9 MG/ML
INJECTION, SOLUTION INTRAVENOUS CONTINUOUS
Status: DISCONTINUED | OUTPATIENT
Start: 2020-05-08 | End: 2020-05-08

## 2020-05-08 RX ORDER — DIGOXIN 125 MCG
125 TABLET ORAL DAILY
Status: DISCONTINUED | OUTPATIENT
Start: 2020-05-08 | End: 2020-05-09 | Stop reason: HOSPADM

## 2020-05-08 RX ORDER — METOPROLOL SUCCINATE 25 MG/1
25 TABLET, EXTENDED RELEASE ORAL DAILY
Status: DISCONTINUED | OUTPATIENT
Start: 2020-05-08 | End: 2020-05-09 | Stop reason: HOSPADM

## 2020-05-08 RX ORDER — IBUTILIDE FUMARATE 0.1 MG/ML
INJECTION, SOLUTION INTRAVENOUS PRN
Status: DISCONTINUED | OUTPATIENT
Start: 2020-05-08 | End: 2020-05-08 | Stop reason: SDUPTHER

## 2020-05-08 RX ORDER — DEXAMETHASONE SODIUM PHOSPHATE 4 MG/ML
INJECTION, SOLUTION INTRA-ARTICULAR; INTRALESIONAL; INTRAMUSCULAR; INTRAVENOUS; SOFT TISSUE PRN
Status: DISCONTINUED | OUTPATIENT
Start: 2020-05-08 | End: 2020-05-08 | Stop reason: SDUPTHER

## 2020-05-08 RX ORDER — SODIUM CHLORIDE 0.9 % (FLUSH) 0.9 %
10 SYRINGE (ML) INJECTION EVERY 12 HOURS SCHEDULED
Status: DISCONTINUED | OUTPATIENT
Start: 2020-05-08 | End: 2020-05-09 | Stop reason: HOSPADM

## 2020-05-08 RX ORDER — SODIUM CHLORIDE 0.9 % (FLUSH) 0.9 %
10 SYRINGE (ML) INJECTION PRN
Status: DISCONTINUED | OUTPATIENT
Start: 2020-05-08 | End: 2020-05-09 | Stop reason: HOSPADM

## 2020-05-08 RX ORDER — PROPOFOL 10 MG/ML
INJECTION, EMULSION INTRAVENOUS PRN
Status: DISCONTINUED | OUTPATIENT
Start: 2020-05-08 | End: 2020-05-08 | Stop reason: SDUPTHER

## 2020-05-08 RX ORDER — SODIUM CHLORIDE 0.9 % (FLUSH) 0.9 %
10 SYRINGE (ML) INJECTION PRN
Status: DISCONTINUED | OUTPATIENT
Start: 2020-05-08 | End: 2020-05-08

## 2020-05-08 RX ORDER — HEPARIN SODIUM 10000 [USP'U]/100ML
INJECTION, SOLUTION INTRAVENOUS CONTINUOUS PRN
Status: DISCONTINUED | OUTPATIENT
Start: 2020-05-08 | End: 2020-05-08 | Stop reason: SDUPTHER

## 2020-05-08 RX ORDER — SODIUM CHLORIDE 0.9 % (FLUSH) 0.9 %
10 SYRINGE (ML) INJECTION EVERY 12 HOURS SCHEDULED
Status: DISCONTINUED | OUTPATIENT
Start: 2020-05-08 | End: 2020-05-08

## 2020-05-08 RX ORDER — ACETAMINOPHEN 325 MG/1
650 TABLET ORAL EVERY 4 HOURS PRN
Status: DISCONTINUED | OUTPATIENT
Start: 2020-05-08 | End: 2020-05-09 | Stop reason: HOSPADM

## 2020-05-08 RX ORDER — EPHEDRINE SULFATE/0.9% NACL/PF 50 MG/5 ML
SYRINGE (ML) INTRAVENOUS PRN
Status: DISCONTINUED | OUTPATIENT
Start: 2020-05-08 | End: 2020-05-08 | Stop reason: SDUPTHER

## 2020-05-08 RX ORDER — KETAMINE HCL IN NACL, ISO-OSM 100MG/10ML
SYRINGE (ML) INJECTION PRN
Status: DISCONTINUED | OUTPATIENT
Start: 2020-05-08 | End: 2020-05-08 | Stop reason: SDUPTHER

## 2020-05-08 RX ORDER — SODIUM CHLORIDE 9 MG/ML
INJECTION, SOLUTION INTRAVENOUS CONTINUOUS PRN
Status: DISCONTINUED | OUTPATIENT
Start: 2020-05-08 | End: 2020-05-08 | Stop reason: SDUPTHER

## 2020-05-08 RX ORDER — PHENYLEPHRINE HCL IN 0.9% NACL 1 MG/10 ML
SYRINGE (ML) INTRAVENOUS PRN
Status: DISCONTINUED | OUTPATIENT
Start: 2020-05-08 | End: 2020-05-08 | Stop reason: SDUPTHER

## 2020-05-08 RX ORDER — LIDOCAINE HYDROCHLORIDE 10 MG/ML
INJECTION, SOLUTION EPIDURAL; INFILTRATION; INTRACAUDAL; PERINEURAL PRN
Status: DISCONTINUED | OUTPATIENT
Start: 2020-05-08 | End: 2020-05-08 | Stop reason: SDUPTHER

## 2020-05-08 RX ORDER — SUCCINYLCHOLINE/SOD CL,ISO/PF 200MG/10ML
SYRINGE (ML) INTRAVENOUS PRN
Status: DISCONTINUED | OUTPATIENT
Start: 2020-05-08 | End: 2020-05-08 | Stop reason: SDUPTHER

## 2020-05-08 RX ORDER — GLYCOPYRROLATE 0.2 MG/ML
INJECTION INTRAMUSCULAR; INTRAVENOUS PRN
Status: DISCONTINUED | OUTPATIENT
Start: 2020-05-08 | End: 2020-05-08 | Stop reason: SDUPTHER

## 2020-05-08 RX ORDER — SODIUM CHLORIDE 9 MG/ML
INJECTION INTRAVENOUS PRN
Status: DISCONTINUED | OUTPATIENT
Start: 2020-05-08 | End: 2020-05-08 | Stop reason: SDUPTHER

## 2020-05-08 RX ORDER — HEPARIN SODIUM 1000 [USP'U]/ML
INJECTION, SOLUTION INTRAVENOUS; SUBCUTANEOUS PRN
Status: DISCONTINUED | OUTPATIENT
Start: 2020-05-08 | End: 2020-05-08 | Stop reason: SDUPTHER

## 2020-05-08 RX ADMIN — DEXAMETHASONE SODIUM PHOSPHATE 8 MG: 4 INJECTION, SOLUTION INTRAMUSCULAR; INTRAVENOUS at 12:58

## 2020-05-08 RX ADMIN — DIGOXIN 125 MCG: 125 TABLET ORAL at 18:13

## 2020-05-08 RX ADMIN — ONDANSETRON 4 MG: 2 INJECTION INTRAMUSCULAR; INTRAVENOUS at 12:58

## 2020-05-08 RX ADMIN — Medication 100 MCG: at 13:32

## 2020-05-08 RX ADMIN — PROTAMINE SULFATE 40 MG: 10 INJECTION, SOLUTION INTRAVENOUS at 15:46

## 2020-05-08 RX ADMIN — APIXABAN 5 MG: 5 TABLET, FILM COATED ORAL at 21:42

## 2020-05-08 RX ADMIN — SUGAMMADEX 150 MG: 100 INJECTION, SOLUTION INTRAVENOUS at 15:50

## 2020-05-08 RX ADMIN — FLECAINIDE ACETATE 100 MG: 100 TABLET ORAL at 21:41

## 2020-05-08 RX ADMIN — Medication 30 MG: at 12:52

## 2020-05-08 RX ADMIN — SODIUM CHLORIDE: 9 INJECTION, SOLUTION INTRAVENOUS at 12:44

## 2020-05-08 RX ADMIN — FENTANYL CITRATE 50 MCG: 50 INJECTION INTRAMUSCULAR; INTRAVENOUS at 12:45

## 2020-05-08 RX ADMIN — Medication 10 MG: at 13:16

## 2020-05-08 RX ADMIN — VECURONIUM BROMIDE 4 MG: 1 INJECTION, POWDER, LYOPHILIZED, FOR SOLUTION INTRAVENOUS at 13:19

## 2020-05-08 RX ADMIN — Medication 100 MCG: at 13:44

## 2020-05-08 RX ADMIN — SODIUM CHLORIDE, PRESERVATIVE FREE 10 ML: 5 INJECTION INTRAVENOUS at 22:04

## 2020-05-08 RX ADMIN — Medication 20 MG: at 13:32

## 2020-05-08 RX ADMIN — METOPROLOL SUCCINATE 25 MG: 25 TABLET, EXTENDED RELEASE ORAL at 18:13

## 2020-05-08 RX ADMIN — FENTANYL CITRATE 50 MCG: 50 INJECTION INTRAMUSCULAR; INTRAVENOUS at 12:47

## 2020-05-08 RX ADMIN — Medication 200 MCG: at 15:35

## 2020-05-08 RX ADMIN — MAGNESIUM SULFATE HEPTAHYDRATE 2 G: 500 INJECTION, SOLUTION INTRAMUSCULAR; INTRAVENOUS at 15:19

## 2020-05-08 RX ADMIN — HEPARIN SODIUM 3000 UNITS: 1000 INJECTION, SOLUTION INTRAVENOUS; SUBCUTANEOUS at 14:12

## 2020-05-08 RX ADMIN — HEPARIN SODIUM 3000 UNITS: 1000 INJECTION, SOLUTION INTRAVENOUS; SUBCUTANEOUS at 13:52

## 2020-05-08 RX ADMIN — IBUTILIDE FUMARATE 1 MG: 0.1 INJECTION, SOLUTION INTRAVENOUS at 15:20

## 2020-05-08 RX ADMIN — LISINOPRIL 10 MG: 10 TABLET ORAL at 18:13

## 2020-05-08 RX ADMIN — Medication 200 MCG: at 13:52

## 2020-05-08 RX ADMIN — SODIUM CHLORIDE 6 ML: 9 INJECTION INTRAMUSCULAR; INTRAVENOUS; SUBCUTANEOUS at 12:56

## 2020-05-08 RX ADMIN — VECURONIUM BROMIDE 6 MG: 1 INJECTION, POWDER, LYOPHILIZED, FOR SOLUTION INTRAVENOUS at 12:56

## 2020-05-08 RX ADMIN — GLYCOPYRROLATE 0.2 MG: 0.2 INJECTION, SOLUTION INTRAMUSCULAR; INTRAVENOUS at 12:58

## 2020-05-08 RX ADMIN — HEPARIN SODIUM AND DEXTROSE 2000 UNITS/HR: 10000; 5 INJECTION INTRAVENOUS at 14:12

## 2020-05-08 RX ADMIN — HEPARIN SODIUM 4000 UNITS: 1000 INJECTION, SOLUTION INTRAVENOUS; SUBCUTANEOUS at 13:33

## 2020-05-08 RX ADMIN — Medication 80 MG: at 12:52

## 2020-05-08 RX ADMIN — Medication 20 MG: at 13:13

## 2020-05-08 RX ADMIN — SODIUM CHLORIDE 4 ML: 9 INJECTION INTRAMUSCULAR; INTRAVENOUS; SUBCUTANEOUS at 13:19

## 2020-05-08 RX ADMIN — PROPOFOL 50 MG: 10 INJECTION, EMULSION INTRAVENOUS at 12:52

## 2020-05-08 RX ADMIN — SODIUM CHLORIDE: 9 INJECTION, SOLUTION INTRAVENOUS at 12:45

## 2020-05-08 RX ADMIN — FUROSEMIDE 20 MG: 10 INJECTION, SOLUTION INTRAMUSCULAR; INTRAVENOUS at 15:48

## 2020-05-08 RX ADMIN — LIDOCAINE HYDROCHLORIDE 25 MG: 10 INJECTION, SOLUTION EPIDURAL; INFILTRATION; INTRACAUDAL; PERINEURAL at 12:52

## 2020-05-08 ASSESSMENT — PULMONARY FUNCTION TESTS
PIF_VALUE: 13
PIF_VALUE: 14
PIF_VALUE: 2
PIF_VALUE: 12
PIF_VALUE: 14
PIF_VALUE: 12
PIF_VALUE: 12
PIF_VALUE: 13
PIF_VALUE: 14
PIF_VALUE: 14
PIF_VALUE: 19
PIF_VALUE: 13
PIF_VALUE: 14
PIF_VALUE: 13
PIF_VALUE: 14
PIF_VALUE: 14
PIF_VALUE: 13
PIF_VALUE: 10
PIF_VALUE: 14
PIF_VALUE: 14
PIF_VALUE: 12
PIF_VALUE: 14
PIF_VALUE: 13
PIF_VALUE: 14
PIF_VALUE: 13
PIF_VALUE: 3
PIF_VALUE: 14
PIF_VALUE: 12
PIF_VALUE: 13
PIF_VALUE: 14
PIF_VALUE: 12
PIF_VALUE: 12
PIF_VALUE: 13
PIF_VALUE: 14
PIF_VALUE: 13
PIF_VALUE: 12
PIF_VALUE: 12
PIF_VALUE: 15
PIF_VALUE: 12
PIF_VALUE: 1
PIF_VALUE: 12
PIF_VALUE: 3
PIF_VALUE: 13
PIF_VALUE: 14
PIF_VALUE: 12
PIF_VALUE: 12
PIF_VALUE: 3
PIF_VALUE: 14
PIF_VALUE: 13
PIF_VALUE: 15
PIF_VALUE: 12
PIF_VALUE: 14
PIF_VALUE: 13
PIF_VALUE: 13
PIF_VALUE: 14
PIF_VALUE: 13
PIF_VALUE: 15
PIF_VALUE: 12
PIF_VALUE: 13
PIF_VALUE: 3
PIF_VALUE: 13
PIF_VALUE: 14
PIF_VALUE: 13
PIF_VALUE: 13
PIF_VALUE: 1
PIF_VALUE: 15
PIF_VALUE: 13
PIF_VALUE: 14
PIF_VALUE: 14
PIF_VALUE: 12
PIF_VALUE: 1
PIF_VALUE: 13
PIF_VALUE: 13
PIF_VALUE: 14
PIF_VALUE: 3
PIF_VALUE: 14
PIF_VALUE: 12
PIF_VALUE: 1
PIF_VALUE: 13
PIF_VALUE: 15
PIF_VALUE: 13
PIF_VALUE: 14
PIF_VALUE: 14
PIF_VALUE: 10
PIF_VALUE: 3
PIF_VALUE: 13
PIF_VALUE: 12
PIF_VALUE: 14
PIF_VALUE: 13
PIF_VALUE: 12
PIF_VALUE: 12
PIF_VALUE: 13
PIF_VALUE: 1
PIF_VALUE: 13
PIF_VALUE: 14
PIF_VALUE: 14
PIF_VALUE: 13
PIF_VALUE: 13
PIF_VALUE: 14
PIF_VALUE: 13
PIF_VALUE: 12
PIF_VALUE: 12
PIF_VALUE: 11
PIF_VALUE: 12
PIF_VALUE: 2
PIF_VALUE: 13
PIF_VALUE: 15
PIF_VALUE: 13
PIF_VALUE: 14
PIF_VALUE: 14
PIF_VALUE: 15
PIF_VALUE: 12
PIF_VALUE: 1
PIF_VALUE: 13
PIF_VALUE: 13
PIF_VALUE: 12
PIF_VALUE: 12
PIF_VALUE: 13
PIF_VALUE: 15
PIF_VALUE: 12
PIF_VALUE: 14
PIF_VALUE: 13
PIF_VALUE: 14
PIF_VALUE: 1
PIF_VALUE: 13
PIF_VALUE: 12
PIF_VALUE: 14
PIF_VALUE: 13
PIF_VALUE: 13
PIF_VALUE: 11
PIF_VALUE: 29
PIF_VALUE: 12
PIF_VALUE: 14
PIF_VALUE: 14
PIF_VALUE: 12
PIF_VALUE: 14
PIF_VALUE: 10
PIF_VALUE: 13
PIF_VALUE: 14
PIF_VALUE: 13
PIF_VALUE: 14
PIF_VALUE: 13
PIF_VALUE: 12
PIF_VALUE: 13
PIF_VALUE: 14
PIF_VALUE: 13
PIF_VALUE: 12
PIF_VALUE: 3
PIF_VALUE: 13
PIF_VALUE: 14
PIF_VALUE: 10
PIF_VALUE: 13
PIF_VALUE: 14
PIF_VALUE: 13
PIF_VALUE: 15
PIF_VALUE: 13
PIF_VALUE: 14
PIF_VALUE: 13
PIF_VALUE: 14
PIF_VALUE: 5
PIF_VALUE: 13
PIF_VALUE: 14
PIF_VALUE: 1
PIF_VALUE: 15
PIF_VALUE: 14
PIF_VALUE: 13
PIF_VALUE: 13
PIF_VALUE: 14
PIF_VALUE: 14
PIF_VALUE: 12
PIF_VALUE: 14
PIF_VALUE: 14
PIF_VALUE: 13
PIF_VALUE: 14
PIF_VALUE: 14
PIF_VALUE: 15

## 2020-05-08 ASSESSMENT — PAIN SCALES - GENERAL
PAINLEVEL_OUTOF10: 0

## 2020-05-08 ASSESSMENT — ENCOUNTER SYMPTOMS: SHORTNESS OF BREATH: 0

## 2020-05-08 NOTE — ANESTHESIA POSTPROCEDURE EVALUATION
Department of Anesthesiology  Postprocedure Note    Patient: Octavio Keith  MRN: [id number]  YOB: 1940  Date of evaluation: 5/8/2020  Time:  4:48 PM     Procedure Summary     Date:  05/08/20 Room / Location:  [facility] Cath Lab    Anesthesia Start:  9999 Anesthesia Stop:  5812    Procedure:  WST ANSON AFIB ABLATION W ANES Diagnosis:  S/P ablation of atrial fibrillation    Scheduled Providers:   Responsible Provider:  Dang Sheridan MD    Anesthesia Type:  general ASA Status:  3          Anesthesia Type: general    Christy Phase I:      Christy Phase II:      Last vitals: Reviewed and per EMR flowsheets.        Anesthesia Post Evaluation    Patient location during evaluation: PACU  Patient participation: complete - patient participated  Level of consciousness: awake and alert  Pain score: 1  Airway patency: patent  Nausea & Vomiting: no nausea and no vomiting  Complications: no  Cardiovascular status: blood pressure returned to baseline  Respiratory status: acceptable  Hydration status: euvolemic

## 2020-05-08 NOTE — PROGRESS NOTES
1623: Patient arrived to CVICU, placed on hardwire monitor. NSR on monitor. Patient with bilateral groin site incisions with figure of 8 sutures in place. Sites are c/d/i with no oozing or hematomas noted. Admission documentation and assessment complete. VSS on 3L, wean to room air tolerating well. Will monitor.  Patient voiding per urinal.     1845: Dinner at the bedside, would like to wait until bedrest up to eat.     1900: Report given to [facility]

## 2020-05-08 NOTE — PRE SEDATION
Sedation Pre-Procedure Note    Patient Name: Brayan Schmidt [id number]   YOB: 1940  Room/Bed: Room/bed info not found  Medical Record Number: [id number]  Date: 5/8/2020   Time: 12:45 PM       Indication:  Afib    Consent: I have discussed with the patient and/or the patient representative the indication, alternatives, and the possible risks and/or complications of the planned procedure and the anesthesia methods. The patient and/or patient representative appear to understand and agree to proceed. Vital Signs:   Vitals:    05/08/20 1130   BP: (!) 143/85   Pulse: 75   Resp: 16   Temp: 98.4 °F (36.9 °C)   SpO2: 98%       Past Medical History:   has a past medical history of Atrial fibrillation ([address] Cardiomyopathy ([address] Erectile dysfunction, Hypertension, Osteoarthritis, Pneumonia, Thymoma, benign, Tubulovillous adenoma polyp of colon, and Wears glasses. Past Surgical History:   has a past surgical history that includes Prostate surgery; Hemorrhoid surgery; Thymectomy; Colonoscopy (N/A, 1/15/2020); and Cardioversion (01/10/2020). Medications:   Scheduled Meds:    sodium chloride flush  10 mL Intravenous 2 times per day     Continuous Infusions:    sodium chloride       PRN Meds: sodium chloride flush  Home Meds:   Prior to Admission medications    Medication Sig Start Date End Date Taking?  Authorizing Provider   metoprolol tartrate (LOPRESSOR) 50 MG tablet Take 1 tablet by mouth 2 times daily 4/29/20  Yes Juliette Epley, MD   digoxin [facility] TRANSPLANT [facility]) 125 MCG tablet Take 1 tablet by mouth daily 4/29/20  Yes Juliette Epley, MD   apixaban (ELIQUIS) 5 MG TABS tablet Take 1 tablet by mouth 2 times daily 4/29/20  Yes Juliette Epley, MD   furosemide (LASIX) 20 MG tablet Take 1 tablet by mouth daily 12/30/19  Yes AYLIN Nielson CNP   lisinopril (PRINIVIL;ZESTRIL) 10 MG tablet Take 1 tablet by mouth daily 11/1/19  Yes Juliette Epley, MD   triamcinolone (ARISTOCORT) 0.5 % cream Apply topically every 4

## 2020-05-08 NOTE — PROCEDURES
esophageal temperature during ablation. Ablation was stopped if the temperature was rising for more than 0.5 °C. SL1 sheath was carefully placed in SVC, over the guidewire. The dilator and a trans-septal [place] C-1 NRG needle was carefully advanced to the SVC. The entire assembly was carefully pulled down into the right atrium. Using pressure monitoring and ICE guidance, a trans-septal catheterization was performed without difficulty. LA pressure was measured and was 21/5 mm Hg. In a similar way, using a medium curve VisiGo sheath and a longer a trans-septal [place] C-1 NRG needle, 2nd transseptal access was performed. An ablation catheter ([employer] ST/ SF D/F curve) was advanced via VisiGo sheath to the left atrium. A Penta-ray catheter was advanced to the left atrium via the SL1 and placed in the LSPV. Using PentaRay catheter 3D electro anatomic mapping of the left atrium was performed. Phrenic nerve was mapped using high output pacing. Esophagus was mapped using Carto sound map and a mapping catheter in the esophagus. Left atrial appendage was also mapped. Baseline signals were recorded. Voltage mapping was also created. At baseline, he have severe LAE and extensive scar of the LA including floor, anterior wall, posterior wall and interatrial septum. 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 Then wide area circumferential ablation for the left sided pulmonary veins along with maggie ablation were performed which resulted in electrical isolation of these pulmonary veins and eradication of the PV fascicles. Similarly, wide area circumferential ablations for the right sided pulmonary veins along with maggie ablation were performed which resulted in electrical isolation of these pulmonary veins and eradication of the PV fascicles.   Esophageal

## 2020-05-08 NOTE — H&P
4.9      CO2 28   BUN 17   CREATININE 0.9     Recent Labs     05/05/20  1517   WBC 5.4   HGB 13.8   HCT 41.9   MCV 93.0        Lab Results   Component Value Date    CKTOTAL 395 10/31/2018    TROPONINI <0.01 10/27/2018     No results found for: BNP  Lab Results   Component Value Date    PROTIME 20.0 12/27/2019    PROTIME 20.9 11/04/2018    PROTIME 24.2 11/03/2018    INR 1.71 12/27/2019    INR 1.83 11/04/2018    INR 2.12 11/03/2018     Lab Results   Component Value Date    CHOL 193 03/29/2018    HDL 78 06/28/2019    TRIG 58 03/29/2018       Diagnostic and imaging results reviewed. ECG: Afib  Echo: 45%    I independently reviewed the ECG and telemetry. Scheduled Meds:   sodium chloride flush  10 mL Intravenous 2 times per day     Continuous Infusions:   sodium chloride       PRN Meds:.sodium chloride flush     Assessment:   Patient Active Problem List    Diagnosis Date Noted    Atrial fibrillation, new onset ([address] 12/27/2019    Chronic systolic CHF (congestive heart failure), NYHA class 1 ([address] 11/01/2019    [facility] 12/05/2018    Cardiomyopathy [facility]     Severe malnutrition ([facility] 11/01/2018    Chest wall mass     Chronic tachycardia 05/08/2018    Encounter to discuss treatment options 11/13/2017    Essential hypertension 12/29/2016    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 Recommendation (s):    1. Persistent Afib  2. HTN  3. NICM 45%  4. OA    We educated the patient that atrial fibrillation and atrial flutter are both progressive diseases, with more frequent episodes that will ensue. Subsequent episodes usually become more sustained to the extent that many individuals would then develop persistent atrial fibrillation/atrial flutter. Once persistence is reached, permanent atrial dysrhythmia is inevitable.  Treatment options including cardioversion,

## 2020-05-08 NOTE — ANESTHESIA PRE PROCEDURE
(!) 143/85   20 118/66   01/15/20 (!) 142/82     Vital Signs Statistics (for past 48 hrs)     Temp  Av.4 °F (36.9 °C)  Min: 98.4 °F (36.9 °C)   Min taken time: 20 1130  Max: 98.4 °F (36.9 °C)   Max taken time: 20 1130  Pulse  Av  Min: 76   Min taken time: 20 1130  Max: 76   Max taken time: 20 1130  Resp  Av  Min: 12   Min taken time: 20 1130  Max: 16   Max taken time: 20 1130  BP  Min: 143/85   Min taken time: 20 1130  Max: 143/85   Max taken time: 20 1130  SpO2  Av %  Min: 98 %   Min taken time: 20 1130  Max: 98 %   Max taken time: 20 1130  BP Readings from Last 3 Encounters:   20 (!) 143/85   20 118/66   01/15/20 (!) 142/82       BMI  Body mass index is 17.84 kg/m². Estimated body mass index is 17.84 kg/m² as calculated from the following:    Height as of this encounter: 5' 9\" (1.753 m). Weight as of this encounter: 120 lb 13 oz (54.8 kg).     CBC   Lab Results   Component Value Date    WBC 5.4 2020    RBC 4.50 2020    HGB 13.8 2020    HCT 41.9 2020    MCV 93.0 2020    RDW 14.6 2020     2020     CMP    Lab Results   Component Value Date     2020    K 4.9 2020    K 3.9 2019     2020    CO2 28 2020    BUN 17 2020    CREATININE 0.9 2020    GFRAA >60 2020    AGRATIO 1.8 2019    LABGLOM >60 2020    GLUCOSE 111 2020    PROT 6.7 2019    CALCIUM 10.1 2020    BILITOT 0.9 2019    ALKPHOS 70 2019    AST 21 2019    ALT 17 2019     BMP    Lab Results   Component Value Date     2020    K 4.9 2020    K 3.9 2019     2020    CO2 28 2020    BUN 17 2020    CREATININE 0.9 2020    CALCIUM 10.1 2020    GFRAA >60 2020    LABGLOM >60 2020    GLUCOSE 111 2020     POCGlucose  Recent Labs 05/05/20  1517   GLUCOSE 111*      Coags    Lab Results   Component Value Date    PROTIME 20.0 12/27/2019    INR 1.71 12/27/2019    APTT 33.5 82/51/9394     HCG (If Applicable) No results found for: Amilcar Krabbe, HCG, HCGQUANT   ABGs   Lab Results   Component Value Date    PHART 7.379 10/30/2018    PO2ART 72.8 10/30/2018    EGK7VTQ 24.7 10/30/2018    NWK1BCD 14.3 10/30/2018    BEART -9.0 10/30/2018    O3RFOQAN 93.8 10/30/2018      Type & Screen (If Applicable)  No results found for: LABABO, LABRH                         BMI: Wt Readings from Last 3 Encounters:       NPO Status:                          Anesthesia Evaluation  Patient summary reviewed  Airway: Mallampati: II  TM distance: >3 FB   Neck ROM: full  Mouth opening: > = 3 FB Dental:    (+) edentulous      Pulmonary:   (+) pneumonia:      (-) COPD, asthma and shortness of breath                           Cardiovascular:    (+) hypertension:, dysrhythmias: atrial fibrillation, CHF:,     (-) valvular problems/murmurs, past MI, CAD, CABG/stent and  angina                Neuro/Psych:      (-) seizures, TIA and CVA           GI/Hepatic/Renal:        (-) GERD, PUD, liver disease and no renal disease       Endo/Other:    (+) : arthritis:., .    (-) diabetes mellitus               Abdominal:           Vascular: negative vascular ROS. Anesthesia Plan      general     ASA 3     (I discussed with the patient the risks and benefits of PIV, general anesthesia, IV Narcotics, PACU. All questions were answered the patient agrees with the plan.)  Induction: intravenous. Anesthetic plan and risks discussed with patient. Plan discussed with CRNA. This pre-anesthesia assessment may be used as a history and physical.    DOS STAFF ADDENDUM:    Pt seen and examined, chart reviewed (including anesthesia, drug and allergy history). No interval changes to history and physical examination.   Anesthetic plan,

## 2020-05-09 VITALS
DIASTOLIC BLOOD PRESSURE: 45 MMHG | RESPIRATION RATE: 16 BRPM | HEART RATE: 84 BPM | TEMPERATURE: 98 F | SYSTOLIC BLOOD PRESSURE: 97 MMHG | OXYGEN SATURATION: 99 % | BODY MASS INDEX: 18.38 KG/M2 | WEIGHT: 124.12 LBS | HEIGHT: 69 IN

## 2020-05-09 LAB
EKG ATRIAL RATE: 64 BPM
EKG DIAGNOSIS: NORMAL
EKG P AXIS: 83 DEGREES
EKG P-R INTERVAL: 170 MS
EKG Q-T INTERVAL: 474 MS
EKG QRS DURATION: 104 MS
EKG QTC CALCULATION (BAZETT): 489 MS
EKG R AXIS: 87 DEGREES
EKG T AXIS: 65 DEGREES
EKG VENTRICULAR RATE: 64 BPM

## 2020-05-09 PROCEDURE — 6370000000 HC RX 637 (ALT 250 FOR IP): Performed by: INTERNAL MEDICINE

## 2020-05-09 PROCEDURE — 93010 ELECTROCARDIOGRAM REPORT: CPT | Performed by: INTERNAL MEDICINE

## 2020-05-09 PROCEDURE — 99238 HOSP IP/OBS DSCHRG MGMT 30/<: CPT | Performed by: INTERNAL MEDICINE

## 2020-05-09 RX ORDER — METOPROLOL SUCCINATE 25 MG/1
25 TABLET, EXTENDED RELEASE ORAL DAILY
Qty: 30 TABLET | Refills: 3 | Status: SHIPPED | OUTPATIENT
Start: 2020-05-09 | End: 2020-06-05

## 2020-05-09 RX ORDER — FLECAINIDE ACETATE 100 MG/1
100 TABLET ORAL EVERY 12 HOURS SCHEDULED
Qty: 60 TABLET | Refills: 3 | Status: SHIPPED | OUTPATIENT
Start: 2020-05-09 | End: 2020-06-12

## 2020-05-09 RX ORDER — DIGOXIN 125 MCG
125 TABLET ORAL DAILY
Qty: 30 TABLET | Refills: 3 | Status: SHIPPED | OUTPATIENT
Start: 2020-05-09 | End: 2020-06-12 | Stop reason: ALTCHOICE

## 2020-05-09 RX ADMIN — FLECAINIDE ACETATE 100 MG: 100 TABLET ORAL at 08:55

## 2020-05-09 RX ADMIN — APIXABAN 5 MG: 5 TABLET, FILM COATED ORAL at 08:55

## 2020-05-09 RX ADMIN — FUROSEMIDE 20 MG: 20 TABLET ORAL at 08:55

## 2020-05-09 RX ADMIN — LISINOPRIL 10 MG: 10 TABLET ORAL at 08:55

## 2020-05-09 RX ADMIN — METOPROLOL SUCCINATE 25 MG: 25 TABLET, EXTENDED RELEASE ORAL at 08:55

## 2020-05-09 ASSESSMENT — PAIN SCALES - GENERAL
PAINLEVEL_OUTOF10: 0

## 2020-05-09 NOTE — DISCHARGE SUMMARY
Instructions [facility]:[id number]    Printed on:05/09/20 8252   Medication Information                      apixaban (ELIQUIS) 5 MG TABS tablet  Take 1 tablet by mouth 2 times daily             digoxin (LANOXIN) 125 MCG tablet  Take 1 tablet by mouth daily             flecainide (TAMBOCOR) 100 MG tablet  Take 1 tablet by mouth every 12 hours             furosemide (LASIX) 20 MG tablet  Take 1 tablet by mouth daily             lisinopril (PRINIVIL;ZESTRIL) 10 MG tablet  Take 1 tablet by mouth daily             metoprolol succinate (TOPROL XL) 25 MG extended release tablet  Take 1 tablet by mouth daily             Multiple Vitamins-Minerals (MULTIVITAMIN PO)  Take by mouth daily             triamcinolone (ARISTOCORT) 0.5 % cream  Apply topically every 4 hours as needed (eczema) Apply topically 3 times daily. Current Discharge Medication List      START taking these medications    Details   flecainide (TAMBOCOR) 100 MG tablet Take 1 tablet by mouth every 12 hours  Qty: 60 tablet, Refills: 3      metoprolol succinate (TOPROL XL) 25 MG extended release tablet Take 1 tablet by mouth daily  Qty: 30 tablet, Refills: 3         CONTINUE these medications which have CHANGED    Details   digoxin (LANOXIN) 125 MCG tablet Take 1 tablet by mouth daily  Qty: 30 tablet, Refills: 3         CONTINUE these medications which have NOT CHANGED    Details   apixaban (ELIQUIS) 5 MG TABS tablet Take 1 tablet by mouth 2 times daily  Qty: 180 tablet, Refills: 1      furosemide (LASIX) 20 MG tablet Take 1 tablet by mouth daily  Qty: 60 tablet, Refills: 3      lisinopril (PRINIVIL;ZESTRIL) 10 MG tablet Take 1 tablet by mouth daily  Qty: 90 tablet, Refills: 1      triamcinolone (ARISTOCORT) 0.5 % cream Apply topically every 4 hours as needed (eczema) Apply topically 3 times daily.   Qty: 15 g, Refills: 3      Multiple Vitamins-Minerals (MULTIVITAMIN PO) Take by mouth daily         STOP taking these medications       metoprolol tartrate

## 2020-05-11 ENCOUNTER — CARE COORDINATION (OUTPATIENT)
Dept: CASE MANAGEMENT | Age: 80
End: 2020-05-11

## 2020-05-11 PROCEDURE — 1111F DSCHRG MED/CURRENT MED MERGE: CPT

## 2020-05-11 NOTE — CARE COORDINATION
[address] Transitions Initial Follow Up Call    Call within 2 business days of discharge: Yes    Patient: Ciarra Parr Patient : 1940   MRN: [id number]  Reason for Admission:   Discharge Date: 20 RARS: Readmission Risk Score: 10      Last Discharge [facility]       Complaint Diagnosis Description Type Department Provider    20   Admission (Discharged) from [facility] [address] Final CV[facility] Turner Maynard MD           Spoke with: Kaveh Khan (patient)    Facility: [facility]    Non-face-to-face services provided:  Obtained and reviewed discharge summary and/or continuity of care documents  Assessment and support for treatment adherence and medication management-medication list reviewed & 1111f completed. Care Transitions 24 Hour Call    Do you have any ongoing symptoms?:  No  Do you have a copy of your discharge instructions?:  Yes  Do you have all of your prescriptions and are they filled?:  Yes  Have you been contacted by a [address]?:  No  Have you scheduled your follow up appointment?:  No  Were you discharged with any Home Care or Post Acute Services:  No  Do you feel like you have everything you need to keep you well at home?:  Yes  Care Transitions Interventions       Initial attempt at CTN/COVID 19 monitoring discharge phone call. Spoke with uAstin Jimenez. He states \"I'm good\". Austin Jimenez states he received a copy of his discharge instructions and they were reviewed with him prior to his discharge. He states he will reach out to  to see if a hospital follow up appointment is needed. Discussed completing his HIPPA/communication form the next time he is at one of his physician's office. Austin Jimenez states his bilateral groin sites are \"slightly red\". He will monitor and inform his physician if they get worse. He states he does not weigh himself on a daily basis.  Discussed daily weights - he will talk to his physician to see if this is something they want

## 2020-05-12 ENCOUNTER — TELEPHONE (OUTPATIENT)
Dept: INTERNAL MEDICINE CLINIC | Age: 80
End: 2020-05-12

## 2020-05-12 NOTE — TELEPHONE ENCOUNTER
[address] Transitions Initial Follow Up Call    Outreach made within 2 business days of discharge: Yes    Patient: Nick Zuluaga Patient : 1940   MRN: <[id number]>  Reason for Admission: There are no discharge diagnoses documented for the most recent discharge. Discharge Date: 20       Spoke with: Sofía Eisenberg    Discharge department/facility: [place] [address]. Interactive Patient Contact:  Was patient able to fill all prescriptions: Yes  Was patient instructed to bring all medications to the follow-up visit: Yes  Is patient taking all medications as directed in the discharge summary?  Yes  Does patient understand their discharge instructions: Yes  Does patient have questions or concerns that need addressed prior to 7-14 day follow up office visit: no    Scheduled appointment with PCP within 7-14 days    Follow Up  Future Appointments   Date Time Provider Rod Villalobos   2020 11:00 AM Brittany Zepeda MD [facility]   2020  3:00 PM Elsa Trejo MD [facility]       Vince Lofton, [facility] Shital Marrero

## 2020-05-15 ENCOUNTER — VIRTUAL VISIT (OUTPATIENT)
Dept: INTERNAL MEDICINE CLINIC | Age: 80
End: 2020-05-15
Payer: MEDICARE

## 2020-05-15 PROCEDURE — 99495 TRANSJ CARE MGMT MOD F2F 14D: CPT | Performed by: FAMILY MEDICINE

## 2020-05-15 PROCEDURE — 1111F DSCHRG MED/CURRENT MED MERGE: CPT | Performed by: FAMILY MEDICINE

## 2020-05-15 NOTE — PROGRESS NOTES
performed by Parmjit Sutton MD at [address] [facility]      S/P TURP    THYMECTOMY         PHYSICAL EXAMINATION:   Limited due to virtual visit. 1. PAF (paroxysmal atrial fibrillation)/ (HCC)2. S/P ablation of atrial fibrillation 5/8/20  Patient did well postop. Continue flecainide, Lanoxin, Toprol-XL and Eliquis    3. Chronic systolic CHF (congestive heart failure), NYHA class 1 (HCC)  Compensated. Continue current medication. May use Lasix as needed    4. Essential hypertension  Controlled. Continue Toprol-XL 25 mg daily and lisinopril 10 mg daily. 5. Thymoma  Stable. Follow up with  Dr. Alise Sin      RTC in 2-3 mos    [facility] is a [de-identified] y.o. male being evaluated by a Virtual Visit (video visit) encounter to address concerns as mentioned above. A caregiver was present when appropriate. Due to this being a TeleHealth encounter (During POIZO-17 public health emergency), evaluation of the following organ systems was limited: Vitals/Constitutional/EENT/Resp/CV/GI//MS/Neuro/Skin/Heme-Lymph-Imm. Pursuant to the emergency declaration under the [facility], [address] [place] [address] authority and the [employer] Act, this Virtual Visit was conducted with patient's (and/or legal guardian's) consent, to reduce the patient's risk of exposure to COVID-19 and provide necessary medical care. The patient (and/or legal guardian) has also been advised to contact this office for worsening conditions or problems, and seek emergency medical treatment and/or call 911 if deemed necessary. Patient identification was verified at the start of the visit: Yes    Total time spent on this encounter: 21 minutes    Services were provided through a video synchronous discussion virtually to substitute for in-person clinic visit. Patient and provider were located at their individual homes.     --ETHEL

## 2020-05-18 NOTE — PROGRESS NOTES
Post-Discharge Transitional Care Management Services or Hospital Follow Up      Brayan Schmidt [id number]   YOB: 1940    Date of Office Visit:  5/15/2020  Date of Hospital Admission: 5/8/20  Date of Hospital Discharge: 5/9/20  Readmission Risk Score(high >=14%.  Medium >=10%):Readmission Risk Score: 10      Care management risk score Rising risk (score 2-5) and Complex Care (Scores >=6): 9     Non face to face  following discharge, date last encounter closed (first attempt may have been earlier): 5/12/2020  2:29 PM 5/12/2020  2:29 PM    Call initiated 2 business days of discharge: Yes     Patient Active Problem List   Diagnosis    OA (osteoarthritis)    Thymoma    Essential hypertension    Encounter to discuss treatment options    Chronic tachycardia    Chest wall mass    Severe malnutrition ([address]    Cardiomyopathy ([address]    PAF (paroxysmal atrial fibrillation) (HCC)    Chronic systolic CHF (congestive heart failure), NYHA class 1 ([address]    Atrial fibrillation, new onset ([address]    S/P ablation of atrial fibrillation    Longstanding persistent atrial fibrillation       Allergies   Allergen Reactions    Amiodarone        Medications listed as ordered at the time of discharge from [facility], [address] [place] [address] Medication Instructions [place]:    Printed on:05/18/20 1233   Medication Information                      apixaban (ELIQUIS) 5 MG TABS tablet  Take 1 tablet by mouth 2 times daily             digoxin (LANOXIN) 125 MCG tablet  Take 1 tablet by mouth daily             flecainide (TAMBOCOR) 100 MG tablet  Take 1 tablet by mouth every 12 hours             furosemide (LASIX) 20 MG tablet  Take 1 tablet by mouth daily             lisinopril (PRINIVIL;ZESTRIL) 10 MG tablet  Take 1 tablet by mouth daily             metoprolol succinate (TOPROL XL) 25 MG extended release tablet  Take 1 tablet by mouth daily             Multiple Vitamins-Minerals (MULTIVITAMIN PO)  Take by mouth daily triamcinolone (ARISTOCORT) 0.5 % cream  Apply topically every 4 hours as needed (eczema) Apply topically 3 times daily. Medications marked \"taking\" at this time  No outpatient medications have been marked as taking for the 5/15/20 encounter (Virtual Visit) with Tracy Ewing MD.        Medications patient taking as of now reconciled against medications ordered at time of hospital discharge: Yes    Chief Complaint   Patient presents with   [address] from Hospital       HPI    Inpatient course: Discharge summary reviewed- see chart. Interval history/Current status: Patient has persistent atrial fibrillation and atrial flutter. He underwent ablation of atrial fibrillation on May 8, 2020 by Dr. Monica Lipscobm. Patient did well postop and was discharged on flecainide 100 mg twice daily, Toprol-XL 25 mg daily and Eliquis 5 mg daily. He was also told to continue Lasix and digoxin as preadmission. Patient was concerned about too many medication and was wondering if some of them could be discontinued. He is also concerned about A. fib coming back. He denies chest pain, palpitation or shortness of breath. He has no leg edema or weight gain. Review of Systems. Denies fever, cough or shortness of breath. Denies chest pain or palpitation. Denies bowel or urinary disturbance. Denies aches and pains more than usual.  Denies leg edema. There were no vitals filed for this visit. There is no height or weight on file to calculate BMI. Wt Readings from Last 3 Encounters:   05/09/20 124 lb 1.9 oz (56.3 kg)   02/21/20 125 lb (56.7 kg)   01/15/20 119 lb 6.1 oz (54.2 kg)     BP Readings from Last 3 Encounters:   05/09/20 (!) 97/45   05/08/20 109/62   02/21/20 118/66       Physical Exam  Limited due to virtual visit. Assessment/Plan:  1. PAF (paroxysmal atrial fibrillation) ([address] / 2. S/P ablation of atrial fibrillation 5/8/20  Patient did well postop.   Continue flecainide 100 mg twice daily,

## 2020-06-02 ENCOUNTER — CARE COORDINATION (OUTPATIENT)
Dept: CASE MANAGEMENT | Age: 80
End: 2020-06-02

## 2020-06-02 NOTE — CARE COORDINATION
[address] Transitions Follow Up Call    2020    Patient: Dorothea Salazar  Patient : 1940   MRN: [id number]  Reason for Admission:   Discharge Date: 20 RARS: Readmission Risk Score: 10         Spoke with: Martin Sarmiento (patient)    Care Transitions Subsequent and Final Call    Subsequent and Final Calls  Care Transitions Interventions  Other Interventions:          Final attempt at CTN follow up call. Spoke with Miriam Wiseman. He states he is \"alright\". Informed Miriam Wiseman this would be the final CTN call. He denies any needs for writer today. Encouraged Miriam Wiseman to call his PCP with any future medical issues or concerns.     Follow Up  Future Appointments   Date Time Provider Rod Turneri   2020 11:00 AM Jose Townsend MD [facility]   2020  3:00 PM Saturnino Salcedo MD [facility]       Ralf Smyth RN

## 2020-06-05 RX ORDER — TRIAMCINOLONE ACETONIDE 1 MG/G
CREAM TOPICAL
Qty: 80 G | Refills: 1 | Status: SHIPPED | OUTPATIENT
Start: 2020-06-05 | End: 2021-01-28 | Stop reason: ALTCHOICE

## 2020-06-05 RX ORDER — TRIAMCINOLONE ACETONIDE 5 MG/G
CREAM TOPICAL EVERY 4 HOURS PRN
Qty: 15 G | Refills: 3 | Status: CANCELLED | OUTPATIENT
Start: 2020-06-05

## 2020-06-05 NOTE — TELEPHONE ENCOUNTER
Patient requesting a medication refill. Medication: triamcinolone (ARISTOCORT) 0.5 % cream     Pharmacy: [facility] [address], [address] [facility] [phone] Alfonzo Agrawal [phone]    Last office visit: 5/15/2020  Next office visit: Visit date not found    Pt would like to have a larger one if he can. He states that they are real small.

## 2020-06-08 RX ORDER — METOPROLOL SUCCINATE 25 MG/1
TABLET, EXTENDED RELEASE ORAL
Qty: 90 TABLET | Refills: 3 | Status: SHIPPED | OUTPATIENT
Start: 2020-06-08 | End: 2020-06-12

## 2020-06-10 ENCOUNTER — TELEPHONE (OUTPATIENT)
Dept: CARDIOLOGY CLINIC | Age: 80
End: 2020-06-10

## 2020-06-11 NOTE — TELEPHONE ENCOUNTER
From: Nurse Junior Larsen  To: Dal Ormond  Sent: 5/4/2020 9:22 AM EDT  Subject: Atrial fibrillation ablation scheduling    Mr. Mosqueda,    Good Morning,     I am reaching out to schedule your atrial fibrillation ablation. Please contact Katie VOGEL at [phone] to schedule your ablation.     Thanks,  Bennett Kohli RN

## 2020-06-12 ENCOUNTER — OFFICE VISIT (OUTPATIENT)
Dept: CARDIOLOGY CLINIC | Age: 80
End: 2020-06-12
Payer: MEDICARE

## 2020-06-12 VITALS
TEMPERATURE: 98.5 F | HEART RATE: 51 BPM | WEIGHT: 117 LBS | SYSTOLIC BLOOD PRESSURE: 126 MMHG | OXYGEN SATURATION: 98 % | BODY MASS INDEX: 17.33 KG/M2 | DIASTOLIC BLOOD PRESSURE: 68 MMHG | HEIGHT: 69 IN

## 2020-06-12 PROCEDURE — 1123F ACP DISCUSS/DSCN MKR DOCD: CPT | Performed by: INTERNAL MEDICINE

## 2020-06-12 PROCEDURE — 93000 ELECTROCARDIOGRAM COMPLETE: CPT | Performed by: INTERNAL MEDICINE

## 2020-06-12 PROCEDURE — 1036F TOBACCO NON-USER: CPT | Performed by: INTERNAL MEDICINE

## 2020-06-12 PROCEDURE — 99213 OFFICE O/P EST LOW 20 MIN: CPT | Performed by: INTERNAL MEDICINE

## 2020-06-12 PROCEDURE — G8419 CALC BMI OUT NRM PARAM NOF/U: HCPCS | Performed by: INTERNAL MEDICINE

## 2020-06-12 PROCEDURE — G8427 DOCREV CUR MEDS BY ELIG CLIN: HCPCS | Performed by: INTERNAL MEDICINE

## 2020-06-12 PROCEDURE — 4040F PNEUMOC VAC/ADMIN/RCVD: CPT | Performed by: INTERNAL MEDICINE

## 2020-06-12 RX ORDER — METOPROLOL SUCCINATE 25 MG/1
12.5 TABLET, EXTENDED RELEASE ORAL DAILY
Qty: 90 TABLET | Refills: 3
Start: 2020-06-12 | End: 2020-08-24 | Stop reason: SDUPTHER

## 2020-06-12 RX ORDER — FLECAINIDE ACETATE 100 MG/1
50 TABLET ORAL EVERY 12 HOURS SCHEDULED
Qty: 60 TABLET | Refills: 3
Start: 2020-06-12 | End: 2020-08-21

## 2020-06-12 NOTE — PROGRESS NOTES
none    4. Cath:   None    The MCOT, echocardiogram, stress test, and coronary angiography/PCI were reviewed by myself and used for my plan of care. Procedures:  1. Electrophysiology study with radiofrequency ablation of atrial fibrillation and pulmonary vein isolation 5/8/2020.  2. Successful cardioversion using 200 Joules of synchronised current 1/10/2020. Assessment/Plan:     Persistent atrial fibrillation  - post ablation, he is in SR and feeling very well  -EKG today marked sinus bradycardia, nonspecific QRS widening and right axis, consider right ventricular hypertrophy. -s/p electrophysiology study with radiofrequency ablation of atrial fibrillation and pulmonary vein isolation 5/8/2020.  -[id number]-MTDl Score  (CHF, HTN, AGE)  -On Eliquis 5 mg BID for  thromboembolic risk reduction.  -Tolerating well no signs or symptoms of abnormal bruising or bleeding.  -Decrease Torpol XL to 12.5 mg daily and Flecainide to 50 mg BID. Cardiomyopathy / Chronic systolic heart failure  LVEF 40-45 % per Echo 5/15/2019. On GDMT Lisinopril, Toprol Xl and Lasix. Appears well compensated exam no signs or symptoms of fluid volume overload. Repeat TTE in 3 months after being in NSR    Follow up with Dr. Ca Garsia in two months. Thank you for allowing me to participate in the [facility] All questions and concerns were addressed to the patient/family. Alternatives to my treatment were discussed. This note was scribed in the presence of Kelly Blanco MD by Simona Winston RN.      Shanta Telles MD, personally performed the services prescribed in this documentation as scribed by Ms. Simona Winston RN in my presence and it is both accurate and complete.      Kelly Blanco MD  Cardiac Electrophysiology  [address]  [address] [facility], [address] [facility]  [facility]s [facility] [address]  [phone]

## 2020-06-27 ENCOUNTER — HOSPITAL ENCOUNTER (EMERGENCY)
Age: 80
Discharge: HOME | End: 2020-06-27
Attending: EMERGENCY MEDICINE
Payer: MEDICARE

## 2020-06-27 VITALS
WEIGHT: 114.2 LBS | HEIGHT: 69 IN | RESPIRATION RATE: 16 BRPM | BODY MASS INDEX: 16.91 KG/M2 | SYSTOLIC BLOOD PRESSURE: 152 MMHG | OXYGEN SATURATION: 99 % | DIASTOLIC BLOOD PRESSURE: 93 MMHG | HEART RATE: 58 BPM | TEMPERATURE: 97.7 F

## 2020-06-27 PROCEDURE — 99282 EMERGENCY DEPT VISIT SF MDM: CPT

## 2020-06-27 RX ORDER — METHYLPREDNISOLONE 4 MG/1
TABLET ORAL
Qty: 1 KIT | Refills: 0 | Status: SHIPPED | OUTPATIENT
Start: 2020-06-27 | End: 2020-10-14 | Stop reason: ALTCHOICE

## 2020-06-27 NOTE — ED PROVIDER NOTES
ALLERGIES     Amiodarone    FAMILY HISTORY       Family History   Problem Relation Age of Onset    Heart Disease Father     Cancer Sister         Colon    Cancer Brother         Colon    Heart Disease Brother     Heart Disease Brother           SOCIAL HISTORY       Social History     Socioeconomic History    Marital status:       Spouse name: Not on file    Number of children: Not on file    Years of education: Not on file    Highest education level: Not on file   Occupational History    Not on file   Social Needs    Financial resource strain: Not on file    Food insecurity     Worry: Not on file     Inability: Not on file    Transportation needs     Medical: Not on file     Non-medical: Not on file   Tobacco Use    Smoking status: Former Smoker     Packs/day: 1.00     Types: Cigarettes     Start date: 1955     Last attempt to quit: 1993     Years since quittin.5    Smokeless tobacco: Never Used   Substance and Sexual Activity    Alcohol use: Yes     Comment: socially    Drug use: Not Currently    Sexual activity: Not Currently   Lifestyle    Physical activity     Days per week: Not on file     Minutes per session: Not on file    Stress: Not on file   Relationships    Social connections     Talks on phone: Not on file     Gets together: Not on file     Attends Worship service: Not on file     Active member of club or organization: Not on file     Attends meetings of clubs or organizations: Not on file     Relationship status: Not on file    Intimate partner violence     Fear of current or ex partner: Not on file     Emotionally abused: Not on file     Physically abused: Not on file     Forced sexual activity: Not on file   Other Topics Concern    Not on file   Social History Narrative    Not on file       SCREENINGS             PHYSICAL EXAM    (up to 7 for level 4, 8 or more for level 5)     ED Triage Vitals [20 1139]   BP Temp Temp Source Pulse Resp SpO2 Discharge 06/27/2020 12:08:24 PM      PATIENT REFERRED TO:  Lian Holbrook MD  [address] [address] [address] [place] [address],[address]  [phone]    Call today        DISCHARGE MEDICATIONS:  New Prescriptions    METHYLPREDNISOLONE (MEDROL, BEE,) 4 MG TABLET    Take by mouth. Controlled Substances Monitoring:     No flowsheet data found. (Please note that portions of this note were completed with a voice recognition program.  Efforts were made to edit the dictations but occasionally words are mis-transcribed. )    [id number] Jermaine Desir DO (electronically signed)  Attending Emergency Physician          Jorden Pollard DO  06/27/20 [id number]

## 2020-08-21 RX ORDER — FLECAINIDE ACETATE 100 MG/1
TABLET ORAL
Qty: 60 TABLET | Refills: 2 | Status: SHIPPED | OUTPATIENT
Start: 2020-08-21 | End: 2021-01-28

## 2020-08-24 ENCOUNTER — OFFICE VISIT (OUTPATIENT)
Dept: PRIMARY CARE CLINIC | Age: 80
End: 2020-08-24
Payer: MEDICARE

## 2020-08-24 PROCEDURE — G8419 CALC BMI OUT NRM PARAM NOF/U: HCPCS | Performed by: NURSE PRACTITIONER

## 2020-08-24 PROCEDURE — 99211 OFF/OP EST MAY X REQ PHY/QHP: CPT | Performed by: NURSE PRACTITIONER

## 2020-08-24 PROCEDURE — G8428 CUR MEDS NOT DOCUMENT: HCPCS | Performed by: NURSE PRACTITIONER

## 2020-08-24 RX ORDER — LISINOPRIL 10 MG/1
10 TABLET ORAL DAILY
Qty: 90 TABLET | Refills: 3 | Status: SHIPPED | OUTPATIENT
Start: 2020-08-24 | End: 2021-09-16 | Stop reason: SDUPTHER

## 2020-08-24 RX ORDER — METOPROLOL SUCCINATE 25 MG/1
12.5 TABLET, EXTENDED RELEASE ORAL DAILY
Qty: 90 TABLET | Refills: 3 | Status: SHIPPED | OUTPATIENT
Start: 2020-08-24 | End: 2021-09-02

## 2020-08-24 RX ORDER — LISINOPRIL 10 MG/1
TABLET ORAL
Qty: 30 TABLET | Refills: 5 | Status: SHIPPED | OUTPATIENT
Start: 2020-08-24 | End: 2020-08-24 | Stop reason: SDUPTHER

## 2020-08-24 NOTE — PROGRESS NOTES
Patient presented to [facility] drive up clinic for preop testing. Patient was swabbed and given information advising them to remain isolated until procedure date.

## 2020-08-25 LAB — SARS-COV-2, NAA: NOT DETECTED

## 2020-08-26 ENCOUNTER — OFFICE VISIT (OUTPATIENT)
Dept: CARDIOLOGY CLINIC | Age: 80
End: 2020-08-26
Payer: MEDICARE

## 2020-08-26 VITALS
SYSTOLIC BLOOD PRESSURE: 118 MMHG | HEIGHT: 69 IN | HEART RATE: 64 BPM | DIASTOLIC BLOOD PRESSURE: 60 MMHG | OXYGEN SATURATION: 97 % | TEMPERATURE: 97.7 F | WEIGHT: 116 LBS | BODY MASS INDEX: 17.18 KG/M2

## 2020-08-26 PROCEDURE — G8427 DOCREV CUR MEDS BY ELIG CLIN: HCPCS | Performed by: INTERNAL MEDICINE

## 2020-08-26 PROCEDURE — 99214 OFFICE O/P EST MOD 30 MIN: CPT | Performed by: INTERNAL MEDICINE

## 2020-08-26 PROCEDURE — 4040F PNEUMOC VAC/ADMIN/RCVD: CPT | Performed by: INTERNAL MEDICINE

## 2020-08-26 PROCEDURE — 93000 ELECTROCARDIOGRAM COMPLETE: CPT | Performed by: INTERNAL MEDICINE

## 2020-08-26 PROCEDURE — 1123F ACP DISCUSS/DSCN MKR DOCD: CPT | Performed by: INTERNAL MEDICINE

## 2020-08-26 PROCEDURE — 1036F TOBACCO NON-USER: CPT | Performed by: INTERNAL MEDICINE

## 2020-08-26 PROCEDURE — G8419 CALC BMI OUT NRM PARAM NOF/U: HCPCS | Performed by: INTERNAL MEDICINE

## 2020-08-26 RX ORDER — FUROSEMIDE 20 MG/1
20 TABLET ORAL DAILY
Qty: 90 TABLET | Refills: 1 | Status: SHIPPED | OUTPATIENT
Start: 2020-08-26 | End: 2020-12-28

## 2020-08-26 RX ORDER — SODIUM CHLORIDE 9 MG/ML
INJECTION, SOLUTION INTRAVENOUS CONTINUOUS
Status: CANCELLED | OUTPATIENT
Start: 2020-08-26

## 2020-08-26 NOTE — PROGRESS NOTES
[facility]      Cardiology Progress Note    Blanca Mosqueda  1940 August 26, 2020        CC: \"I have been doing good since my ablation. \"     HPI:  The patient is 79 y.o. male with a past medical history significant for atrial fibrillation, hypertension, and systolic heart failure. He was admitted 10/27 - 11/6/18 for worsening shortness of breath and weakness. He is a marathon runner and first noticed the symptoms while in [facility]. In the ER he was noted to be in Atrial fibrillation with RVR and also evidence of pneumonia. He had an echocardiogram completed that showed an LVEF of 30-35%. He was treated with amiodarone but was discontinued due to abnormal LFTs. He was discharged home on a BB and Eliquis at that time. He is s/p DCCV 1/2020 followed by EPS AFIB ablation and pulm vein isol. 5/8/20. Today, he denies any new or recurrent cardiac complains, specifically palpitations, heart racing, fluttering, pounding, fatigue, VILLELA since his AFIB Ablation almost 4 months ago. He reports medical therapy compliance and feels he is tolerating. He denies any abnormal bruising or bleeding on Eliquis therapy. Patient denies any symptoms of chest pain, pressure, tightness, shortness of breath at rest, VILLELA, nausea, vomiting, near syncope, syncope, heart racing, palpitations, dizziness, lightheaded, PND, orthopnea, wheezing, diaphoresis, BLE edema, bilateral lower extremities pain, cramping or fatigue.        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    also sessile serrated adenoma    Wears glasses      Past Surgical History:   Procedure Laterality Date    CARDIOVERSION  01/10/2020    COLONOSCOPY N/A 1/15/2020    COLONOSCOPY performed by Nikunj Silvestre MD at [address] [place] [address] SURGERY      PROSTATE SURGERY      S/P TURP    THYMECTOMY       Family History   Problem Relation Age of Onset    Heart Disease Father     Cancer Sister         Colon    Cancer Brother         Colon    Heart Disease Brother     Heart Disease Brother      Social History     Tobacco Use    Smoking status: Former Smoker     Packs/day: 1.00     Types: Cigarettes     Start date: 1955     Last attempt to quit: 1993     Years since quittin.6    Smokeless tobacco: Never Used   Substance Use Topics    Alcohol use: Yes     Comment: socially    Drug use: Not Currently       Allergies   Allergen Reactions    Amiodarone      Current Outpatient Medications   Medication Sig Dispense Refill    lisinopril (PRINIVIL;ZESTRIL) 10 MG tablet Take 1 tablet by mouth daily 90 tablet 3    metoprolol succinate (TOPROL XL) 25 MG extended release tablet Take 0.5 tablets by mouth daily 90 tablet 3    flecainide (TAMBOCOR) 100 MG tablet TAKE ONE TABLET BY MOUTH EVERY 12 HOURS 60 tablet 2    methylPREDNISolone (MEDROL, BEE,) 4 MG tablet Take by mouth. 1 kit 0    triamcinolone (KENALOG) 0.1 % cream Apply topically 2 times daily. 80 g 1    apixaban (ELIQUIS) 5 MG TABS tablet Take 1 tablet by mouth 2 times daily 180 tablet 1    furosemide (LASIX) 20 MG tablet Take 1 tablet by mouth daily 60 tablet 3    triamcinolone (ARISTOCORT) 0.5 % cream Apply topically every 4 hours as needed (eczema) Apply topically 3 times daily. 15 g 3    Multiple Vitamins-Minerals (MULTIVITAMIN PO) Take by mouth daily       No current facility-administered medications for this visit. Review of Systems:  · Constitutional: no unanticipated weight loss. There's been no change in energy level, sleep pattern, or activity level. No fevers, chills. · Eyes: No visual changes or diplopia. No scleral icterus. · ENT: No Headaches, hearing loss or vertigo. No mouth sores or sore throat.   · Cardiovascular: as reviewed in HPI  · Respiratory: No cough or wheezing, no sputum production. No hematemesis. · Gastrointestinal: No abdominal pain, appetite loss, blood in stools. No change in bowel or bladder habits. · Genitourinary: No dysuria, trouble voiding, or hematuria. · Musculoskeletal:  No gait disturbance, no joint complaints. · Integumentary: No rash or pruritis. · Neurological: No headache, diplopia, change in muscle strength, numbness or tingling. · Psychiatric: No anxiety or depression. · Endocrine: No temperature intolerance. No excessive thirst, fluid intake, or urination. No tremor. · Hematologic/Lymphatic: No abnormal bruising or bleeding, blood clots or swollen lymph nodes. · Allergic/Immunologic: No nasal congestion or hives. Physical Exam:   /60   Pulse 64   Temp 97.7 °F (36.5 °C)   Ht 5' 9\" (1.753 m)   Wt 116 lb (52.6 kg) Comment: with shoes  SpO2 97%   BMI 17.13 kg/m²   Wt Readings from Last 3 Encounters:   06/27/20 114 lb 3.2 oz (51.8 kg)   06/12/20 117 lb (53.1 kg)   05/09/20 124 lb 1.9 oz (56.3 kg)     Physical Exam:   Constitutional: The patient is oriented to person, place, and time. Appears well-developed and well-nourished. In no acute distress. Head: Normocephalic and atraumatic. Pupils equal and round. Neck: Neck supple. No JVP or carotid bruit appreciated. No mass and no thyromegaly present. No lymphadenopathy present. Cardiovascular: Irregularly irregular. Normal heart sounds. Exam reveals no gallop and no friction rub. 1-9/0 systolic murmur at the base  Pulmonary/Chest: Effort normal and breath sounds normal. No respiratory distress. No wheezes, rhonchi or rales. Abdominal: Soft, non-tender. Bowel sounds are normal. Exhibits no organomegaly, mass or bruit. Extremities: No edema. No cyanosis or clubbing. Pulses are 2+ radial and carotid bilaterally. Neurological: No gross cranial nerve deficit. Coordination normal.   Skin: Skin is warm and dry. There is no rash or diaphoresis.    Psychiatric: Patient has a normal mood and affect. Speech is normal and behavior is normal.       Lab Review:   Lab Results   Component Value Date    TRIG 58 03/29/2018    HDL 78 06/28/2019    LDLCALC 76 06/28/2019    LABVLDL 15 06/28/2019     Lab Results   Component Value Date    BUN 17 05/05/2020    CREATININE 0.9 05/05/2020       EKG Interpretation:   12/7/18 SR with PACs   1/2/19 Sinus bradycardia with PAC's  8/15/19 Sinus bradycardia with PAC's  1/3/20 Atrial fibrillation  2/21/2020 Atrial fibrillation  8/26/20 Sinus  Rhythm with Nonspecific QRS widening. Nonspecific T-abnormality. Image Review:   Echo 10/29/18  Left ventricular cavity size is normal.  Normal left ventricular wall thickness. Difficult to estimate EF due to increase rate and rhythm but appears depressed  Mitral valve leaflets appear mildly thickened. Mild mitral regurgitation. The left atrium is dilated. Aortic valve appears sclerotic but opens adequately. Trivial aortic regurgitation. Tricuspid valve is structurally normal.  Mild tricuspid regurgitation with RVSP of 26 mmHg. The right atrium is dilated. Suggest repeat limited echo with bubble study to r/o ASD or PFO     Echo limited 11/2/18   Left ventricular cavity size is normal.  Normal left ventricular wall thickness. Ejection fraction is visually estimated to be 30-35%, though difficult to adequately assess due to arrhythmia. Severe hypo to akinesis of the septum. Abnormal (paradoxical) septal motion is present. Tricuspid valve appears thickened with redundancy. The right atrium is mildly dilated. IVC not well visualized. A bubble study was performed and fails to show evidence of shunting. Stress 12/21/18  1. Technically a satisfactory study.    2. Positive treadmill exercise stress portion of the study.    3. No evidence of Ischemia by Myocardial Perfusion Imaging.    4. Gated Study shows normal LV size and Systolic function; EF is 70%.    5. The patient exercised for 6 min.  on the Ramírez protocol to achieve a HR of    133, which is 94% of PMHR. The study was stopped due to elevated BP. There    was no chest pain . 1mm ST depression in lateral leads.    6. Diastolic BP crept upto 950 with peak exercise     Event monitor 2/13/19  NSR. Few episodes if PSVT. ECHO 5/15/19  Mitral valve leaflets appear thickened with mitral valve prolapse. moderate mitral regurgitation is present. The left atrium is mildly dilated. Tricuspid valve appear redundant. Mild to moderate tricuspid regurgitation. The right atrium is moderately dilated. IVC size is normal (<2.1 cm) but collapses < 50% with respiration consistent  with elevated RA pressure (8 mmHg). Estimated pulmonary artery systolic pressure is mildly elevated at 34 mmHg  assuming a right atrial pressure of 8 mmHg. Ejection fraction is visually estimated to be 40-45%. Assessment/Plan:     Paroxysmal atrial fibrillation  Asymptomatic today with no reports of palpitations, heart racing, fluttering, pounding. Patient is here here for a follow-up after he underwent cardioversion 1/10/2020 followed by EPS with radiofrequency AFIB ablation 5/8/20 per Dr. Mely Bridges. EKG today reveals Sinus  Rhythm with Nonspecific QRS widening and  Nonspecific T-abnormality. Continue Eliquis 5 mg BID along with Toprol-Xl and Flecainide. I will discuss with my EP partners about discontinuing discontinue flecainide. Cardiomyopathy/Chronic systolic heart failure  He appears euvolemic on clinical examine today. His last echocardiogram 5/15/19 LVEF 40-45%. Continue with medical therapy including B-blocker and lisinopril 10 mg daily. Encouraged a low sodium diet, daily fluid intake daily monitoring and daily weights. We will plan to repeat his echocardiogram prior to his follow up.       Hypertension, essential   Controlled today on medical therapy. I have encouraged him to work on a low sodium diet. Follow up in 4-5 months.   Instructed to obtain flu vaccine this season and annually. Complexity of medical decision making-high    Thank you very much for allowing me to participate in the care of your patient. Please do not hesitate to contact me if you have any questions. Sincerely,  Yari Stiles M.D. [facility], [address] [place] [address]  Ph: [phone]  Fax: [phone]    This note was scribed in the presence of Dr. Jaxon Golden MD by Madhuri Fleming RN. Physician Attestation:  The scribes documentation has been prepared under my direction and personally reviewed by me in its entirety. I confirm that the note above accurately reflects all work, treatment, procedures, and medical decision making performed by me.

## 2020-08-26 NOTE — PATIENT INSTRUCTIONS
Patient Education        Avoiding Triggers With Heart Failure: Care Instructions  Your Care Instructions     Triggers are anything that make your heart failure flare up. A flare-up is also called \"sudden heart failure\" or \"acute heart failure. \" When you have a flare-up, fluid builds up in your lungs, and you have problems breathing. You might need to go to the hospital. By watching for changes in your condition and avoiding triggers, you can prevent heart failure flare-ups. Follow-up care is a key part of your treatment and safety. Be sure to make and go to all appointments, and call your doctor if you are having problems. It's also a good idea to know your test results and keep a list of the medicines you take. How can you care for yourself at home? Watch for changes in your weight and condition  · Weigh yourself without clothing at the same time each day. Record your weight. Call your doctor if you have sudden weight gain, such as more than 2 to 3 pounds in a day or 5 pounds in a week. (Your doctor may suggest a different range of weight gain.) A sudden weight gain may mean that your heart failure is getting worse. · Keep a daily record of your symptoms. Write down any changes in how you feel, such as new shortness of breath, cough, or problems eating. Also record if your ankles are more swollen than usual and if you feel more tired than usual. Note anything that you ate or did that could have triggered these changes. Limit sodium  Sodium causes your body to hold on to extra water. This may cause your heart failure symptoms to get worse. People get most of their sodium from processed foods. Fast food and restaurant meals also tend to be very high in sodium. · Your doctor may suggest that you limit sodium. Your doctor can tell you how much sodium is right for you. This includes limiting sodium in cooked and packaged foods. · Read food labels on cans and food packages.  They tell you how much sodium you get over-the-counter medicines, vitamins, or supplements you take. Take this list with you when you go to any doctor. · Take your medicines at the same time every day. It may help you to post a list of all the medicines you take every day and what time of day you take them. · Make taking your medicine as simple as you can. Plan times to take your medicines when you are doing other things, such as eating a meal or getting ready for bed. This will make it easier to remember to take your medicines. · Get organized. Use helpful tools, such as daily or weekly pill containers. When should you call for help? Call 911 if you have symptoms of sudden heart failure such as:  · You have severe trouble breathing. · You cough up pink, foamy mucus. · You have a new irregular or rapid heartbeat. Call your doctor now or seek immediate medical care if:  · You have new or increased shortness of breath. · You are dizzy or lightheaded, or you feel like you may faint. · You have sudden weight gain, such as more than 2 to 3 pounds in a day or 5 pounds in a week. (Your doctor may suggest a different range of weight gain.)  · You have increased swelling in your legs, ankles, or feet. · You are suddenly so tired or weak that you cannot do your usual activities. Watch closely for changes in your health, and be sure to contact your doctor if you develop new symptoms. Where can you learn more? Go to https://Showbie.[employer]. org and sign in to your [employer]) account. Enter L839 in the [employer] box to learn more about \"Avoiding Triggers With Heart Failure: Care Instructions. \"     If you do not have an account, please click on the \"Sign Up Now\" link. Current as of: December 16, 2019               Content Version: 12.5  © 3989-0835 Healthwise, Incorporated. Care instructions adapted under license by [facility].  If you have questions about a medical condition or this instruction, always ask your healthcare professional. [address] any warranty or liability for your use of this information.

## 2020-08-27 ENCOUNTER — HOSPITAL ENCOUNTER (OUTPATIENT)
Dept: CT IMAGING | Age: 80
Discharge: HOME | End: 2020-08-27
Payer: MEDICARE

## 2020-08-27 ENCOUNTER — ANESTHESIA EVENT (OUTPATIENT)
Dept: CT IMAGING | Age: 80
End: 2020-08-27

## 2020-08-27 ENCOUNTER — ANESTHESIA (OUTPATIENT)
Dept: CT IMAGING | Age: 80
End: 2020-08-27

## 2020-08-27 VITALS
TEMPERATURE: 96.8 F | SYSTOLIC BLOOD PRESSURE: 119 MMHG | RESPIRATION RATE: 6 BRPM | OXYGEN SATURATION: 100 % | DIASTOLIC BLOOD PRESSURE: 60 MMHG

## 2020-08-27 VITALS
TEMPERATURE: 97.4 F | BODY MASS INDEX: 16.57 KG/M2 | HEIGHT: 69 IN | OXYGEN SATURATION: 98 % | RESPIRATION RATE: 16 BRPM | SYSTOLIC BLOOD PRESSURE: 156 MMHG | WEIGHT: 111.88 LBS | HEART RATE: 66 BPM | DIASTOLIC BLOOD PRESSURE: 80 MMHG

## 2020-08-27 LAB
ANION GAP SERPL CALCULATED.3IONS-SCNC: 12 MMOL/L (ref 3–16)
BUN BLDV-MCNC: 28 MG/DL (ref 7–20)
CALCIUM SERPL-MCNC: 9.1 MG/DL (ref 8.3–10.6)
CHLORIDE BLD-SCNC: 103 MMOL/L (ref 99–110)
CO2: 23 MMOL/L (ref 21–32)
CREAT SERPL-MCNC: 1 MG/DL (ref 0.8–1.3)
GFR AFRICAN AMERICAN: >60
GFR NON-AFRICAN AMERICAN: >60
GLUCOSE BLD-MCNC: 97 MG/DL (ref 70–99)
HCT VFR BLD CALC: 36.6 % (ref 40.5–52.5)
HEMOGLOBIN: 12.4 G/DL (ref 13.5–17.5)
INR BLD: 1.06 (ref 0.86–1.14)
MCH RBC QN AUTO: 31.8 PG (ref 26–34)
MCHC RBC AUTO-ENTMCNC: 33.8 G/DL (ref 31–36)
MCV RBC AUTO: 94.1 FL (ref 80–100)
PDW BLD-RTO: 14.1 % (ref 12.4–15.4)
PLATELET # BLD: 177 K/UL (ref 135–450)
PMV BLD AUTO: 10.1 FL (ref 5–10.5)
POTASSIUM SERPL-SCNC: 4.8 MMOL/L (ref 3.5–5.1)
PROTHROMBIN TIME: 12.3 SEC (ref 10–13.2)
RBC # BLD: 3.89 M/UL (ref 4.2–5.9)
SODIUM BLD-SCNC: 138 MMOL/L (ref 136–145)
WBC # BLD: 5.6 K/UL (ref 4–11)

## 2020-08-27 PROCEDURE — 7100000011 HC PHASE II RECOVERY - ADDTL 15 MIN

## 2020-08-27 PROCEDURE — C1886 CATHETER, ABLATION: HCPCS

## 2020-08-27 PROCEDURE — 6360000002 HC RX W HCPCS: Performed by: RADIOLOGY

## 2020-08-27 PROCEDURE — 7100000001 HC PACU RECOVERY - ADDTL 15 MIN

## 2020-08-27 PROCEDURE — 2580000003 HC RX 258: Performed by: NURSE ANESTHETIST, CERTIFIED REGISTERED

## 2020-08-27 PROCEDURE — 7100000010 HC PHASE II RECOVERY - FIRST 15 MIN

## 2020-08-27 PROCEDURE — 80048 BASIC METABOLIC PNL TOTAL CA: CPT

## 2020-08-27 PROCEDURE — 2500000003 HC RX 250 WO HCPCS: Performed by: NURSE ANESTHETIST, CERTIFIED REGISTERED

## 2020-08-27 PROCEDURE — 3700000001 HC ADD 15 MINUTES (ANESTHESIA)

## 2020-08-27 PROCEDURE — 85027 COMPLETE CBC AUTOMATED: CPT

## 2020-08-27 PROCEDURE — 6360000002 HC RX W HCPCS: Performed by: ANESTHESIOLOGY

## 2020-08-27 PROCEDURE — 36415 COLL VENOUS BLD VENIPUNCTURE: CPT

## 2020-08-27 PROCEDURE — 6360000002 HC RX W HCPCS: Performed by: NURSE ANESTHETIST, CERTIFIED REGISTERED

## 2020-08-27 PROCEDURE — 2580000003 HC RX 258: Performed by: RADIOLOGY

## 2020-08-27 PROCEDURE — 77012 CT SCAN FOR NEEDLE BIOPSY: CPT

## 2020-08-27 PROCEDURE — 7100000000 HC PACU RECOVERY - FIRST 15 MIN

## 2020-08-27 PROCEDURE — 3700000000 HC ANESTHESIA ATTENDED CARE

## 2020-08-27 PROCEDURE — 6370000000 HC RX 637 (ALT 250 FOR IP): Performed by: RADIOLOGY

## 2020-08-27 PROCEDURE — 85610 PROTHROMBIN TIME: CPT

## 2020-08-27 RX ORDER — ONDANSETRON 2 MG/ML
4 INJECTION INTRAMUSCULAR; INTRAVENOUS EVERY 8 HOURS PRN
Status: DISCONTINUED | OUTPATIENT
Start: 2020-08-27 | End: 2020-08-28 | Stop reason: HOSPADM

## 2020-08-27 RX ORDER — PROPOFOL 10 MG/ML
INJECTION, EMULSION INTRAVENOUS PRN
Status: DISCONTINUED | OUTPATIENT
Start: 2020-08-27 | End: 2020-08-27 | Stop reason: SDUPTHER

## 2020-08-27 RX ORDER — ONDANSETRON 2 MG/ML
INJECTION INTRAMUSCULAR; INTRAVENOUS PRN
Status: DISCONTINUED | OUTPATIENT
Start: 2020-08-27 | End: 2020-08-27 | Stop reason: SDUPTHER

## 2020-08-27 RX ORDER — OXYCODONE HYDROCHLORIDE AND ACETAMINOPHEN 5; 325 MG/1; MG/1
1 TABLET ORAL EVERY 4 HOURS PRN
Status: DISCONTINUED | OUTPATIENT
Start: 2020-08-27 | End: 2020-08-28 | Stop reason: HOSPADM

## 2020-08-27 RX ORDER — OXYCODONE HYDROCHLORIDE AND ACETAMINOPHEN 5; 325 MG/1; MG/1
2 TABLET ORAL EVERY 4 HOURS PRN
Status: DISCONTINUED | OUTPATIENT
Start: 2020-08-27 | End: 2020-08-28 | Stop reason: HOSPADM

## 2020-08-27 RX ORDER — PROMETHAZINE HYDROCHLORIDE 25 MG/ML
6.25 INJECTION, SOLUTION INTRAMUSCULAR; INTRAVENOUS
Status: ACTIVE | OUTPATIENT
Start: 2020-08-27 | End: 2020-08-27

## 2020-08-27 RX ORDER — LABETALOL HYDROCHLORIDE 5 MG/ML
5 INJECTION, SOLUTION INTRAVENOUS EVERY 10 MIN PRN
Status: DISCONTINUED | OUTPATIENT
Start: 2020-08-27 | End: 2020-08-28 | Stop reason: HOSPADM

## 2020-08-27 RX ORDER — ACETAMINOPHEN 325 MG/1
650 TABLET ORAL EVERY 4 HOURS PRN
Status: DISCONTINUED | OUTPATIENT
Start: 2020-08-27 | End: 2020-08-28 | Stop reason: HOSPADM

## 2020-08-27 RX ORDER — GLYCOPYRROLATE 0.2 MG/ML
INJECTION INTRAMUSCULAR; INTRAVENOUS PRN
Status: DISCONTINUED | OUTPATIENT
Start: 2020-08-27 | End: 2020-08-27 | Stop reason: SDUPTHER

## 2020-08-27 RX ORDER — SUCCINYLCHOLINE/SOD CL,ISO/PF 200MG/10ML
SYRINGE (ML) INTRAVENOUS PRN
Status: DISCONTINUED | OUTPATIENT
Start: 2020-08-27 | End: 2020-08-27 | Stop reason: SDUPTHER

## 2020-08-27 RX ORDER — EPHEDRINE SULFATE 50 MG/ML
INJECTION INTRAVENOUS PRN
Status: DISCONTINUED | OUTPATIENT
Start: 2020-08-27 | End: 2020-08-27 | Stop reason: SDUPTHER

## 2020-08-27 RX ORDER — SODIUM CHLORIDE 9 MG/ML
INJECTION, SOLUTION INTRAVENOUS CONTINUOUS
Status: DISCONTINUED | OUTPATIENT
Start: 2020-08-27 | End: 2020-08-28 | Stop reason: HOSPADM

## 2020-08-27 RX ORDER — LIDOCAINE HYDROCHLORIDE 20 MG/ML
INJECTION, SOLUTION EPIDURAL; INFILTRATION; INTRACAUDAL; PERINEURAL PRN
Status: DISCONTINUED | OUTPATIENT
Start: 2020-08-27 | End: 2020-08-27 | Stop reason: SDUPTHER

## 2020-08-27 RX ORDER — FENTANYL CITRATE 50 UG/ML
25 INJECTION, SOLUTION INTRAMUSCULAR; INTRAVENOUS EVERY 5 MIN PRN
Status: DISCONTINUED | OUTPATIENT
Start: 2020-08-27 | End: 2020-08-28 | Stop reason: HOSPADM

## 2020-08-27 RX ORDER — DEXAMETHASONE SODIUM PHOSPHATE 4 MG/ML
INJECTION, SOLUTION INTRA-ARTICULAR; INTRALESIONAL; INTRAMUSCULAR; INTRAVENOUS; SOFT TISSUE PRN
Status: DISCONTINUED | OUTPATIENT
Start: 2020-08-27 | End: 2020-08-27 | Stop reason: SDUPTHER

## 2020-08-27 RX ADMIN — ONDANSETRON 4 MG: 2 INJECTION INTRAMUSCULAR; INTRAVENOUS at 09:31

## 2020-08-27 RX ADMIN — FENTANYL CITRATE 50 MCG: 50 INJECTION INTRAMUSCULAR; INTRAVENOUS at 09:38

## 2020-08-27 RX ADMIN — PHENYLEPHRINE HYDROCHLORIDE 200 MCG: 10 INJECTION INTRAVENOUS at 09:50

## 2020-08-27 RX ADMIN — Medication 20 MG: at 09:31

## 2020-08-27 RX ADMIN — LIDOCAINE HYDROCHLORIDE 50 MG: 20 INJECTION, SOLUTION EPIDURAL; INFILTRATION; INTRACAUDAL; PERINEURAL at 09:38

## 2020-08-27 RX ADMIN — OXYCODONE HYDROCHLORIDE AND ACETAMINOPHEN 1 TABLET: 5; 325 TABLET ORAL at 12:23

## 2020-08-27 RX ADMIN — CEFAZOLIN SODIUM 2 G: 10 INJECTION, POWDER, FOR SOLUTION INTRAVENOUS at 09:34

## 2020-08-27 RX ADMIN — GLYCOPYRROLATE 0.2 MG: 0.2 INJECTION, SOLUTION INTRAMUSCULAR; INTRAVENOUS at 09:51

## 2020-08-27 RX ADMIN — DEXAMETHASONE SODIUM PHOSPHATE 4 MG: 4 INJECTION, SOLUTION INTRAMUSCULAR; INTRAVENOUS at 09:51

## 2020-08-27 RX ADMIN — EPHEDRINE SULFATE 20 MG: 50 INJECTION INTRAVENOUS at 09:50

## 2020-08-27 RX ADMIN — Medication 160 MG: at 09:38

## 2020-08-27 RX ADMIN — CEFAZOLIN SODIUM 2 G: 10 INJECTION, POWDER, FOR SOLUTION INTRAVENOUS at 09:30

## 2020-08-27 RX ADMIN — SODIUM CHLORIDE: 9 INJECTION, SOLUTION INTRAVENOUS at 09:33

## 2020-08-27 RX ADMIN — PROPOFOL 150 MG: 10 INJECTION, EMULSION INTRAVENOUS at 09:38

## 2020-08-27 RX ADMIN — EPHEDRINE SULFATE 10 MG: 50 INJECTION INTRAVENOUS at 09:46

## 2020-08-27 ASSESSMENT — PAIN DESCRIPTION - PROGRESSION
CLINICAL_PROGRESSION: NOT CHANGED
CLINICAL_PROGRESSION: GRADUALLY IMPROVING

## 2020-08-27 ASSESSMENT — PULMONARY FUNCTION TESTS
PIF_VALUE: 9
PIF_VALUE: 13
PIF_VALUE: 11
PIF_VALUE: 9
PIF_VALUE: 13
PIF_VALUE: 12
PIF_VALUE: 0
PIF_VALUE: 10
PIF_VALUE: 14
PIF_VALUE: 12
PIF_VALUE: 2
PIF_VALUE: 10
PIF_VALUE: 12
PIF_VALUE: 10
PIF_VALUE: 12
PIF_VALUE: 10
PIF_VALUE: 12
PIF_VALUE: 43
PIF_VALUE: 12
PIF_VALUE: 10
PIF_VALUE: 12
PIF_VALUE: 15
PIF_VALUE: 1
PIF_VALUE: 12
PIF_VALUE: 15
PIF_VALUE: 10
PIF_VALUE: 0
PIF_VALUE: 10
PIF_VALUE: 14
PIF_VALUE: 12
PIF_VALUE: 12
PIF_VALUE: 17
PIF_VALUE: 0
PIF_VALUE: 8
PIF_VALUE: 12
PIF_VALUE: 10
PIF_VALUE: 12
PIF_VALUE: 12
PIF_VALUE: 3
PIF_VALUE: 14
PIF_VALUE: 12
PIF_VALUE: 11
PIF_VALUE: 7
PIF_VALUE: 2
PIF_VALUE: 24
PIF_VALUE: 12
PIF_VALUE: 1
PIF_VALUE: 12
PIF_VALUE: 38
PIF_VALUE: 11
PIF_VALUE: 12
PIF_VALUE: 11
PIF_VALUE: 12
PIF_VALUE: 10
PIF_VALUE: 11
PIF_VALUE: 21
PIF_VALUE: 12
PIF_VALUE: 12

## 2020-08-27 ASSESSMENT — PAIN DESCRIPTION - PAIN TYPE
TYPE: SURGICAL PAIN;CHRONIC PAIN
TYPE: CHRONIC PAIN;SURGICAL PAIN
TYPE: SURGICAL PAIN;CHRONIC PAIN

## 2020-08-27 ASSESSMENT — PAIN DESCRIPTION - DESCRIPTORS
DESCRIPTORS: PATIENT UNABLE TO DESCRIBE
DESCRIPTORS: STABBING
DESCRIPTORS: THROBBING

## 2020-08-27 ASSESSMENT — PAIN DESCRIPTION - FREQUENCY
FREQUENCY: CONTINUOUS

## 2020-08-27 ASSESSMENT — PAIN DESCRIPTION - ORIENTATION
ORIENTATION: RIGHT;UPPER;ANTERIOR
ORIENTATION: RIGHT;ANTERIOR;UPPER
ORIENTATION: RIGHT;UPPER;ANTERIOR

## 2020-08-27 ASSESSMENT — PAIN SCALES - GENERAL
PAINLEVEL_OUTOF10: 4
PAINLEVEL_OUTOF10: 0
PAINLEVEL_OUTOF10: 3
PAINLEVEL_OUTOF10: 0
PAINLEVEL_OUTOF10: 4

## 2020-08-27 ASSESSMENT — PAIN DESCRIPTION - ONSET
ONSET: GRADUAL
ONSET: ON-GOING
ONSET: ON-GOING

## 2020-08-27 ASSESSMENT — PAIN - FUNCTIONAL ASSESSMENT
PAIN_FUNCTIONAL_ASSESSMENT: 0-10
PAIN_FUNCTIONAL_ASSESSMENT: ACTIVITIES ARE NOT PREVENTED

## 2020-08-27 ASSESSMENT — PAIN DESCRIPTION - LOCATION
LOCATION: CHEST

## 2020-08-27 NOTE — PROGRESS NOTES
Pt arrived to PACU from IR. Pt sleeping on 1l/nc, awakens easily. Right chest dressing C/D/I. Pt denies c/o pain.  VSS

## 2020-08-27 NOTE — ANESTHESIA PRE PROCEDURE
(PRINIVIL;ZESTRIL) 10 MG tablet Take 1 tablet by mouth daily 90 tablet 3    metoprolol succinate (TOPROL XL) 25 MG extended release tablet Take 0.5 tablets by mouth daily 90 tablet 3    flecainide (TAMBOCOR) 100 MG tablet TAKE ONE TABLET BY MOUTH EVERY 12 HOURS 60 tablet 2    methylPREDNISolone (MEDROL, BEE,) 4 MG tablet Take by mouth. 1 kit 0    triamcinolone (KENALOG) 0.1 % cream Apply topically 2 times daily. 80 g 1    apixaban (ELIQUIS) 5 MG TABS tablet Take 1 tablet by mouth 2 times daily 180 tablet 1    triamcinolone (ARISTOCORT) 0.5 % cream Apply topically every 4 hours as needed (eczema) Apply topically 3 times daily. 15 g 3     No current facility-administered medications on file prior to encounter. Current Outpatient Medications   Medication Sig Dispense Refill    furosemide (LASIX) 20 MG tablet Take 1 tablet by mouth daily 90 tablet 1    lisinopril (PRINIVIL;ZESTRIL) 10 MG tablet Take 1 tablet by mouth daily 90 tablet 3    metoprolol succinate (TOPROL XL) 25 MG extended release tablet Take 0.5 tablets by mouth daily 90 tablet 3    flecainide (TAMBOCOR) 100 MG tablet TAKE ONE TABLET BY MOUTH EVERY 12 HOURS 60 tablet 2    methylPREDNISolone (MEDROL, BEE,) 4 MG tablet Take by mouth. 1 kit 0    triamcinolone (KENALOG) 0.1 % cream Apply topically 2 times daily. 80 g 1    apixaban (ELIQUIS) 5 MG TABS tablet Take 1 tablet by mouth 2 times daily 180 tablet 1    triamcinolone (ARISTOCORT) 0.5 % cream Apply topically every 4 hours as needed (eczema) Apply topically 3 times daily.  15 g 3     Current Facility-Administered Medications   Medication Dose Route Frequency Provider Last Rate Last Dose    0.9 % sodium chloride infusion   Intravenous Continuous Keerthi Sharma MD        ceFAZolin (ANCEF) 2 g in dextrose 5 % 100 mL IVPB  2 g Intravenous On Call to [address] [place] [address] MD Srinivasan         Vital Signs (Current)   Vitals:    08/27/20 0810   BP: 129/72   Pulse: 59   Resp: 16   Temp: 97.9 °F (36.6 °C)   SpO2: 95%     Vital Signs Statistics (for past 48 hrs)     Temp  Av.8 °F (36.6 °C)  Min: 97.7 °F (36.5 °C)   Min taken time: 20 1450  Max: 97.9 °F (36.6 °C)   Max taken time: 20 0810  Pulse  Av.5  Min: 61   Min taken time: 20 0810  Max: 59   Max taken time: 20 1450  Resp  Av  Min: 16   Min taken time: 20 0810  Max: 16   Max taken time: 20 0810  BP  Min: 118/60   Min taken time: 20 1450  Max: 129/72   Max taken time: 20 0810  SpO2  Av %  Min: 95 %   Min taken time: 20 0810  Max: 97 %   Max taken time: 20 1450    BP Readings from Last 3 Encounters:   20 129/72   20 118/60   20 (!) 152/93     BMI  Body mass index is 16.52 kg/m². Estimated body mass index is 16.52 kg/m² as calculated from the following:    Height as of this encounter: 5' 9\" (1.753 m). Weight as of this encounter: 111 lb 14.1 oz (50.8 kg). CBC   Lab Results   Component Value Date    WBC 5.4 2020    RBC 4.50 2020    HGB 13.8 2020    HCT 41.9 2020    MCV 93.0 2020    RDW 14.6 2020     2020     CMP    Lab Results   Component Value Date     2020    K 4.9 2020    K 3.9 2019     2020    CO2 28 2020    BUN 17 2020    CREATININE 0.9 2020    GFRAA >60 2020    AGRATIO 1.8 2019    LABGLOM >60 2020    GLUCOSE 111 2020    PROT 6.7 2019    CALCIUM 10.1 2020    BILITOT 0.9 2019    ALKPHOS 70 2019    AST 21 2019    ALT 17 2019     BMP    Lab Results   Component Value Date     2020    K 4.9 2020    K 3.9 2019     2020    CO2 28 2020    BUN 17 2020    CREATININE 0.9 2020    CALCIUM 10.1 2020    GFRAA >60 2020    LABGLOM >60 2020    GLUCOSE 111 2020     POCGlucose  No results for input(s): GLUCOSE in the last 72 hours.    Coags Lab Results   Component Value Date    PROTIME 20.0 12/27/2019    INR 1.71 12/27/2019    APTT 33.5 47/45/7113     HCG (If Applicable) No results found for: [employer], HCG, HCGQUANT   ABGs   Lab Results   Component Value Date    PHART 7.379 10/30/2018    PO2ART 72.8 10/30/2018    XWD3OSR 24.7 10/30/2018    RSD5QBX 14.3 10/30/2018    BEART -9.0 10/30/2018    U7NWIZCL 93.8 10/30/2018      Type & Screen (If Applicable)  No results found for: LABABO, LABRH                         BMI: Wt Readings from Last 3 Encounters:       NPO Status: 8 hours                          Anesthesia Evaluation  Patient summary reviewed no history of anesthetic complications:   Airway: Mallampati: III  TM distance: >3 FB   Neck ROM: full   Dental:    (+) partials      Pulmonary:normal exam        (-) pneumonia                           Cardiovascular:  Exercise tolerance: good (>4 METS),   (+) hypertension:, dysrhythmias: atrial fibrillation, CHF (Ef 45):,       ECG reviewed  Rhythm: regular  Rate: normal  Echocardiogram reviewed         Beta Blocker:  Dose within 24 Hrs         Neuro/Psych:   Negative Neuro/Psych ROS              GI/Hepatic/Renal: Neg GI/Hepatic/Renal ROS            Endo/Other: Negative Endo/Other ROS                    Abdominal:           Vascular: negative vascular ROS. Anesthesia Plan      general     ASA 3       Induction: intravenous. MIPS: Postoperative opioids intended and Prophylactic antiemetics administered. Anesthetic plan and risks discussed with patient and child/children. Plan discussed with CRNA. This pre-anesthesia assessment may be used as a history and physical.    DOS STAFF ADDENDUM:    Pt seen and examined, chart reviewed (including anesthesia, drug and allergy history). No interval changes to history and physical examination. Anesthetic plan, risks, benefits, alternatives, and personnel involved discussed with patient. Questions and concerns addressed. Patient(family) verbalized an understanding and agrees to proceed.       Feliciano Wilson MD  August 27, 2020  8:21 AM

## 2020-08-27 NOTE — PROGRESS NOTES
Arrives to phase 2 awake, talkative with some discomfort, respirations easy, rt ant chest drsg with small amt bloody shadow drainage

## 2020-08-27 NOTE — ANESTHESIA POSTPROCEDURE EVALUATION
[facility] Department of Anesthesiology  Post-Anesthesia Note       Name:  Narendra Smiley                                  Age:  79 y.o. MRN:  [id number]     Last Vitals & Oxygen Saturation: BP (!) 141/77   Pulse 70   Temp 97.4 °F (36.3 °C) (Temporal)   Resp 16   Ht 5' 9\" (1.753 m)   Wt 111 lb 14.1 oz (50.8 kg)   SpO2 99%   BMI 16.52 kg/m²   Patient Vitals for the past 4 hrs:   BP Temp Temp src Pulse Resp SpO2   08/27/20 1213 (!) 141/77 -- -- 70 16 99 %   08/27/20 1200 137/73 -- -- 67 16 99 %   08/27/20 1140 137/87 97.4 °F (36.3 °C) Temporal 58 20 100 %   08/27/20 1117 (!) 132/94 98 °F (36.7 °C) Temporal 66 16 98 %   08/27/20 1106 -- -- -- 66 13 99 %   08/27/20 1102 131/71 -- -- 67 13 98 %   08/27/20 1057 132/71 -- -- 67 14 98 %   08/27/20 1050 132/71 -- -- 67 13 100 %   08/27/20 1048 132/69 -- -- 67 14 100 %   08/27/20 1042 133/73 -- -- 66 15 100 %   08/27/20 1038 134/73 98 °F (36.7 °C) Temporal 68 16 100 %       Level of consciousness:  Awake, alert    Respiratory: Respirations easy, no distress. Stable. Cardiovascular: Hemodynamically stable. Hydration: Adequate. PONV: Adequately managed. Post-op pain: Adequately controlled. Post-op assessment: Tolerated anesthetic well without complication. Complications:  None.     [place] MD  August 27, 2020   12:45 PM

## 2020-10-08 ENCOUNTER — HOSPITAL ENCOUNTER (OUTPATIENT)
Dept: NON INVASIVE DIAGNOSTICS | Age: 80
Discharge: HOME | End: 2020-10-08
Payer: MEDICARE

## 2020-10-08 LAB
LV EF: 50 %
LVEF MODALITY: NORMAL

## 2020-10-08 PROCEDURE — 93306 TTE W/DOPPLER COMPLETE: CPT

## 2020-10-14 ENCOUNTER — OFFICE VISIT (OUTPATIENT)
Dept: INTERNAL MEDICINE CLINIC | Age: 80
End: 2020-10-14
Payer: MEDICARE

## 2020-10-14 VITALS
TEMPERATURE: 98.1 F | BODY MASS INDEX: 17.13 KG/M2 | DIASTOLIC BLOOD PRESSURE: 74 MMHG | RESPIRATION RATE: 18 BRPM | OXYGEN SATURATION: 98 % | HEART RATE: 61 BPM | SYSTOLIC BLOOD PRESSURE: 126 MMHG | WEIGHT: 116 LBS

## 2020-10-14 PROBLEM — E43 SEVERE MALNUTRITION (HCC): Status: RESOLVED | Noted: 2018-11-01 | Resolved: 2020-10-14

## 2020-10-14 PROCEDURE — G8427 DOCREV CUR MEDS BY ELIG CLIN: HCPCS | Performed by: FAMILY MEDICINE

## 2020-10-14 PROCEDURE — G0008 ADMIN INFLUENZA VIRUS VAC: HCPCS | Performed by: FAMILY MEDICINE

## 2020-10-14 PROCEDURE — G8419 CALC BMI OUT NRM PARAM NOF/U: HCPCS | Performed by: FAMILY MEDICINE

## 2020-10-14 PROCEDURE — 90694 VACC AIIV4 NO PRSRV 0.5ML IM: CPT | Performed by: FAMILY MEDICINE

## 2020-10-14 PROCEDURE — 1123F ACP DISCUSS/DSCN MKR DOCD: CPT | Performed by: FAMILY MEDICINE

## 2020-10-14 PROCEDURE — 99214 OFFICE O/P EST MOD 30 MIN: CPT | Performed by: FAMILY MEDICINE

## 2020-10-14 PROCEDURE — G8484 FLU IMMUNIZE NO ADMIN: HCPCS | Performed by: FAMILY MEDICINE

## 2020-10-14 PROCEDURE — 4040F PNEUMOC VAC/ADMIN/RCVD: CPT | Performed by: FAMILY MEDICINE

## 2020-10-14 PROCEDURE — 1036F TOBACCO NON-USER: CPT | Performed by: FAMILY MEDICINE

## 2020-10-14 ASSESSMENT — ENCOUNTER SYMPTOMS
VOICE CHANGE: 0
SHORTNESS OF BREATH: 0
TROUBLE SWALLOWING: 0
ABDOMINAL PAIN: 0
BLOOD IN STOOL: 0
CONSTIPATION: 0
DIARRHEA: 0

## 2020-10-14 NOTE — PROGRESS NOTES
10/14/2020     Maira Linares (:  1940) is a 79 y.o. male, here for evaluation of the following medical concerns:    HPI  Patient is here for follow-up. Has hypertension controlled with Toprol and lisinopril. He has systolic heart failure and  has recent echocardiogram which now shows ejection fraction of 50%. He denies increased shortness of breath leg edema ordering or weight gain. He has A. fib s/p  catheter ablation 20 12 for doing fairly well. Denies palpitation is currently asymptomatic. He still takes Toprol , flecainide as well as Eliquis 5 mg twice daily. 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 Patient is doing fairly well but  has a concern about possible recurrence in the future. He goes to oncologist Dr. Bria Hernandez of [facility]   Constitutional: Negative for activity change. HENT: Negative for trouble swallowing and voice change. Eyes: Negative for visual disturbance. Respiratory: Negative for shortness of breath. Cardiovascular: Negative for chest pain and leg swelling. Gastrointestinal: Negative for abdominal pain, blood in stool, constipation and diarrhea. Genitourinary: Negative for difficulty urinating, dysuria, frequency, hematuria and scrotal swelling. Musculoskeletal: Negative for arthralgias and myalgias. Skin: Negative for rash. Neurological: Negative for dizziness. Psychiatric/Behavioral: Negative for behavioral problems. Prior to Visit Medications    Medication Sig Taking?  Authorizing Provider   furosemide (LASIX) 20 MG tablet Take 1 tablet by mouth daily Yes Irish Elmore MD   lisinopril (PRINIVIL;ZESTRIL) 10 MG tablet Take 1 tablet by mouth daily Yes Daniel Clemons MD   metoprolol succinate (TOPROL XL) 25 MG extended release tablet Take 0.5 tablets by mouth daily Yes Ashley Lomax MD   flecainide (TAMBOCOR) 100 MG tablet TAKE ONE TABLET BY MOUTH EVERY 12 HOURS Yes Jailyn Black Kenya Arzola MD   apixaban (ELIQUIS) 5 MG TABS tablet Take 1 tablet by mouth 2 times daily Yes Ravindra Waller MD   triamcinolone (KENALOG) 0.1 % cream Apply topically 2 times daily. Ravindra Waller MD   triamcinolone (ARISTOCORT) 0.5 % cream Apply topically every 4 hours as needed (eczema) Apply topically 3 times daily. Ravindra Waller MD        Social History     Tobacco Use    Smoking status: Former Smoker     Packs/day: 1.00     Types: Cigarettes     Start date: 1955     Last attempt to quit: 1993     Years since quittin.8    Smokeless tobacco: Never Used   Substance Use Topics    Alcohol use: Yes     Comment: socially        Vitals:    10/14/20 1400   BP: 126/74   Pulse: 61   Resp: 18   Temp: 98.1 °F (36.7 °C)   TempSrc: Temporal   SpO2: 98%   Weight: 116 lb (52.6 kg)     Estimated body mass index is 17.13 kg/m² as calculated from the following:    Height as of 20: 5' 9\" (1.753 m). Weight as of this encounter: 116 lb (52.6 kg). Physical Exam  Constitutional:       General: He is not in acute distress. Appearance: He is well-developed. HENT:      Head: Normocephalic. Eyes:      Conjunctiva/sclera: Conjunctivae normal.   Neck:      Musculoskeletal: Neck supple. Thyroid: No thyromegaly. Cardiovascular:      Rate and Rhythm: Normal rate and regular rhythm. Heart sounds: Normal heart sounds. No murmur. Pulmonary:      Effort: No respiratory distress. Breath sounds: Normal breath sounds. No wheezing or rales. Abdominal:      General: There is no distension. Palpations: Abdomen is soft. Musculoskeletal: Normal range of motion. Skin:     General: Skin is warm. Findings: No rash. Neurological:      Mental Status: He is alert and oriented to person, place, and time. Psychiatric:         Behavior: Behavior normal.         ASSESSMENT/PLAN:  1. Essential hypertension  Controlled. Continue lisinopril 10 mg daily and Toprol-XL 25 mg daily.     2.

## 2020-10-14 NOTE — PROGRESS NOTES
Vaccine Information Sheet, \"Influenza - Inactivated\"  given to Israel Milton, or parent/legal guardian of  Israel Milton and verbalized understanding. Patient responses:    Have you ever had a reaction to a flu vaccine? No  Do you have any current illness? No  Have you ever had Guillian Crandon Syndrome? No  Do you have a serious allergy to any of the follow: Neomycin, Polymyxin, Thimerosal, eggs or egg products? No    Flu vaccine given per order. Please see immunization tab. Risks and benefits explained. Current VIS given.

## 2020-10-23 RX ORDER — METOPROLOL TARTRATE 50 MG/1
TABLET, FILM COATED ORAL
Qty: 180 TABLET | Refills: 0 | OUTPATIENT
Start: 2020-10-23

## 2020-11-05 ENCOUNTER — HOSPITAL ENCOUNTER (OUTPATIENT)
Dept: CT IMAGING | Age: 80
Discharge: HOME | End: 2020-11-05
Payer: MEDICARE

## 2020-11-05 PROCEDURE — 6360000004 HC RX CONTRAST MEDICATION: Performed by: INTERNAL MEDICINE

## 2020-11-05 PROCEDURE — 71260 CT THORAX DX C+: CPT

## 2020-11-05 RX ADMIN — IOPAMIDOL 75 ML: 755 INJECTION, SOLUTION INTRAVENOUS at 07:19

## 2020-11-30 RX ORDER — APIXABAN 5 MG/1
TABLET, FILM COATED ORAL
Qty: 34 TABLET | Refills: 0 | OUTPATIENT
Start: 2020-11-30

## 2020-12-28 RX ORDER — FUROSEMIDE 20 MG/1
20 TABLET ORAL DAILY
Qty: 90 TABLET | Refills: 1 | Status: SHIPPED
Start: 2020-12-28 | End: 2021-05-18 | Stop reason: ALTCHOICE

## 2021-01-18 ENCOUNTER — OFFICE VISIT (OUTPATIENT)
Dept: PRIMARY CARE CLINIC | Age: 81
End: 2021-01-18
Payer: MEDICARE

## 2021-01-18 VITALS
DIASTOLIC BLOOD PRESSURE: 71 MMHG | TEMPERATURE: 97.3 F | SYSTOLIC BLOOD PRESSURE: 117 MMHG | RESPIRATION RATE: 16 BRPM | OXYGEN SATURATION: 98 % | HEART RATE: 51 BPM | BODY MASS INDEX: 16.95 KG/M2 | WEIGHT: 114.8 LBS

## 2021-01-18 DIAGNOSIS — I50.22 CHRONIC SYSTOLIC CHF (CONGESTIVE HEART FAILURE), NYHA CLASS 1 (HCC): ICD-10-CM

## 2021-01-18 DIAGNOSIS — Z13.29 THYROID DISORDER SCREENING: ICD-10-CM

## 2021-01-18 DIAGNOSIS — I48.0 PAF (PAROXYSMAL ATRIAL FIBRILLATION) (HCC): ICD-10-CM

## 2021-01-18 DIAGNOSIS — I10 ESSENTIAL HYPERTENSION: Primary | ICD-10-CM

## 2021-01-18 DIAGNOSIS — Z13.1 DIABETES MELLITUS SCREENING: ICD-10-CM

## 2021-01-18 DIAGNOSIS — D49.89 THYMOMA: ICD-10-CM

## 2021-01-18 DIAGNOSIS — Z13.220 LIPID SCREENING: ICD-10-CM

## 2021-01-18 PROCEDURE — G8427 DOCREV CUR MEDS BY ELIG CLIN: HCPCS | Performed by: FAMILY MEDICINE

## 2021-01-18 PROCEDURE — 4040F PNEUMOC VAC/ADMIN/RCVD: CPT | Performed by: FAMILY MEDICINE

## 2021-01-18 PROCEDURE — G8419 CALC BMI OUT NRM PARAM NOF/U: HCPCS | Performed by: FAMILY MEDICINE

## 2021-01-18 PROCEDURE — 1036F TOBACCO NON-USER: CPT | Performed by: FAMILY MEDICINE

## 2021-01-18 PROCEDURE — 99214 OFFICE O/P EST MOD 30 MIN: CPT | Performed by: FAMILY MEDICINE

## 2021-01-18 PROCEDURE — 1123F ACP DISCUSS/DSCN MKR DOCD: CPT | Performed by: FAMILY MEDICINE

## 2021-01-18 PROCEDURE — G8484 FLU IMMUNIZE NO ADMIN: HCPCS | Performed by: FAMILY MEDICINE

## 2021-01-18 ASSESSMENT — ENCOUNTER SYMPTOMS
CONSTIPATION: 0
BLOOD IN STOOL: 0
TROUBLE SWALLOWING: 0
SHORTNESS OF BREATH: 0
ABDOMINAL PAIN: 0
VOICE CHANGE: 0
DIARRHEA: 0

## 2021-01-18 ASSESSMENT — PATIENT HEALTH QUESTIONNAIRE - PHQ9
SUM OF ALL RESPONSES TO PHQ QUESTIONS 1-9: 0
SUM OF ALL RESPONSES TO PHQ9 QUESTIONS 1 & 2: 0
SUM OF ALL RESPONSES TO PHQ QUESTIONS 1-9: 0

## 2021-01-18 NOTE — PROGRESS NOTES
2021     Quadra Quadra [id number] (:  1940) is a 79 y.o. male, here for evaluation of the following medical concerns:    HPI  Patient presented to the office for his regular follow-up. He has hypertension controlled with lisinopril and Toprol-XL. 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 He denies obstructive symptoms. He has a chronic systolic congestive heart failure appears to be compensated. Has echocardiogram done 10/8/2020 which showed ejection fraction estimated at 50%. He goes to  cardiologist Dr. Cass Lopez. He has paroxysmal A. fib doing fairly well currently asymptomatic. He takes Toprol-XL flecainide as well as long-term anticoagulation with Eliquis. He is planning to go back to [facility] but concerned about the coronavirus pandemic and was wondering if he could get a COVID-19 vaccine. Review of Systems   Constitutional: Negative for activity change. HENT: Negative for trouble swallowing and voice change. Eyes: Negative for visual disturbance. Respiratory: Negative for shortness of breath. Cardiovascular: Negative for chest pain and leg swelling. Gastrointestinal: Negative for abdominal pain, blood in stool, constipation and diarrhea. Genitourinary: Negative for difficulty urinating, dysuria, frequency, hematuria and scrotal swelling. Musculoskeletal: Negative for arthralgias and myalgias. Skin: Negative for rash. Neurological: Negative for dizziness. Psychiatric/Behavioral: Negative for behavioral problems. Prior to Visit Medications    Medication Sig Taking?  Authorizing Provider   zoster recombinant adjuvanted vaccine (SHINGRIX) 50 MCG/0.5ML SUSR injection Inject 0.5 mLs into the muscle once for 1 dose Yes Brayan Darling MD   furosemide (LASIX) 20 MG tablet Take 1 tablet by mouth daily Yes AYLIN Painter - CNP apixaban (ELIQUIS) 5 MG TABS tablet Take 1 tablet by mouth 2 times daily Yes Fabiola Ulrich MD   lisinopril (PRINIVIL;ZESTRIL) 10 MG tablet Take 1 tablet by mouth daily Yes Kay Monteiro MD   metoprolol succinate (TOPROL XL) 25 MG extended release tablet Take 0.5 tablets by mouth daily Yes Neeta Hernandez MD   flecainide (TAMBOCOR) 100 MG tablet TAKE ONE TABLET BY MOUTH EVERY 12 HOURS Yes Russell Spring MD   triamcinolone (ARISTOCORT) 0.5 % cream Apply topically every 4 hours as needed (eczema) Apply topically 3 times daily. Yes Fabiola Ulrich MD   triamcinolone (KENALOG) 0.1 % cream Apply topically 2 times daily. Fabiola Ulrich MD        Social History     Tobacco Use    Smoking status: Former Smoker     Packs/day: 1.00     Types: Cigarettes     Start date: 1955     Quit date: 1993     Years since quittin.0    Smokeless tobacco: Never Used   Substance Use Topics    Alcohol use: Yes     Comment: socially        Vitals:    21 0901   BP: 117/71   Pulse: 51   Resp: 16   Temp: 97.3 °F (36.3 °C)   TempSrc: Temporal   SpO2: 98%   Weight: 114 lb 12.8 oz (52.1 kg)     Estimated body mass index is 16.95 kg/m² as calculated from the following:    Height as of 20: 5' 9\" (1.753 m). Weight as of this encounter: 114 lb 12.8 oz (52.1 kg). Physical Exam  Constitutional:       General: He is not in acute distress. Appearance: He is well-developed. HENT:      Head: Normocephalic. Eyes:      Conjunctiva/sclera: Conjunctivae normal.   Neck:      Musculoskeletal: Neck supple. Thyroid: No thyromegaly. Cardiovascular:      Rate and Rhythm: Normal rate and regular rhythm. Heart sounds: Normal heart sounds. No murmur. Pulmonary:      Effort: No respiratory distress. Breath sounds: Normal breath sounds. No wheezing or rales. Abdominal:      General: There is no distension. Palpations: Abdomen is soft. Musculoskeletal: Normal range of motion.    Skin: General: Skin is warm. Findings: No rash. Neurological:      Mental Status: He is alert and oriented to person, place, and time. Psychiatric:         Behavior: Behavior normal.         ASSESSMENT/PLAN:  1. Essential hypertension  Controlled. Continue lisinopril 10 mg daily and Toprol-XL 25 mg daily.  - CBC Auto Differential; Future    2. PAF (paroxysmal atrial fibrillation) ([facility])  Controlled ventricular rate. Patient asymptomatic. Continue Toprol-XL, flecainide and long-term anticoagulation with Eliquis 5 mg twice daily. Follow-up with cardiologist who is considering stopping flecainide    3. Chronic systolic CHF (congestive heart failure), NYHA class 1 (HCC)  Compensated. Echocardiogram 10/8/2020 showed ejection fraction estimated at 50%. Continue current medication. Follow-up with cardiologist Dr. Lali Wick. May take Lasix 20 mg as needed    4. Thymoma  S/p CT guided ablation 8/27/20. Patient is still have a lot of question and concern of possible recurrence and whether the tumor was completely gone. I redirected the patient to see his primary oncologist Dr. Simon Norton. Patient is also considering second opinion which is his option    5. Thyroid disorder screening  - T4, Free; Future  - TSH without Reflex; Future    6. Lipid screening  - Lipid, Fasting; Future    7. Diabetes mellitus screening  - Comprehensive Metabolic Panel, Fasting;  Future      RTC in 3 mos    An electronic signature was used to authenticate this note.    --Dora Ortiz MD on 1/18/2021 at 9:42 AM

## 2021-01-28 ENCOUNTER — OFFICE VISIT (OUTPATIENT)
Dept: CARDIOLOGY CLINIC | Age: 81
End: 2021-01-28
Payer: MEDICARE

## 2021-01-28 VITALS
OXYGEN SATURATION: 98 % | DIASTOLIC BLOOD PRESSURE: 60 MMHG | TEMPERATURE: 97.3 F | HEIGHT: 69 IN | WEIGHT: 119 LBS | SYSTOLIC BLOOD PRESSURE: 134 MMHG | HEART RATE: 54 BPM | BODY MASS INDEX: 17.63 KG/M2

## 2021-01-28 DIAGNOSIS — I10 ESSENTIAL HYPERTENSION: ICD-10-CM

## 2021-01-28 DIAGNOSIS — I50.22 CHRONIC SYSTOLIC HEART FAILURE (HCC): ICD-10-CM

## 2021-01-28 DIAGNOSIS — I42.9 CARDIOMYOPATHY, UNSPECIFIED TYPE (HCC): ICD-10-CM

## 2021-01-28 DIAGNOSIS — I48.0 PAF (PAROXYSMAL ATRIAL FIBRILLATION) (HCC): Primary | ICD-10-CM

## 2021-01-28 PROCEDURE — G8484 FLU IMMUNIZE NO ADMIN: HCPCS | Performed by: INTERNAL MEDICINE

## 2021-01-28 PROCEDURE — 1036F TOBACCO NON-USER: CPT | Performed by: INTERNAL MEDICINE

## 2021-01-28 PROCEDURE — 1123F ACP DISCUSS/DSCN MKR DOCD: CPT | Performed by: INTERNAL MEDICINE

## 2021-01-28 PROCEDURE — 93000 ELECTROCARDIOGRAM COMPLETE: CPT | Performed by: INTERNAL MEDICINE

## 2021-01-28 PROCEDURE — 99214 OFFICE O/P EST MOD 30 MIN: CPT | Performed by: INTERNAL MEDICINE

## 2021-01-28 PROCEDURE — G8427 DOCREV CUR MEDS BY ELIG CLIN: HCPCS | Performed by: INTERNAL MEDICINE

## 2021-01-28 PROCEDURE — 4040F PNEUMOC VAC/ADMIN/RCVD: CPT | Performed by: INTERNAL MEDICINE

## 2021-01-28 PROCEDURE — G8419 CALC BMI OUT NRM PARAM NOF/U: HCPCS | Performed by: INTERNAL MEDICINE

## 2021-01-28 RX ORDER — FLECAINIDE ACETATE 50 MG/1
50 TABLET ORAL 2 TIMES DAILY
Qty: 60 TABLET | Refills: 6 | Status: SHIPPED | OUTPATIENT
Start: 2021-01-28 | End: 2021-05-04 | Stop reason: ALTCHOICE

## 2021-02-02 ENCOUNTER — HOSPITAL ENCOUNTER (OUTPATIENT)
Dept: CT IMAGING | Age: 81
Discharge: HOME | End: 2021-02-02
Payer: MEDICARE

## 2021-02-02 DIAGNOSIS — C37 THYMUS CANCER (HCC): ICD-10-CM

## 2021-02-02 PROCEDURE — 6360000004 HC RX CONTRAST MEDICATION: Performed by: INTERNAL MEDICINE

## 2021-02-02 PROCEDURE — 71260 CT THORAX DX C+: CPT

## 2021-02-02 PROCEDURE — 82565 ASSAY OF CREATININE: CPT

## 2021-02-02 RX ADMIN — IOPAMIDOL 75 ML: 755 INJECTION, SOLUTION INTRAVENOUS at 08:41

## 2021-04-22 ENCOUNTER — HOSPITAL ENCOUNTER (OUTPATIENT)
Dept: CT IMAGING | Age: 81
Discharge: HOME | End: 2021-04-22
Payer: MEDICARE

## 2021-04-22 DIAGNOSIS — C37 THYMUS CANCER (HCC): ICD-10-CM

## 2021-04-22 PROCEDURE — 82565 ASSAY OF CREATININE: CPT

## 2021-04-22 PROCEDURE — 71260 CT THORAX DX C+: CPT

## 2021-04-22 PROCEDURE — 6360000004 HC RX CONTRAST MEDICATION: Performed by: INTERNAL MEDICINE

## 2021-04-22 RX ADMIN — IOPAMIDOL 75 ML: 755 INJECTION, SOLUTION INTRAVENOUS at 09:55

## 2021-04-29 PROBLEM — R00.0 CHRONIC TACHYCARDIA: Status: RESOLVED | Noted: 2018-05-08 | Resolved: 2021-04-29

## 2021-04-29 PROBLEM — I48.0 PAF (PAROXYSMAL ATRIAL FIBRILLATION) (HCC): Status: RESOLVED | Noted: 2018-12-05 | Resolved: 2021-04-29

## 2021-04-29 NOTE — PROGRESS NOTES
[facility]   Electrophysiology      Date: 5/4/2021    Primary Cardiologist: Minerva Dumont MD  PCP: Misty Reina MD     Chief Complaint:   Chief Complaint   Patient presents with    Follow-up     afib - no cardiac complaints     History of Present Illness:    I saw Dawit Sandhu in the office for electrophysiology follow up today. He is an 79 y.o. male with a past medical history of hypertension, systolic heart failure and persistent atrial fibrillation. He was first diagnosed with atrial fibrillation in October 2018 after presented to ED with worsening shortness of breath and weakness. EKG confirmed afib with RVR. Echocardiogram was completed and showed an LVEF on 30-35%. He was initially treated with amiodarone but it was discontinued due to abnormal LFTs. He underwent successful cardioversion to SR on 1/10/2020. On 5/8/2020 he underwent ellectrophysiology study with radiofrequency ablation of atrial fibrillation and pulmonary vein isolation 5/8/2020 with Dr. Adrián Moran. His flecainide was decreased by Dr. Neno Armijo in January 2021. Overall he has been feeling very well. He is very active, he runs 2-3 miles most days without any issues. He feels like he can do more since his ablation. Denies any dyspnea or chest pain. No palpitations. No syncope. No bleeding issues. Allergies: Allergies   Allergen Reactions    Amiodarone      Home Medications:  Prior to Visit Medications    Medication Sig Taking?  Authorizing Provider   furosemide (LASIX) 20 MG tablet Take 1 tablet by mouth daily Yes AYLIN Plascencia - CNP   apixaban (ELIQUIS) 5 MG TABS tablet Take 1 tablet by mouth 2 times daily Yes Melissa Zelaya MD   lisinopril (PRINIVIL;ZESTRIL) 10 MG tablet Take 1 tablet by mouth daily Yes Viraj Vera MD   metoprolol succinate (TOPROL XL) 25 MG extended release tablet Take 0.5 tablets by mouth daily Yes Margaret Ash MD   triamcinolone (ARISTOCORT) 0.5 % cream Apply topically every 4 hours as needed (eczema) Apply topically 3 times daily. 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 His smoking use included cigarettes. He started smoking about 66 years ago. He smoked 1.00 pack per day. He has never used smokeless tobacco. He reports current alcohol use. He reports previous drug use. Family History:      Problem Relation Age of Onset    Heart Disease Father     Cancer Sister         Colon    Cancer Brother         Colon    Heart Disease Brother     Heart Disease Brother        Review of Systems   Constitutional: Negative for chills, fatigue, fever and unexpected weight change. HENT: Negative for congestion, hearing loss, sinus pressure, sore throat and trouble swallowing. Respiratory: Negative for cough, shortness of breath and wheezing. Cardiovascular: Negative for chest pain, palpitations and leg swelling. Gastrointestinal: Negative for abdominal pain, blood in stool, constipation, diarrhea, nausea and vomiting. Genitourinary: Negative for hematuria. Musculoskeletal: Negative for arthralgias, back pain, gait problem and myalgias. Skin: Negative for color change, rash and wound.    Neurological: Negative for dizziness, seizures, syncope, speech difficulty, weakness and 0.8 mg/dL). No results found for: BNP    Thyroid:   Lab Results   Component Value Date    TSH 3.98 2019     Lipid Panel:   Lab Results   Component Value Date    CHOL 193 2018    HDL 78 2019    TRIG 58 2018     LFTs:  Lab Results   Component Value Date    ALT 17 2019    AST 21 2019    GGT 83 (H) 10/31/2018    ALKPHOS 70 2019    BILITOT 0.9 2019     Coags:   Lab Results   Component Value Date    PROTIME 12.3 2020    INR 1.06 2020    APTT 33.5 2019       EC2021   SB at 55 BPM. IVCD. Baseline artifact. Echo: 10/8/20   Summary   Ejection fraction is visually estimated to be 50%. Indeterminate diastolic function. RV is dilated with mildly depressed function   RV volume overload   moderate TR    Stress test: 18   Summary    Treadmill MPI Results        1. Technically a satisfactory study.    2. Positive treadmill exercise stress portion of the study.    3. No evidence of Ischemia by Myocardial Perfusion Imaging.    4. Gated Study shows normal LV size and Systolic function; EF is 99%.    5. The patient exercised for 6 min. on the Ramírez protocol to achieve a HR of    133, which is 94% of PMHR. The study was stopped due to elevated BP. There    was no chest pain . 1mm ST depression in lateral leads.    6. Diastolic BP crept upto 845 with peak exercise     Procedures:  1. Electrophysiology study with radiofrequency ablation of atrial fibrillation and pulmonary vein isolation 2020.  2. Successful cardioversion using 200 Joules of synchronised current 1/10/2020.     Assessment & Plan:    Persistent atrial fibrillation   - noted initially in 10/18 s/p cardioversion  and ablation    - can stop flecainide as he has been maintaining SR   - 30 day event monitor   - continue Toprol given history of cardiomyopathy/SHF   - CHADS2-VASc 4 (age, HTN, HF) on Eliquis 5mg BID    Chronic systolic heart failure   - EF 50%, NYHA class I   - on Toprol 12.5mg QD, lisinopril 10mg QD, Lasix   - appears well compensated   - follows with Dr. Arsenio Sanchez cardiomyopathy   - likely tachycardia-mediated as EF improved with SR and stress test at that time with no ischemia   - continue current medications    Essential HTN   - managed by Dr. Sarah Edmond    Thank you for allowing to us to participate in the care of Brayan Nails [id number]. Return in about 2 months (around 7/4/2021) for an appointment with Dr. Shravan Ortez.      AYLIN Brewer  [facility], [address] [place] [address]  Phone: [phone]  Fax: [phone]    Electronically signed by AYLIN Wright - CNP on 5/4/2021 at 1:21 PM

## 2021-04-30 RX ORDER — FLECAINIDE ACETATE 100 MG/1
TABLET ORAL
Qty: 60 TABLET | Refills: 1 | Status: SHIPPED | OUTPATIENT
Start: 2021-04-30 | End: 2021-05-04

## 2021-05-04 ENCOUNTER — OFFICE VISIT (OUTPATIENT)
Dept: CARDIOLOGY CLINIC | Age: 81
End: 2021-05-04
Payer: MEDICARE

## 2021-05-04 VITALS
HEIGHT: 69 IN | DIASTOLIC BLOOD PRESSURE: 60 MMHG | WEIGHT: 115 LBS | BODY MASS INDEX: 17.03 KG/M2 | OXYGEN SATURATION: 99 % | HEART RATE: 65 BPM | SYSTOLIC BLOOD PRESSURE: 100 MMHG

## 2021-05-04 DIAGNOSIS — I42.8 OTHER CARDIOMYOPATHY (HCC): ICD-10-CM

## 2021-05-04 DIAGNOSIS — I10 ESSENTIAL HYPERTENSION: ICD-10-CM

## 2021-05-04 DIAGNOSIS — I48.11 LONGSTANDING PERSISTENT ATRIAL FIBRILLATION (HCC): Primary | ICD-10-CM

## 2021-05-04 DIAGNOSIS — Z86.79 S/P ABLATION OF ATRIAL FIBRILLATION: ICD-10-CM

## 2021-05-04 DIAGNOSIS — Z98.890 S/P ABLATION OF ATRIAL FIBRILLATION: ICD-10-CM

## 2021-05-04 DIAGNOSIS — I50.22 CHRONIC SYSTOLIC CHF (CONGESTIVE HEART FAILURE), NYHA CLASS 1 (HCC): ICD-10-CM

## 2021-05-04 PROCEDURE — 1036F TOBACCO NON-USER: CPT | Performed by: NURSE PRACTITIONER

## 2021-05-04 PROCEDURE — 93000 ELECTROCARDIOGRAM COMPLETE: CPT | Performed by: NURSE PRACTITIONER

## 2021-05-04 PROCEDURE — 1123F ACP DISCUSS/DSCN MKR DOCD: CPT | Performed by: NURSE PRACTITIONER

## 2021-05-04 PROCEDURE — G8427 DOCREV CUR MEDS BY ELIG CLIN: HCPCS | Performed by: NURSE PRACTITIONER

## 2021-05-04 PROCEDURE — 4040F PNEUMOC VAC/ADMIN/RCVD: CPT | Performed by: NURSE PRACTITIONER

## 2021-05-04 PROCEDURE — G8419 CALC BMI OUT NRM PARAM NOF/U: HCPCS | Performed by: NURSE PRACTITIONER

## 2021-05-04 PROCEDURE — 99213 OFFICE O/P EST LOW 20 MIN: CPT | Performed by: NURSE PRACTITIONER

## 2021-05-04 ASSESSMENT — ENCOUNTER SYMPTOMS
WHEEZING: 0
ABDOMINAL PAIN: 0
SHORTNESS OF BREATH: 0
SORE THROAT: 0
TROUBLE SWALLOWING: 0
BLOOD IN STOOL: 0
SINUS PRESSURE: 0
BACK PAIN: 0
VOMITING: 0
COLOR CHANGE: 0
NAUSEA: 0
DIARRHEA: 0
CONSTIPATION: 0
COUGH: 0

## 2021-05-05 PROCEDURE — 93228 REMOTE 30 DAY ECG REV/REPORT: CPT | Performed by: INTERNAL MEDICINE

## 2021-05-18 ENCOUNTER — OFFICE VISIT (OUTPATIENT)
Dept: PRIMARY CARE CLINIC | Age: 81
End: 2021-05-18
Payer: MEDICARE

## 2021-05-18 VITALS
BODY MASS INDEX: 17.1 KG/M2 | WEIGHT: 112.8 LBS | RESPIRATION RATE: 18 BRPM | OXYGEN SATURATION: 98 % | HEIGHT: 68 IN | SYSTOLIC BLOOD PRESSURE: 100 MMHG | DIASTOLIC BLOOD PRESSURE: 63 MMHG | HEART RATE: 65 BPM

## 2021-05-18 DIAGNOSIS — Z00.00 MEDICARE ANNUAL WELLNESS VISIT, SUBSEQUENT: Primary | ICD-10-CM

## 2021-05-18 DIAGNOSIS — Z00.00 ROUTINE GENERAL MEDICAL EXAMINATION AT A HEALTH CARE FACILITY: ICD-10-CM

## 2021-05-18 PROCEDURE — 1123F ACP DISCUSS/DSCN MKR DOCD: CPT | Performed by: FAMILY MEDICINE

## 2021-05-18 PROCEDURE — G0439 PPPS, SUBSEQ VISIT: HCPCS | Performed by: FAMILY MEDICINE

## 2021-05-18 PROCEDURE — 4040F PNEUMOC VAC/ADMIN/RCVD: CPT | Performed by: FAMILY MEDICINE

## 2021-05-18 SDOH — ECONOMIC STABILITY: FOOD INSECURITY: WITHIN THE PAST 12 MONTHS, THE FOOD YOU BOUGHT JUST DIDN'T LAST AND YOU DIDN'T HAVE MONEY TO GET MORE.: NEVER TRUE

## 2021-05-18 SDOH — ECONOMIC STABILITY: FOOD INSECURITY: WITHIN THE PAST 12 MONTHS, YOU WORRIED THAT YOUR FOOD WOULD RUN OUT BEFORE YOU GOT MONEY TO BUY MORE.: NEVER TRUE

## 2021-05-18 ASSESSMENT — LIFESTYLE VARIABLES
HOW OFTEN DURING THE LAST YEAR HAVE YOU FOUND THAT YOU WERE NOT ABLE TO STOP DRINKING ONCE YOU HAD STARTED: 0
AUDIT-C TOTAL SCORE: 4
HOW OFTEN DO YOU HAVE SIX OR MORE DRINKS ON ONE OCCASION: 0
HOW OFTEN DURING THE LAST YEAR HAVE YOU FAILED TO DO WHAT WAS NORMALLY EXPECTED FROM YOU BECAUSE OF DRINKING: 0
HOW MANY STANDARD DRINKS CONTAINING ALCOHOL DO YOU HAVE ON A TYPICAL DAY: 0
HOW OFTEN DURING THE LAST YEAR HAVE YOU BEEN UNABLE TO REMEMBER WHAT HAPPENED THE NIGHT BEFORE BECAUSE YOU HAD BEEN DRINKING: 0

## 2021-05-18 ASSESSMENT — PATIENT HEALTH QUESTIONNAIRE - PHQ9
SUM OF ALL RESPONSES TO PHQ QUESTIONS 1-9: 2
2. FEELING DOWN, DEPRESSED OR HOPELESS: 1
SUM OF ALL RESPONSES TO PHQ QUESTIONS 1-9: 2

## 2021-05-18 NOTE — PROGRESS NOTES
Medicare Annual Wellness Visit  Name: Marvin Vega Date: 2021   MRN: <[id number]> Sex: Male   Age: 79 y.o. Ethnicity: Non-/Non    : 1940 Race: White      Bia Carias is here for Medicare AWV    Screenings for behavioral, psychosocial and functional/safety risks, and cognitive dysfunction are all negative except as indicated below. These results, as well as other patient data from the [address] [facility] [address] form, are documented in Flowsheets linked to this Encounter. Allergies   Allergen Reactions    Amiodarone          Prior to Visit Medications    Medication Sig Taking? Authorizing Provider   zoster recombinant adjuvanted vaccine (SHINGRIX) 50 MCG/0.5ML SUSR injection Inject 0.5 mLs into the muscle once for 1 dose Yes Bennett Rubalcava MD   apixaban (ELIQUIS) 5 MG TABS tablet Take 1 tablet by mouth 2 times daily Yes Bennett Rubalcava MD   lisinopril (PRINIVIL;ZESTRIL) 10 MG tablet Take 1 tablet by mouth daily Yes Jessica Sánchez MD   metoprolol succinate (TOPROL XL) 25 MG extended release tablet Take 0.5 tablets by mouth daily Yes Erma Mccurdy MD   triamcinolone (ARISTOCORT) 0.5 % cream Apply topically every 4 hours as needed (eczema) Apply topically 3 times daily.   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      S/P TURP    THYMECTOMY           Family History   Problem Relation Age of Onset    Heart Disease Father  Cancer Sister         Colon    Cancer Brother         Colon    Heart Disease Brother     Heart Disease Brother        Isidro (Including outside providers/suppliers regularly involved in providing care):   Patient Care Team:  John Chatman MD as PCP - General (Internal Medicine)  Preet Pérez MD as PCP - Hematology/Oncology (Hematology and Oncology)  John Chatman MD as PCP - [facility] Empaneled Provider  London Renee MD as Consulting Physician (Gastroenterology)  Tuyet Hanson MD as Referring Physician (Pulmonary Disease)  Afsaneh Borja MD as Consulting Physician (Cardiology)    Wt Readings from Last 3 Encounters:   05/18/21 112 lb 12.8 oz (51.2 kg)   05/04/21 115 lb (52.2 kg)   01/28/21 119 lb (54 kg)     Vitals:    05/18/21 0931   BP: 100/63   Pulse: 65   Resp: 18   SpO2: 98%   Weight: 112 lb 12.8 oz (51.2 kg)   Height: 5' 7.5\" (1.715 m)     Body mass index is 17.41 kg/m². Based upon direct observation of the patient, evaluation of cognition reveals recent and remote memory intact.     General Appearance: alert and oriented to person, place and time, well developed and well- nourished, in no acute distress  Skin: warm and dry, no rash or erythema  Head: normocephalic and atraumatic  Eyes: pupils equal, round, and reactive to light, extraocular eye movements intact, conjunctivae normal  ENT: tympanic membrane, external ear and ear canal normal bilaterally, nose without deformity, nasal mucosa and turbinates normal without polyps  Neck: supple and non-tender without mass, no thyromegaly or thyroid nodules, no cervical lymphadenopathy  Pulmonary/Chest: clear to auscultation bilaterally- no wheezes, rales or rhonchi, normal air movement, no respiratory distress  Cardiovascular: normal rate, regular rhythm, normal S1 and S2, no murmurs, rubs, clicks, or gallops, distal pulses intact, no carotid bruits  Abdomen: soft, non-tender, non-distended, normal bowel sounds, no masses or organomegaly Extremities: no cyanosis, clubbing or edema  Musculoskeletal: normal range of motion, no joint swelling, deformity or tenderness  Neurologic: reflexes normal and symmetric, no cranial nerve deficit, gait, coordination and speech normal    Patient's complete Health Risk Assessment and screening values have been reviewed and are found in Flowsheets. The following problems were reviewed today and where indicated follow up appointments were made and/or referrals ordered. Positive Risk Factor Screenings with Interventions:            General Health and ACP:  General  In general, how would you say your health is?: Good  In the past 7 days, have you experienced any of the following?  New or Increased Pain, New or Increased Fatigue, Loneliness, Social Isolation, Stress or Anger?: (!) Anger, Stress  Do you get the social and emotional support that you need?: Yes  Do you have a Living Will?: (!) No  Advance Directives     Power of  Living Will ACP-Advance Directive ACP-Power of     Not on File Not on File Not on File Not on File      General Health Risk Interventions:  · Stress: patient declines any further evaluation/treatment for this issue  · No Living Will: Advance Care Planning addressed with patient today    Health Habits/Nutrition:  Health Habits/Nutrition  Do you exercise for at least 20 minutes 2-3 times per week?: Yes  Have you lost any weight without trying in the past 3 months?: No  Do you eat only one meal per day?: No  Have you seen the dentist within the past year?: (!) No  Body mass index: (!) 17.4  Health Habits/Nutrition Interventions:  · Dental exam overdue:  patient encouraged to make appointment with his/her dentist    Hearing/Vision:  No exam data present  Hearing/Vision  Do you or your family notice any trouble with your hearing that hasn't been managed with hearing aids?: No  Do you have difficulty driving, watching TV, or doing any of your daily activities because of your eyesight?: (!) Yes  Have you had an eye exam within the past year?: (!) No  Hearing/Vision Interventions:  · Vision concerns:  patient encouraged to make appointment with his/her eye specialist    Safety:  Safety  Do you have working smoke detectors?: Yes  Have all throw rugs been removed or fastened?: Yes  Do you have non-slip mats or surfaces in all bathtubs/showers?: (!) No  Do all of your stairways have a railing or banister?: Yes  Are your doorways, halls and stairs free of clutter?: Yes  Do you always fasten your seatbelt when you are in a car?: Yes  Safety Interventions:  · Home safety tips provided     Personalized Preventive Plan   Current Health Maintenance Status  Immunization History   Administered Date(s) Administered    Influenza Virus Vaccine 02/09/2015, 01/18/2016, 01/13/2017    Influenza, High Dose (Fluzone 65 yrs and older) 12/02/2013    Influenza, Quadv, IM, PF (6 mo and older Fluzone, Flulaval, Fluarix, and 3 yrs and older Afluria) 11/13/2017    Influenza, Yolie Aas, Recombinant, IM PF (Flublok 18 yrs and older) 10/01/2018    Influenza, Quadv, adjuvanted, 65 yrs +, IM, PF (Fluad) 10/14/2020    Influenza, Triv, inactivated, subunit, adjuvanted, IM (Fluad 65 yrs and older) 09/27/2019    Pneumococcal Conjugate 13-valent ([id number]) 03/27/2017    Pneumococcal Conjugate 7-valent (Prevnar7) 01/09/2004    Pneumococcal Polysaccharide ([id number]) 12/02/2013    Tdap (Boostrix, Adacel) 03/30/2018    Tetanus 02/03/2009    Zoster Live (Zostavax) 02/10/2015        Health Maintenance   Topic Date Due    Shingles Vaccine (2 of 3) 04/07/2015    Annual Wellness Visit (AWV)  Never done    Potassium monitoring  08/27/2021    Creatinine monitoring  08/27/2021    Colon cancer screen colonoscopy  01/15/2023    DTaP/Tdap/Td vaccine (2 - Td) 03/30/2028    Flu vaccine  Completed    Pneumococcal 65+ years Vaccine  Completed    COVID-19 Vaccine  Completed    Hepatitis A vaccine  Aged Out    Hepatitis B vaccine Aged Out    Hib vaccine  Aged Out    Meningococcal (ACWY) vaccine  Aged Out     Recommendations for [employer] Due: see orders and patient instructions/AVS.  . Recommended screening schedule for the next 5-10 years is provided to the patient in written form: see Patient Instructions/AVS.    Marlen Chisholm was seen today for medicare awv. Diagnoses and all orders for this visit:    Medicare annual wellness visit, subsequent    Routine general medical examination at a health care facility    Other orders  -     zoster recombinant adjuvanted vaccine [facility]) 50 MCG/0.5ML SUSR injection;  Inject 0.5 mLs into the muscle once for 1 dose

## 2021-06-04 ENCOUNTER — HOSPITAL ENCOUNTER (OUTPATIENT)
Dept: ULTRASOUND IMAGING | Age: 81
Discharge: HOME | End: 2021-06-04
Payer: MEDICARE

## 2021-06-04 DIAGNOSIS — C37 MALIGNANT NEOPLASM OF THYMUS (HCC): ICD-10-CM

## 2021-06-04 DIAGNOSIS — E04.2 NON-TOXIC MULTINODULAR GOITER: ICD-10-CM

## 2021-06-04 PROCEDURE — 76536 US EXAM OF HEAD AND NECK: CPT

## 2021-06-11 DIAGNOSIS — I48.11 LONGSTANDING PERSISTENT ATRIAL FIBRILLATION (HCC): ICD-10-CM

## 2021-06-18 ENCOUNTER — HOSPITAL ENCOUNTER (OUTPATIENT)
Dept: ULTRASOUND IMAGING | Age: 81
Discharge: HOME | End: 2021-06-18
Payer: MEDICARE

## 2021-06-18 DIAGNOSIS — E04.2 NONTOXIC MULTINODULAR GOITER: ICD-10-CM

## 2021-06-18 PROCEDURE — 88173 CYTOPATH EVAL FNA REPORT: CPT

## 2021-06-18 PROCEDURE — 2709999900 US BIOPSY THYROID

## 2021-06-18 PROCEDURE — 88305 TISSUE EXAM BY PATHOLOGIST: CPT

## 2021-06-30 NOTE — PROGRESS NOTES
Cardiac Electrophysiology Consultation   Date: 6/30/2021  Reason for Consultation: Atrial fibrillation  Consult Requesting Physician: Yajaira Millan M.D. Primary Care Physician: Herman Cain Complaint: No chief complaint on file. HPI: Lorena Strong is a 79 y.o. patient with a history of atrial fibrillation, hypertension, and systolic heart failure He was first diagnosed with atrial fibrillation in October of 2018 after presenting to the ED with c/p worsening shortness of breath and weakness. . In the ER he was noted to be in Atrial fibrillation with RVR and also evidence of pneumonia. He had an echocardiogram completed that showed an LVEF of 30-35%. He was treated with amiodarone but was discontinued due to abnormal LFTs. He was discharged home on a BB and Eliquis. Interval History: Today, ***    Past Medical History:   Diagnosis Date    Atrial fibrillation ([address]     Cardiomyopathy ([facility] [address]     Chronic tachycardia 5/8/2018    Erectile dysfunction     Hypertension     Osteoarthritis     Pneumonia     Thymoma, benign     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    also sessile serrated adenoma    Wears glasses         Past Surgical History:   Procedure Laterality Date    CARDIOVERSION  01/10/2020    COLONOSCOPY N/A 1/15/2020    COLONOSCOPY performed by Benjie Schuster MD at [facility]      S/P [facility] THYROID BIOPSY  6/18/2021     THYROID BIOPSY 6/18/2021 [facility] ULTRASOUND       Allergies: Allergies   Allergen Reactions    Amiodarone        Medication:   Prior to Admission medications    Medication Sig Start Date End Date Taking?  Authorizing Provider   apixaban (ELIQUIS) 5 MG TABS tablet Take 1 tablet by mouth 2 times daily 5/27/21   Beatrice Mansfield MD   lisinopril (PRINIVIL;ZESTRIL) 10 MG tablet Take 1 tablet by mouth daily 8/24/20   Lynsey Andrews MD   metoprolol succinate (TOPROL XL) 25 MG no vitals filed for this visit. · Constitutional: Oriented. No distress. · Head: Normocephalic and atraumatic. · Mouth/Throat: Oropharynx is clear and moist.   · Eyes: Conjunctivae normal. EOM are normal.   · Neck: Normal range of motion. Neck supple. No rigidity. No JVD present. · Cardiovascular: Normal rate, regular rhythm, S1&S2 and intact distal pulses. · Pulmonary/Chest: Bilateral respiratory sounds. No wheezes. No rhonchi. · Abdominal: Soft. Bowel sounds present. No distension, No tenderness. · Musculoskeletal: No tenderness. No edema    · Lymphadenopathy: Has no cervical adenopathy. · Neurological: Alert and oriented. Cranial nerve appears intact, No Gross deficit   · Skin: Skin is warm and dry. No rash noted. · Psychiatric: Has a normal mood, affect and behavior     Labs:  Reviewed. ECG: reviewed, ***Sinus  rhythm with v-rate of *** bpm with QRS duration *** ms. No pathologic Q waves, ventricular pre-excitation, or QT prolongation. Studies:   Studies:   1. Event monitor: 2/13/19  NSR. Few episodes if PSVT.     2. Echo: 5/15/19  Left ventricular cavity size is normal.  Normal left ventricular wall thickness. Ejection fraction is visually estimated to be 40-45%. There is severe hypokinesis of the septal walls. Abnormal (paradoxical) septal motion is present. Indeterminate diastolic function  Mitral valve leaflets appear thickened with mitral valve prolapse. Moderate mitral regurgitation is present. The left atrium is mildly dilated. Tricuspid valve appear redundant. Mild to moderate tricuspid regurgitation. The right atrium is moderately dilated. IVC size is normal (<2.1 cm) but collapses < 50% with respiration consistent  with elevated RA pressure (8 mmHg). Estimated pulmonary artery systolic pressure is mildly elevated at 34 mmHg  assuming a right atrial pressure of 8 mmHg. Ejection fraction is visually estimated to be 40-45%.   LA volume/Index: 69 ml /40 5/15/2019    Thank you for allowing me to participate in the care of [phone]. All questions and concerns were addressed to the patient/family. Alternatives to my treatment were discussed.      This note was scribed in the presence of Dr. Saurabh Grady MD by Ish Ruiz, LELA.      ***    Saurabh Grady MD, [address], [place] [address] Electrophysiology  [address], [address] [place] [address]  Jose Orosco [facility] [address]  [phone]

## 2021-07-07 ENCOUNTER — OFFICE VISIT (OUTPATIENT)
Dept: CARDIOLOGY CLINIC | Age: 81
End: 2021-07-07
Payer: MEDICARE

## 2021-07-07 VITALS
DIASTOLIC BLOOD PRESSURE: 64 MMHG | SYSTOLIC BLOOD PRESSURE: 100 MMHG | OXYGEN SATURATION: 98 % | WEIGHT: 113 LBS | HEART RATE: 69 BPM | HEIGHT: 68 IN | BODY MASS INDEX: 17.13 KG/M2

## 2021-07-07 DIAGNOSIS — I48.0 PAROXYSMAL ATRIAL FIBRILLATION (HCC): ICD-10-CM

## 2021-07-07 DIAGNOSIS — I48.0 PAF (PAROXYSMAL ATRIAL FIBRILLATION) (HCC): Primary | ICD-10-CM

## 2021-07-07 DIAGNOSIS — I42.8 NICM (NONISCHEMIC CARDIOMYOPATHY) (HCC): ICD-10-CM

## 2021-07-07 DIAGNOSIS — I50.22 CHRONIC SYSTOLIC HEART FAILURE (HCC): ICD-10-CM

## 2021-07-07 PROCEDURE — 99215 OFFICE O/P EST HI 40 MIN: CPT | Performed by: INTERNAL MEDICINE

## 2021-07-07 PROCEDURE — G8427 DOCREV CUR MEDS BY ELIG CLIN: HCPCS | Performed by: INTERNAL MEDICINE

## 2021-07-07 PROCEDURE — 93000 ELECTROCARDIOGRAM COMPLETE: CPT | Performed by: INTERNAL MEDICINE

## 2021-07-07 PROCEDURE — 1123F ACP DISCUSS/DSCN MKR DOCD: CPT | Performed by: INTERNAL MEDICINE

## 2021-07-07 PROCEDURE — G8419 CALC BMI OUT NRM PARAM NOF/U: HCPCS | Performed by: INTERNAL MEDICINE

## 2021-07-07 PROCEDURE — 1036F TOBACCO NON-USER: CPT | Performed by: INTERNAL MEDICINE

## 2021-07-07 PROCEDURE — 4040F PNEUMOC VAC/ADMIN/RCVD: CPT | Performed by: INTERNAL MEDICINE

## 2021-07-07 RX ORDER — FUROSEMIDE 20 MG/1
TABLET ORAL
COMMUNITY
Start: 2021-06-22 | End: 2021-08-11 | Stop reason: HOSPADM

## 2021-07-07 NOTE — PATIENT INSTRUCTIONS
Patient Education        Atrial Fibrillation: Care Instructions  Your Care Instructions     Atrial fibrillation is an irregular and often fast heartbeat. Treating this condition is important for several reasons. It can cause blood clots, which can travel from your heart to your brain and cause a stroke. If you have a fast heartbeat, you may feel lightheaded, dizzy, and weak. An irregular heartbeat can also increase your risk for heart failure. Atrial fibrillation is often the result of another heart condition, such as high blood pressure or coronary artery disease. Making changes to improve your heart condition will help you stay healthy and active. Follow-up care is a key part of your treatment and safety. Be sure to make and go to all appointments, and call your doctor if you are having problems. It's also a good idea to know your test results and keep a list of the medicines you take. How can you care for yourself at home? Medicines    · Take your medicines exactly as prescribed. Call your doctor if you think you are having a problem with your medicine. You will get more details on the specific medicines your doctor prescribes.     · If your doctor has given you a blood thinner to prevent a stroke, be sure you get instructions about how to take your medicine safely. Blood thinners can cause serious bleeding problems.     · Do not take any vitamins, over-the-counter drugs, or herbal products without talking to your doctor first.   Lifestyle changes    · Do not smoke. Smoking can increase your chance of a stroke and heart attack. If you need help quitting, talk to your doctor about stop-smoking programs and medicines. These can increase your chances of quitting for good.     · Eat a heart-healthy diet.     · Stay at a healthy weight. Lose weight if you need to.     · Limit alcohol to 2 drinks a day for men and 1 drink a day for women. Too much alcohol can cause health problems.     · Avoid colds and flu.  Get a pneumococcal vaccine shot. If you have had one before, ask your doctor whether you need another dose. Get a flu shot every year. If you must be around people with colds or flu, wash your hands often. Activity    · If your doctor recommends it, get more exercise. Walking is a good choice. Bit by bit, increase the amount you walk every day. Try for at least 30 minutes on most days of the week. You also may want to swim, bike, or do other activities. Your doctor may suggest that you join a cardiac rehabilitation program so that you can have help increasing your physical activity safely.     · Start light exercise if your doctor says it is okay. Even a small amount will help you get stronger, have more energy, and manage stress. Walking is an easy way to get exercise. Start out by walking a little more than you did in the hospital. Gradually increase the amount you walk.     · When you exercise, watch for signs that your heart is working too hard. You are pushing too hard if you cannot talk while you are exercising. If you become short of breath or dizzy or have chest pain, sit down and rest immediately.     · Check your pulse regularly. Place two fingers on the artery at the palm side of your wrist, in line with your thumb. If your heartbeat seems uneven or fast, talk to your doctor. When should you call for help? Call 911 anytime you think you may need emergency care. For example, call if:    · You have symptoms of a heart attack. These may include:  ? Chest pain or pressure, or a strange feeling in the chest.  ? Sweating. ? Shortness of breath. ? Nausea or vomiting. ? Pain, pressure, or a strange feeling in the back, neck, jaw, or upper belly or in one or both shoulders or arms. ? Lightheadedness or sudden weakness. ? A fast or irregular heartbeat. After you call 911, the  may tell you to chew 1 adult-strength or 2 to 4 low-dose aspirin. Wait for an ambulance.  Do not try to drive yourself.     · You have symptoms of a stroke. These may include:  ? Sudden numbness, tingling, weakness, or loss of movement in your face, arm, or leg, especially on only one side of your body. ? Sudden vision changes. ? Sudden trouble speaking. ? Sudden confusion or trouble understanding simple statements. ? Sudden problems with walking or balance. ? A sudden, severe headache that is different from past headaches.     · You passed out (lost consciousness). Call your doctor now or seek immediate medical care if:    · You have new or increased shortness of breath.     · You feel dizzy or lightheaded, or you feel like you may faint.     · Your heart rate becomes irregular.     · You can feel your heart flutter in your chest or skip heartbeats. Tell your doctor if these symptoms are new or worse. Watch closely for changes in your health, and be sure to contact your doctor if you have any problems. Where can you learn more? Go to https://Fobbler.eWave Interactive. org and sign in to your fitmob account. Enter U020 in the [employer] box to learn more about \"Atrial Fibrillation: Care Instructions. \"     If you do not have an account, please click on the \"Sign Up Now\" link. Current as of: August 31, 2020               Content Version: 12.9  © 2006-2021 [employer]. Care instructions adapted under license by [facility] ([facility]). If you have questions about a medical condition or this instruction, always ask your healthcare professional. [place] [address] any warranty or liability for your use of this information. That sugar film. Patient Education        Learning About Catheter Ablation for Heart Rhythm Problems  What is catheter ablation? Catheter ablation is a procedure that treats heart rhythm problems. These problems include atrial fibrillation, supraventricular tachycardia (SVT), atrial flutter, and ventricular tachycardia.   Your heart should have a strong, steady beat. That beat is controlled by the heart's electrical system. Sometimes that system misfires. This causes a heartbeat that is too fast and isn't steady. Catheter ablation is a way to get into your heart and fix the problem. Ablation is not surgery. How is catheter ablation done? Your doctor inserts thin tubes called catheters into a blood vessel in your groin, arm, or neck. Then your doctor feeds them into the heart. Wires in the catheters help the doctor find the problem areas. Then the doctor uses the wires to send energy to destroy the tiny areas of heart tissue that are causing the problems. It may seem like a bad idea to destroy parts of your heart on purpose. But the areas that are destroyed are very tiny. They should not affect your heart's ability to do its job. You may be awake during the procedure. Or you may be asleep. The doctor will give you medicines to help you feel relaxed and to numb the areas where the catheters go in. You may feel a little uncomfortable, but you should not feel pain. What can you expect after catheter ablation? You may stay overnight in the hospital. How long you stay in the hospital depends on the type of ablation you have. Do not exercise hard or lift anything heavy for a week. You will probably be able to go back to work and to your normal routine in 1 or 2 days. You may have swelling, bruising, or a small lump around the site where the catheters went into your body. These should go away in 3 to 4 weeks. You may have to take some medicines for a while. Follow-up care is a key part of your treatment and safety. Be sure to make and go to all appointments, and call your doctor if you are having problems. It's also a good idea to know your test results and keep a list of the medicines you take. Where can you learn more? Go to https://sukhjinder.Qustodio. org and sign in to your Musistic account.  Enter B688 in the [employer] box to learn more about \"Learning About Catheter Ablation for Heart Rhythm Problems. \"     If you do not have an account, please click on the \"Sign Up Now\" link. Current as of: August 31, 2020               Content Version: 12.9  © 2006-2021 [employer]. Care instructions adapted under license by [facility] ([facility]). If you have questions about a medical condition or this instruction, always ask your healthcare professional. [place] [address] any warranty or liability for your use of this information. Patient Education        Learning About Atrial Fibrillation  What is atrial fibrillation? Atrial fibrillation (say \"AY-tree-noemi kqg-usia-EOC-shun\") is a common type of irregular heartbeat (arrhythmia). Normally, the heart beats in a strong, steady rhythm. In atrial fibrillation, a problem with the heart's electrical system causes the two upper chambers of the heart (called the atria) to quiver, or fibrillate. Atrial fibrillation can be dangerous. This is because if the heartbeat isn't strong and steady, blood can collect, or pool, in the atria. And pooled blood is more likely to form clots. Clots can travel to the brain, block blood flow, and cause a stroke. Atrial fibrillation can also lead to heart failure. This condition also upsets the normal rhythm between the atria and the lower chambers of the heart. (These chambers are called the ventricles.) The ventricles may beat fast and without a regular rhythm. What are the symptoms? Some people feel symptoms when they have episodes of atrial fibrillation. But other people don't notice any symptoms. If you have symptoms, you may feel:  · A fluttering, racing, or pounding feeling in your chest called palpitations. · Weak or tired. · Dizzy or lightheaded. · Short of breath. · Chest pain. · Confused. You may notice signs of atrial fibrillation when you check your pulse.  Your pulse may seem uneven or fast.  What can you expect when you have atrial fibrillation? At first, spells of atrial fibrillation may come on suddenly and last a short time. They may go away on their own or with treatment. Over time, the spells may last longer and occur more often. They often don't go away on their own. How is it treated? Treatments can help you feel better and prevent future problems, especially stroke and heart failure. Your treatment will depend on the cause of your atrial fibrillation, your symptoms, and your risk for stroke. Types of treatment include:  · Heart rate treatment. Medicine may be used to slow your heart rate. Your heartbeat may still be irregular. But these medicines keep your heart from beating too fast. They may also help relieve symptoms. · Heart rhythm treatment. Different treatments may be used to try to stop atrial fibrillation and keep it from returning. They can also relieve symptoms. These treatments include medicine, electrical cardioversion to shock the heart back to a normal rhythm, a procedure called catheter ablation, and heart surgery. · Stroke prevention. You and your doctor can decide how to lower your risk. You may decide to take a blood-thinning medicine called an anticoagulant. What is a heart-healthy lifestyle for atrial fibrillation? You can live well and help manage atrial fibrillation by having a heart-healthy lifestyle. This lifestyle may help reduce how often you have episodes of atrial fibrillation. Don't smoke. Avoid secondhand smoke too. Quitting smoking is the best thing you can do for your heart. Be active. Talk to your doctor about what type and level of exercise is safe for you. Eat a heart-healthy diet. These foods include vegetables, fruits, nuts, beans, lean meat, fish, and whole grains. Limit sodium, alcohol, and sugar. Avoid alcohol if it triggers symptoms. Stay at a healthy weight. Lose weight if you need to. Losing weight can help relieve symptoms. Manage other health problems. These problems include diabetes, high blood pressure, and high cholesterol. If you think you may have a problem with alcohol or drug use, talk to your doctor. Manage stress. Options like yoga, biofeedback, and meditation may help. Where can you learn more? Go to https://sukhjinder.Seafarers [email or web address] org and sign in to your UCAN account. Enter K337 in the [employer] box to learn more about \"Learning About Atrial Fibrillation. \"     If you do not have an account, please click on the \"Sign Up Now\" link. Current as of: August 31, 2020               Content Version: 12.9  © 2006-2021 [employer]. Care instructions adapted under license by [facility] [place] ([facility]). If you have questions about a medical condition or this instruction, always ask your healthcare professional. [address] any warranty or liability for your use of this information. Patient Education        Deciding Between Electrical Cardioversion and Rate Control Medicines for Atrial Fibrillation  How can you decide between electrical cardioversion and rate control medicines for atrial fibrillation? What is atrial fibrillation? Atrial fibrillation (say \"AY-tree-noemi dvp-rbtm-SOP-shun\") is a kind of uneven heartbeat. It can make you feel lightheaded and dizzy. You may feel weak. It also can make you more likely to have a stroke. Electrical cardioversion can return your heart to a normal rhythm. First you'll get medicines to make you sleepy and control pain. Then your doctor will use patches to send an electric current to your heart. This resets the rhythm of your heart. Not everyone with atrial fibrillation needs this treatment. For some people, taking medicines may be better. Most people can live with an uneven heartbeat.  It just has to be kept under control so the heart does not beat too fast.  Use this information to help you and your doctor decide which treatment to choose for atrial fibrillation. What are key points about this decision? · Electrical cardioversion can return your heart to a normal rhythm. But the problem can come back. The longer you have had atrial fibrillation, the more likely it is to come back after this treatment. · Cardioversion may not work as well when an uneven heartbeat is caused by another heart disease, such as heart failure. · If your symptoms bother you a lot, you may want to try cardioversion. But even if it works, you may still need to take blood thinners to prevent a stroke. · If you don't have symptoms, or if they don't bother you much, you can try medicines to slow your heart rate. And you can take blood thinners to prevent a stroke. · Cardioversion does have risks, such as stroke. Discuss the risks with your doctor. Make sure you understand them. · You may have more than one heart problem. Cardioversion doesn't work as well if you have more than one heart problem. Why might you choose electrical cardioversion? · It restores the normal heart rhythm for most people. · The idea of having an electric shock does not bother you. · Your symptoms bother you a lot. · You have had atrial fibrillation just one time. · You do not have other heart problems. · You may not have to take as many medicines. Or you may not need to take them as long. Why might you choose rate-control medicines? · These medicines keep many people from having symptoms. · You prefer to take medicines rather than have an electric shock. · Your symptoms don't bother you much. · If these medicines don't work, you can still try electrical cardioversion. Your decision  Thinking about the facts and your feelings can help you make a decision that is right for you. Be sure you understand the benefits and risks of your options. And think about what else you need to do before you make the decision. Where can you learn more? Go to https://chaaliyah.healthLaREDChina.com. org and sign in to your OfferWiret account. Enter Y266 in the [facility] box to learn more about \"Deciding Between Electrical Cardioversion and Rate Control Medicines for Atrial Fibrillation. \"     If you do not have an account, please click on the \"Sign Up Now\" link. Current as of: August 31, 2020               Content Version: 12.9  © 3751-8034 Healthwise, [employer]. Care instructions adapted under license by [facility] ([facility]). If you have questions about a medical condition or this instruction, always ask your healthcare professional. [address] any warranty or liability for your use of this information.

## 2021-07-07 NOTE — PROGRESS NOTES
Cardiac Electrophysiology Consultation   Date: 7/7/2021  Reason for Consultation: Atrial fibrillation  Consult Requesting Physician: Lidia Gomes M.D. Primary Care Physician: Nicol Velasco MD    Chief Complaint   Patient presents with    Follow-up     afib - no cardiac complaints      HPI: Michelet Perez is a 79 y.o. patient with a history of atrial fibrillation, hypertension, and systolic heart failure He was first diagnosed with atrial fibrillation in October of 2018 after presenting to the ED with c/p worsening shortness of breath and weakness. . In the ER he was noted to be in Atrial fibrillation with RVR and also evidence of pneumonia. He had an echocardiogram completed that showed an LVEF of 30-35%. He was treated with amiodarone but was discontinued due to abnormal LFTs. He was discharged home on a BB and Eliquis. He wore a 30 day event monitor that showed he was predominantly in normal sinus rhythm with 6 episodes of atrial fibrillation or atrial flutter. No symptoms were reported. Interval History: Today, he states overall he is doing well. He denies any known recurrence of any atrial fibrillation. He denies any new cardiac complaints and states he continues to remain active without any exertional symptoms. He denies any chest pains or worsening shortness of breath. He reports compliance with his medications and tolerating. He denies any abnormal bleeding or bruising. He follows with Dr. Dyana Michaels on a routine basis for his general cardiology needs. Patient denies exertional chest pain/pressure, dyspnea at rest, VILLELA, PND, orthopnea, palpitations, lightheadedness, weight changes, changes in LE edema, and syncope.     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      [facility] THYROID BIOPSY  6/18/2021     THYROID BIOPSY 6/18/2021 [facility] ULTRASOUND     Allergies: Allergies   Allergen Reactions    Amiodarone      Medication:   Prior to Admission medications    Medication Sig Start Date End Date Taking? Authorizing Provider   furosemide (LASIX) 20 MG tablet  6/22/21  Yes Historical Provider, MD   apixaban (ELIQUIS) 5 MG TABS tablet Take 1 tablet by mouth 2 times daily 5/27/21  Yes Nini Ash MD   lisinopril (PRINIVIL;ZESTRIL) 10 MG tablet Take 1 tablet by mouth daily 8/24/20  Yes Primo Vaughn MD   metoprolol succinate (TOPROL XL) 25 MG extended release tablet Take 0.5 tablets by mouth daily 8/24/20  Yes Alison Raphael MD   triamcinolone (ARISTOCORT) 0.5 % cream Apply topically every 4 hours as needed (eczema) Apply topically 3 times daily. 7/9/19  Yes Nini Ash MD     Social History:   reports that he quit smoking about 28 years ago. His smoking use included cigarettes. He started smoking about 66 years ago. He smoked 1.00 pack per day. He has never used smokeless tobacco. He reports current alcohol use. He reports previous drug use. Family History:  family history includes Cancer in his brother and sister; Heart Disease in his brother, brother, and father. Reviewed.  Denies family history of sudden cardiac death, arrhythmia, premature CAD    Review of System:    · General ROS: negative for - chills, fever   · Psychological ROS: negative for - anxiety or depression  · Ophthalmic ROS: negative for - eye pain or loss of vision  · ENT ROS: negative for - epistaxis, headaches, nasal discharge, sore throat   · Allergy and Immunology ROS: negative for - hives, nasal congestion   · Hematological and Lymphatic ROS: negative for - bleeding problems, blood clots, bruising or jaundice  · Endocrine ROS: negative for - skin changes, temperature intolerance or unexpected weight changes  · Respiratory ROS: negative for - cough, hemoptysis, pleuritic pain, SOB, sputum changes or wheezing  · Cardiovascular ROS: Per HPI. · Gastrointestinal ROS: negative for - abdominal pain, blood in stools, diarrhea, hematemesis, melena, nausea/vomiting or swallowing difficulty/pain  · Genito-Urinary ROS: negative for - dysuria or incontinence  · Musculoskeletal ROS: negative for - joint swelling or muscle pain  · Neurological ROS: negative for - confusion, dizziness, gait disturbance, headaches, numbness/tingling, seizures, speech problems, tremors, visual changes or weakness  · Dermatological ROS: negative for - rash    Physical Examination:  Vitals:    07/07/21 1247   BP: 100/64   Pulse: 69   SpO2: 98%     · Constitutional: Oriented. No distress. · Head: Normocephalic and atraumatic. · Mouth/Throat: Oropharynx is clear and moist.   · Eyes: Conjunctivae normal. EOM are normal.   · Neck: Normal range of motion. Neck supple. No rigidity. No JVD present. · Cardiovascular: Normal rate, regular rhythm, S1&S2 and intact distal pulses. · Pulmonary/Chest: Bilateral respiratory sounds. No wheezes. No rhonchi. · Abdominal: Soft. Bowel sounds present. No distension, No tenderness. · Musculoskeletal: No tenderness. No edema    · Lymphadenopathy: Has no cervical adenopathy. · Neurological: Alert and oriented. Cranial nerve appears intact, No Gross deficit   · Skin: Skin is warm and dry. No rash noted. · Psychiatric: Has a normal mood, affect and behavior     Labs:  Reviewed. ECG: reviewed, 7/7/21 Sinus rhythm with PACs, v-rate of 61 bpm with QRS duration 108 ms. Studies:   1. Event monitor: 2/13/19  NSR. Few episodes if PSVT.   2. Event monitor: 6/11/21  Sinus rhythm, (>6) episodes of atrial fibrillation or atrial flutter, sustained and brief.      2. Echo: 5/15/19  Left ventricular cavity size is normal.  Normal left ventricular wall thickness. Ejection fraction is visually estimated to be 40-45%. There is severe hypokinesis of the septal walls. Abnormal (paradoxical) septal motion is present. Indeterminate diastolic function  Mitral valve leaflets appear thickened with mitral valve prolapse. Moderate mitral regurgitation is present. The left atrium is mildly dilated. Tricuspid valve appear redundant. Mild to moderate tricuspid regurgitation. The right atrium is moderately dilated. IVC size is normal (<2.1 cm) but collapses < 50% with respiration consistent  with elevated RA pressure (8 mmHg). Estimated pulmonary artery systolic pressure is mildly elevated at 34 mmHg  assuming a right atrial pressure of 8 mmHg. Ejection fraction is visually estimated to be 40-45%. LA volume/Index: 69 ml /40 ml/m2     Echo:10/29/18  Left ventricular cavity size is normal.  Normal left ventricular wall thickness. Difficult to estimate EF due to increase rate and rhythm but appears depressed  Mitral valve leaflets appear mildly thickened. Mild mitral regurgitation. The left atrium is dilated. Aortic valve appears sclerotic but opens adequately. Trivial aortic regurgitation. Tricuspid valve is structurally normal.  Mild tricuspid regurgitation with RVSP of 26 mmHg. The right atrium is dilated. Suggest repeat limited echo with bubble study to r/o ASD or PFO     Echo limited 11/2/18   Left ventricular cavity size is normal.  Normal left ventricular wall thickness. Ejection fraction is visually estimated to be 30-35%, though difficult to adequately assess due to arrhythmia. Severe hypo to akinesis of the septum. Abnormal (paradoxical) septal motion is present. Tricuspid valve appears thickened with redundancy. The right atrium is mildly dilated. IVC not well visualized.   A bubble study was performed and fails to show evidence of shunting.     3. Stress Test:    12/21/18 no evidence of ischemia.      4. Cath:   None     I independently reviewed the ECG, MCOT, echocardiogram, stress test, and coronary angiography/PCI results and used them for my plan of care. Procedures:  1. Cardioversion 1/10/2020  2. Electrophysiology study with radiofrequency ablation of atrial fibrillation and pulmonary vein isolation   Additional focal ablation for Atrial fibrillation, outside the pulmonary vein along the posterior wall of left atrium, floor of left atrium and interatrial septum. Typical atrial flutter ablation on 5/8/2020 with Kira Murcia MD     Assessment/Plan:     Paroxysmal atrial fibrillation/atrial flutter   -s/p cardioversion 1/10/2020  -typical atrial flutter ablation on 5/8/2020 with Kira Murcia MD   -Event monitor showed episodes of Atrial fibrillation or atrial flutter, sustained and brief  -[id number] Score 4 (CHF, HTN, AGE)  -On Eliquis 5 mg BID for  thromboembolic risk reduction.  -Tolerating well no signs or symptoms of abnormal bruising or bleeding. -LA volume/Index: 69 ml /40 ml/m2 per Echo 5/15/2019  -Will await starting any antiarrhythmics at this time     We educated the patient that atrial fibrillation and atrial flutter are both worsening and progressive diseases, with more frequent episodes that will ensue. Subsequent episodes usually become more sustained to the extent that many individuals would then develop persistent atrial fibrillation/atrial flutter. Once persistence is reached, permanent atrial dysrhythmia is inevitable. We also discussed the fact that atrial fibrillation and atrial flutter are associated with stroke, including life-threatening stroke, and therefore oral anticoagulation is warranted depending on the patient's [id number] score. We discussed different management options for atrial fibrillation and atrial flutter.  These options include the use of cardioversion, anti-arrhythmic medication therapy, rate control strategy, oral anticoagulation, and atrial fibrillation and atrial flutter ablation. Risks, benefits and alternative of each treatment options were explained. These options include use of cardioversion (mainly for persisting atrial fibrillation or atrial flutter) which provides an effective immediate therapy with success rates of 75% or higher, but it provides no short nor long term efficacy. Anti-arrhythmic medications provide a very effective short term therapy, but even with our most potent anti-arrhythmic medication there is limited long term efficacy (clinical studies have shown that 40% of patients remain atrial fibrillation-free after 4 years of follow-up after starting one of the more powerful anti-arrhythmic medication (amiodarone), and, if extrapolated, may have further diminishing success as time goes on). Against atrial flutter, any anti-arrhythmic medication would be nearly ineffective. Atrial fibrillation and atrial flutter ablation is a potentially curative therapy with very reasonable success rate after a first time procedure and with improving success rates with subsequent procedures. The risks, benefits and alternatives of the ablation procedure were discussed with the patient. The risks including, but not limited to, the risks of bleeding, infection, radiation exposure, injury to vascular, cardiac and surrounding structures (including pneumothorax), stroke, cardiac perforation, tamponade, need for emergent open heart surgery, need for pacemaker implantation, injury to the phrenic nerve, injury to the esophagus, myocardial infarction and death were discussed in detail. The patient was also counseled at length about the risks of arleth Covid-19 in the jennifer-operative and post-operative states including the recovery window of their procedure.  The patient was made aware that arleth Covid-19 after a surgical procedure may worsen their prognosis for recovering from the virus and lend to a higher morbidity and or mortality risk. The patient was given the option of postponing their procedure. The patient was also presented reasonable alternatives to the proposed care, treatment, and services. The discussion I have had with the patient encompassed risks, benefits, and side effects related to the alternatives and the risks related to not receiving the proposed care, treatment and services. I spent 40 minutes face to face with the patient, with greater than 50% of that time spent in counseling on the above. The patient wishes to further discuss with his family and review educational information provided today prior to proceeding with an ablation. Chronic systolic heart failure/NICM  -EF 30%, improved to 40-45%  -Pt appears euvolemic today  -Continue with medical therapy   -ACE-I, B-blocker, ASA, and statin  -Encouraged a low sodium diet. -Management per Dr. Mosie Snellen to call the office if he would like to pursue the ablation. Follow up with EP as needed and with Dr. Ivon Gonsalez as scheduled. Thank you for allowing me to participate in the care [facility]. All questions and concerns were addressed to the patient/family. Alternatives to my treatment were discussed. This note was scribed in the presence of Dr Prateek Choudhury MD by Ramesh Wallace RN. The scribe's documentation has been prepared under my direction and personally reviewed by me in its entirety. I confirm that the note above accurately reflects all work, physical examination, the discussion of treatments and procedures, and medical decision making performed by me.     Chao Anthony MD, MS, [facility]  Cardiac Electrophysiology  [address]  [address] [facility], [facility]  [facility] [phone]  [phone]

## 2021-07-09 ENCOUNTER — TELEPHONE (OUTPATIENT)
Dept: CARDIOLOGY CLINIC | Age: 81
End: 2021-07-09

## 2021-07-09 DIAGNOSIS — I48.0 PAF (PAROXYSMAL ATRIAL FIBRILLATION) (HCC): Primary | ICD-10-CM

## 2021-07-09 NOTE — TELEPHONE ENCOUNTER
Crystal Gutierrez called in this afternoon wanting to talk to LELA Bass to schedule his Ablation. I told him LELA Bass was out of the office today and will return on Monday.      You can reach [facility] at #[phone]

## 2021-07-09 NOTE — LETTER
[address] [facility] [place]  Phone: [phone]  Fax: [phone]    July 13, 2021    [phone]  [address] [facility] [address]    Dear Cecile Whitmore:    Atrial Fibrillation / Left atrial flutter  Ablation Pre procedure Instructions    Date: 8- Wednesday   Arrive at: 11:30 am  Procedure time: 1:00 pm    The morning of your procedure you will park in the hospital parking lot and report directly to the cath lab to check in. At the information desk stay right and go all the way to the end of the goel, this will take you directly to your check in desk for the cath lab. Pre-Procedure Instructions   1. You will need to fast (nothing to eat or drink) for at least 8 hours prior to procedure. 2. You will need to hold your Eliquis for 12 hours prior to the procedure. 3. You may hold your Furosemide the morning of the procedure for comfort to decrease urinary urgency. 4. Do not use any lotions, creams or perfume the morning of procedure. 5. You will need to complete pre-procedure lab work 5-7 days prior to your procedure. 6.  A COVID test will be completed upon arrival the day of your procedure. If you have been fully vaccinated for COVID 19 at least 14 days prior to your procedure bring documentation of your vaccination with you the day of your procedure and you will NOT be required to get a COVID test.  7. Please have a responsible adult to drive you home after procedure, you should go home same day, but there is always a possibility of an overnight stay. 8. Cath lab will provide you with all post procedure instructions    If you have any questions or concerns, please don't hesitate to call.     Sincerely,        Clifton Miranda MD / Marjan Cross RN

## 2021-07-12 NOTE — TELEPHONE ENCOUNTER
I will reach out to patient end of day today depending on Dr. Reina Briones clinic end time or tomorrow am to schedule his ablation procedure.

## 2021-07-13 NOTE — TELEPHONE ENCOUNTER
Spoke with patient scheduled afib & left atrial flutter ablation. Atrial Fibrillation / Left atrial flutter  Ablation Pre procedure Instructions    Date: 8-    Arrive at: 11:30 am    Procedure time: 1:00 pm    The morning of your procedure you will park in the hospital parking lot and report directly to the cath lab to check in. At the information desk stay right and go all the way to the end of the goel, this will take you directly to your check in desk for the cath lab. Pre-Procedure Instructions   1. You will need to fast (nothing to eat or drink) for at least 8 hours prior to procedure. 2. You will need to hold your Eliquis for 12 hours prior to the procedure. 3. You may hold your Furosemide the morning of the procedure for comfort to decrease urinary urgency. 4. Do not use any lotions, creams or perfume the morning of procedure. 5. You will need to complete pre-procedure lab work 5-7 days prior to your procedure. 6.  A COVID test will be completed upon arrival the day of your procedure. If you have been fully vaccinated for COVID 19 at least 14 days prior to your procedure bring documentation of your vaccination with you the day of your procedure and you will NOT be required to get a COVID test.  7. Please have a responsible adult to drive you home after procedure, you should go home same day, but there is always a possibility of an overnight stay. 8. Cath lab will provide you with all post procedure instructions      Letter mailed to patient home address with all pre procedure instructions, including the dates and time of the  ablation. The letter also included detailed instructions regarding completing lab work as well as a copy of the lab orders to take to lab. Case published to [facility] and form emailed to [place] in cath lab.

## 2021-08-05 NOTE — TELEPHONE ENCOUNTER
I called and lvm for Estefani Colby to remind him to get his pre-procedure labs drawn prior to his procedure on 8/11

## 2021-08-11 ENCOUNTER — HOSPITAL ENCOUNTER (OUTPATIENT)
Dept: CARDIAC CATH/INVASIVE PROCEDURES | Age: 81
Discharge: HOME | End: 2021-08-11
Payer: MEDICARE

## 2021-08-11 ENCOUNTER — ANESTHESIA (OUTPATIENT)
Dept: CARDIAC CATH/INVASIVE PROCEDURES | Age: 81
End: 2021-08-11

## 2021-08-11 ENCOUNTER — ANESTHESIA EVENT (OUTPATIENT)
Dept: CARDIAC CATH/INVASIVE PROCEDURES | Age: 81
End: 2021-08-11

## 2021-08-11 VITALS
OXYGEN SATURATION: 100 % | TEMPERATURE: 95.2 F | SYSTOLIC BLOOD PRESSURE: 117 MMHG | DIASTOLIC BLOOD PRESSURE: 58 MMHG | RESPIRATION RATE: 1 BRPM

## 2021-08-11 VITALS
DIASTOLIC BLOOD PRESSURE: 64 MMHG | HEIGHT: 68 IN | TEMPERATURE: 96.5 F | HEART RATE: 56 BPM | RESPIRATION RATE: 11 BRPM | OXYGEN SATURATION: 95 % | BODY MASS INDEX: 17.13 KG/M2 | WEIGHT: 113 LBS | SYSTOLIC BLOOD PRESSURE: 98 MMHG

## 2021-08-11 LAB
ABO/RH: NORMAL
ANTIBODY SCREEN: NORMAL
GLUCOSE BLD-MCNC: 90 MG/DL (ref 70–99)
PERFORMED ON: NORMAL
POC ACT LR: 337 SEC
POC ACT LR: 366 SEC
POC ACT LR: 372 SEC
POC ACT LR: 387 SEC

## 2021-08-11 PROCEDURE — 93656 COMPRE EP EVAL ABLTJ ATR FIB: CPT | Performed by: INTERNAL MEDICINE

## 2021-08-11 PROCEDURE — 6360000002 HC RX W HCPCS: Performed by: NURSE ANESTHETIST, CERTIFIED REGISTERED

## 2021-08-11 PROCEDURE — 93613 INTRACARDIAC EPHYS 3D MAPG: CPT | Performed by: FAMILY MEDICINE

## 2021-08-11 PROCEDURE — 7100000001 HC PACU RECOVERY - ADDTL 15 MIN

## 2021-08-11 PROCEDURE — 93655 ICAR CATH ABLTJ DSCRT ARRHYT: CPT | Performed by: FAMILY MEDICINE

## 2021-08-11 PROCEDURE — 3700000001 HC ADD 15 MINUTES (ANESTHESIA)

## 2021-08-11 PROCEDURE — 86900 BLOOD TYPING SEROLOGIC ABO: CPT

## 2021-08-11 PROCEDURE — 93622 COMP EP EVAL L VENTR PAC&REC: CPT | Performed by: FAMILY MEDICINE

## 2021-08-11 PROCEDURE — 2720000010 HC SURG SUPPLY STERILE

## 2021-08-11 PROCEDURE — 2500000003 HC RX 250 WO HCPCS

## 2021-08-11 PROCEDURE — 2500000003 HC RX 250 WO HCPCS: Performed by: NURSE ANESTHETIST, CERTIFIED REGISTERED

## 2021-08-11 PROCEDURE — 86901 BLOOD TYPING SEROLOGIC RH(D): CPT

## 2021-08-11 PROCEDURE — C1732 CATH, EP, DIAG/ABL, 3D/VECT: HCPCS

## 2021-08-11 PROCEDURE — 6370000000 HC RX 637 (ALT 250 FOR IP)

## 2021-08-11 PROCEDURE — C1893 INTRO/SHEATH, FIXED,NON-PEEL: HCPCS

## 2021-08-11 PROCEDURE — 7100000000 HC PACU RECOVERY - FIRST 15 MIN

## 2021-08-11 PROCEDURE — 93662 INTRACARDIAC ECG (ICE): CPT | Performed by: INTERNAL MEDICINE

## 2021-08-11 PROCEDURE — 6360000002 HC RX W HCPCS

## 2021-08-11 PROCEDURE — 2580000003 HC RX 258: Performed by: INTERNAL MEDICINE

## 2021-08-11 PROCEDURE — 3700000000 HC ANESTHESIA ATTENDED CARE

## 2021-08-11 PROCEDURE — 2709999900 HC NON-CHARGEABLE SUPPLY

## 2021-08-11 PROCEDURE — 93656 COMPRE EP EVAL ABLTJ ATR FIB: CPT | Performed by: FAMILY MEDICINE

## 2021-08-11 PROCEDURE — 2580000003 HC RX 258

## 2021-08-11 PROCEDURE — 93623 PRGRMD STIMJ&PACG IV RX NFS: CPT | Performed by: FAMILY MEDICINE

## 2021-08-11 PROCEDURE — 93662 INTRACARDIAC ECG (ICE): CPT | Performed by: FAMILY MEDICINE

## 2021-08-11 PROCEDURE — 93622 COMP EP EVAL L VENTR PAC&REC: CPT | Performed by: INTERNAL MEDICINE

## 2021-08-11 PROCEDURE — 93657 TX L/R ATRIAL FIB ADDL: CPT | Performed by: INTERNAL MEDICINE

## 2021-08-11 PROCEDURE — 93613 INTRACARDIAC EPHYS 3D MAPG: CPT | Performed by: INTERNAL MEDICINE

## 2021-08-11 PROCEDURE — 93657 TX L/R ATRIAL FIB ADDL: CPT | Performed by: FAMILY MEDICINE

## 2021-08-11 PROCEDURE — 93623 PRGRMD STIMJ&PACG IV RX NFS: CPT | Performed by: INTERNAL MEDICINE

## 2021-08-11 PROCEDURE — C1759 CATH, INTRA ECHOCARDIOGRAPHY: HCPCS

## 2021-08-11 PROCEDURE — 86850 RBC ANTIBODY SCREEN: CPT

## 2021-08-11 PROCEDURE — C1730 CATH, EP, 19 OR FEW ELECT: HCPCS

## 2021-08-11 PROCEDURE — 93655 ICAR CATH ABLTJ DSCRT ARRHYT: CPT | Performed by: INTERNAL MEDICINE

## 2021-08-11 PROCEDURE — 93005 ELECTROCARDIOGRAM TRACING: CPT | Performed by: INTERNAL MEDICINE

## 2021-08-11 PROCEDURE — 85347 COAGULATION TIME ACTIVATED: CPT

## 2021-08-11 PROCEDURE — C1894 INTRO/SHEATH, NON-LASER: HCPCS

## 2021-08-11 RX ORDER — METOPROLOL SUCCINATE 25 MG/1
12.5 TABLET, EXTENDED RELEASE ORAL DAILY
Status: DISCONTINUED | OUTPATIENT
Start: 2021-08-11 | End: 2021-08-12 | Stop reason: HOSPADM

## 2021-08-11 RX ORDER — KETAMINE HCL IN NACL, ISO-OSM 100MG/10ML
SYRINGE (ML) INJECTION PRN
Status: DISCONTINUED | OUTPATIENT
Start: 2021-08-11 | End: 2021-08-11 | Stop reason: SDUPTHER

## 2021-08-11 RX ORDER — MEPERIDINE HYDROCHLORIDE 25 MG/ML
12.5 INJECTION INTRAMUSCULAR; INTRAVENOUS; SUBCUTANEOUS EVERY 5 MIN PRN
Status: DISCONTINUED | OUTPATIENT
Start: 2021-08-11 | End: 2021-08-11

## 2021-08-11 RX ORDER — FENTANYL CITRATE 50 UG/ML
INJECTION, SOLUTION INTRAMUSCULAR; INTRAVENOUS PRN
Status: DISCONTINUED | OUTPATIENT
Start: 2021-08-11 | End: 2021-08-11 | Stop reason: SDUPTHER

## 2021-08-11 RX ORDER — LIDOCAINE HYDROCHLORIDE 10 MG/ML
INJECTION, SOLUTION EPIDURAL; INFILTRATION; INTRACAUDAL; PERINEURAL PRN
Status: DISCONTINUED | OUTPATIENT
Start: 2021-08-11 | End: 2021-08-11 | Stop reason: SDUPTHER

## 2021-08-11 RX ORDER — OXYCODONE HYDROCHLORIDE 5 MG/1
5 TABLET ORAL PRN
Status: DISCONTINUED | OUTPATIENT
Start: 2021-08-11 | End: 2021-08-11

## 2021-08-11 RX ORDER — LISINOPRIL 10 MG/1
10 TABLET ORAL DAILY
Status: DISCONTINUED | OUTPATIENT
Start: 2021-08-11 | End: 2021-08-12 | Stop reason: HOSPADM

## 2021-08-11 RX ORDER — GLYCOPYRROLATE 0.2 MG/ML
INJECTION INTRAMUSCULAR; INTRAVENOUS PRN
Status: DISCONTINUED | OUTPATIENT
Start: 2021-08-11 | End: 2021-08-11 | Stop reason: SDUPTHER

## 2021-08-11 RX ORDER — AMIODARONE HYDROCHLORIDE 200 MG/1
100 TABLET ORAL DAILY
Status: CANCELLED | OUTPATIENT
Start: 2021-08-11

## 2021-08-11 RX ORDER — ACETAMINOPHEN 325 MG/1
650 TABLET ORAL EVERY 4 HOURS PRN
Status: DISCONTINUED | OUTPATIENT
Start: 2021-08-11 | End: 2021-08-12 | Stop reason: HOSPADM

## 2021-08-11 RX ORDER — DEXAMETHASONE SODIUM PHOSPHATE 4 MG/ML
INJECTION, SOLUTION INTRA-ARTICULAR; INTRALESIONAL; INTRAMUSCULAR; INTRAVENOUS; SOFT TISSUE PRN
Status: DISCONTINUED | OUTPATIENT
Start: 2021-08-11 | End: 2021-08-11 | Stop reason: SDUPTHER

## 2021-08-11 RX ORDER — FENTANYL CITRATE 50 UG/ML
25 INJECTION, SOLUTION INTRAMUSCULAR; INTRAVENOUS EVERY 5 MIN PRN
Status: DISCONTINUED | OUTPATIENT
Start: 2021-08-11 | End: 2021-08-11

## 2021-08-11 RX ORDER — HEPARIN SODIUM 1000 [USP'U]/ML
INJECTION, SOLUTION INTRAVENOUS; SUBCUTANEOUS PRN
Status: DISCONTINUED | OUTPATIENT
Start: 2021-08-11 | End: 2021-08-11 | Stop reason: SDUPTHER

## 2021-08-11 RX ORDER — 0.9 % SODIUM CHLORIDE 0.9 %
1000 INTRAVENOUS SOLUTION INTRAVENOUS
Status: SHIPPED | OUTPATIENT
Start: 2021-08-11 | End: 2021-08-11

## 2021-08-11 RX ORDER — ONDANSETRON 2 MG/ML
INJECTION INTRAMUSCULAR; INTRAVENOUS PRN
Status: DISCONTINUED | OUTPATIENT
Start: 2021-08-11 | End: 2021-08-11 | Stop reason: SDUPTHER

## 2021-08-11 RX ORDER — SODIUM CHLORIDE 0.9 % (FLUSH) 0.9 %
5-40 SYRINGE (ML) INJECTION PRN
Status: DISCONTINUED | OUTPATIENT
Start: 2021-08-11 | End: 2021-08-12 | Stop reason: HOSPADM

## 2021-08-11 RX ORDER — FLECAINIDE ACETATE 50 MG/1
25 TABLET ORAL EVERY 12 HOURS SCHEDULED
Status: DISCONTINUED | OUTPATIENT
Start: 2021-08-11 | End: 2021-08-12 | Stop reason: HOSPADM

## 2021-08-11 RX ORDER — LIDOCAINE HYDROCHLORIDE AND EPINEPHRINE BITARTRATE 20; .01 MG/ML; MG/ML
15 INJECTION, SOLUTION SUBCUTANEOUS SEE ADMIN INSTRUCTIONS
Status: DISCONTINUED | OUTPATIENT
Start: 2021-08-11 | End: 2021-08-12 | Stop reason: HOSPADM

## 2021-08-11 RX ORDER — OXYCODONE HYDROCHLORIDE 10 MG/1
10 TABLET ORAL PRN
Status: DISCONTINUED | OUTPATIENT
Start: 2021-08-11 | End: 2021-08-11

## 2021-08-11 RX ORDER — FENTANYL CITRATE 50 UG/ML
50 INJECTION, SOLUTION INTRAMUSCULAR; INTRAVENOUS EVERY 5 MIN PRN
Status: DISCONTINUED | OUTPATIENT
Start: 2021-08-11 | End: 2021-08-11

## 2021-08-11 RX ORDER — SODIUM CHLORIDE 9 MG/ML
25 INJECTION, SOLUTION INTRAVENOUS PRN
Status: DISCONTINUED | OUTPATIENT
Start: 2021-08-11 | End: 2021-08-12 | Stop reason: HOSPADM

## 2021-08-11 RX ORDER — SODIUM CHLORIDE 9 MG/ML
INJECTION, SOLUTION INTRAVENOUS CONTINUOUS
Status: DISCONTINUED | OUTPATIENT
Start: 2021-08-11 | End: 2021-08-11

## 2021-08-11 RX ORDER — PROPOFOL 10 MG/ML
INJECTION, EMULSION INTRAVENOUS PRN
Status: DISCONTINUED | OUTPATIENT
Start: 2021-08-11 | End: 2021-08-11 | Stop reason: SDUPTHER

## 2021-08-11 RX ORDER — ONDANSETRON 2 MG/ML
4 INJECTION INTRAMUSCULAR; INTRAVENOUS
Status: DISCONTINUED | OUTPATIENT
Start: 2021-08-11 | End: 2021-08-11

## 2021-08-11 RX ORDER — HEPARIN SODIUM 10000 [USP'U]/100ML
INJECTION, SOLUTION INTRAVENOUS CONTINUOUS PRN
Status: DISCONTINUED | OUTPATIENT
Start: 2021-08-11 | End: 2021-08-11 | Stop reason: SDUPTHER

## 2021-08-11 RX ORDER — MORPHINE SULFATE 2 MG/ML
1 INJECTION, SOLUTION INTRAMUSCULAR; INTRAVENOUS EVERY 5 MIN PRN
Status: DISCONTINUED | OUTPATIENT
Start: 2021-08-11 | End: 2021-08-11

## 2021-08-11 RX ORDER — SODIUM CHLORIDE 0.9 % (FLUSH) 0.9 %
5-40 SYRINGE (ML) INJECTION EVERY 12 HOURS SCHEDULED
Status: DISCONTINUED | OUTPATIENT
Start: 2021-08-11 | End: 2021-08-12 | Stop reason: HOSPADM

## 2021-08-11 RX ORDER — FUROSEMIDE 10 MG/ML
30 INJECTION INTRAMUSCULAR; INTRAVENOUS ONCE
Status: DISCONTINUED | OUTPATIENT
Start: 2021-08-11 | End: 2021-08-12 | Stop reason: HOSPADM

## 2021-08-11 RX ORDER — VECURONIUM BROMIDE 1 MG/ML
INJECTION, POWDER, LYOPHILIZED, FOR SOLUTION INTRAVENOUS PRN
Status: DISCONTINUED | OUTPATIENT
Start: 2021-08-11 | End: 2021-08-11 | Stop reason: SDUPTHER

## 2021-08-11 RX ORDER — MIDAZOLAM HYDROCHLORIDE 1 MG/ML
INJECTION INTRAMUSCULAR; INTRAVENOUS PRN
Status: DISCONTINUED | OUTPATIENT
Start: 2021-08-11 | End: 2021-08-11 | Stop reason: SDUPTHER

## 2021-08-11 RX ORDER — FLECAINIDE ACETATE 50 MG/1
25 TABLET ORAL 2 TIMES DAILY
Qty: 60 TABLET | Refills: 3 | Status: SHIPPED | OUTPATIENT
Start: 2021-08-11 | End: 2021-11-16 | Stop reason: ALTCHOICE

## 2021-08-11 RX ORDER — SUCCINYLCHOLINE/SOD CL,ISO/PF 200MG/10ML
SYRINGE (ML) INTRAVENOUS PRN
Status: DISCONTINUED | OUTPATIENT
Start: 2021-08-11 | End: 2021-08-11 | Stop reason: SDUPTHER

## 2021-08-11 RX ORDER — DOBUTAMINE HYDROCHLORIDE 200 MG/100ML
INJECTION INTRAVENOUS CONTINUOUS PRN
Status: DISCONTINUED | OUTPATIENT
Start: 2021-08-11 | End: 2021-08-11 | Stop reason: SDUPTHER

## 2021-08-11 RX ORDER — MORPHINE SULFATE 2 MG/ML
2 INJECTION, SOLUTION INTRAMUSCULAR; INTRAVENOUS EVERY 5 MIN PRN
Status: DISCONTINUED | OUTPATIENT
Start: 2021-08-11 | End: 2021-08-11

## 2021-08-11 RX ORDER — PROTAMINE SULFATE 10 MG/ML
INJECTION, SOLUTION INTRAVENOUS PRN
Status: DISCONTINUED | OUTPATIENT
Start: 2021-08-11 | End: 2021-08-11 | Stop reason: SDUPTHER

## 2021-08-11 RX ORDER — PROTAMINE SULFATE 10 MG/ML
30 INJECTION, SOLUTION INTRAVENOUS
Status: SHIPPED | OUTPATIENT
Start: 2021-08-11 | End: 2021-08-11

## 2021-08-11 RX ORDER — PHENYLEPHRINE HCL IN 0.9% NACL 1 MG/10 ML
SYRINGE (ML) INTRAVENOUS PRN
Status: DISCONTINUED | OUTPATIENT
Start: 2021-08-11 | End: 2021-08-11 | Stop reason: SDUPTHER

## 2021-08-11 RX ADMIN — MIDAZOLAM 1 MG: 1 INJECTION INTRAMUSCULAR; INTRAVENOUS at 12:53

## 2021-08-11 RX ADMIN — Medication 100 MCG: at 13:51

## 2021-08-11 RX ADMIN — HEPARIN SODIUM 4000 UNITS/HR: 10000 INJECTION, SOLUTION INTRAVENOUS at 14:26

## 2021-08-11 RX ADMIN — PROTAMINE SULFATE 100 MG: 10 INJECTION, SOLUTION INTRAVENOUS at 15:33

## 2021-08-11 RX ADMIN — LIDOCAINE HYDROCHLORIDE 50 MG: 10 INJECTION, SOLUTION EPIDURAL; INFILTRATION; INTRACAUDAL; PERINEURAL at 13:02

## 2021-08-11 RX ADMIN — GLYCOPYRROLATE 0.2 MG: 0.2 INJECTION, SOLUTION INTRAMUSCULAR; INTRAVENOUS at 13:08

## 2021-08-11 RX ADMIN — FENTANYL CITRATE 25 MCG: 50 INJECTION INTRAMUSCULAR; INTRAVENOUS at 13:12

## 2021-08-11 RX ADMIN — VECURONIUM BROMIDE 5 MG: 1 INJECTION, POWDER, LYOPHILIZED, FOR SOLUTION INTRAVENOUS at 13:56

## 2021-08-11 RX ADMIN — Medication 50 MCG: at 13:02

## 2021-08-11 RX ADMIN — Medication 30 MG: at 13:02

## 2021-08-11 RX ADMIN — SODIUM CHLORIDE: 9 INJECTION, SOLUTION INTRAVENOUS at 12:38

## 2021-08-11 RX ADMIN — Medication 100 MCG: at 13:23

## 2021-08-11 RX ADMIN — PROPOFOL 30 MG: 10 INJECTION, EMULSION INTRAVENOUS at 13:02

## 2021-08-11 RX ADMIN — Medication 100 MCG: at 15:38

## 2021-08-11 RX ADMIN — Medication 100 MCG: at 13:19

## 2021-08-11 RX ADMIN — Medication 100 MCG: at 13:30

## 2021-08-11 RX ADMIN — FENTANYL CITRATE 25 MCG: 50 INJECTION INTRAMUSCULAR; INTRAVENOUS at 13:45

## 2021-08-11 RX ADMIN — Medication 100 MCG: at 13:26

## 2021-08-11 RX ADMIN — Medication 100 MCG: at 13:14

## 2021-08-11 RX ADMIN — ONDANSETRON 4 MG: 2 INJECTION INTRAMUSCULAR; INTRAVENOUS at 13:08

## 2021-08-11 RX ADMIN — HEPARIN SODIUM 3000 UNITS: 1000 INJECTION INTRAVENOUS; SUBCUTANEOUS at 14:03

## 2021-08-11 RX ADMIN — Medication 100 MCG: at 13:16

## 2021-08-11 RX ADMIN — PHENYLEPHRINE HYDROCHLORIDE 80 MCG/MIN: 10 INJECTION INTRAVENOUS at 13:35

## 2021-08-11 RX ADMIN — Medication 120 MG: at 13:02

## 2021-08-11 RX ADMIN — FENTANYL CITRATE 25 MCG: 50 INJECTION INTRAMUSCULAR; INTRAVENOUS at 13:01

## 2021-08-11 RX ADMIN — DEXAMETHASONE SODIUM PHOSPHATE 8 MG: 4 INJECTION, SOLUTION INTRAMUSCULAR; INTRAVENOUS at 13:08

## 2021-08-11 RX ADMIN — MIDAZOLAM 1 MG: 1 INJECTION INTRAMUSCULAR; INTRAVENOUS at 12:58

## 2021-08-11 RX ADMIN — Medication 100 MCG: at 13:11

## 2021-08-11 RX ADMIN — DOBUTAMINE HYDROCHLORIDE 10 MCG/KG/MIN: 200 INJECTION INTRAVENOUS at 15:27

## 2021-08-11 RX ADMIN — Medication 200 MCG: at 13:33

## 2021-08-11 RX ADMIN — SUGAMMADEX 400 MG: 100 INJECTION, SOLUTION INTRAVENOUS at 15:34

## 2021-08-11 RX ADMIN — VECURONIUM BROMIDE 2 MG: 1 INJECTION, POWDER, LYOPHILIZED, FOR SOLUTION INTRAVENOUS at 13:26

## 2021-08-11 RX ADMIN — FENTANYL CITRATE 25 MCG: 50 INJECTION INTRAMUSCULAR; INTRAVENOUS at 12:58

## 2021-08-11 RX ADMIN — HEPARIN SODIUM 3000 UNITS: 1000 INJECTION INTRAVENOUS; SUBCUTANEOUS at 13:59

## 2021-08-11 RX ADMIN — VECURONIUM BROMIDE 8 MG: 1 INJECTION, POWDER, LYOPHILIZED, FOR SOLUTION INTRAVENOUS at 13:07

## 2021-08-11 ASSESSMENT — PULMONARY FUNCTION TESTS
PIF_VALUE: 13
PIF_VALUE: 14
PIF_VALUE: 13
PIF_VALUE: 14
PIF_VALUE: 15
PIF_VALUE: 13
PIF_VALUE: 13
PIF_VALUE: 14
PIF_VALUE: 16
PIF_VALUE: 13
PIF_VALUE: 13
PIF_VALUE: 14
PIF_VALUE: 14
PIF_VALUE: 15
PIF_VALUE: 13
PIF_VALUE: 12
PIF_VALUE: 13
PIF_VALUE: 3
PIF_VALUE: 12
PIF_VALUE: 13
PIF_VALUE: 17
PIF_VALUE: 12
PIF_VALUE: 13
PIF_VALUE: 12
PIF_VALUE: 12
PIF_VALUE: 13
PIF_VALUE: 12
PIF_VALUE: 14
PIF_VALUE: 14
PIF_VALUE: 13
PIF_VALUE: 12
PIF_VALUE: 15
PIF_VALUE: 12
PIF_VALUE: 12
PIF_VALUE: 15
PIF_VALUE: 14
PIF_VALUE: 12
PIF_VALUE: 13
PIF_VALUE: 12
PIF_VALUE: 15
PIF_VALUE: 15
PIF_VALUE: 12
PIF_VALUE: 14
PIF_VALUE: 13
PIF_VALUE: 10
PIF_VALUE: 12
PIF_VALUE: 13
PIF_VALUE: 12
PIF_VALUE: 13
PIF_VALUE: 14
PIF_VALUE: 13
PIF_VALUE: 12
PIF_VALUE: 0
PIF_VALUE: 13
PIF_VALUE: 13
PIF_VALUE: 14
PIF_VALUE: 13
PIF_VALUE: 15
PIF_VALUE: 12
PIF_VALUE: 14
PIF_VALUE: 1
PIF_VALUE: 26
PIF_VALUE: 13
PIF_VALUE: 14
PIF_VALUE: 1
PIF_VALUE: 15
PIF_VALUE: 3
PIF_VALUE: 11
PIF_VALUE: 13
PIF_VALUE: 14
PIF_VALUE: 12
PIF_VALUE: 13
PIF_VALUE: 13
PIF_VALUE: 14
PIF_VALUE: 13
PIF_VALUE: 13
PIF_VALUE: 12
PIF_VALUE: 14
PIF_VALUE: 14
PIF_VALUE: 13
PIF_VALUE: 12
PIF_VALUE: 14
PIF_VALUE: 12
PIF_VALUE: 13
PIF_VALUE: 13
PIF_VALUE: 12
PIF_VALUE: 14
PIF_VALUE: 13
PIF_VALUE: 13
PIF_VALUE: 1
PIF_VALUE: 13
PIF_VALUE: 12
PIF_VALUE: 13
PIF_VALUE: 13
PIF_VALUE: 12
PIF_VALUE: 13
PIF_VALUE: 13
PIF_VALUE: 14
PIF_VALUE: 12
PIF_VALUE: 1
PIF_VALUE: 13
PIF_VALUE: 1
PIF_VALUE: 21
PIF_VALUE: 13
PIF_VALUE: 12
PIF_VALUE: 13
PIF_VALUE: 12
PIF_VALUE: 13
PIF_VALUE: 13
PIF_VALUE: 14
PIF_VALUE: 12
PIF_VALUE: 14
PIF_VALUE: 13
PIF_VALUE: 12
PIF_VALUE: 12
PIF_VALUE: 13
PIF_VALUE: 12
PIF_VALUE: 13
PIF_VALUE: 15
PIF_VALUE: 13
PIF_VALUE: 12
PIF_VALUE: 13
PIF_VALUE: 13
PIF_VALUE: 14
PIF_VALUE: 12
PIF_VALUE: 1
PIF_VALUE: 13
PIF_VALUE: 14
PIF_VALUE: 13
PIF_VALUE: 14
PIF_VALUE: 13
PIF_VALUE: 12
PIF_VALUE: 13
PIF_VALUE: 15
PIF_VALUE: 15
PIF_VALUE: 14
PIF_VALUE: 10
PIF_VALUE: 14
PIF_VALUE: 12
PIF_VALUE: 12
PIF_VALUE: 15
PIF_VALUE: 14
PIF_VALUE: 14
PIF_VALUE: 15

## 2021-08-11 NOTE — ANESTHESIA PRE PROCEDURE
[facility] Department of Anesthesiology  Pre-Anesthesia Evaluation/Consultation       Name:  Jennyfer Abdullahi  : 1940  Age:  79 y.o.                                            MRN:  [id number]  Date: 2021           Surgeon: Radiology    Procedure CT ablation, right chest wall     Allergies   Allergen Reactions    Amiodarone      Patient Active Problem List   Diagnosis    OA (osteoarthritis)    Thymoma    Essential hypertension    Encounter to discuss treatment options    Chest wall mass    Cardiomyopathy ([address]    Chronic systolic CHF (congestive heart failure), NYHA class 1 ([address]    S/P ablation of atrial fibrillation    Longstanding persistent atrial fibrillation ([address]     Past Medical History:   Diagnosis Date    Atrial fibrillation ([address]     Cardiomyopathy ([address]     Chronic tachycardia 2018    Erectile dysfunction     Hypertension     Osteoarthritis     Pneumonia     Thymoma, benign     Tubulovillous adenoma polyp of colon 13    also sessile serrated adenoma    Wears glasses      Past Surgical History:   Procedure Laterality Date    CARDIOVERSION  01/10/2020    COLONOSCOPY N/A 1/15/2020    COLONOSCOPY performed by Angelica Grijalva MD at [address] [facility]      [facility]P [facility] THYROID BIOPSY  2021     THYROID BIOPSY 2021 [facility] ULTRASOUND     Social History     Tobacco Use    Smoking status: Former Smoker     Packs/day: 1.00     Types: Cigarettes     Start date: 1955     Quit date: 1993     Years since quittin.6    Smokeless tobacco: Never Used   Vaping Use    Vaping Use: Never used   Substance Use Topics    Alcohol use: Yes     Comment: socially    Drug use: Not Currently     Medications  Current Outpatient Medications on File Prior to Visit   Medication Sig Dispense Refill    furosemide (LASIX) 20 MG tablet       apixaban (ELIQUIS) 5 MG TABS tablet Take 1 tablet by mouth 2 times daily 180 tablet 1    lisinopril (PRINIVIL;ZESTRIL) 10 MG tablet Take 1 tablet by mouth daily 90 tablet 3    metoprolol succinate (TOPROL XL) 25 MG extended release tablet Take 0.5 tablets by mouth daily 90 tablet 3    triamcinolone (ARISTOCORT) 0.5 % cream Apply topically every 4 hours as needed (eczema) Apply topically 3 times daily. 15 g 3     Current Facility-Administered Medications on File Prior to Visit   Medication Dose Route Frequency Provider Last Rate Last Admin    0.9 % sodium chloride infusion   Intravenous Continuous Melchor Carlos MD        sodium chloride flush 0.9 % injection 5-40 mL  5-40 mL Intravenous 2 times per day Melchor Carlos MD        sodium chloride flush 0.9 % injection 5-40 mL  5-40 mL Intravenous PRN Melchor Carlos MD        0.9 % sodium chloride infusion  25 mL Intravenous PRN Melchor Carlos MD         Current Outpatient Medications   Medication Sig Dispense Refill    furosemide (LASIX) 20 MG tablet       apixaban (ELIQUIS) 5 MG TABS tablet Take 1 tablet by mouth 2 times daily 180 tablet 1    lisinopril (PRINIVIL;ZESTRIL) 10 MG tablet Take 1 tablet by mouth daily 90 tablet 3    metoprolol succinate (TOPROL XL) 25 MG extended release tablet Take 0.5 tablets by mouth daily 90 tablet 3    triamcinolone (ARISTOCORT) 0.5 % cream Apply topically every 4 hours as needed (eczema) Apply topically 3 times daily. 15 g 3     No current facility-administered medications for this visit.      Facility-Administered Medications Ordered in Other Visits   Medication Dose Route Frequency Provider Last Rate Last Admin    0.9 % sodium chloride infusion   Intravenous Continuous Melchor Carlos MD        sodium chloride flush 0.9 % injection 5-40 mL  5-40 mL Intravenous 2 times per day Melchor Carlos MD        sodium chloride flush 0.9 % injection 5-40 mL  5-40 mL Intravenous PRN Melchor Carlos MD        0.9 % sodium chloride infusion  25 mL Intravenous PRN Melchor Carlos MD         Vital Signs (Current)   There were no vitals filed for this visit. Vital Signs Statistics (for past 48 hrs)     Temp  Av.4 °F (36.3 °C)  Min: 97.4 °F (36.3 °C)   Min taken time: 21 1228  Max: 97.4 °F (36.3 °C)   Max taken time: 21 1228  Pulse  Av  Min: 64   Min taken time: 21 1228  Max: 61   Max taken time: 21 1228  Resp  Avg: 15  Min: 15   Min taken time: 21 1228  Max: 15   Max taken time: 21 1228  BP  Min: 131/66   Min taken time: 21 1228  Max: 131/66   Max taken time: 21 1228  SpO2  Av %  Min: 100 %   Min taken time: 21 1228  Max: 100 %   Max taken time: 21 1228    BP Readings from Last 3 Encounters:   21 131/66   21 100/64   21 100/63     BMI  There is no height or weight on file to calculate BMI. Estimated body mass index is 17.44 kg/m² as calculated from the following:    Height as of an earlier encounter on 21: 5' 7.5\" (1.715 m). Weight as of an earlier encounter on 21: 113 lb (51.3 kg).     CBC   Lab Results   Component Value Date    WBC 5.5 2021    RBC 4.03 2021    HGB 12.4 2021    HCT 36.8 2021    MCV 91.4 2021    RDW 13.7 2021     2021     CMP    Lab Results   Component Value Date     2021    K 5.0 2021    K 3.9 2019     2021    CO2 25 2021    BUN 26 2021    CREATININE 0.8 2021    GFRAA >60 2021    AGRATIO 1.8 2019    LABGLOM >60 2021    GLUCOSE 88 2021    PROT 6.7 2019    CALCIUM 9.6 2021    BILITOT 0.9 2019    ALKPHOS 70 2019    AST 21 2019    ALT 17 2019     BMP    Lab Results   Component Value Date     2021    K 5.0 2021    K 3.9 2019     2021    CO2 25 2021    BUN 26 2021    CREATININE 0.8 2021    CALCIUM 9.6 2021    GFRAA >60 2021    LABGLOM >60 2021    GLUCOSE 88 reviewed (including anesthesia, drug and allergy history). No interval changes to history and physical examination. Anesthetic plan, risks, benefits, alternatives, and personnel involved discussed with patient. Questions and concerns addressed. Patient(family) verbalized an understanding and agrees to proceed.       Elise Espinoza MD  August 11, 2021  12:36 PM

## 2021-08-11 NOTE — PROCEDURES
[facility]     Electrophysiology Procedure Note       Date of Procedure: 8/11/2021  Patient's Name: Joselyn Chaidez  YOB: 1940   Medical Record Number: [id number]  Referring Physician: Rachele Ortiz MD  Procedure Performed by: Camilla Myrick MD    Procedure performed:  · Electrophysiology study with radiofrequency ablation of atrial fibrillation and pulmonary vein isolation   · Ablation of right atrial tachycardia with CL 427ms with proximal-distal activation, originating from the low posterior wall of the RA  · Additional ablation of low posterior wall of the right atrium and roof (for atrial fibrillation)  · Additional ablation of complex fractionated atrial electrograms (for atrial fibrillation)  · 3-D electroanatomical mapping of the left atrium    · Transseptal puncture through an intact septum x 2 under intracardiac ultrasound guidance without fluoroscopic guidance   · Intracardiac echocardiography  · Left ventricular pacing and recording  · Drug infusion with an attempt to induce atrial tachydysrhythmia  · Anesthesia: General anesthesia provided by the Anesthesia service    Indications for procedure:    Joselyn Chaidez is a 79 y.o. male who has a history of paroxysmal atrial fibrillation s/p afib and right atrial flutter ablation by my colleague, Dr. Oscar Padron in the recent past with recurrence of atrial fibrillation who is symptomatic with symptoms of dyspnea with minimal exertion and fatigue who has failed antiarrhythmic therapy in the past is now here for an ablation for paroxysmal atrial fibrillation. Details of Procedure: The risks, benefits and alternatives of the ablation procedure were discussed with the patient.  The risks including, but not limited to, the risks of bleeding, infection, radiation exposure, injury to vascular, cardiac and surrounding structures (including pneumothorax), stroke, cardiac perforation, tamponade, need for emergent open heart surgery, need for pacemaker implantation, esophageal injury and fistula, myocardial infarction and death were discussed in detail. The patient was also counseled at length about the risks of arleth Covid-19 in the jennifer-operative and post-operative states including the recovery window of their procedure. The patient was made aware that arleth Covid-19 after a surgical procedure may worsen their prognosis for recovering from the virus and lend to a higher morbidity and or mortality risk. The patient was given the option of postponing their procedure. The patient was also presented reasonable alternatives to the proposed care, treatment, and services. The discussion I have had with the patient encompassed risks, benefits, and side effects related to the alternatives and the risks related to not receiving the proposed care, treatment and services. The patient opted to proceed with the ablation. Written informed consent was signed and placed in the chart. Patient was brought to the EP lab in a fasting non-sedate state. Patient underwent general anesthesia by anesthesia team. The patient was monitored continuously with ECG, pulse oximetry, blood pressure monitoring, and direct observation. No urinary chase was placed in order to prevent any hematuria or urinary tract infection. Both groins were prepped in a sterile fashion. We gained access in the right femoral vein. One 8 Jamaican short sheath for ICE and subsequently for CS catheters were placed in the right femoral vein using modified seldinger technique. Two 8.5F SLO sheaths were introduced into the right femoral vein using modified Seldinger technique. Then a CS cathter was placed inside the coronary sinus without fluoroscopy but with 3-D electroanatomic mapping for recording and mapping of the left atrium.  Using ICE we delineated the left pulmonary vein, left atrial appendage,  mitral valve, and right superior and right inferior pulmonary veins, and the trivial amount of pericardial effusion prior to ablation. Two transeptal punctures were performed under intracardiac echocardiogram, pressure monitoring, and without fluoroscopic guidance. Patient received a bolus of heparin shortly after each transseptal puncture followed by continuous monitoring of the ACT every 15-30 minutes, and additional boluses of heparin during the procedure to keep the ACT between 300-400 sec. We placed both SLO sheaths inside the left atrium. Also an esophageal temperature probe (tenfarms Temperature Probe 12Fr) that was tied with sutures to an accompanying [employer] quadripolar 6 Eritrean 5-5-5 electrode spacing catheter was advanced into the esophagus for real-time mapping of the esophagus and careful monitoring of the esophageal temperature during ablation. Using the Penta ray cathter and Carto navigation system a three dimensional electro anatomical mapping of the left atrium, in addition to right and left sided pulmonary vein anatomy was created. During sinus rhythm, we found that all four pulmonary veins (left superior, left inferior, right superior, and right inferior) did NOT have PV fascicles, the triggers for the atrial fibrillation. The L and R antrums were also electrically isolated except for the L anterior maggie. With atrial pacing at 600 ms, we were able to easily induce atrial tachycardia with  ms with proximal-distal activation. Given that we had atrial fibrillation clinically, as manifested on event monitor, we proceeded with ablation along the L anterior maggie and also the roof of the LA, the latter being a separate and independent mechanism for perpetuation of the atrial fibrillation, beyond that of the pulmonary vein and its antrum, for both the perpetuation and initiation of atrial fibrillation. We ablated the L anterior maggie successfully, and then proceeded with a linear ablation along the roof of the LA. Afterwards, we remained in atrial tachycardia. Therefore, we performed activation mapping, and found that the earliest site of activation was on the low posterior wall of the right atrium. We therefore performed a circumferential ablation in and around the site of the earliest activation. Interestingly, there were no atrial ectopies as we were ablating. However, after this ablation, we were no longer able to induce the tachycardia, whereas prior to this ablation, the atrial tachycardia was very easily inducible. We then evaluated the left atrium for complex fractionated atrial electrogram, a separate mechanism that would initiate and/or perpetuate atrial fibrillation apart from the pulmonary vein fascicles and antral ablations. We found 1 sites on the septal aspect of the LA and ablated this focus. After ablation was complete, catheters were placed in the left and right atrium, His-position, right ventricle, and left ventricle for pacing and recording. Arrhythmia was attempted to be induced by rapid atrial and ventricular pacing, and there was no induction of atrial tachydysrhythmia. Maximum output (20mA) pacing in the L antrum and R antrum were also performed and showed dissociation from the rest of the left atrium. This was checked circumferentially around the antrums and verified many times. We evaluated the roof line and found that there was complete, bidirectional block. Adenosine bolus of 18mg was also given once to assess for acute re-connection and to attempt to induce atrial fibrillation. We had adequate adenosine effect (AV blocks of > 3 seconds) and there were no pulmonary fascicles seen in any of the veins nor were there any atrial tachydysrhythmia induced.  We then administered dobutamine infusion up to 10 mcg/kg/min in order to achieve at least a 20% increase in heart rate from the basal heart rate to induce any atrial tachydysrhythmia, and there were no atrial tachydysrhythmia radiofrequency ablation around the L anterior antrum  - Additional ablation with creation of roof line (for atrial fibrillation) and low posterior wall of the right atrium (for right atrial tachycardia)  - Ablation of complex fractionated atrial electrogram ablations in the left atrium (for atrial fibrillation)    Plan:   The patient will be monitored and receive the usual post ablation care. If there are no complications, the patient will be discharged from the hospital either later today or tomorrow with a new prescription for Flecainide 25mg po BID and pre-admission Eliquis 5mg po BID. He will remain on Toprol XL for cardiac anti-remodeling. The patient will follow-up with the EP NP in 3 month's time. Thank you for allowing us to participate in the care of your patient. If you have any questions or concerns, please do not hesitate to contact me.     Katelynn Stiles MD, MS, [facility]  Cardiac Electrophysiology  [address]  [address] [facility], [address] [place] [address]  [place] [address]  [phone]

## 2021-08-11 NOTE — ANESTHESIA POSTPROCEDURE EVALUATION
Department of Anesthesiology  Postprocedure Note    Patient: Terry Aragon  MRN: [id number]  YOB: 1940  Date of evaluation: 8/11/2021  Time:  4:52 PM     Procedure Summary     Date: 08/11/21 Room / Location: [facility] Cath Lab    Anesthesia Start: 7419 Anesthesia Stop: 0793    Procedure: WST ANSON AFIB ABLATION W ANES Diagnosis:     Scheduled Providers:  Responsible Provider: Viviana Castillo MD    Anesthesia Type: general ASA Status: 3          Anesthesia Type: general    Christy Phase I: Christy Score: 8    Christy Phase II:      Last vitals: Reviewed and per EMR flowsheets. Anesthesia Post Evaluation    Patient location during evaluation: PACU  Patient participation: complete - patient participated  Level of consciousness: awake and alert  Pain score: 2  Airway patency: patent  Nausea & Vomiting: no nausea and no vomiting  Complications: no  Cardiovascular status: blood pressure returned to baseline  Respiratory status: acceptable  Hydration status: euvolemic  Comments: Patient alert and oriented to person, place, and time but unaware of what he came to hospital for. Monitored for extended period of time and confusion decreased. Strong bilaterally.

## 2021-08-12 ENCOUNTER — OFFICE VISIT (OUTPATIENT)
Dept: CARDIOLOGY CLINIC | Age: 81
End: 2021-08-12
Payer: MEDICARE

## 2021-08-12 VITALS
HEART RATE: 60 BPM | SYSTOLIC BLOOD PRESSURE: 116 MMHG | HEIGHT: 68 IN | OXYGEN SATURATION: 96 % | BODY MASS INDEX: 17.58 KG/M2 | WEIGHT: 116 LBS | DIASTOLIC BLOOD PRESSURE: 60 MMHG

## 2021-08-12 DIAGNOSIS — I48.0 PAF (PAROXYSMAL ATRIAL FIBRILLATION) (HCC): Primary | ICD-10-CM

## 2021-08-12 DIAGNOSIS — I42.8 NICM (NONISCHEMIC CARDIOMYOPATHY) (HCC): ICD-10-CM

## 2021-08-12 DIAGNOSIS — I10 ESSENTIAL HYPERTENSION: ICD-10-CM

## 2021-08-12 DIAGNOSIS — I50.22 CHRONIC SYSTOLIC HEART FAILURE (HCC): ICD-10-CM

## 2021-08-12 LAB
EKG ATRIAL RATE: 50 BPM
EKG DIAGNOSIS: NORMAL
EKG P AXIS: 104 DEGREES
EKG P-R INTERVAL: 152 MS
EKG Q-T INTERVAL: 506 MS
EKG QRS DURATION: 100 MS
EKG QTC CALCULATION (BAZETT): 461 MS
EKG R AXIS: 87 DEGREES
EKG T AXIS: 79 DEGREES
EKG VENTRICULAR RATE: 50 BPM

## 2021-08-12 PROCEDURE — G8427 DOCREV CUR MEDS BY ELIG CLIN: HCPCS | Performed by: INTERNAL MEDICINE

## 2021-08-12 PROCEDURE — 1123F ACP DISCUSS/DSCN MKR DOCD: CPT | Performed by: INTERNAL MEDICINE

## 2021-08-12 PROCEDURE — 1036F TOBACCO NON-USER: CPT | Performed by: INTERNAL MEDICINE

## 2021-08-12 PROCEDURE — G8419 CALC BMI OUT NRM PARAM NOF/U: HCPCS | Performed by: INTERNAL MEDICINE

## 2021-08-12 PROCEDURE — 99214 OFFICE O/P EST MOD 30 MIN: CPT | Performed by: INTERNAL MEDICINE

## 2021-08-12 PROCEDURE — 4040F PNEUMOC VAC/ADMIN/RCVD: CPT | Performed by: INTERNAL MEDICINE

## 2021-08-12 PROCEDURE — 93010 ELECTROCARDIOGRAM REPORT: CPT | Performed by: INTERNAL MEDICINE

## 2021-08-12 NOTE — PROGRESS NOTES
[facility]      Cardiology Progress Note    Isabella Mosqueda  1940 August 12, 2021        CC: \"I had my ablation yesterday. \"     HPI:  The patient is 79 y.o. male with a past medical history significant for atrial fibrillation, hypertension, and systolic heart failure. He was admitted 10/27 - 11/6/18 for worsening shortness of breath and weakness. He is a marathon runner and first noticed the symptoms while in [facility]. In the ER he was noted to be in Atrial fibrillation with RVR and also evidence of pneumonia. He had an echocardiogram completed that showed an LVEF of 30-35%. He was treated with amiodarone but was discontinued due to abnormal LFTs. He was discharged home on a BB and Eliquis at that time. He is s/p DCCV 1/2020 followed by EPS AFIB ablation and pulm vein isol. 5/8/20. Today, he presents in follow up and is s/p AFIB and R atrial tachycardia ablation yesterday with Dr. Leslie Pérez. He reports that he is feeling good with no cardiac sounding complaints today. Patient denies exertional chest pain/pressure, dyspnea at rest, VILLELA, PND, orthopnea, palpitations, lightheadedness, weight changes, changes in LE edema, and syncope. He admits medical therapy compliance and tolerating. He has received his COVID vaccine. he continues to run for exercise.         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    also sessile serrated adenoma    Wears glasses      Past Surgical History:   Procedure Laterality Date    CARDIOVERSION  01/10/2020    COLONOSCOPY N/A 1/15/2020    COLONOSCOPY performed by Petra Schwab, MD at [facility]      [facility]P [facility] THYROID BIOPSY  6/18/2021     THYROID BIOPSY 2021 [facility] ULTRASOUND     Family History   Problem Relation Age of Onset    Heart Disease Father     Cancer Sister         Colon    Cancer Brother         Colon    Heart Disease Brother     Heart Disease Brother      Social History     Tobacco Use    Smoking status: Former Smoker     Packs/day: 1.00     Types: Cigarettes     Start date: 1955     Quit date: 1993     Years since quittin.6    Smokeless tobacco: Never Used   Vaping Use    Vaping Use: Never used   Substance Use Topics    Alcohol use: Yes     Comment: socially    Drug use: Not Currently       Allergies   Allergen Reactions    Amiodarone      Current Outpatient Medications   Medication Sig Dispense Refill    flecainide (TAMBOCOR) 50 MG tablet Take 0.5 tablets by mouth 2 times daily 60 tablet 3    apixaban (ELIQUIS) 5 MG TABS tablet Take 1 tablet by mouth 2 times daily 180 tablet 1    lisinopril (PRINIVIL;ZESTRIL) 10 MG tablet Take 1 tablet by mouth daily 90 tablet 3    metoprolol succinate (TOPROL XL) 25 MG extended release tablet Take 0.5 tablets by mouth daily 90 tablet 3    triamcinolone (ARISTOCORT) 0.5 % cream Apply topically every 4 hours as needed (eczema) Apply topically 3 times daily. 15 g 3     No current facility-administered medications for this visit. Review of Systems:  · Constitutional: no unanticipated weight loss. There's been no change in energy level, sleep pattern, or activity level. No fevers, chills. · Eyes: No visual changes or diplopia. No scleral icterus. · ENT: No Headaches, hearing loss or vertigo. No mouth sores or sore throat. · Cardiovascular: as reviewed in HPI  · Respiratory: No cough or wheezing, no sputum production. No hematemesis. · Gastrointestinal: No abdominal pain, appetite loss, blood in stools. No change in bowel or bladder habits. · Genitourinary: No dysuria, trouble voiding, or hematuria.   · Musculoskeletal:  No gait disturbance, no joint complaints. · Integumentary: No rash or pruritis. · Neurological: No headache, diplopia, change in muscle strength, numbness or tingling. · Psychiatric: No anxiety or depression. · Endocrine: No temperature intolerance. No excessive thirst, fluid intake, or urination. No tremor. · Hematologic/Lymphatic: No abnormal bruising or bleeding, blood clots or swollen lymph nodes. · Allergic/Immunologic: No nasal congestion or hives. Physical Exam:   /60   Pulse 60   Ht 5' 7.5\" (1.715 m)   Wt 116 lb (52.6 kg)   SpO2 96%   BMI 17.90 kg/m²   Wt Readings from Last 3 Encounters:   08/12/21 116 lb (52.6 kg)   08/11/21 113 lb (51.3 kg)   07/07/21 113 lb (51.3 kg)     Physical Exam:   Constitutional: The patient is oriented to person, place, and time. Appears well-developed and well-nourished. In no acute distress. Head: Normocephalic and atraumatic. Pupils equal and round. Neck: Neck supple. No JVP or carotid bruit appreciated. No mass and no thyromegaly present. No lymphadenopathy present. Cardiovascular: Irregularly irregular. Normal heart sounds. Exam reveals no gallop and no friction rub. 9-8/8 systolic murmur at the base  Pulmonary/Chest: Effort normal and breath sounds normal. No respiratory distress. No wheezes, rhonchi or rales. Abdominal: Soft, non-tender. Bowel sounds are normal. Exhibits no organomegaly, mass or bruit. Extremities: No edema. No cyanosis or clubbing. Pulses are 2+ radial and carotid bilaterally. Neurological: No gross cranial nerve deficit. Coordination normal.   Skin: Skin is warm and dry. There is no rash or diaphoresis. Psychiatric: Patient has a normal mood and affect.  Speech is normal and behavior is normal.       Lab Review:   Lab Results   Component Value Date    TRIG 58 03/29/2018    HDL 78 06/28/2019    LDLCALC 76 06/28/2019    LABVLDL 15 06/28/2019     Lab Results   Component Value Date     08/05/2021      Lab Results   Component Value Date    HGB 12.4 08/05/2021    HCT 36.8 08/05/2021       Lab Results   Component Value Date     08/05/2021     Lab Results   Component Value Date    K 5.0 08/05/2021    K 3.9 12/29/2019     Lab Results   Component Value Date    BUN 26 08/05/2021    CREATININE 0.8 08/05/2021       EKG Interpretation:   12/7/18 SR with PACs   1/2/19 Sinus bradycardia with PAC's  8/15/19 Sinus bradycardia with PAC's  1/3/20 Atrial fibrillation  2/21/2020 Atrial fibrillation  8/26/20 Sinus  Rhythm with Nonspecific QRS widening. Nonspecific T-abnormality. 1/28/21 Sinus  Bradycardia  - occasional PAC, # PACs = 1. Nonspecific QRS widening and right axis, 50 bpm.   8/12/21: not completed       Image Review:     Echo 10/29/18  Left ventricular cavity size is normal.  Normal left ventricular wall thickness. Difficult to estimate EF due to increase rate and rhythm but appears depressed  Mitral valve leaflets appear mildly thickened. Mild mitral regurgitation. The left atrium is dilated. Aortic valve appears sclerotic but opens adequately. Trivial aortic regurgitation. Tricuspid valve is structurally normal.  Mild tricuspid regurgitation with RVSP of 26 mmHg. The right atrium is dilated. Suggest repeat limited echo with bubble study to r/o ASD or PFO     Echo limited 11/2/18   Left ventricular cavity size is normal.  Normal left ventricular wall thickness. Ejection fraction is visually estimated to be 30-35%, though difficult to adequately assess due to arrhythmia. Severe hypo to akinesis of the septum. Abnormal (paradoxical) septal motion is present. Tricuspid valve appears thickened with redundancy. The right atrium is mildly dilated. IVC not well visualized. A bubble study was performed and fails to show evidence of shunting. Stress 12/21/18  1.  Technically a satisfactory study.    2. Positive treadmill exercise stress portion of the study.    3. No evidence of Ischemia by Myocardial Perfusion Imaging.    4. Gated Study shows normal LV size and Systolic function; EF is 17%.    5. The patient exercised for 6 min. on the Ramírez protocol to achieve a HR of    133, which is 94% of PMHR. The study was stopped due to elevated BP. There    was no chest pain . 1mm ST depression in lateral leads.    6. Diastolic BP crept upto 732 with peak exercise     Event monitor 2/13/19  NSR. Few episodes if PSVT. ECHO 5/15/19  Mitral valve leaflets appear thickened with mitral valve prolapse. moderate mitral regurgitation is present. The left atrium is mildly dilated. Tricuspid valve appear redundant. Mild to moderate tricuspid regurgitation. The right atrium is moderately dilated. IVC size is normal (<2.1 cm) but collapses < 50% with respiration consistent  with elevated RA pressure (8 mmHg). Estimated pulmonary artery systolic pressure is mildly elevated at 34 mmHg  assuming a right atrial pressure of 8 mmHg. Ejection fraction is visually estimated to be 40-45%. Echo: 10/8/20   Ejection fraction is visually estimated to be 50%. Indeterminate diastolic function. RV is dilated with mildly depressed function   RV volume overload   moderate TR    Assessment/Plan:     Paroxysmal atrial fibrillation  -underwent cardioversion 1/10/2020 followed by EPS with radiofrequency AFIB ablation 5/8/20.   -8/11/21 AFIB and atrial tachycardia ablation per Dr. Aminata Toro   Postprocedure EKG showed sinus rhythm with PACs  Per Dr. Aminata Toro new prescription for Flecainide 25mg po BID and pre-admission Eliquis 5mg po BID. He will remain on Toprol XL for cardiac anti-remodeling. The patient will follow-up with the EP NP in 3 month's time. Today, He is currently asymptomatic and stable today with no reports of palpitations, heart racing, fluttering, pounding. Physical exam today is unremarkable    Cardiomyopathy/Chronic systolic heart failure  He denies any signs or symptoms of heart failure.  He appears euvolemic on clinical examine today and has remained stable at this time. His last echocardiogram 10/8/20 LVEF improved to 50%. Continue with medical therapy including B-blocker and lisinopril 10 mg daily. Encouraged a low sodium diet, daily fluid intake daily monitoring and daily weights. BMP 8/5/21 abnormal BUN 26 otherwise wnl. We will repeat an echocardiogram prior to next follow up. Hypertension, essential   Controlled today on medical therapy. I have encouraged him to work on a low sodium diet. Follow up with EP-NP in 3 months and myself in 5 months with echo prior. Instructed to obtain flu vaccine this season, annually . Complexity of medical decision making-high    Thank you very much for allowing me to participate in the care of your patient. Please do not hesitate to contact me if you have any questions. Sincerely,  Lola Nowak M.D. [address] [place] [address]  Ph: [phone]  Fax: [phone]    This note was scribed in the presence of Dr. Praveen Estrada MD by Eloisa Nowak RN.

## 2021-08-12 NOTE — PATIENT INSTRUCTIONS
Patient Education        Transthoracic Echocardiogram: About This Test  What is it? An echocardiogram (also called an echo) uses sound waves to make an image of your heart. A device called a transducer sends sound waves that echo off your heart and back to the transducer. These echoes are turned into moving pictures of your heart that can be seen on a video screen. In a transthoracic echocardiogram (TTE), the transducer is moved across your chest or belly. A TTE is the most common type of echocardiogram.  Why is this test done? This test is done to check your heart health. It's used for many reasons. For example, it may be done to:  · Check a heart murmur. · Look for the cause of shortness of breath or unexplained chest pain or pressure. · Check how well your heart is pumping blood. · Check to see how well your heart valves are working. · Look for blood clots inside your heart. · Measure the size and shape of the heart's chambers. · Measure the blood pressure and speed of blood flow through the heart. How is the test done? · You may be asked to remove your clothes above your waist. You may be given a cloth or paper covering to use during the test.  · You will lie on your back or on your left side on a bed or table. · You may receive medicine through a vein (intravenously, or IV). The IV can be used to give you a contrast material. This helps your doctor get good views of your heart. · Small pads or patches (electrodes) will be placed on the skin of your chest to record your heart rate during the test.  · A small amount of gel will be rubbed on the side of your chest to help  the sound waves. · The transducer will be pressed firmly against your chest and moved slowly back and forth. It is usually moved to different areas on your chest or belly to get specific views of your heart.   · You will be asked to do several things, such as hold very still, breathe in and out very slowly, hold your breath, or lie on your left side. · When the test is over, the gel is wiped off and the electrodes are removed. What are the risks of the test?  There are no known risks from having this test.  · No electricity passes through your body during the test. There is no danger of getting an electrical shock. · You do not receive any radiation. What happens after the test?  · You will probably be able to go home right away. It depends on the reason for the test.  · You can go back to your usual activities right away. Follow-up care is a key part of your treatment and safety. Be sure to make and go to all appointments, and call your doctor if you are having problems. It's also a good idea to keep a list of the medicines you take. Ask your doctor when you can expect to have your test results. Current as of: August 31, 2020               Content Version: 12.9  © 2006-2021 [employer]. Care instructions adapted under license by [facility] ([facility]). If you have questions about a medical condition or this instruction, always ask your healthcare professional. [address] any warranty or liability for your use of this information.

## 2021-08-13 NOTE — FLOWSHEET NOTE
Follow up call made to patient with message left. Patient had appointment with Dr. Angeles Gracia on 8/12/21 with patient stable per notes.

## 2021-09-02 RX ORDER — METOPROLOL SUCCINATE 25 MG/1
TABLET, EXTENDED RELEASE ORAL
Qty: 45 TABLET | Refills: 0 | Status: SHIPPED | OUTPATIENT
Start: 2021-09-02 | End: 2021-11-30 | Stop reason: SDUPTHER

## 2021-09-02 NOTE — TELEPHONE ENCOUNTER
Last ov 8/12/21  Pending appt 11/16/21  Last refill 8/24/20 #[id number]  Last labs 8/5/21
2 seconds or less

## 2021-09-07 RX ORDER — METOPROLOL SUCCINATE 25 MG/1
TABLET, EXTENDED RELEASE ORAL
Qty: 45 TABLET | Refills: 0 | OUTPATIENT
Start: 2021-09-07

## 2021-09-08 ENCOUNTER — HOSPITAL ENCOUNTER (OUTPATIENT)
Dept: CT IMAGING | Age: 81
Discharge: HOME | End: 2021-09-08
Payer: MEDICARE

## 2021-09-08 DIAGNOSIS — C37 THYMUS CANCER (HCC): ICD-10-CM

## 2021-09-08 PROCEDURE — 6360000004 HC RX CONTRAST MEDICATION: Performed by: RADIOLOGY

## 2021-09-08 PROCEDURE — 71260 CT THORAX DX C+: CPT

## 2021-09-08 RX ADMIN — IOPAMIDOL 75 ML: 755 INJECTION, SOLUTION INTRAVENOUS at 11:41

## 2021-09-16 ENCOUNTER — OFFICE VISIT (OUTPATIENT)
Dept: PRIMARY CARE CLINIC | Age: 81
End: 2021-09-16
Payer: MEDICARE

## 2021-09-16 VITALS
WEIGHT: 114.6 LBS | BODY MASS INDEX: 17.68 KG/M2 | RESPIRATION RATE: 16 BRPM | SYSTOLIC BLOOD PRESSURE: 97 MMHG | OXYGEN SATURATION: 97 % | HEART RATE: 58 BPM | DIASTOLIC BLOOD PRESSURE: 55 MMHG | TEMPERATURE: 97.7 F

## 2021-09-16 DIAGNOSIS — I10 ESSENTIAL HYPERTENSION: ICD-10-CM

## 2021-09-16 DIAGNOSIS — Z23 NEED FOR INFLUENZA VACCINATION: ICD-10-CM

## 2021-09-16 DIAGNOSIS — D49.89 THYMOMA: ICD-10-CM

## 2021-09-16 DIAGNOSIS — I50.22 CHRONIC SYSTOLIC CHF (CONGESTIVE HEART FAILURE), NYHA CLASS 1 (HCC): ICD-10-CM

## 2021-09-16 DIAGNOSIS — I48.0 PAF (PAROXYSMAL ATRIAL FIBRILLATION) (HCC): ICD-10-CM

## 2021-09-16 PROCEDURE — 99214 OFFICE O/P EST MOD 30 MIN: CPT | Performed by: FAMILY MEDICINE

## 2021-09-16 PROCEDURE — 90694 VACC AIIV4 NO PRSRV 0.5ML IM: CPT | Performed by: FAMILY MEDICINE

## 2021-09-16 PROCEDURE — G0008 ADMIN INFLUENZA VIRUS VAC: HCPCS | Performed by: FAMILY MEDICINE

## 2021-09-16 PROCEDURE — 4040F PNEUMOC VAC/ADMIN/RCVD: CPT | Performed by: FAMILY MEDICINE

## 2021-09-16 PROCEDURE — G8427 DOCREV CUR MEDS BY ELIG CLIN: HCPCS | Performed by: FAMILY MEDICINE

## 2021-09-16 PROCEDURE — G8419 CALC BMI OUT NRM PARAM NOF/U: HCPCS | Performed by: FAMILY MEDICINE

## 2021-09-16 PROCEDURE — 1036F TOBACCO NON-USER: CPT | Performed by: FAMILY MEDICINE

## 2021-09-16 PROCEDURE — 1123F ACP DISCUSS/DSCN MKR DOCD: CPT | Performed by: FAMILY MEDICINE

## 2021-09-16 RX ORDER — LISINOPRIL 5 MG/1
5 TABLET ORAL DAILY
Qty: 90 TABLET | Refills: 1 | Status: SHIPPED | OUTPATIENT
Start: 2021-09-16 | End: 2021-12-17

## 2021-09-16 ASSESSMENT — ENCOUNTER SYMPTOMS
SHORTNESS OF BREATH: 0
CONSTIPATION: 0
DIARRHEA: 0
TROUBLE SWALLOWING: 0
ABDOMINAL PAIN: 0
VOICE CHANGE: 0
BLOOD IN STOOL: 0

## 2021-09-16 NOTE — PROGRESS NOTES
Vaccine Information Sheet, \"Influenza - Inactivated\"  given to Israel Milton, or parent/legal guardian of  Israel Milton and verbalized understanding. Patient responses:    Have you ever had a reaction to a flu vaccine? No  Do you have any current illness? No  Have you ever had Guillian [place] Syndrome? No  Do you have a serious allergy to any of the follow: Neomycin, Polymyxin, Thimerosal, eggs or egg products? No    Flu vaccine given per order. Please see immunization tab. Risks and benefits explained. Current VIS given.

## 2021-09-16 NOTE — PROGRESS NOTES
2021     Snehal Levy (:  1940) is a 79 y.o. male, here for evaluation of the following medical concerns:    HPI  Patient presented to the office for his regular follow-up. He has hypertension and takes lisinopril 10 mg daily and Toprol-XL 25 mg daily. His blood pressure today is somewhat low at 97/55 and patient is asymptomatic. Denies dizziness or lightheadedness. He has paroxysmal A. fib status post cardioversion and catheter ablation currently controlled with Toprol-XL 25 daily and Tambocor 25 mg twice daily. He also take Eliquis 5 mg twice daily . He goes to Dr. Reynoso Nail  He has chronic systolic heart failure appears to be compensated and LV function recovered to 50% per echocardiogram last year 10/8/2020. He denies shortness of breath chest pain and denies leg edema. 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 He still think that it is \"malignant\". And is considering about second opinion. He goes to oncologist Dr. Lily Marino of [facility]   Constitutional: Negative for activity change. HENT: Negative for trouble swallowing and voice change. Eyes: Negative for visual disturbance. Respiratory: Negative for shortness of breath. Cardiovascular: Negative for chest pain and leg swelling. Gastrointestinal: Negative for abdominal pain, blood in stool, constipation and diarrhea. Genitourinary: Negative for difficulty urinating, dysuria, frequency, hematuria and scrotal swelling. Musculoskeletal: Negative for arthralgias and myalgias. Skin: Negative for rash. Neurological: Negative for dizziness. Psychiatric/Behavioral: Negative for behavioral problems. Prior to Visit Medications    Medication Sig Taking?  Authorizing Provider   zoster recombinant adjuvanted vaccine (SHINGRIX) 50 MCG/0.5ML SUSR injection Inject 0.5 mLs into the muscle once for 1 dose Yes Angelita Stanton MD   lisinopril (PRINIVIL;ZESTRIL) 5 MG tablet Take 1 tablet by mouth daily Yes Monica Garcia MD   metoprolol succinate (TOPROL XL) 25 MG extended release tablet TAKE 1/2 TABLET BY MOUTH DAILY Yes Reno Ceballos MD   flecainide (TAMBOCOR) 50 MG tablet Take 0.5 tablets by mouth 2 times daily Yes Susan Roberts MD   apixaban (ELIQUIS) 5 MG TABS tablet Take 1 tablet by mouth 2 times daily Yes Monica Garcia MD   triamcinolone (ARISTOCORT) 0.5 % cream Apply topically every 4 hours as needed (eczema) Apply topically 3 times daily. Monica Garcia MD        Social History     Tobacco Use    Smoking status: Former Smoker     Packs/day: 1.00     Types: Cigarettes     Start date: 1955     Quit date: 1993     Years since quittin.7    Smokeless tobacco: Never Used   Substance Use Topics    Alcohol use: Yes     Comment: socially        Vitals:    21 1219   BP: (!) 97/55   Pulse: 58   Resp: 16   Temp: 97.7 °F (36.5 °C)   TempSrc: Temporal   SpO2: 97%   Weight: 114 lb 9.6 oz (52 kg)     Estimated body mass index is 17.68 kg/m² as calculated from the following:    Height as of 21: 5' 7.5\" (1.715 m). Weight as of this encounter: 114 lb 9.6 oz (52 kg). Physical Exam  Constitutional:       General: He is not in acute distress. Appearance: He is well-developed. HENT:      Head: Normocephalic. Eyes:      Conjunctiva/sclera: Conjunctivae normal.   Neck:      Thyroid: No thyromegaly. Cardiovascular:      Rate and Rhythm: Normal rate and regular rhythm. Heart sounds: Normal heart sounds. No murmur heard. Pulmonary:      Effort: No respiratory distress. Breath sounds: Normal breath sounds. No wheezing or rales. Abdominal:      General: There is no distension. Palpations: Abdomen is soft. Musculoskeletal:         General: Normal range of motion. Cervical back: Neck supple. Skin:     General: Skin is warm. Findings: No rash.    Neurological:      Mental Status: He is alert and oriented to person, place, and time. Psychiatric:         Behavior: Behavior normal.         ASSESSMENT/PLAN:  1. Essential hypertension  Blood pressure is low at 9755. Patient is asymptomatic. Will reduce lisinopril from 10 mg to 5 mg daily. Continue Toprol-XL 25 mg daily. 2. PAF (paroxysmal atrial fibrillation) ([facility])  Controlled ventricular rate. Continue Toprol-XL 25 mg daily and Eliquis 5 mg twice daily. 3. Chronic systolic CHF (congestive heart failure), NYHA class 1 ([facility])  LV ejection fraction recovered to 50% per echocardiogram 10/8/2020. He appears to be compensated. Continue current medication    4. 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 \"  Advised to follow-up with his oncologist Dr. Andrews Tom    5.  Need for influenza vaccination  - INFLUENZA, QUADV, ADJUVANTED, 65 YRS =, IM, PF, PREFILL SYR, 0.5ML (FLUAD)      RTC in 3 mos    An electronic signature was used to authenticate this note.    --Inna Dorantes MD on 9/16/2021 at 1:05 PM

## 2021-10-25 RX ORDER — LISINOPRIL 10 MG/1
TABLET ORAL
Qty: 90 TABLET | Refills: 3 | OUTPATIENT
Start: 2021-10-25

## 2021-11-12 PROBLEM — I47.1 ATRIAL TACHYCARDIA (HCC): Status: ACTIVE | Noted: 2021-11-12

## 2021-11-12 PROBLEM — I47.19 ATRIAL TACHYCARDIA: Status: ACTIVE | Noted: 2021-11-12

## 2021-11-12 NOTE — PROGRESS NOTES
[facility]   Electrophysiology      Date: 11/16/2021    Primary Cardiologist: Yonny Mendez MD  PCP: Ana Rosa Bergman MD     Chief Complaint:   Chief Complaint   Patient presents with    Follow-up     no cc     History of Present Illness:    I saw Tea Capps in the office for electrophysiology follow up today. He is an 79 y.o. male with a past medical history of hypertension, systolic heart failure, CAD seen on CTA and persistent atrial fibrillation. He was first diagnosed with atrial fibrillation in October 2018 after presented to ED with worsening shortness of breath and weakness. EKG confirmed afib with RVR. Echocardiogram was completed and showed an LVEF on 30-35%. He was initially treated with amiodarone but it was discontinued due to abnormal LFTs. He underwent successful cardioversion to SR on 1/10/2020. On 5/8/2020 he underwent ellectrophysiology study with radiofrequency ablation of atrial fibrillation and pulmonary vein isolation 5/8/2020 with Dr. Melony Rios. His flecainide was decreased by Dr. Clovis Zhu in January 2021. He had recurrent atrial fibrillation and flutter on a monitor in May 2021. He underwent repeat atrial fibrillation ablation, right atrial tachycardia, posterior wall line (for A fib) and CAFE (for A fib) ablation on 8/11/21 with Dr. Missy Vizcaino. Flecainide was restarted on flecainide at 25mg BID. He presents today for procedure follow up. He has been doing well since his last procedure. He is back to running regularly without any issues. Denies any chest pain or dyspnea. No palpitations. No edema. No syncope. No bleeding issues. Allergies: Allergies   Allergen Reactions    Amiodarone      Pt is not sure that he is allergic to this. Home Medications:  Prior to Visit Medications    Medication Sig Taking?  Authorizing Provider   lisinopril (PRINIVIL;ZESTRIL) 5 MG tablet Take 1 tablet by mouth daily Yes Shahram Chisholm MD   metoprolol succinate (TOPROL XL) 25 MG extended release tablet TAKE 1/2 TABLET BY MOUTH DAILY Yes Mason Mckeon MD   apixaban (ELIQUIS) 5 MG TABS tablet Take 1 tablet by mouth 2 times daily Yes Jeison Estrada MD   triamcinolone (ARISTOCORT) 0.5 % cream Apply topically every 4 hours as needed (eczema) Apply topically 3 times daily. 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      [facility] THYROID BIOPSY  6/18/2021     THYROID BIOPSY 6/18/2021 [facility] ULTRASOUND       Social History:   reports that he quit smoking about 28 years ago. His smoking use included cigarettes. He started smoking about 66 years ago. He smoked 1.00 pack per day. He has never used smokeless tobacco. He reports current alcohol use. He reports previous drug use. Family History:      Problem Relation Age of Onset    Heart Disease Father     Cancer Sister         Colon    Cancer Brother         Colon    Heart Disease Brother     Heart Disease Brother        Review of Systems   Constitutional: Negative for chills, fatigue, fever and unexpected weight change. HENT: Negative for congestion, hearing loss, sinus pressure, sore throat and trouble swallowing. Respiratory: Negative for cough, shortness of breath and wheezing. Cardiovascular: Negative for chest pain, palpitations and leg swelling.    Gastrointestinal: Negative for abdominal pain, blood in stool, constipation, diarrhea, nausea and vomiting. Genitourinary: Negative for hematuria. Musculoskeletal: Negative for arthralgias, back pain, gait problem and myalgias. Skin: Negative for color change, rash and wound. Neurological: Negative for dizziness, seizures, syncope, speech difficulty, weakness and light-headedness. Hematological: Does not bruise/bleed easily. Physical Examination:  Vitals:    11/16/21 1253   BP: 132/70   Pulse: 55   SpO2: 98%      Wt Readings from Last 3 Encounters:   11/16/21 117 lb (53.1 kg)   09/16/21 114 lb 9.6 oz (52 kg)   08/12/21 116 lb (52.6 kg)       Physical Exam  Vitals reviewed. Constitutional:       General: He is not in acute distress. Appearance: Normal appearance. HENT:      Head: Normocephalic and atraumatic. Nose: Nose normal.      Mouth/Throat:      Mouth: Mucous membranes are moist.   Eyes:      Conjunctiva/sclera: Conjunctivae normal.      Pupils: Pupils are equal, round, and reactive to light. Cardiovascular:      Rate and Rhythm: Normal rate and regular rhythm. Heart sounds: No murmur heard. No friction rub. No gallop. Pulmonary:      Effort: No respiratory distress. Breath sounds: No wheezing, rhonchi or rales. Abdominal:      General: Abdomen is flat. Bowel sounds are normal.      Palpations: Abdomen is soft. Musculoskeletal:         General: Normal range of motion. Right lower leg: No edema. Left lower leg: No edema. Skin:     General: Skin is warm and dry. Findings: No bruising. Neurological:      General: No focal deficit present. Mental Status: He is alert and oriented to person, place, and time. Motor: No weakness.    Psychiatric:         Mood and Affect: Mood normal.         Behavior: Behavior normal.          Pertinent labs, diagnostic, device, and imaging results reviewed as a part of this visit    LABS    CBC:   Lab Results   Component Value Date    WBC 5.5 08/05/2021    HGB 12.4 (L) 08/05/2021 HCT 36.8 (L) 2021    MCV 91.4 2021     2021     BMP:   Lab Results   Component Value Date    CREATININE 0.8 2021    BUN 26 (H) 2021     2021    K 5.0 2021     2021    CO2 25 2021     Estimated Creatinine Clearance: 53 mL/min (based on SCr of 0.8 mg/dL). No results found for: BNP    Thyroid:   Lab Results   Component Value Date    TSH 3.98 2019     Lipid Panel:   Lab Results   Component Value Date    CHOL 193 2018    HDL 78 2019    TRIG 58 2018     LFTs:  Lab Results   Component Value Date    ALT 17 2019    AST 21 2019    GGT 83 (H) 10/31/2018    ALKPHOS 70 2019    BILITOT 0.9 2019     Coags:   Lab Results   Component Value Date    PROTIME 12.3 2020    INR 1.06 2020    APTT 33.5 2019       EC2021   SB at 53 BPM. PACs. Non-specific ST-T wave changes. Echo: 10/8/20   Summary   Ejection fraction is visually estimated to be 50%. Indeterminate diastolic function. RV is dilated with mildly depressed function   RV volume overload   moderate TR    Stress test: 18   Summary    Treadmill MPI Results        1. Technically a satisfactory study.    2. Positive treadmill exercise stress portion of the study.    3. No evidence of Ischemia by Myocardial Perfusion Imaging.    4. Gated Study shows normal LV size and Systolic function; EF is 50%.    5. The patient exercised for 6 min. on the Ramírez protocol to achieve a HR of    133, which is 94% of PMHR. The study was stopped due to elevated BP. There    was no chest pain . 1mm ST depression in lateral leads.    6. Diastolic BP crept upto 340 with peak exercise     Procedures:  1.  Electrophysiology study with radiofrequency ablation of atrial fibrillation and pulmonary vein isolation 2020.  2. Successful cardioversion using 200 Joules of synchronised current 1/10/2020.  3. A fib and R AT ablation, 21,  Ej    Assessment & Plan:    Persistent atrial fibrillation   - noted initially in 10/18 s/p cardioversion 1/20 and ablation 5/20, repeat ablation on 8/11/21   - EKG today with SB   - can stop flecainide    - 30 day cardiac monitor   - continue Toprol given history of cardiomyopathy/SHF   - CHADS2-VASc 5 (age, HTN, HF, CAD) on Eliquis 5mg BID    Chronic systolic heart failure   - EF 50%, NYHA class I   - on Toprol 12.5mg QD, lisinopril 10mg QD, Lasix   - appears well compensated   - follows with Dr. Veronica Kim cardiomyopathy   - likely tachycardia-mediated as EF improved with SR and stress test at that time with no ischemia   - continue current medications    CAD   - seen on cardiac CTA, described as \"moderate\" on report in 2020   - no angina and pt is a very active runner without issues   - discussed with pt, advised he start statin, will discuss w/ Dr. Jani Morales at upcoming follow up first    Essential HTN   - managed by Dr. Jani Morales    Thank you for allowing to us to participate in the care of [facility] [address] [place], [place] [address]Ludmila. Return in about 2 months (around 1/16/2022) for an appointment with Cornelia Gonzales NP.      AYLIN Jovel  [facility] [address], [address]  Phone: [phone]  Fax: [phone]    Electronically signed by AYLIN Smith - CNP on 11/16/2021 at 1:49 PM

## 2021-11-16 ENCOUNTER — OFFICE VISIT (OUTPATIENT)
Dept: CARDIOLOGY CLINIC | Age: 81
End: 2021-11-16
Payer: MEDICARE

## 2021-11-16 VITALS
BODY MASS INDEX: 17.73 KG/M2 | HEIGHT: 68 IN | HEART RATE: 55 BPM | DIASTOLIC BLOOD PRESSURE: 70 MMHG | SYSTOLIC BLOOD PRESSURE: 132 MMHG | WEIGHT: 117 LBS | OXYGEN SATURATION: 98 %

## 2021-11-16 DIAGNOSIS — I48.19 PERSISTENT ATRIAL FIBRILLATION (HCC): Primary | ICD-10-CM

## 2021-11-16 DIAGNOSIS — I42.8 NICM (NONISCHEMIC CARDIOMYOPATHY) (HCC): ICD-10-CM

## 2021-11-16 DIAGNOSIS — I10 ESSENTIAL HYPERTENSION: ICD-10-CM

## 2021-11-16 DIAGNOSIS — I47.1 ATRIAL TACHYCARDIA (HCC): ICD-10-CM

## 2021-11-16 DIAGNOSIS — I50.22 CHRONIC SYSTOLIC HEART FAILURE (HCC): ICD-10-CM

## 2021-11-16 PROCEDURE — G8427 DOCREV CUR MEDS BY ELIG CLIN: HCPCS | Performed by: NURSE PRACTITIONER

## 2021-11-16 PROCEDURE — 93000 ELECTROCARDIOGRAM COMPLETE: CPT | Performed by: NURSE PRACTITIONER

## 2021-11-16 PROCEDURE — G8419 CALC BMI OUT NRM PARAM NOF/U: HCPCS | Performed by: NURSE PRACTITIONER

## 2021-11-16 PROCEDURE — 1036F TOBACCO NON-USER: CPT | Performed by: NURSE PRACTITIONER

## 2021-11-16 PROCEDURE — 99214 OFFICE O/P EST MOD 30 MIN: CPT | Performed by: NURSE PRACTITIONER

## 2021-11-16 PROCEDURE — 1123F ACP DISCUSS/DSCN MKR DOCD: CPT | Performed by: NURSE PRACTITIONER

## 2021-11-16 PROCEDURE — 4040F PNEUMOC VAC/ADMIN/RCVD: CPT | Performed by: NURSE PRACTITIONER

## 2021-11-16 PROCEDURE — G8484 FLU IMMUNIZE NO ADMIN: HCPCS | Performed by: NURSE PRACTITIONER

## 2021-11-16 ASSESSMENT — ENCOUNTER SYMPTOMS
CONSTIPATION: 0
BLOOD IN STOOL: 0
VOMITING: 0
SINUS PRESSURE: 0
SORE THROAT: 0
ABDOMINAL PAIN: 0
COUGH: 0
SHORTNESS OF BREATH: 0
NAUSEA: 0
WHEEZING: 0
DIARRHEA: 0
TROUBLE SWALLOWING: 0
BACK PAIN: 0
COLOR CHANGE: 0

## 2021-11-18 PROCEDURE — 93228 REMOTE 30 DAY ECG REV/REPORT: CPT | Performed by: NURSE PRACTITIONER

## 2021-11-19 ENCOUNTER — TELEPHONE (OUTPATIENT)
Dept: CARDIOLOGY CLINIC | Age: 81
End: 2021-11-19

## 2021-11-19 NOTE — TELEPHONE ENCOUNTER
Noted. Patient has a 30 day event monitor. Will contact Ariela Dominguez to see if she can have strips sent over.

## 2021-11-19 NOTE — TELEPHONE ENCOUNTER
Salma Bhatti called in this morning wanting to talk to Rod Leong NP. He said he feels like he is back in A-Fib this morning and wants to know if he should come in for an EKG?       You can reach Salma Bhatti at #[phone]

## 2021-11-19 NOTE — TELEPHONE ENCOUNTER
Spoke to patient who states that he thinks that he is in atrial fib. He has no symptoms but his [employer] device showed a \"skipping heart beat. \" He denies episodes of racing heart beats and states that his pulse is in the 60's. I instructed pt to activate his monitor which he did and we will review the rhythm strips when available and call with further recommendations. V/u. Please obtain rhythm strips.  Thanks

## 2021-11-22 NOTE — TELEPHONE ENCOUNTER
Reviewed tracings, all SR, one 21 seconds of an atrial run and one NSVT. No sustained A fib. Will continue to monitor.

## 2021-11-24 RX ORDER — LISINOPRIL 10 MG/1
TABLET ORAL
Qty: 90 TABLET | Refills: 3 | OUTPATIENT
Start: 2021-11-24

## 2021-11-24 RX ORDER — TRIAMCINOLONE ACETONIDE 5 MG/G
CREAM TOPICAL EVERY 4 HOURS PRN
Qty: 15 G | Refills: 3 | Status: SHIPPED | OUTPATIENT
Start: 2021-11-24 | End: 2022-05-06 | Stop reason: SDUPTHER

## 2021-11-24 NOTE — TELEPHONE ENCOUNTER
Reviewed chart:  Last OV lists lisinopril 10 mg tablet daily; however medication list in [place] states 5 mg lisinopril daily. Last filled with 5 mg Lisinopril tablets in September 2021. Would defer to PCP and verify dosage.

## 2021-11-30 RX ORDER — METOPROLOL SUCCINATE 25 MG/1
TABLET, EXTENDED RELEASE ORAL
Qty: 45 TABLET | Refills: 1 | Status: SHIPPED | OUTPATIENT
Start: 2021-11-30 | End: 2022-06-03

## 2021-11-30 RX ORDER — LISINOPRIL 10 MG/1
TABLET ORAL
Qty: 90 TABLET | Refills: 1 | Status: SHIPPED | OUTPATIENT
Start: 2021-11-30 | End: 2022-05-25

## 2021-12-06 ENCOUNTER — TELEPHONE (OUTPATIENT)
Dept: CARDIOLOGY CLINIC | Age: 81
End: 2021-12-06

## 2021-12-06 NOTE — LETTER
[address] HEART INST [facility]  Phone: [phone]  Fax: [phone]    AYLIN Dacosta CNP        December 17, 2021    [facility] [address]  [address] [place] [id number]      Dear Tiffanie Long:    [address] [facility] office has been trying to contact you regarding a result and medications. Please contact our office to obtain results and medication recommendations. If you have any questions or concerns, please don't hesitate to call.     Sincerely,        AYLIN Dacosta CNP / Evelio Mahajan, [address] [facility]

## 2021-12-07 NOTE — TELEPHONE ENCOUNTER
Left message on v/m to call office regarding biotel report and medication recommendations. Please see report scanned into chart.

## 2021-12-17 ENCOUNTER — OFFICE VISIT (OUTPATIENT)
Dept: PRIMARY CARE CLINIC | Age: 81
End: 2021-12-17
Payer: MEDICARE

## 2021-12-17 VITALS
TEMPERATURE: 97.2 F | HEART RATE: 64 BPM | WEIGHT: 114.6 LBS | BODY MASS INDEX: 17.68 KG/M2 | DIASTOLIC BLOOD PRESSURE: 72 MMHG | SYSTOLIC BLOOD PRESSURE: 130 MMHG | OXYGEN SATURATION: 96 % | RESPIRATION RATE: 18 BRPM

## 2021-12-17 DIAGNOSIS — I50.22 CHRONIC SYSTOLIC CHF (CONGESTIVE HEART FAILURE), NYHA CLASS 1 (HCC): ICD-10-CM

## 2021-12-17 DIAGNOSIS — Z86.79 S/P ABLATION OF ATRIAL FIBRILLATION: ICD-10-CM

## 2021-12-17 DIAGNOSIS — I10 ESSENTIAL HYPERTENSION: ICD-10-CM

## 2021-12-17 DIAGNOSIS — Z98.890 S/P ABLATION OF ATRIAL FIBRILLATION: ICD-10-CM

## 2021-12-17 DIAGNOSIS — I48.0 PAF (PAROXYSMAL ATRIAL FIBRILLATION) (HCC): ICD-10-CM

## 2021-12-17 DIAGNOSIS — D49.89 THYMOMA: ICD-10-CM

## 2021-12-17 PROCEDURE — G8427 DOCREV CUR MEDS BY ELIG CLIN: HCPCS | Performed by: FAMILY MEDICINE

## 2021-12-17 PROCEDURE — 99214 OFFICE O/P EST MOD 30 MIN: CPT | Performed by: FAMILY MEDICINE

## 2021-12-17 PROCEDURE — G8484 FLU IMMUNIZE NO ADMIN: HCPCS | Performed by: FAMILY MEDICINE

## 2021-12-17 PROCEDURE — 1036F TOBACCO NON-USER: CPT | Performed by: FAMILY MEDICINE

## 2021-12-17 PROCEDURE — G8419 CALC BMI OUT NRM PARAM NOF/U: HCPCS | Performed by: FAMILY MEDICINE

## 2021-12-17 PROCEDURE — 4040F PNEUMOC VAC/ADMIN/RCVD: CPT | Performed by: FAMILY MEDICINE

## 2021-12-17 PROCEDURE — 1123F ACP DISCUSS/DSCN MKR DOCD: CPT | Performed by: FAMILY MEDICINE

## 2021-12-17 ASSESSMENT — ENCOUNTER SYMPTOMS
BLOOD IN STOOL: 0
ABDOMINAL PAIN: 0
VOICE CHANGE: 0
CONSTIPATION: 0
DIARRHEA: 0
SHORTNESS OF BREATH: 0
TROUBLE SWALLOWING: 0

## 2021-12-17 NOTE — PROGRESS NOTES
2021     Di Osorio (:  1940) is a 79 y.o. male, here for evaluation of the following medical concerns:    HPI  Patient presented to the office for regular follow-up. He has hypertension and blood pressure has been fluctuating for the past several months. He is  back on lisinopril 10 mg daily plus Toprol-XL 25 mg half tablet daily. Blood pressure is within normal today at 351 systolic. He has paroxysmal A. fib status post cardioversion and catheter ablation. He is no longer on flecainide. He is still on beta-blocker and Eliquis. He has 30-day Holter monitor to evaluate for recurrence of AF. He has a chronic systolic heart failure which appeared to be compensated. He denies shortness of breath weight gain or leg edema. His LV ejection fraction was recovered to 50% per echocardiogram 10/8/2020. He takes beta-blocker Lasix and lisinopril. 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 He goes to oncologist Dr. Jolynn Woodward of [facility]   Constitutional: Negative for activity change. HENT: Negative for trouble swallowing and voice change. Eyes: Negative for visual disturbance. Respiratory: Negative for shortness of breath. Cardiovascular: Negative for chest pain and leg swelling. Gastrointestinal: Negative for abdominal pain, blood in stool, constipation and diarrhea. Genitourinary: Negative for difficulty urinating, dysuria, frequency, hematuria and scrotal swelling. Musculoskeletal: Negative for arthralgias and myalgias. Skin: Negative for rash. Neurological: Negative for dizziness. Psychiatric/Behavioral: Negative for behavioral problems. Prior to Visit Medications    Medication Sig Taking?  Authorizing Provider   zoster recombinant adjuvanted vaccine (SHINGRIX) 50 MCG/0.5ML SUSR injection Inject 0.5 mLs into the muscle once for 1 dose Yes Jeison Estrada MD   metoprolol succinate (TOPROL XL) 25 MG extended release tablet TAKE 1/2 TABLET BY MOUTH DAILY Yes Suhail Kimball MD   apixaban (ELIQUIS) 5 MG TABS tablet Take 1 tablet by mouth 2 times daily Yes Brandy Edmond MD   lisinopril (PRINIVIL;ZESTRIL) 10 MG tablet TAKE ONE TABLET BY MOUTH DAILY  Keith Blandon MD   triamcinolone (ARISTOCORT) 0.5 % cream Apply topically every 4 hours as needed (eczema) Apply topically 3 times daily. Brandy Edmond MD        Social History     Tobacco Use    Smoking status: Former Smoker     Packs/day: 1.00     Types: Cigarettes     Start date: 1955     Quit date: 1993     Years since quittin.9    Smokeless tobacco: Never Used   Substance Use Topics    Alcohol use: Yes     Comment: socially        Vitals:    21 1023   BP: 130/72   Pulse: 64   Resp: 18   Temp: 97.2 °F (36.2 °C)   TempSrc: Temporal   SpO2: 96%   Weight: 114 lb 9.6 oz (52 kg)     Estimated body mass index is 17.68 kg/m² as calculated from the following:    Height as of 21: 5' 7.5\" (1.715 m). Weight as of this encounter: 114 lb 9.6 oz (52 kg). Physical Exam  Constitutional:       General: He is not in acute distress. Appearance: He is well-developed. HENT:      Head: Normocephalic. Eyes:      Conjunctiva/sclera: Conjunctivae normal.   Neck:      Thyroid: No thyromegaly. Cardiovascular:      Rate and Rhythm: Normal rate and regular rhythm. Heart sounds: Normal heart sounds. No murmur heard. Pulmonary:      Effort: No respiratory distress. Breath sounds: Normal breath sounds. No wheezing or rales. Abdominal:      General: There is no distension. Palpations: Abdomen is soft. Musculoskeletal:         General: Normal range of motion. Cervical back: Neck supple. Skin:     General: Skin is warm. Findings: No rash. Neurological:      Mental Status: He is alert and oriented to person, place, and time. Psychiatric:         Behavior: Behavior normal.         ASSESSMENT/PLAN:  1. Essential hypertension  Controlled.   She is back on lisinopril 10 mg daily plus Toprol-XL 25 mg half tablet daily. 2. PAF (paroxysmal atrial fibrillation) ([facility] [address] [address] / 3. S/P ablation of atrial fibrillation  S/p cardioversion 1/10/2020 s/p radiofrequency ablation 5/8/2020 by Dr Torsten Briones  S/p AF ablation 8/11/2021 by Dr Natanael Camacho. He is OFF Fleicainide. Has 30 day cardiac monitor. Continue Toprol-XL and Eliquis      4. Chronic systolic CHF (congestive heart failure), NYHA class 1 ([facility])  EF 50%. Appears to be compensated. Continue Toprol-XL 12.5 mg daily, lisinopril 10 mg daily and Lasix. 5. Thymoma  He goes to oncologist Dr. Angela Dong      No follow-ups on file. RTC in 4 mos    An electronic signature was used to authenticate this note.    --Gabrielle Dobson MD on 12/17/2021 at 10:52 AM

## 2021-12-28 DIAGNOSIS — I48.19 PERSISTENT ATRIAL FIBRILLATION (HCC): ICD-10-CM

## 2021-12-28 DIAGNOSIS — I47.1 ATRIAL TACHYCARDIA (HCC): ICD-10-CM

## 2021-12-28 NOTE — TELEPHONE ENCOUNTER
Left message on v/m to call office for results of cardiac event monitor and medication recs from Rod Leong. Per Rod Leong needs a follow up appointment with Dr. Yudith Chamberlain. Report has been scanned into chart.

## 2021-12-29 NOTE — TELEPHONE ENCOUNTER
Pt called back, informed of monitor results and recs for restarting flecainide 25 mg BID. Pt's appt will be rescheduled with Dr. Hemanth Cornejo.

## 2021-12-30 NOTE — PROGRESS NOTES
Cardiac Electrophysiology Follow Up  Date: 1/3/2022  Reason for Consultation: Atrial fibrillation  Consult Requesting Physician: Tez Duran M.D. Primary Care Physician: Amilcar Cristina MD    Chief Complaint   Patient presents with    Follow-up     No cc       HPI: Conrad Reyes is a 79 y.o. patient with a history of atrial fibrillation, hypertension, and systolic heart failure He was first diagnosed with atrial fibrillation in October of 2018 after presenting to the ED with c/p worsening shortness of breath and weakness. In the ER he was noted to be in Atrial fibrillation with RVR and also evidence of pneumonia. He had an echocardiogram completed that showed an LVEF of 30-35%. He was treated with amiodarone but was discontinued due to abnormal LFTs. He was discharged home on a BB and Eliquis. He wore a 30 day event monitor that showed he was predominantly in normal sinus rhythm with 6 episodes of atrial fibrillation or atrial flutter. No symptoms were reported. Underwent successful CV on 1/10/2020 (Dr. Sherry Newsome). On 5/8/20 he underwent ablation of atrial fibrillation with Dr. Alba Godwin. Noted to have recurrent AF and flutter on holter monitor 5/2021 and underwent repeat AF ablation on 8/11/21 with Dr. Christ Gomes. Flecainide discontinued during follow up with Alva Mcgill on 11/16/21 and 30 day event monitor placed. Patient called the office on 11/19/21 to report that he was \"back in Afib. \" He denied any symptoms, stating his K[employer]iam[employer] device showed a \"skipping heart beat. \" Uriel Borges reviewed strips from event monitor revealing sustained Afib >4 hours, 21 seconds of an atrial run likely atrial flutter and NSVT . Interval History: He returns today in f/u to discuss findings on cardiac monitor. Says he had an episode where he thought he had a rapid heart rate, but it felt differently than when he had atrial fibrillation.  Says he was instructed to resume Flecainide 25 mg bid and has remained compliant with therapy. He continues to take B-blocker and Eliquis with no reports of abnormal bruising or bleeding. 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 Inquiring if that may have contributed to the development of Afib/flutter. Today, he reports feeling well. 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 [place] [place] THYROID BIOPSY  6/18/2021     THYROID BIOPSY 6/18/2021 [facility] ULTRASOUND     Allergies: Allergies   Allergen Reactions    Amiodarone      Pt is not sure that he is allergic to this. Medication:   Prior to Admission medications    Medication Sig Start Date End Date Taking? Authorizing Provider   flecainide (TAMBOCOR) 50 MG tablet Take 25 mg by mouth 2 times daily   Yes Historical Provider, MD   lisinopril (PRINIVIL;ZESTRIL) 10 MG tablet TAKE ONE TABLET BY MOUTH DAILY 11/30/21  Yes Ifeanyi Guevara MD   metoprolol succinate (TOPROL XL) 25 MG extended release tablet TAKE 1/2 TABLET BY MOUTH DAILY 11/30/21  Yes Ifeanyi Guevara MD   apixaban (ELIQUIS) 5 MG TABS tablet Take 1 tablet by mouth 2 times daily 11/24/21  Yes Jose Morrissey MD   triamcinolone (ARISTOCORT) 0.5 % cream Apply topically every 4 hours as needed (eczema) Apply topically 3 times daily. 11/24/21  Yes Jose Morrissey MD     Social History:   reports that he quit smoking about 29 years ago. His smoking use included cigarettes. He started smoking about 67 years ago.  He smoked 1.00 pack per day. He has never used smokeless tobacco. He reports current alcohol use. He reports previous drug use. Family History:  family history includes Cancer in his brother and sister; Heart Disease in his brother, brother, and father. Reviewed. Denies family history of sudden cardiac death, arrhythmia, premature CAD    Review of System:    · General ROS: negative for - chills, fever   · Psychological ROS: negative for - anxiety or depression  · Ophthalmic ROS: negative for - eye pain or loss of vision  · ENT ROS: negative for - epistaxis, headaches, nasal discharge, sore throat   · Allergy and Immunology ROS: negative for - hives, nasal congestion   · Hematological and Lymphatic ROS: negative for - bleeding problems, blood clots, bruising or jaundice  · Endocrine ROS: negative for - skin changes, temperature intolerance or unexpected weight changes  · Respiratory ROS: negative for - cough, hemoptysis, pleuritic pain, SOB, sputum changes or wheezing  · Cardiovascular ROS: Per HPI. · Gastrointestinal ROS: negative for - abdominal pain, blood in stools, diarrhea, hematemesis, melena, nausea/vomiting or swallowing difficulty/pain  · Genito-Urinary ROS: negative for - dysuria or incontinence  · Musculoskeletal ROS: negative for - joint swelling or muscle pain  · Neurological ROS: negative for - confusion, dizziness, gait disturbance, headaches, numbness/tingling, seizures, speech problems, tremors, visual changes or weakness  · Dermatological ROS: negative for - rash    Physical Examination:  Vitals:    01/03/22 1308   BP: 136/64   Pulse:    SpO2:      · Constitutional: Oriented. No distress. · Head: Normocephalic and atraumatic. · Mouth/Throat: Oropharynx is clear and moist.   · Eyes: Conjunctivae normal. EOM are normal.   · Neck: Normal range of motion. Neck supple. No rigidity. No JVD present. · Cardiovascular: Normal rate, regular rhythm, S1&S2 and intact distal pulses.    · Pulmonary/Chest: Bilateral respiratory sounds. No wheezes. No rhonchi. · Abdominal: Soft. Bowel sounds present. No distension, No tenderness. · Musculoskeletal: No tenderness. No edema    · Lymphadenopathy: Has no cervical adenopathy. · Neurological: Alert and oriented. Cranial nerve appears intact, No Gross deficit   · Skin: Skin is warm and dry. No rash noted. · Psychiatric: Has a normal mood, affect and behavior     Labs:  Reviewed. ECG: reviewed, Sinus rhythm with PACs, v-rate of 59 bpm with QRS duration 116 ms. Studies:   1. Event monitor: 2/13/19  NSR. Few episodes if PSVT. 2. Event monitor: 6/11/21  Sinus rhythm, (>6) episodes of atrial fibrillation or atrial flutter, sustained and brief. 3. Event monitor 11/2021  Sustained Afib, atrial run likely Aflutter and NSVT    Echo: 10/8/20  Ejection fraction is visually estimated to be 50%. Indeterminate diastolic function. RV is dilated with mildly depressed function  RV volume overload moderate TR    Echo: 5/15/19  Left ventricular cavity size is normal.  Normal left ventricular wall thickness. Ejection fraction is visually estimated to be 40-45%. There is severe hypokinesis of the septal walls. Abnormal (paradoxical) septal motion is present. Indeterminate diastolic function  Mitral valve leaflets appear thickened with mitral valve prolapse. Moderate mitral regurgitation is present. The left atrium is mildly dilated. Tricuspid valve appear redundant. Mild to moderate tricuspid regurgitation. The right atrium is moderately dilated. IVC size is normal (<2.1 cm) but collapses < 50% with respiration consistent  with elevated RA pressure (8 mmHg). Estimated pulmonary artery systolic pressure is mildly elevated at 34 mmHg  assuming a right atrial pressure of 8 mmHg. Ejection fraction is visually estimated to be 40-45%. LA volume/Index: 69 ml /40 ml/m2     Echo:10/29/18  Left ventricular cavity size is normal.  Normal left ventricular wall thickness.   Difficult low posterior wall of the right atrium (for right atrial tachycardia)  - Ablation of complex fractionated atrial electrogram ablations in the left atrium (for atrial fibrillation)       Assessment/Plan:     Paroxysmal atrial fibrillation/atrial flutter   -S/p cardioversion 1/10/2020  -Typical atrial flutter ablation on 5/8/2020 with Dr. Andrea Smith   -Repeat atrial fibrillation ablation on  8/11/21 with Dr. Edgar Car   -[id number] Score 4 (CHF, HTN, AGE)  -On Eliquis 5 mg BID for  thromboembolic risk reduction.  -Toprol XL 25 mg for rate control   -Flecainide 25 mg bid for rhythm control   -LA volume/Index: 42 ml /26 ml/m2 per Echo 10/8/20  -Recurrent Afib/L atrial flutter noted on cardiac monitor 11/2021    We educated the patient that atrial fibrillation and atrial flutter are both worsening and progressive diseases, with more frequent episodes that will ensue. Subsequent episodes usually become more sustained to the extent that many individuals would then develop persistent atrial fibrillation/atrial flutter. Once persistence is reached, permanent atrial dysrhythmia is inevitable. We also discussed the fact that atrial fibrillation and atrial flutter are associated with stroke, including life-threatening stroke, and therefore oral anticoagulation is warranted depending on the patient's [id number] score. We discussed different management options for atrial fibrillation and atrial flutter. These options include the use of cardioversion, anti-arrhythmic medication therapy, rate control strategy, oral anticoagulation, and atrial fibrillation and atrial flutter ablation. Risks, benefits and alternative of each treatment options were explained. These options include use of cardioversion (mainly for persisting atrial fibrillation or atrial flutter) which provides an effective immediate therapy with success rates of 75% or higher, but it provides no short nor long term efficacy.  Anti-arrhythmic medications provide a very effective short term therapy, but even with our most potent anti-arrhythmic medication there is limited long term efficacy (clinical studies have shown that 40% of patients remain atrial fibrillation-free after 4 years of follow-up after starting one of the more powerful anti-arrhythmic medication (amiodarone), and, if extrapolated, may have further diminishing success as time goes on). Against atrial flutter, any anti-arrhythmic medication would be nearly ineffective. Atrial fibrillation and atrial flutter ablation is a potentially curative therapy with very reasonable success rate after a first time procedure and with improving success rates with subsequent procedures. The risks, benefits and alternatives of the ablation procedure were discussed with the patient. The risks including, but not limited to, the risks of bleeding, infection, radiation exposure, injury to vascular, cardiac and surrounding structures (including pneumothorax), stroke, cardiac perforation, tamponade, need for emergent open heart surgery, need for pacemaker implantation, injury to the phrenic nerve, injury to the esophagus, myocardial infarction and death were discussed in detail. The patient was also counseled at length about the risks of arleth Covid-19 in the jennifer-operative and post-operative states including the recovery window of their procedure. The patient was made aware that arleth Covid-19 after a surgical procedure may worsen their prognosis for recovering from the virus and lend to a higher morbidity and or mortality risk. The patient was given the option of postponing their procedure. The patient was also presented reasonable alternatives to the proposed care, treatment, and services. The discussion I have had with the patient encompassed risks, benefits, and side effects related to the alternatives and the risks related to not receiving the proposed care, treatment and services.     I spent 40 minutes face to face with the patient, with greater than 50% of that time spent in counseling on the above. The patient would like some time to deliberate further regarding procedure. If he wishes to proceed, he will contact our nurse, Katherine Gross at which time we will schedule for a radiofrequency ablation with [employer] system with ANSON immediately prior to this ablation. We will order BMP, CBC, PT/INR, and Type & Screen prior to the procedure. A cardiac CTA will be ordered for pulmonary vein mapping prior to this procedure. Informed that he may be scheduled with Dr. Erendira Lara as well. Chronic systolic heart failure/NICM  -EF now 50% by most recent Echo 10/2020  -Pt appears euvolemic today  -Continue with medical therapy   -ACE-I and B-blocker  -No ASA secondary to Eliquis for AF  -Encouraged a low sodium diet. -Management per Dr. Shabnam Cassidy     Follow up with EP post procedure and as scheduled with Dr. Shabnam Cassidy     Thank you for allowing me to participate in the care of [id number]. All questions and concerns were addressed to the patient/family. Alternatives to my treatment were discussed. This note was scribed in the presence of Dr. Alec Spencer MD by Shalonda Amaya. The scribe's documentation has been prepared under my direction and personally reviewed by me in its entirety. I confirm that the note above accurately reflects all work, physical examination, the discussion of treatments and procedures, and medical decision making performed by me.     Alec Spencer MD, MS, [facility], [facility]  Cardiac Electrophysiology  [address]  [address] [facility], [address] [place] [address]  Jose Orosco [address]  [phone]

## 2022-01-03 ENCOUNTER — OFFICE VISIT (OUTPATIENT)
Dept: CARDIOLOGY CLINIC | Age: 82
End: 2022-01-03
Payer: MEDICARE

## 2022-01-03 VITALS
SYSTOLIC BLOOD PRESSURE: 136 MMHG | WEIGHT: 115.6 LBS | HEART RATE: 48 BPM | DIASTOLIC BLOOD PRESSURE: 64 MMHG | BODY MASS INDEX: 17.52 KG/M2 | HEIGHT: 68 IN | OXYGEN SATURATION: 99 %

## 2022-01-03 DIAGNOSIS — I48.3 TYPICAL ATRIAL FLUTTER (HCC): ICD-10-CM

## 2022-01-03 DIAGNOSIS — I48.0 PAF (PAROXYSMAL ATRIAL FIBRILLATION) (HCC): Primary | ICD-10-CM

## 2022-01-03 DIAGNOSIS — I50.22 CHRONIC SYSTOLIC HEART FAILURE (HCC): ICD-10-CM

## 2022-01-03 PROCEDURE — 93000 ELECTROCARDIOGRAM COMPLETE: CPT | Performed by: INTERNAL MEDICINE

## 2022-01-03 PROCEDURE — G8484 FLU IMMUNIZE NO ADMIN: HCPCS | Performed by: INTERNAL MEDICINE

## 2022-01-03 PROCEDURE — 99215 OFFICE O/P EST HI 40 MIN: CPT | Performed by: INTERNAL MEDICINE

## 2022-01-03 PROCEDURE — G8427 DOCREV CUR MEDS BY ELIG CLIN: HCPCS | Performed by: INTERNAL MEDICINE

## 2022-01-03 PROCEDURE — 4040F PNEUMOC VAC/ADMIN/RCVD: CPT | Performed by: INTERNAL MEDICINE

## 2022-01-03 PROCEDURE — 1123F ACP DISCUSS/DSCN MKR DOCD: CPT | Performed by: INTERNAL MEDICINE

## 2022-01-03 PROCEDURE — G8419 CALC BMI OUT NRM PARAM NOF/U: HCPCS | Performed by: INTERNAL MEDICINE

## 2022-01-03 PROCEDURE — 1036F TOBACCO NON-USER: CPT | Performed by: INTERNAL MEDICINE

## 2022-01-03 RX ORDER — FLECAINIDE ACETATE 50 MG/1
25 TABLET ORAL 2 TIMES DAILY
COMMUNITY
End: 2022-06-01 | Stop reason: SDUPTHER

## 2022-01-03 NOTE — PATIENT INSTRUCTIONS
Patient Education        Learning About Atrial Flutter  What is atrial flutter? Atrial flutter is a type of heartbeat problem (arrhythmia) that usually causes a fast heart rate. This fast rate is caused by changes in the electrical system of your heart. Normally, the heart beats in a strong, steady rhythm. In atrial flutter, a problem with the heart's electrical system causes the two upper parts of the heart (the right atrium and the left atrium) to flutter, or beat very fast. Atrial flutter might be diagnosed using an an electrocardiogram (EKG). An EKG translates the heart's electrical activity into line tracings on paper. This problem can be dangerous. If the heartbeat isn't strong and steady, blood can collect, or pool, in the atria. And pooled blood is more likely to form clots. Clots can travel to the brain, block blood flow, and cause a stroke. Over time, atrial flutter can also lead to heart failure. Treatment for atrial flutter helps prevent stroke and heart failure. It also helps relieve symptoms. Atrial flutter is often caused by another heart condition, such as coronary artery disease or another heart rhythm problem. It may happen after heart surgery. Many people with atrial flutter are able to live full and active lives. What are the symptoms? Some people have symptoms when they have episodes of atrial flutter. But other people don't notice any symptoms. If you have symptoms, you may feel:  · A fluttering, racing, or pounding feeling in your chest (palpitations). · Weak or tired. · Dizzy or lightheaded. · Short of breath. · Chest pain. You may notice signs of atrial flutter when you check your pulse. Your pulse may seem fast.  How is atrial flutter treated? Treatments can help you feel better and prevent future problems, especially stroke and heart failure. The main types of treatment slow the heart rate and help prevent stroke.  Your treatment will depend on the cause of your atrial flutter, your symptoms, and your risk for stroke. Treatments include:  · Medicines to slow your heart rate. They may also help relieve your symptoms. Or you may take a medicine to try to stop the flutter from happening. · Blood-thinning medicines to help prevent stroke. You and your doctor can decide if you will take medicine to lower your risk. · Electrical cardioversion to stop atrial flutter. An electric current is used to shock the heart back to a normal rhythm. · Catheter ablation to stop atrial flutter. Thin wires are used to send energy to destroy the tiny areas of heart tissue that are causing atrial flutter. How can you live well with it? You can live well and help manage atrial flutter by having a heart-healthy lifestyle. To have a heart-healthy lifestyle:  · Don't smoke. · Eat heart-healthy foods. · Be active. Talk to your doctor about what type and level of exercise is safe for you. · Stay at a healthy weight. Lose weight if you need to. · Manage stress. · Avoid alcohol if it triggers symptoms. · Manage other health problems such as high blood pressure, high cholesterol, and diabetes. · Avoid getting sick from the flu. Get a flu shot every year. When should you call for help? Call 911 anytime you think you may need emergency care. For example, call if:  · You have symptoms of a stroke. These may include:  ? Sudden numbness, tingling, weakness, or loss of movement in your face, arm, or leg, especially on only one side of your body. ? Sudden vision changes. ? Sudden trouble speaking. ? Sudden confusion or trouble understanding simple statements. ? Sudden problems with walking or balance. ? A sudden, severe headache that is different from past headaches. Call your doctor now or seek immediate medical care if:  · You have new or increased shortness of breath. · You feel dizzy or lightheaded, or you feel like you may faint.   Watch closely for changes in your health, and be sure to contact your doctor if you have any problems. Follow-up care is a key part of your treatment and safety. Be sure to make and go to all appointments, and call your doctor if you are having problems. It's also a good idea to know your test results and keep a list of the medicines you take. Where can you learn more? Go to https://chpepiceweb.[employer]. org and sign in to your [employer] account. Enter L020 in the Verified Person box to learn more about \"Learning About Atrial Flutter. \"     If you do not have an account, please click on the \"Sign Up Now\" link. Current as of: April 29, 2021               Content Version: 13.1  © 2006-2021 Healthwise, Incorporated. Care instructions adapted under license by [facility] ([facility]). If you have questions about a medical condition or this instruction, always ask your healthcare professional. [address] any warranty or liability for your use of this information. IF YOU DECIDE TO PROCEED WITH REPEAT ABLATION, PLEASE CALL TE OFFICE [phone] AND ASK TO SPEAK WITH KAMRAN.

## 2022-01-13 ENCOUNTER — TELEPHONE (OUTPATIENT)
Dept: CARDIOLOGY CLINIC | Age: 82
End: 2022-01-13

## 2022-01-13 DIAGNOSIS — I48.3 TYPICAL ATRIAL FLUTTER (HCC): ICD-10-CM

## 2022-01-13 DIAGNOSIS — I48.0 PAF (PAROXYSMAL ATRIAL FIBRILLATION) (HCC): Primary | ICD-10-CM

## 2022-01-13 NOTE — TELEPHONE ENCOUNTER
Preet Hu called in this afternoon and states that he would like to go ahead and get his ablation scheduled.     He can be reached back at [phone]

## 2022-01-13 NOTE — LETTER
[facility] HEART INST [facility]  Phone: [phone]  Fax: [phone]      January 17, 2022    [id number]  [address] [place] [address]    Dear Anise Nissen:    Atrial Fibrillation & Left Atrial Flutter  Ablation Pre procedure Instructions    Date: 3-4-2022  Arrive at: 6:30 am  Procedure time: 7:45 am    The morning of your procedure you will park in the hospital parking lot and report directly to the cath lab to check in. At the information desk stay right and go all the way to the end of the goel, this will take you directly to your check in desk for the cath lab. Pre-Procedure Instructions   1. You will need to fast (nothing to eat or drink) after midnight the day of your procedure  2. Do NOT chew gum or eat mints the day of your procedure. 3. You will need to hold your Flecainide for 7 days prior to the procedure. 4. You will need to hold your Eliquis for 12 hours prior to the procedure. 5. Do not use any lotions, creams or perfume the morning of procedure. 6. You will need to complete pre-procedure lab work 4-6 days prior to your procedure. 7. You will need to get a COVID test  4-6 days prior to your procedure, regardless of your vaccination status. You can get you COVID test at the [facility] lab (SEE BELOW). [facility] & [facility] offer COVID testing at no cost. Please contact the location in which you would like to get the COVID testing completed to make sure it is one of the participating location. If you complete the testing at a location other than [facility] please bring results with you the day of your procedure. 8. Please have a responsible adult to drive you home after procedure, you should go home same day, but there is always a possibility of an overnight stay. 9. Cath lab will provide you with all post procedure instructions  10.  A 3 month follow up will be scheduled with Dr. Ramon Lr Nurse practitioner Colonel Marah CNP post procedure                                                [address] [facility] [place] [address]  Phone: [phone]  The hours are Mon -Fri. 6:30 am  4:00 pm   Saturday 8:00 am  noon  No appointment necessary    You may complete pre procedure labs & COVID test at this location. COVID TESTING CAN ONLY BE COMPLETED   Between hours of 12:00 pm & 4:00 pm  Monday through Friday  No Appointment necessary. There are parking spaces behind the [address] [place] [address] were you see Dr. Betty Garrett designated for COVID test where you can pull up and call number listed on the pole and they will come out and complete covid test while you are in your car. If you have any questions or concerns, please don't hesitate to call.     Sincerely,        Felicity Tapia MD / Corry Pacheco RN

## 2022-01-17 NOTE — TELEPHONE ENCOUNTER
Spoke with patient scheduled left atrial flutter &  atrial fibrillation ablation. Atrial Fibrillation & Left Atrial Flutter  Ablation Pre procedure Instructions    Date: 3-4-2022    Arrive at: 6:30 am    Procedure time: 7:45 am    The morning of your procedure you will park in the hospital parking lot and report directly to the cath lab to check in. At the information desk stay right and go all the way to the end of the goel, this will take you directly to your check in desk for the cath lab. Pre-Procedure Instructions   1. You will need to fast (nothing to eat or drink) after midnight the day of your procedure  2. Do NOT chew gum or eat mints the day of your procedure. 3. You will need to hold your Flecainide for 7 days prior to the procedure. 4. You will need to hold your Eliquis for 12 hours prior to the procedure. 5. Do not use any lotions, creams or perfume the morning of procedure. 6. You will need to complete pre-procedure lab work 4-6 days prior to your procedure. 7. You will need to get a COVID test  4-6 days prior to your procedure, regardless of your vaccination status. You can get you COVID test at the [facility] lab (SEE BELOW). [facility] offer COVID testing at no cost. Please contact the location in which you would like to get the COVID testing completed to make sure it is one of the participating location. If you complete the testing at a location other than [facility] please bring results with you the day of your procedure. 8. Please have a responsible adult to drive you home after procedure, you should go home same day, but there is always a possibility of an overnight stay. 9. Cath lab will provide you with all post procedure instructions  10.  A 3 month follow up will be scheduled with Dr. Aki Dumont Nurse practitioner Praneeth Joe CNP post procedure                                          [address] [place] [address]/[facility]  [address] [facility] [address] [place] [address]  Phone: [phone]  The hours are Mon -Fri. 6:30 am - 4:00 pm   Saturday 8:00 am - noon  No appointment necessary    You may complete pre procedure labs & COVID test at this location. COVID TESTING CAN ONLY BE COMPLETED   Between hours of 12:00 pm & 4:00 pm  Monday through Friday  No Appointment necessary. There are parking spaces behind the [address] [place] [address] were you see Dr. Fuad Thomas designated for COVID test where you can pull up and call number listed on the pole and they will come out and complete covid test while you are in your car. Case published to [facility] and form emailed to [place] in cath lab. Letter mailed to patient home address with all pre procedure instructions, including the date of the ablation. The letter also included detailed instructions regarding completing lab work & COVID test as well as a copy of the lab orders to take to lab.

## 2022-01-20 ENCOUNTER — HOSPITAL ENCOUNTER (OUTPATIENT)
Dept: NON INVASIVE DIAGNOSTICS | Age: 82
Discharge: HOME | End: 2022-01-20
Payer: MEDICARE

## 2022-01-20 DIAGNOSIS — I50.22 CHRONIC SYSTOLIC HEART FAILURE (HCC): ICD-10-CM

## 2022-01-20 DIAGNOSIS — I42.8 NICM (NONISCHEMIC CARDIOMYOPATHY) (HCC): ICD-10-CM

## 2022-01-20 LAB
LV EF: 50 %
LVEF MODALITY: NORMAL

## 2022-01-20 PROCEDURE — 93306 TTE W/DOPPLER COMPLETE: CPT

## 2022-01-24 NOTE — PROGRESS NOTES
[address]      Cardiology Progress Note    Tressa Mosqueda  1940 February 1, 2022        CC: \"I have no heart symptoms. \"     HPI:  The patient is 79 y.o. male with a past medical history significant for atrial fibrillation, hypertension, and systolic heart failure. He was admitted 10/27 - 11/6/18 for worsening shortness of breath and weakness. He is a marathon runner and first noticed the symptoms while in [facility]. In the ER he was noted to be in Atrial fibrillation with RVR and also evidence of pneumonia. He had an echocardiogram completed that showed an LVEF of 30-35%. He was treated with amiodarone but was discontinued due to abnormal LFTs. He was discharged home on a BB and Eliquis at that time. He is s/p DCCV 1/2020 followed by EPS AFIB ablation and pulm vein isol. 5/8/20. Today, he denies any current cardiac symptoms. He also reports follow up with Dr. Ilana Hicks 1/3/22 and is scheduled for a repeat AFIB/Flutter ablation 3/4/22 per Dr. Parvez Crabtree. Patient denies exertional chest pain/pressure, dyspnea at rest, VILLELA, PND, orthopnea, palpitations, lightheadedness, weight changes, changes in LE edema, and syncope. Admits to feeling better on this current medication regimen. Denies any abnormal bruising or bleeding.        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    also sessile serrated adenoma    Wears glasses      Past Surgical History:   Procedure Laterality Date    CARDIOVERSION  01/10/2020    COLONOSCOPY N/A 1/15/2020    COLONOSCOPY performed by Bailey Burroughs MD at [facility]      [facility]P [facility] THYROID BIOPSY  6/18/2021     THYROID BIOPSY 6/18/2021 [facility] ULTRASOUND     Family History   Problem Relation Age of Onset    Heart Disease Father     Cancer Sister         Colon    Cancer Brother         Colon    Heart Disease Brother     Heart Disease Brother      Social History     Tobacco Use    Smoking status: Former Smoker     Packs/day: 1.00     Types: Cigarettes     Start date: 1955     Quit date: 1993     Years since quittin.1    Smokeless tobacco: Never Used   Vaping Use    Vaping Use: Never used   Substance Use Topics    Alcohol use: Yes     Comment: 1-2 daily    Drug use: Not Currently       Allergies   Allergen Reactions    Amiodarone      Pt is not sure that he is allergic to this. Current Outpatient Medications   Medication Sig Dispense Refill    apixaban (ELIQUIS) 5 MG TABS tablet Take 1 tablet by mouth 2 times daily 60 tablet 3    flecainide (TAMBOCOR) 50 MG tablet Take 25 mg by mouth 2 times daily      lisinopril (PRINIVIL;ZESTRIL) 10 MG tablet TAKE ONE TABLET BY MOUTH DAILY 90 tablet 1    metoprolol succinate (TOPROL XL) 25 MG extended release tablet TAKE 1/2 TABLET BY MOUTH DAILY 45 tablet 1    triamcinolone (ARISTOCORT) 0.5 % cream Apply topically every 4 hours as needed (eczema) Apply topically 3 times daily. 15 g 3     No current facility-administered medications for this visit. Review of Systems:  · Constitutional: no unanticipated weight loss. There's been no change in energy level, sleep pattern, or activity level. No fevers, chills. · Eyes: No visual changes or diplopia. No scleral icterus. · ENT: No Headaches, hearing loss or vertigo. No mouth sores or sore throat. · Cardiovascular: as reviewed in HPI  · Respiratory: No cough or wheezing, no sputum production. No hematemesis. · Gastrointestinal: No abdominal pain, appetite loss, blood in stools. No change in bowel or bladder habits. · Genitourinary: No dysuria, trouble voiding, or hematuria. · Musculoskeletal:  No gait disturbance, no joint complaints.   · Integumentary: No rash or pruritis. · Neurological: No headache, diplopia, change in muscle strength, numbness or tingling. · Psychiatric: No anxiety or depression. · Endocrine: No temperature intolerance. No excessive thirst, fluid intake, or urination. No tremor. · Hematologic/Lymphatic: No abnormal bruising or bleeding, blood clots or swollen lymph nodes. · Allergic/Immunologic: No nasal congestion or hives. Physical Exam:   /60   Pulse 54 Comment: irreg  Ht 5' 8\" (1.727 m)   Wt 115 lb (52.2 kg)   SpO2 98%   BMI 17.49 kg/m²   Wt Readings from Last 3 Encounters:   02/01/22 115 lb (52.2 kg)   01/03/22 115 lb 9.6 oz (52.4 kg)   12/17/21 114 lb 9.6 oz (52 kg)     Physical Exam:   Constitutional: The patient is oriented to person, place, and time. Appears well-developed and well-nourished. In no acute distress. Head: Normocephalic and atraumatic. Pupils equal and round. Neck: Neck supple. No JVP or carotid bruit appreciated. No mass and no thyromegaly present. No lymphadenopathy present. Cardiovascular: Irregularly irregular. Normal heart sounds. Exam reveals no gallop and no friction rub. 2-1/7 systolic murmur at the base  Pulmonary/Chest: Effort normal and breath sounds normal. No respiratory distress. No wheezes, rhonchi or rales. Abdominal: Soft, non-tender. Bowel sounds are normal. Exhibits no organomegaly, mass or bruit. Extremities: No edema. No cyanosis or clubbing. Pulses are 2+ radial and carotid bilaterally. Neurological: No gross cranial nerve deficit. Coordination normal.   Skin: Skin is warm and dry. There is no rash or diaphoresis. Psychiatric: Patient has a normal mood and affect.  Speech is normal and behavior is normal.       Lab Review:   Lab Results   Component Value Date    TRIG 58 03/29/2018    HDL 78 06/28/2019    LDLCALC 76 06/28/2019    LABVLDL 15 06/28/2019     Lab Results   Component Value Date     08/05/2021      Lab Results   Component Value Date    HGB 12.4 08/05/2021    HCT 36.8 08/05/2021       Lab Results   Component Value Date     08/05/2021     Lab Results   Component Value Date    K 5.0 08/05/2021    K 3.9 12/29/2019     Lab Results   Component Value Date    BUN 26 08/05/2021    CREATININE 0.8 08/05/2021       EKG Interpretation:   12/7/18 SR with PACs   1/2/19 Sinus bradycardia with PAC's  8/15/19 Sinus bradycardia with PAC's  1/3/20 Atrial fibrillation  2/21/2020 Atrial fibrillation  8/26/20 Sinus  Rhythm with Nonspecific QRS widening. Nonspecific T-abnormality. 1/28/21 Sinus  Bradycardia  - occasional PAC, # PACs = 1. Nonspecific QRS widening and right axis, 50 bpm.   2/1/22: not completed      Image Review:     Echo 10/29/18  Left ventricular cavity size is normal.  Normal left ventricular wall thickness. Difficult to estimate EF due to increase rate and rhythm but appears depressed  Mitral valve leaflets appear mildly thickened. Mild mitral regurgitation. The left atrium is dilated. Aortic valve appears sclerotic but opens adequately. Trivial aortic regurgitation. Tricuspid valve is structurally normal.  Mild tricuspid regurgitation with RVSP of 26 mmHg. The right atrium is dilated. Suggest repeat limited echo with bubble study to r/o ASD or PFO     Echo limited 11/2/18   Left ventricular cavity size is normal.  Normal left ventricular wall thickness. Ejection fraction is visually estimated to be 30-35%, though difficult to adequately assess due to arrhythmia. Severe hypo to akinesis of the septum. Abnormal (paradoxical) septal motion is present. Tricuspid valve appears thickened with redundancy. The right atrium is mildly dilated. IVC not well visualized. A bubble study was performed and fails to show evidence of shunting. Stress 12/21/18  1.  Technically a satisfactory study.    2. Positive treadmill exercise stress portion of the study.    3. No evidence of Ischemia by Myocardial Perfusion Imaging.    4. Gated Study shows normal LV size and Systolic function; EF is 19%.    5. The patient exercised for 6 min. on the Ramírez protocol to achieve a HR of    133, which is 94% of PMHR. The study was stopped due to elevated BP. There    was no chest pain . 1mm ST depression in lateral leads.    6. Diastolic BP crept upto 026 with peak exercise     Event monitor 2/13/19  NSR. Few episodes if PSVT. ECHO 5/15/19  Mitral valve leaflets appear thickened with mitral valve prolapse. moderate mitral regurgitation is present. The left atrium is mildly dilated. Tricuspid valve appear redundant. Mild to moderate tricuspid regurgitation. The right atrium is moderately dilated. IVC size is normal (<2.1 cm) but collapses < 50% with respiration consistent  with elevated RA pressure (8 mmHg). Estimated pulmonary artery systolic pressure is mildly elevated at 34 mmHg  assuming a right atrial pressure of 8 mmHg. Ejection fraction is visually estimated to be 40-45%. Echo: 10/8/20   Ejection fraction is visually estimated to be 50%. Indeterminate diastolic function. RV is dilated with mildly depressed function   RV volume overload   moderate TR    Echo: 1/20/22   Normal left ventricle size, wall thickness, and low normal systolic function   with an estimated ejection fraction of 50%. mild septal hypokinesis   Indeterminate diastolic function. Mitral valve leaflets appear mild-moderately thickened. Mild mitral annular calcification. Two seperate jets of Moderate mitral regurgitation. The left atrium is moderately to severely dilated. Dilated right ventricle. moderate tricuspid regurgitation. Cannot r/o tricuspid valve prolapse   No evidence of tricuspid stenosis. The right atrium is dilated. IVC size is normal (<2.1 cm) but collapses < 50% with respiration consistent   with elevated RA pressure (8 mmHg).    Estimated pulmonary artery systolic pressure is mildly elevated at 44 mmHg   assuming a right atrial pressure of 8 mmHg.    Assessment/Plan:     Paroxysmal atrial fibrillation  -underwent cardioversion 1/10/2020 followed by EPS with radiofrequency AFIB ablation 5/8/20.   -8/11/21 AFIB and atrial tachycardia ablation per Dr. Alexia Mcclendon   Postprocedure EKG showed sinus rhythm with PACs  Per Dr. Alexia Mcclendon new prescription for Flecainide 25mg po BID and pre-admission Eliquis 5mg po BID. He will remain on Toprol XL for cardiac anti-remodeling. The patient follow-up with the EP NP in 3 month's time with recurrent AFIB/L atrial flutter noted on cardiac monitor 11/2021 then f/u with  Dr. Alexia Mcclendon 1/3/22-patient would like to deliberate further regarding proceeding with ablation. Today, He is currently asymptomatic and stable today with no reports of palpitations, heart racing, fluttering, pounding.   -he reports that he is tolerating and has noticed improvement in symptoms with current medical regimen.   -Physical exam today reveals irregularly irregular heart rhythm.   -he is scheduled for AFIB/Flutter ablation 3/4/22 per Dr. Alexia Mcclendon. Cardiomyopathy/Chronic systolic heart failure  -Echocardiogram 10/8/20 LVEF improved to 50%. -Echo 1/20/22 LV function low normal 50%  -He denies any signs or symptoms of heart failure.   -He appears euvolemic on clinical examine today and has remained stable at this time.    -Continue with medical therapy including B-blocker and lisinopril 10 mg daily.   -Encouraged a low sodium diet, daily fluid intake daily monitoring and daily weights. Hypertension, essential   Controlled today on medical therapy. I have encouraged him to work on a low sodium diet and walking program.     Systolic murmur  4-3/0 per physical exam   1/2022 echo reviewed revealing trivial AR, aortic sclerosis with no AS, mod. MR and TR. He is fully COVID vaccinated and has obtained his Influenza vaccination. We will plan to follow up in 7-8 months. Thank you very much for allowing me to participate in the care of your patient. Please do not hesitate to contact me if you have any questions. Sincerely,  Kelly Steinberg M.D. [address] [place] [address], [address] [place] [address]   Jose Sims [facility]  Ph: [phone]  Fax: [phone]      This note was scribed in the presence of Dr. Jose May MD by Mere Capps RN.

## 2022-01-31 ENCOUNTER — TELEPHONE (OUTPATIENT)
Dept: PRIMARY CARE CLINIC | Age: 82
End: 2022-01-31

## 2022-01-31 NOTE — TELEPHONE ENCOUNTER
Left VM for patient that discount cards won't work with Medicare coverage. Will D/W Dr Raymundo Huang and let patient know if there is something else we can try.

## 2022-01-31 NOTE — TELEPHONE ENCOUNTER
----- Message from Liliana Rg sent at 1/29/2022 11:08 AM EST -----  Subject: Message to Provider    QUESTIONS  Information for Provider? Patient is requesting a discount card for his   medication Eliquis he states that its to expensive for him. Please advise   Thanks  ---------------------------------------------------------------------------  --------------  CALL BACK INFO  What is the best way for the office to contact you? OK to leave message on   voicemail,Do not leave any message, patient will call back for answer  Preferred Call Back Phone Number? [phone]  ---------------------------------------------------------------------------  --------------  SCRIPT ANSWERS  Relationship to Patient?  Self

## 2022-02-01 ENCOUNTER — OFFICE VISIT (OUTPATIENT)
Dept: CARDIOLOGY CLINIC | Age: 82
End: 2022-02-01
Payer: MEDICARE

## 2022-02-01 VITALS
OXYGEN SATURATION: 98 % | DIASTOLIC BLOOD PRESSURE: 60 MMHG | BODY MASS INDEX: 17.43 KG/M2 | SYSTOLIC BLOOD PRESSURE: 122 MMHG | HEIGHT: 68 IN | WEIGHT: 115 LBS | HEART RATE: 54 BPM

## 2022-02-01 DIAGNOSIS — I10 ESSENTIAL HYPERTENSION: ICD-10-CM

## 2022-02-01 DIAGNOSIS — I48.0 PAROXYSMAL ATRIAL FIBRILLATION (HCC): Primary | ICD-10-CM

## 2022-02-01 DIAGNOSIS — I50.22 CHRONIC SYSTOLIC HEART FAILURE (HCC): ICD-10-CM

## 2022-02-01 DIAGNOSIS — I42.8 NICM (NONISCHEMIC CARDIOMYOPATHY) (HCC): ICD-10-CM

## 2022-02-01 PROCEDURE — 1036F TOBACCO NON-USER: CPT | Performed by: INTERNAL MEDICINE

## 2022-02-01 PROCEDURE — G8427 DOCREV CUR MEDS BY ELIG CLIN: HCPCS | Performed by: INTERNAL MEDICINE

## 2022-02-01 PROCEDURE — 4040F PNEUMOC VAC/ADMIN/RCVD: CPT | Performed by: INTERNAL MEDICINE

## 2022-02-01 PROCEDURE — G8484 FLU IMMUNIZE NO ADMIN: HCPCS | Performed by: INTERNAL MEDICINE

## 2022-02-01 PROCEDURE — G8419 CALC BMI OUT NRM PARAM NOF/U: HCPCS | Performed by: INTERNAL MEDICINE

## 2022-02-01 PROCEDURE — 99213 OFFICE O/P EST LOW 20 MIN: CPT | Performed by: INTERNAL MEDICINE

## 2022-02-01 PROCEDURE — 1123F ACP DISCUSS/DSCN MKR DOCD: CPT | Performed by: INTERNAL MEDICINE

## 2022-02-01 NOTE — PATIENT INSTRUCTIONS
Patient Education        Atrial Fibrillation: Care Instructions  Your Care Instructions     Atrial fibrillation is an irregular and often fast heartbeat. Treating this condition is important for several reasons. It can cause blood clots, which can travel from your heart to your brain and cause a stroke. If you have a fast heartbeat, you may feel lightheaded, dizzy, and weak. An irregular heartbeat can also increase your risk for heart failure. Atrial fibrillation is often the result of another heart condition, such as high blood pressure or coronary artery disease. Making changes to improve your heart condition will help you stay healthy and active. Follow-up care is a key part of your treatment and safety. Be sure to make and go to all appointments, and call your doctor if you are having problems. It's also a good idea to know your test results and keep a list of the medicines you take. How can you care for yourself at home? Medicines    · Take your medicines exactly as prescribed. Call your doctor if you think you are having a problem with your medicine. You will get more details on the specific medicines your doctor prescribes.     · If your doctor has given you a blood thinner to prevent a stroke, be sure you get instructions about how to take your medicine safely. Blood thinners can cause serious bleeding problems.     · Do not take any vitamins, over-the-counter drugs, or herbal products without talking to your doctor first.   Lifestyle changes    · Do not smoke. Smoking can increase your chance of a stroke and heart attack. If you need help quitting, talk to your doctor about stop-smoking programs and medicines. These can increase your chances of quitting for good.     · Eat a heart-healthy diet.     · Stay at a healthy weight. Lose weight if you need to.     · Limit alcohol to 2 drinks a day for men and 1 drink a day for women. Too much alcohol can cause health problems.     · Avoid colds and flu.  Get a pneumococcal vaccine shot. If you have had one before, ask your doctor whether you need another dose. Get a flu shot every year. If you must be around people with colds or flu, wash your hands often. Activity    · If your doctor recommends it, get more exercise. Walking is a good choice. Bit by bit, increase the amount you walk every day. Try for at least 30 minutes on most days of the week. You also may want to swim, bike, or do other activities. Your doctor may suggest that you join a cardiac rehabilitation program so that you can have help increasing your physical activity safely.     · Start light exercise if your doctor says it is okay. Even a small amount will help you get stronger, have more energy, and manage stress. Walking is an easy way to get exercise. Start out by walking a little more than you did in the hospital. Gradually increase the amount you walk.     · When you exercise, watch for signs that your heart is working too hard. You are pushing too hard if you cannot talk while you are exercising. If you become short of breath or dizzy or have chest pain, sit down and rest immediately.     · Check your pulse regularly. Place two fingers on the artery at the palm side of your wrist, in line with your thumb. If your heartbeat seems uneven or fast, talk to your doctor. When should you call for help? Call 911 anytime you think you may need emergency care. For example, call if:    · You have symptoms of a heart attack. These may include:  ? Chest pain or pressure, or a strange feeling in the chest.  ? Sweating. ? Shortness of breath. ? Nausea or vomiting. ? Pain, pressure, or a strange feeling in the back, neck, jaw, or upper belly or in one or both shoulders or arms. ? Lightheadedness or sudden weakness. ? A fast or irregular heartbeat. After you call 911, the  may tell you to chew 1 adult-strength or 2 to 4 low-dose aspirin. Wait for an ambulance.  Do not try to drive yourself.     · You have symptoms of a stroke. These may include:  ? Sudden numbness, tingling, weakness, or loss of movement in your face, arm, or leg, especially on only one side of your body. ? Sudden vision changes. ? Sudden trouble speaking. ? Sudden confusion or trouble understanding simple statements. ? Sudden problems with walking or balance. ? A sudden, severe headache that is different from past headaches.     · You passed out (lost consciousness). Call your doctor now or seek immediate medical care if:    · You have new or increased shortness of breath.     · You feel dizzy or lightheaded, or you feel like you may faint.     · Your heart rate becomes irregular.     · You can feel your heart flutter in your chest or skip heartbeats. Tell your doctor if these symptoms are new or worse. Watch closely for changes in your health, and be sure to contact your doctor if you have any problems. Where can you learn more? Go to https://yuback.J-Kan. org and sign in to your Tellpe account. Enter U020 in the Lightstorm Networks box to learn more about \"Atrial Fibrillation: Care Instructions. \"     If you do not have an account, please click on the \"Sign Up Now\" link. Current as of: April 29, 2021               Content Version: 13.1  © 2006-2021 Healthwise, [employer]. Care instructions adapted under license by [facility] ([facility]). If you have questions about a medical condition or this instruction, always ask your healthcare professional. [place] [address] any warranty or liability for your use of this information. Patient Education        Learning About Catheter Ablation for Heart Rhythm Problems  What is catheter ablation? Catheter ablation is a procedure that treats heart rhythm problems. These problems include atrial fibrillation, supraventricular tachycardia (SVT), atrial flutter, and ventricular tachycardia. Your heart should have a strong, steady beat.  That beat is controlled by the heart's electrical system. Sometimes that system misfires. This causes a heartbeat that is too fast and isn't steady. Catheter ablation is a way to get into your heart and fix the problem. Ablation is not surgery. How is catheter ablation done? Your doctor inserts thin tubes called catheters into a blood vessel in your groin, arm, or neck. Then your doctor feeds them into the heart. Wires in the catheters help the doctor find the problem areas. Then the doctor uses the wires to send energy to destroy the tiny areas of heart tissue that are causing the problems. It may seem like a bad idea to destroy parts of your heart on purpose. But the areas that are destroyed are very tiny. They should not affect your heart's ability to do its job. You may be awake during the procedure. Or you may be asleep. The doctor will give you medicines to help you feel relaxed and to numb the areas where the catheters go in. You may feel a little uncomfortable, but you should not feel pain. What can you expect after catheter ablation? You may stay overnight in the hospital. How long you stay in the hospital depends on the type of ablation you have. Do not exercise hard or lift anything heavy for a week. You will probably be able to go back to work and to your normal routine in 1 or 2 days. You may have swelling, bruising, or a small lump around the site where the catheters went into your body. These should go away in 3 to 4 weeks. You may have to take some medicines for a while. Follow-up care is a key part of your treatment and safety. Be sure to make and go to all appointments, and call your doctor if you are having problems. It's also a good idea to know your test results and keep a list of the medicines you take. Where can you learn more? Go to https://sukhjinder.LYNX Network [email or web address] org and sign in to your [employer] Systems account.  Enter V279 in the Finomial box to learn more about \"Learning About Catheter Ablation for Heart Rhythm Problems. \"     If you do not have an account, please click on the \"Sign Up Now\" link. Current as of: April 29, 2021               Content Version: 13.1  © 2055-9395 Healthwise, Incorporated. Care instructions adapted under license by [facility] ([facility]). If you have questions about a medical condition or this instruction, always ask your healthcare professional. [place] [address] any warranty or liability for your use of this information. Patient Education        How to Read a Food Label to Limit Sodium: Care Instructions  Overview  Limiting sodium can be an important part of managing some health problems. Processed foods, fast food, and restaurant foods are the major sources of dietary sodium. The most common name for sodium is salt. Most packaged foods have a Nutrition Facts label. This will tell you how much sodium is in one serving of food. Follow-up care is a key part of your treatment and safety. Be sure to make and go to all appointments, and call your doctor if you are having problems. It's also a good idea to know your test results and keep a list of the medicines you take. How can you care for yourself at home? Read ingredient lists on food labels  · Read the list of ingredients on food labels to help you find how much sodium is in a food. The label lists the ingredients in a food in descending order (from the most to the least). If salt or sodium is high on the list, there may be a lot of sodium in the food. · Know that sodium has different names. Sodium is also called monosodium glutamate (MSG), sodium citrate, sodium alginate, and sodium phosphate. Read Nutrition Facts labels  · On most foods, there is a Nutrition Facts label. This will tell you how much sodium is in one serving of food. Look at both the serving size and the sodium amount. The serving size is located at the top of the label, usually right under the \"Nutrition Facts\" title. The amount of sodium is given in the list under the title. It is given in milligrams (mg). ? Check the serving size carefully. A single serving is often very small, and you may eat more than one serving. If this is the case, you will eat more sodium than listed on the label. For example, if the serving size for a canned soup is 1 cup and the sodium amount is 470 mg, if you have 2 cups you will eat 940 mg of sodium. · The nutrition facts for fresh fruits and vegetables are not listed on the food. They may be listed somewhere in the store. These foods usually have no sodium or low sodium. · The Nutrition Facts label also gives you the Percent Daily Value for sodium. This is how much of the recommended amount of sodium a serving contains. The daily value for sodium is 2,300 mg. So if the Percent Daily Value says 50%, this means one serving is giving you half of this, or 1,150 mg. Buy low-sodium foods  · Look for foods that are made with less sodium. Watch for the following words on the label. ? \"Unsalted\" means there is no sodium added to the food. But there may be sodium already in the food naturally. ? \"Sodium-free\" means a serving has less than 5 mg of sodium. ? \"Very low sodium\" means a serving has 35 mg or less of sodium. ? \"Low-sodium\" means a serving has 140 mg or less of sodium. · \"Reduced-sodium\" means that there is 25% less sodium than what the food normally has. This is still usually too much sodium. · Buy fresh vegetables, or frozen vegetables without added sauces. Buy low-sodium versions of canned vegetables, soups, and other canned goods. Where can you learn more? Go to https://chpepiceweb.Meican. org and sign in to your [employer] account. Enter 79 [id number] in the Solve Media box to learn more about \"How to Read a Food Label to Limit Sodium: Care Instructions. \"     If you do not have an account, please click on the \"Sign Up Now\" link.   Current as of: September 8, 2021               Content Version: 13.1  © 8065-4136 Healthwise, [employer]. Care instructions adapted under license by [facility] ([facility]). If you have questions about a medical condition or this instruction, always ask your healthcare professional. [address] any warranty or liability for your use of this information. Patient Education        Walking for Exercise: Care Instructions  Your Care Instructions     Walking is one of the easiest ways to get the exercise you need for good health. A brisk, 30-minute walk each day can help you feel better and have more energy. It can help you lower your risk of disease. Walking can help you keep your bones strong and your heart healthy. Check with your doctor before you start a walking plan if you have heart problems, other health issues, or you have not been active in a long time. Follow your doctor's instructions for safe levels of exercise. Follow-up care is a key part of your treatment and safety. Be sure to make and go to all appointments, and call your doctor if you are having problems. It's also a good idea to know your test results and keep a list of the medicines you take. How can you care for yourself at home? Getting started  · Start slowly and set a short-term goal. For example, walk for 5 or 10 minutes every day. · Bit by bit, increase the amount you walk every day. Try for at least 30 minutes on most days of the week. You also may want to swim, bike, or do other activities. · If finding enough time is a problem, it's fine to be active in shorter periods of time throughout your day. · To get the heart-healthy benefits of walking, you need to walk briskly enough to increase your heart rate and breathing, but not so fast that you can't talk comfortably. · Wear comfortable shoes that fit well and provide good support for your feet and ankles.   Staying with your plan  · After you've made walking a habit, set a longer-term goal. You may want to set a goal of walking briskly for longer or walking farther. Experts say to do 2½ hours (150 minutes) of moderate activity a week. A faster heartbeat is what defines moderate-level activity. · To stay motivated, walk with friends, coworkers, or pets. · Use a phone kannan or pedometer to track your steps each day. Set a goal to increase your steps. When you reach that goal, set a higher goal.  · If the weather keeps you from walking outside, go for walks at the mall with a friend. Local schools and churches may have indoor gyms where you can walk. Fitting a walk into your workday  · Park several blocks away from work, or get off the bus a few stops early. · Use the stairs instead of the elevator, at least for a few floors. · Suggest holding meetings with colleagues during a walk inside or outside the building. · Use the restroom that is the farthest from your desk or workstation. · Use your morning and afternoon breaks to take quick 15 minutes walks. Staying safe  · Know your surroundings. Walk in a well-lighted, safe place. If it's dark, walk with a partner. Wear light-colored clothing. If you can, buy a vest or jacket that reflects light. · Carry a cell phone for emergencies. · Drink plenty of water. Take a water bottle with you when you walk. This is very important if it is hot out. · Be careful not to slip on wet or icy ground. You can buy \"grippers\" for your shoes to help keep you from slipping. · Pay attention to your walking surface. Use sidewalks and paths. · If you have health issues such as asthma, COPD, or heart problems, or if you haven't been active for a long time, check with your doctor before you start a new activity. Where can you learn more? Go to https://Brndstraaliyah.Karo Internet. org and sign in to your [employer]ion Software account. Enter R159 in the Mobissimo box to learn more about \"Walking for Exercise: Care Instructions. \"     If you do not have an account, please click on the \"Sign Up Now\" link. Current as of: May 12, 2021               Content Version: 13.1  © 2006-2021 Healthwise, Incorporated. Care instructions adapted under license by [facility] ([facility]). If you have questions about a medical condition or this instruction, always ask your healthcare professional. [address] any warranty or liability for your use of this information.

## 2022-02-17 ENCOUNTER — HOSPITAL ENCOUNTER (OUTPATIENT)
Dept: CT IMAGING | Age: 82
Discharge: HOME | End: 2022-02-17
Payer: MEDICARE

## 2022-02-17 DIAGNOSIS — C37 MALIGNANT NEOPLASM OF THYMUS (HCC): ICD-10-CM

## 2022-02-17 PROCEDURE — 71260 CT THORAX DX C+: CPT

## 2022-02-17 PROCEDURE — 6360000004 HC RX CONTRAST MEDICATION: Performed by: INTERNAL MEDICINE

## 2022-02-17 RX ADMIN — IOPAMIDOL 75 ML: 755 INJECTION, SOLUTION INTRAVENOUS at 11:20

## 2022-03-04 ENCOUNTER — ANESTHESIA (OUTPATIENT)
Dept: CARDIAC CATH/INVASIVE PROCEDURES | Age: 82
End: 2022-03-04

## 2022-03-04 ENCOUNTER — ANESTHESIA EVENT (OUTPATIENT)
Dept: CARDIAC CATH/INVASIVE PROCEDURES | Age: 82
End: 2022-03-04

## 2022-03-04 ENCOUNTER — HOSPITAL ENCOUNTER (OUTPATIENT)
Dept: CARDIAC CATH/INVASIVE PROCEDURES | Age: 82
Discharge: HOME | End: 2022-03-04
Payer: MEDICARE

## 2022-03-04 VITALS
OXYGEN SATURATION: 100 % | SYSTOLIC BLOOD PRESSURE: 120 MMHG | TEMPERATURE: 95.2 F | RESPIRATION RATE: 10 BRPM | DIASTOLIC BLOOD PRESSURE: 73 MMHG

## 2022-03-04 VITALS
WEIGHT: 114 LBS | RESPIRATION RATE: 13 BRPM | DIASTOLIC BLOOD PRESSURE: 71 MMHG | TEMPERATURE: 97 F | HEART RATE: 70 BPM | HEIGHT: 68 IN | OXYGEN SATURATION: 100 % | SYSTOLIC BLOOD PRESSURE: 108 MMHG | BODY MASS INDEX: 17.28 KG/M2

## 2022-03-04 DIAGNOSIS — I48.92 LEFT ATRIAL FLUTTER BY ELECTROCARDIOGRAM (HCC): ICD-10-CM

## 2022-03-04 LAB
ABO/RH: NORMAL
ANION GAP SERPL CALCULATED.3IONS-SCNC: 11 MMOL/L (ref 3–16)
ANTIBODY SCREEN: NORMAL
BUN BLDV-MCNC: 21 MG/DL (ref 7–20)
CALCIUM SERPL-MCNC: 9.2 MG/DL (ref 8.3–10.6)
CHLORIDE BLD-SCNC: 103 MMOL/L (ref 99–110)
CO2: 23 MMOL/L (ref 21–32)
CREAT SERPL-MCNC: 0.8 MG/DL (ref 0.8–1.3)
EKG ATRIAL RATE: 61 BPM
EKG DIAGNOSIS: NORMAL
EKG P AXIS: 90 DEGREES
EKG P-R INTERVAL: 166 MS
EKG Q-T INTERVAL: 458 MS
EKG QRS DURATION: 104 MS
EKG QTC CALCULATION (BAZETT): 461 MS
EKG R AXIS: 85 DEGREES
EKG T AXIS: 95 DEGREES
EKG VENTRICULAR RATE: 61 BPM
GFR AFRICAN AMERICAN: >60
GFR NON-AFRICAN AMERICAN: >60
GLUCOSE BLD-MCNC: 99 MG/DL (ref 70–99)
HCT VFR BLD CALC: 36.1 % (ref 40.5–52.5)
HEMOGLOBIN: 12.1 G/DL (ref 13.5–17.5)
MCH RBC QN AUTO: 30.3 PG (ref 26–34)
MCHC RBC AUTO-ENTMCNC: 33.6 G/DL (ref 31–36)
MCV RBC AUTO: 90.2 FL (ref 80–100)
PDW BLD-RTO: 13.7 % (ref 12.4–15.4)
PLATELET # BLD: 173 K/UL (ref 135–450)
PMV BLD AUTO: 10 FL (ref 5–10.5)
POC ACT LR: 346 SEC
POC ACT LR: 361 SEC
POTASSIUM SERPL-SCNC: 4.2 MMOL/L (ref 3.5–5.1)
RBC # BLD: 4.01 M/UL (ref 4.2–5.9)
SARS-COV-2, NAAT: NOT DETECTED
SODIUM BLD-SCNC: 137 MMOL/L (ref 136–145)
WBC # BLD: 4.3 K/UL (ref 4–11)

## 2022-03-04 PROCEDURE — 3700000001 HC ADD 15 MINUTES (ANESTHESIA)

## 2022-03-04 PROCEDURE — 85347 COAGULATION TIME ACTIVATED: CPT

## 2022-03-04 PROCEDURE — 2580000003 HC RX 258: Performed by: INTERNAL MEDICINE

## 2022-03-04 PROCEDURE — 93657 TX L/R ATRIAL FIB ADDL: CPT | Performed by: INTERNAL MEDICINE

## 2022-03-04 PROCEDURE — 93656 COMPRE EP EVAL ABLTJ ATR FIB: CPT | Performed by: INTERNAL MEDICINE

## 2022-03-04 PROCEDURE — 2500000003 HC RX 250 WO HCPCS: Performed by: NURSE ANESTHETIST, CERTIFIED REGISTERED

## 2022-03-04 PROCEDURE — 93655 ICAR CATH ABLTJ DSCRT ARRHYT: CPT

## 2022-03-04 PROCEDURE — 2580000003 HC RX 258: Performed by: NURSE ANESTHETIST, CERTIFIED REGISTERED

## 2022-03-04 PROCEDURE — 93623 PRGRMD STIMJ&PACG IV RX NFS: CPT

## 2022-03-04 PROCEDURE — 93622 COMP EP EVAL L VENTR PAC&REC: CPT

## 2022-03-04 PROCEDURE — 6370000000 HC RX 637 (ALT 250 FOR IP)

## 2022-03-04 PROCEDURE — 85027 COMPLETE CBC AUTOMATED: CPT

## 2022-03-04 PROCEDURE — 86850 RBC ANTIBODY SCREEN: CPT

## 2022-03-04 PROCEDURE — 7100000000 HC PACU RECOVERY - FIRST 15 MIN

## 2022-03-04 PROCEDURE — C1759 CATH, INTRA ECHOCARDIOGRAPHY: HCPCS

## 2022-03-04 PROCEDURE — 80048 BASIC METABOLIC PNL TOTAL CA: CPT

## 2022-03-04 PROCEDURE — 93005 ELECTROCARDIOGRAM TRACING: CPT | Performed by: INTERNAL MEDICINE

## 2022-03-04 PROCEDURE — C1732 CATH, EP, DIAG/ABL, 3D/VECT: HCPCS

## 2022-03-04 PROCEDURE — 86901 BLOOD TYPING SEROLOGIC RH(D): CPT

## 2022-03-04 PROCEDURE — 93655 ICAR CATH ABLTJ DSCRT ARRHYT: CPT | Performed by: INTERNAL MEDICINE

## 2022-03-04 PROCEDURE — 6360000002 HC RX W HCPCS: Performed by: NURSE ANESTHETIST, CERTIFIED REGISTERED

## 2022-03-04 PROCEDURE — 2709999900 HC NON-CHARGEABLE SUPPLY

## 2022-03-04 PROCEDURE — 86900 BLOOD TYPING SEROLOGIC ABO: CPT

## 2022-03-04 PROCEDURE — 93623 PRGRMD STIMJ&PACG IV RX NFS: CPT | Performed by: INTERNAL MEDICINE

## 2022-03-04 PROCEDURE — C1730 CATH, EP, 19 OR FEW ELECT: HCPCS

## 2022-03-04 PROCEDURE — 2500000003 HC RX 250 WO HCPCS

## 2022-03-04 PROCEDURE — C1893 INTRO/SHEATH, FIXED,NON-PEEL: HCPCS

## 2022-03-04 PROCEDURE — 7100000001 HC PACU RECOVERY - ADDTL 15 MIN

## 2022-03-04 PROCEDURE — C1894 INTRO/SHEATH, NON-LASER: HCPCS

## 2022-03-04 PROCEDURE — 93656 COMPRE EP EVAL ABLTJ ATR FIB: CPT

## 2022-03-04 PROCEDURE — A4216 STERILE WATER/SALINE, 10 ML: HCPCS

## 2022-03-04 PROCEDURE — 93622 COMP EP EVAL L VENTR PAC&REC: CPT | Performed by: INTERNAL MEDICINE

## 2022-03-04 PROCEDURE — 2580000003 HC RX 258

## 2022-03-04 PROCEDURE — 93657 TX L/R ATRIAL FIB ADDL: CPT

## 2022-03-04 PROCEDURE — 6360000002 HC RX W HCPCS

## 2022-03-04 PROCEDURE — 6370000000 HC RX 637 (ALT 250 FOR IP): Performed by: INTERNAL MEDICINE

## 2022-03-04 PROCEDURE — 93010 ELECTROCARDIOGRAM REPORT: CPT | Performed by: INTERNAL MEDICINE

## 2022-03-04 PROCEDURE — 87635 SARS-COV-2 COVID-19 AMP PRB: CPT

## 2022-03-04 PROCEDURE — 3700000000 HC ANESTHESIA ATTENDED CARE

## 2022-03-04 PROCEDURE — A4216 STERILE WATER/SALINE, 10 ML: HCPCS | Performed by: NURSE ANESTHETIST, CERTIFIED REGISTERED

## 2022-03-04 RX ORDER — OXYCODONE HYDROCHLORIDE 5 MG/1
5 TABLET ORAL PRN
Status: DISCONTINUED | OUTPATIENT
Start: 2022-03-04 | End: 2022-03-04

## 2022-03-04 RX ORDER — ONDANSETRON 2 MG/ML
4 INJECTION INTRAMUSCULAR; INTRAVENOUS
Status: DISCONTINUED | OUTPATIENT
Start: 2022-03-04 | End: 2022-03-04

## 2022-03-04 RX ORDER — SODIUM CHLORIDE 9 MG/ML
25 INJECTION, SOLUTION INTRAVENOUS PRN
Status: DISCONTINUED | OUTPATIENT
Start: 2022-03-04 | End: 2022-03-04

## 2022-03-04 RX ORDER — PROTAMINE SULFATE 10 MG/ML
30 INJECTION, SOLUTION INTRAVENOUS
Status: ACTIVE | OUTPATIENT
Start: 2022-03-04 | End: 2022-03-04

## 2022-03-04 RX ORDER — FENTANYL CITRATE 50 UG/ML
25 INJECTION, SOLUTION INTRAMUSCULAR; INTRAVENOUS EVERY 5 MIN PRN
Status: DISCONTINUED | OUTPATIENT
Start: 2022-03-04 | End: 2022-03-04

## 2022-03-04 RX ORDER — FENTANYL CITRATE 50 UG/ML
INJECTION, SOLUTION INTRAMUSCULAR; INTRAVENOUS PRN
Status: DISCONTINUED | OUTPATIENT
Start: 2022-03-04 | End: 2022-03-04 | Stop reason: SDUPTHER

## 2022-03-04 RX ORDER — SODIUM CHLORIDE 9 MG/ML
25 INJECTION, SOLUTION INTRAVENOUS PRN
Status: DISCONTINUED | OUTPATIENT
Start: 2022-03-04 | End: 2022-03-05 | Stop reason: HOSPADM

## 2022-03-04 RX ORDER — ACETAMINOPHEN 325 MG/1
650 TABLET ORAL EVERY 4 HOURS PRN
Status: DISCONTINUED | OUTPATIENT
Start: 2022-03-04 | End: 2022-03-05 | Stop reason: HOSPADM

## 2022-03-04 RX ORDER — METOPROLOL SUCCINATE 25 MG/1
25 TABLET, EXTENDED RELEASE ORAL DAILY
Status: DISCONTINUED | OUTPATIENT
Start: 2022-03-04 | End: 2022-03-05 | Stop reason: HOSPADM

## 2022-03-04 RX ORDER — ONDANSETRON 2 MG/ML
INJECTION INTRAMUSCULAR; INTRAVENOUS PRN
Status: DISCONTINUED | OUTPATIENT
Start: 2022-03-04 | End: 2022-03-04 | Stop reason: SDUPTHER

## 2022-03-04 RX ORDER — DEXAMETHASONE SODIUM PHOSPHATE 4 MG/ML
INJECTION, SOLUTION INTRA-ARTICULAR; INTRALESIONAL; INTRAMUSCULAR; INTRAVENOUS; SOFT TISSUE PRN
Status: DISCONTINUED | OUTPATIENT
Start: 2022-03-04 | End: 2022-03-04 | Stop reason: SDUPTHER

## 2022-03-04 RX ORDER — KETAMINE HCL IN NACL, ISO-OSM 100MG/10ML
SYRINGE (ML) INJECTION PRN
Status: DISCONTINUED | OUTPATIENT
Start: 2022-03-04 | End: 2022-03-04 | Stop reason: SDUPTHER

## 2022-03-04 RX ORDER — SODIUM CHLORIDE 0.9 % (FLUSH) 0.9 %
5-40 SYRINGE (ML) INJECTION PRN
Status: DISCONTINUED | OUTPATIENT
Start: 2022-03-04 | End: 2022-03-05 | Stop reason: HOSPADM

## 2022-03-04 RX ORDER — FENTANYL CITRATE 50 UG/ML
50 INJECTION, SOLUTION INTRAMUSCULAR; INTRAVENOUS EVERY 5 MIN PRN
Status: DISCONTINUED | OUTPATIENT
Start: 2022-03-04 | End: 2022-03-04

## 2022-03-04 RX ORDER — PROPOFOL 10 MG/ML
INJECTION, EMULSION INTRAVENOUS PRN
Status: DISCONTINUED | OUTPATIENT
Start: 2022-03-04 | End: 2022-03-04 | Stop reason: SDUPTHER

## 2022-03-04 RX ORDER — SODIUM CHLORIDE 9 MG/ML
INJECTION INTRAVENOUS PRN
Status: DISCONTINUED | OUTPATIENT
Start: 2022-03-04 | End: 2022-03-04 | Stop reason: SDUPTHER

## 2022-03-04 RX ORDER — LIDOCAINE HYDROCHLORIDE AND EPINEPHRINE BITARTRATE 20; .01 MG/ML; MG/ML
15 INJECTION, SOLUTION SUBCUTANEOUS SEE ADMIN INSTRUCTIONS
Status: DISCONTINUED | OUTPATIENT
Start: 2022-03-04 | End: 2022-03-05 | Stop reason: HOSPADM

## 2022-03-04 RX ORDER — DOBUTAMINE HYDROCHLORIDE 200 MG/100ML
INJECTION INTRAVENOUS CONTINUOUS PRN
Status: DISCONTINUED | OUTPATIENT
Start: 2022-03-04 | End: 2022-03-04 | Stop reason: SDUPTHER

## 2022-03-04 RX ORDER — SUCCINYLCHOLINE/SOD CL,ISO/PF 200MG/10ML
SYRINGE (ML) INTRAVENOUS PRN
Status: DISCONTINUED | OUTPATIENT
Start: 2022-03-04 | End: 2022-03-04 | Stop reason: SDUPTHER

## 2022-03-04 RX ORDER — VECURONIUM BROMIDE 1 MG/ML
INJECTION, POWDER, LYOPHILIZED, FOR SOLUTION INTRAVENOUS PRN
Status: DISCONTINUED | OUTPATIENT
Start: 2022-03-04 | End: 2022-03-04 | Stop reason: SDUPTHER

## 2022-03-04 RX ORDER — FUROSEMIDE 10 MG/ML
INJECTION INTRAMUSCULAR; INTRAVENOUS PRN
Status: DISCONTINUED | OUTPATIENT
Start: 2022-03-04 | End: 2022-03-04 | Stop reason: SDUPTHER

## 2022-03-04 RX ORDER — GLYCOPYRROLATE 0.2 MG/ML
INJECTION INTRAMUSCULAR; INTRAVENOUS PRN
Status: DISCONTINUED | OUTPATIENT
Start: 2022-03-04 | End: 2022-03-04 | Stop reason: SDUPTHER

## 2022-03-04 RX ORDER — FLECAINIDE ACETATE 50 MG/1
25 TABLET ORAL 2 TIMES DAILY
Status: DISCONTINUED | OUTPATIENT
Start: 2022-03-04 | End: 2022-03-05 | Stop reason: HOSPADM

## 2022-03-04 RX ORDER — HEPARIN SODIUM 1000 [USP'U]/ML
INJECTION, SOLUTION INTRAVENOUS; SUBCUTANEOUS PRN
Status: DISCONTINUED | OUTPATIENT
Start: 2022-03-04 | End: 2022-03-04 | Stop reason: SDUPTHER

## 2022-03-04 RX ORDER — SODIUM CHLORIDE 0.9 % (FLUSH) 0.9 %
5-40 SYRINGE (ML) INJECTION PRN
Status: DISCONTINUED | OUTPATIENT
Start: 2022-03-04 | End: 2022-03-04

## 2022-03-04 RX ORDER — 0.9 % SODIUM CHLORIDE 0.9 %
1000 INTRAVENOUS SOLUTION INTRAVENOUS
Status: ACTIVE | OUTPATIENT
Start: 2022-03-04 | End: 2022-03-04

## 2022-03-04 RX ORDER — SODIUM CHLORIDE 0.9 % (FLUSH) 0.9 %
5-40 SYRINGE (ML) INJECTION EVERY 12 HOURS SCHEDULED
Status: DISCONTINUED | OUTPATIENT
Start: 2022-03-04 | End: 2022-03-04

## 2022-03-04 RX ORDER — OXYCODONE HYDROCHLORIDE 10 MG/1
10 TABLET ORAL PRN
Status: DISCONTINUED | OUTPATIENT
Start: 2022-03-04 | End: 2022-03-04

## 2022-03-04 RX ORDER — HEPARIN SODIUM 10000 [USP'U]/100ML
INJECTION, SOLUTION INTRAVENOUS CONTINUOUS PRN
Status: DISCONTINUED | OUTPATIENT
Start: 2022-03-04 | End: 2022-03-04 | Stop reason: SDUPTHER

## 2022-03-04 RX ORDER — SODIUM CHLORIDE 9 MG/ML
INJECTION, SOLUTION INTRAVENOUS CONTINUOUS PRN
Status: DISCONTINUED | OUTPATIENT
Start: 2022-03-04 | End: 2022-03-04 | Stop reason: SDUPTHER

## 2022-03-04 RX ORDER — MEPERIDINE HYDROCHLORIDE 25 MG/ML
12.5 INJECTION INTRAMUSCULAR; INTRAVENOUS; SUBCUTANEOUS EVERY 5 MIN PRN
Status: DISCONTINUED | OUTPATIENT
Start: 2022-03-04 | End: 2022-03-04

## 2022-03-04 RX ORDER — SODIUM CHLORIDE 9 MG/ML
INJECTION, SOLUTION INTRAVENOUS CONTINUOUS
Status: DISCONTINUED | OUTPATIENT
Start: 2022-03-04 | End: 2022-03-04

## 2022-03-04 RX ORDER — SODIUM CHLORIDE 0.9 % (FLUSH) 0.9 %
5-40 SYRINGE (ML) INJECTION EVERY 12 HOURS SCHEDULED
Status: DISCONTINUED | OUTPATIENT
Start: 2022-03-04 | End: 2022-03-05 | Stop reason: HOSPADM

## 2022-03-04 RX ORDER — MIDAZOLAM HYDROCHLORIDE 1 MG/ML
INJECTION INTRAMUSCULAR; INTRAVENOUS PRN
Status: DISCONTINUED | OUTPATIENT
Start: 2022-03-04 | End: 2022-03-04 | Stop reason: SDUPTHER

## 2022-03-04 RX ORDER — PROTAMINE SULFATE 10 MG/ML
INJECTION, SOLUTION INTRAVENOUS PRN
Status: DISCONTINUED | OUTPATIENT
Start: 2022-03-04 | End: 2022-03-04 | Stop reason: SDUPTHER

## 2022-03-04 RX ORDER — EPHEDRINE SULFATE/0.9% NACL/PF 50 MG/5 ML
SYRINGE (ML) INTRAVENOUS PRN
Status: DISCONTINUED | OUTPATIENT
Start: 2022-03-04 | End: 2022-03-04 | Stop reason: SDUPTHER

## 2022-03-04 RX ORDER — LISINOPRIL 10 MG/1
10 TABLET ORAL DAILY
Status: DISCONTINUED | OUTPATIENT
Start: 2022-03-04 | End: 2022-03-05 | Stop reason: HOSPADM

## 2022-03-04 RX ADMIN — SODIUM CHLORIDE 4 ML: 9 INJECTION INTRAMUSCULAR; INTRAVENOUS; SUBCUTANEOUS at 08:48

## 2022-03-04 RX ADMIN — FUROSEMIDE 40 MG: 10 INJECTION, SOLUTION INTRAMUSCULAR; INTRAVENOUS at 09:35

## 2022-03-04 RX ADMIN — VECURONIUM BROMIDE 6 MG: 1 INJECTION, POWDER, LYOPHILIZED, FOR SOLUTION INTRAVENOUS at 08:01

## 2022-03-04 RX ADMIN — VECURONIUM BROMIDE 4 MG: 1 INJECTION, POWDER, LYOPHILIZED, FOR SOLUTION INTRAVENOUS at 08:48

## 2022-03-04 RX ADMIN — Medication 10 MG: at 07:56

## 2022-03-04 RX ADMIN — PROTAMINE SULFATE 100 MG: 10 INJECTION, SOLUTION INTRAVENOUS at 09:31

## 2022-03-04 RX ADMIN — SODIUM CHLORIDE 6 ML: 9 INJECTION INTRAMUSCULAR; INTRAVENOUS; SUBCUTANEOUS at 08:01

## 2022-03-04 RX ADMIN — PROPOFOL 100 MG: 10 INJECTION, EMULSION INTRAVENOUS at 07:50

## 2022-03-04 RX ADMIN — METOPROLOL SUCCINATE 25 MG: 25 TABLET, EXTENDED RELEASE ORAL at 10:39

## 2022-03-04 RX ADMIN — HEPARIN SODIUM 2500 UNITS: 1000 INJECTION INTRAVENOUS; SUBCUTANEOUS at 08:27

## 2022-03-04 RX ADMIN — HEPARIN SODIUM 5000 UNITS/HR: 10000 INJECTION, SOLUTION INTRAVENOUS at 08:50

## 2022-03-04 RX ADMIN — FLECAINIDE ACETATE 25 MG: 50 TABLET ORAL at 10:39

## 2022-03-04 RX ADMIN — APIXABAN 5 MG: 5 TABLET, FILM COATED ORAL at 10:42

## 2022-03-04 RX ADMIN — FENTANYL CITRATE 25 MCG: 50 INJECTION INTRAMUSCULAR; INTRAVENOUS at 07:48

## 2022-03-04 RX ADMIN — MIDAZOLAM 2 MG: 1 INJECTION INTRAMUSCULAR; INTRAVENOUS at 07:45

## 2022-03-04 RX ADMIN — DEXAMETHASONE SODIUM PHOSPHATE 4 MG: 4 INJECTION, SOLUTION INTRAMUSCULAR; INTRAVENOUS at 08:33

## 2022-03-04 RX ADMIN — Medication 15 MG: at 07:50

## 2022-03-04 RX ADMIN — Medication 100 MG: at 07:50

## 2022-03-04 RX ADMIN — DOBUTAMINE HYDROCHLORIDE 10 MCG/KG/MIN: 200 INJECTION INTRAVENOUS at 09:06

## 2022-03-04 RX ADMIN — GLYCOPYRROLATE 0.2 MG: 0.2 INJECTION, SOLUTION INTRAMUSCULAR; INTRAVENOUS at 08:10

## 2022-03-04 RX ADMIN — SODIUM CHLORIDE: 9 INJECTION, SOLUTION INTRAVENOUS at 07:45

## 2022-03-04 RX ADMIN — SODIUM CHLORIDE: 9 INJECTION, SOLUTION INTRAVENOUS at 08:04

## 2022-03-04 RX ADMIN — SUGAMMADEX 200 MG: 100 INJECTION, SOLUTION INTRAVENOUS at 09:34

## 2022-03-04 RX ADMIN — ONDANSETRON 4 MG: 2 INJECTION INTRAMUSCULAR; INTRAVENOUS at 09:28

## 2022-03-04 RX ADMIN — Medication 10 MG: at 07:59

## 2022-03-04 ASSESSMENT — PULMONARY FUNCTION TESTS
PIF_VALUE: 2
PIF_VALUE: 1
PIF_VALUE: 12
PIF_VALUE: 12
PIF_VALUE: 11
PIF_VALUE: 12
PIF_VALUE: 0
PIF_VALUE: 11
PIF_VALUE: 12
PIF_VALUE: 11
PIF_VALUE: 12
PIF_VALUE: 10
PIF_VALUE: 1
PIF_VALUE: 8
PIF_VALUE: 0
PIF_VALUE: 12
PIF_VALUE: 11
PIF_VALUE: 10
PIF_VALUE: 11
PIF_VALUE: 12
PIF_VALUE: 12
PIF_VALUE: 10
PIF_VALUE: 11
PIF_VALUE: 12
PIF_VALUE: 12
PIF_VALUE: 11
PIF_VALUE: 11
PIF_VALUE: 12
PIF_VALUE: 10
PIF_VALUE: 12
PIF_VALUE: 12
PIF_VALUE: 11
PIF_VALUE: 11
PIF_VALUE: 12
PIF_VALUE: 13
PIF_VALUE: 10
PIF_VALUE: 11
PIF_VALUE: 1
PIF_VALUE: 11
PIF_VALUE: 11
PIF_VALUE: 12
PIF_VALUE: 11
PIF_VALUE: 11
PIF_VALUE: 12
PIF_VALUE: 12
PIF_VALUE: 11
PIF_VALUE: 1
PIF_VALUE: 8
PIF_VALUE: 11
PIF_VALUE: 1
PIF_VALUE: 11
PIF_VALUE: 12
PIF_VALUE: 11
PIF_VALUE: 1
PIF_VALUE: 10
PIF_VALUE: 11
PIF_VALUE: 10
PIF_VALUE: 11
PIF_VALUE: 12
PIF_VALUE: 10
PIF_VALUE: 11
PIF_VALUE: 0
PIF_VALUE: 11
PIF_VALUE: 12
PIF_VALUE: 11
PIF_VALUE: 12
PIF_VALUE: 11
PIF_VALUE: 5
PIF_VALUE: 12
PIF_VALUE: 12
PIF_VALUE: 11
PIF_VALUE: 10
PIF_VALUE: 0
PIF_VALUE: 12
PIF_VALUE: 10
PIF_VALUE: 11
PIF_VALUE: 11
PIF_VALUE: 12
PIF_VALUE: 13
PIF_VALUE: 11
PIF_VALUE: 12
PIF_VALUE: 2
PIF_VALUE: 9
PIF_VALUE: 12
PIF_VALUE: 12
PIF_VALUE: 11
PIF_VALUE: 12
PIF_VALUE: 11
PIF_VALUE: 12
PIF_VALUE: 10
PIF_VALUE: 10
PIF_VALUE: 18
PIF_VALUE: 11
PIF_VALUE: 11
PIF_VALUE: 12
PIF_VALUE: 11
PIF_VALUE: 11
PIF_VALUE: 31
PIF_VALUE: 12
PIF_VALUE: 12
PIF_VALUE: 11
PIF_VALUE: 11
PIF_VALUE: 10
PIF_VALUE: 10
PIF_VALUE: 11
PIF_VALUE: 12

## 2022-03-04 ASSESSMENT — PAIN SCALES - GENERAL
PAINLEVEL_OUTOF10: 0

## 2022-03-04 NOTE — H&P
Cardiac Electrophysiology Follow Up  Date: 3/4/2022  Reason for Consultation: Atrial fibrillation  Consult Charity Ragsdale M.D. Primary R Meme Lim MD          Chief Complaint   Patient presents with    Follow-up       No cc       HPI: Wilberto Pascual is a 79 y.o. patient with a history of atrial fibrillation, hypertension, and systolic heart failure He was first diagnosed with atrial fibrillation in October of 2018 after presenting to the ED with c/p worsening shortness of breath and weakness. In the ER he was noted to be in Atrial fibrillation with RVR and also evidence of pneumonia. He had an echocardiogram completed that showed an LVEF of 30-35%. He was treated with amiodarone but was discontinued due to abnormal LFTs. He was discharged home on a BB and Eliquis. He wore a 30 day event monitor that showed he was predominantly in normal sinus rhythm with 6 episodes of atrial fibrillation or atrial flutter. No symptoms were reported. Underwent successful CV on 1/10/2020 (Dr. Tegan Elise). On 5/8/20 he underwent ablation of atrial fibrillation with Dr. Peggy Pillai. Noted to have recurrent AF and flutter on holter monitor 5/2021 and underwent repeat AF ablation on 8/11/21 with Dr. Fuad Thomas. Flecainide discontinued during follow up with Gary Gil on 11/16/21 and 30 day event monitor placed. Patient called the office on 11/19/21 to report that he was \"back in Afib. \" He denied any symptoms, stating his [employer] device showed a \"skipping heart beat. \" Jaxon Marx reviewed strips from event monitor revealing sustained Afib >4 hours, 21 seconds of an atrial run likely atrial flutter and NSVT .      Interval History: He returns today in f/u to discuss findings on cardiac monitor. Says he had an episode where he thought he had a rapid heart rate, but it felt differently than when he had atrial fibrillation.  Says he was instructed to resume Flecainide 25 mg bid and has remained compliant with therapy. He continues to take B-blocker and Eliquis with no reports of abnormal bruising or bleeding. 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 Inquiring if that may have contributed to the development of Afib/flutter. Today, he reports feeling well.      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       Medication:   Home Medications           Prior to Admission medications    Medication Sig Start Date End Date Taking? Authorizing Provider   flecainide (TAMBOCOR) 50 MG tablet Take 25 mg by mouth 2 times daily     Yes Historical Provider, MD   lisinopril (PRINIVIL;ZESTRIL) 10 MG tablet TAKE ONE TABLET BY MOUTH DAILY 11/30/21   Yes Emiliano Cruz MD   metoprolol succinate (TOPROL XL) 25 MG extended release tablet TAKE 1/2 TABLET BY MOUTH DAILY 11/30/21   Yes Emiliano Cruz MD   apixaban (ELIQUIS) 5 MG TABS tablet Take 1 tablet by mouth 2 times daily 11/24/21   Yes Lynnette Whittington MD   triamcinolone (ARISTOCORT) 0.5 % cream Apply topically every 4 hours as needed (eczema) Apply topically 3 times daily.  11/24/21   Yes Lynnette Whittington MD         Social History: reports that he quit smoking about 29 years ago. His smoking use included cigarettes. He started smoking about 67 years ago. He smoked 1.00 pack per day. He has never used smokeless tobacco. He reports current alcohol use. He reports previous drug use. Family History:  family history includes Cancer in his brother and sister; Heart Disease in his brother, brother, and father. Reviewed. Denies family history of sudden cardiac death, arrhythmia, premature CAD     Review of System:     · General ROS: negative for - chills, fever   · Psychological ROS: negative for - anxiety or depression  · Ophthalmic ROS: negative for - eye pain or loss of vision  · ENT ROS: negative for - epistaxis, headaches, nasal discharge, sore throat   · Allergy and Immunology ROS: negative for - hives, nasal congestion   · Hematological and Lymphatic ROS: negative for - bleeding problems, blood clots, bruising or jaundice  · Endocrine ROS: negative for - skin changes, temperature intolerance or unexpected weight changes  · Respiratory ROS: negative for - cough, hemoptysis, pleuritic pain, SOB, sputum changes or wheezing  · Cardiovascular ROS: Per HPI. · Gastrointestinal ROS: negative for - abdominal pain, blood in stools, diarrhea, hematemesis, melena, nausea/vomiting or swallowing difficulty/pain  · Genito-Urinary ROS: negative for - dysuria or incontinence  · Musculoskeletal ROS: negative for - joint swelling or muscle pain  · Neurological ROS: negative for - confusion, dizziness, gait disturbance, headaches, numbness/tingling, seizures, speech problems, tremors, visual changes or weakness  · Dermatological ROS: negative for - rash     Physical Examination:      Vitals:     01/03/22 1308   BP: 136/64   Pulse:     SpO2:        · Constitutional: Oriented. No distress. · Head: Normocephalic and atraumatic. · Mouth/Throat: Oropharynx is clear and moist.   · Eyes: Conjunctivae normal. EOM are normal.   · Neck: Normal range of motion. Neck supple. No rigidity. No JVD present. · Cardiovascular: Normal rate, regular rhythm, S1&S2 and intact distal pulses. · Pulmonary/Chest: Bilateral respiratory sounds. No wheezes. No rhonchi. · Abdominal: Soft. Bowel sounds present. No distension, No tenderness. · Musculoskeletal: No tenderness. No edema    · Lymphadenopathy: Has no cervical adenopathy. · Neurological: Alert and oriented. Cranial nerve appears intact, No Gross deficit   · Skin: Skin is warm and dry. No rash noted. · Psychiatric: Has a normal mood, affect and behavior      Labs:  Reviewed.      ECG: reviewed, Sinus rhythm with PACs, v-rate of 59 bpm with QRS duration 116 ms.      Studies:   1. Event monitor: 2/13/19  NSR. Few episodes if PSVT. 2. Event monitor: 6/11/21  Sinus rhythm, (>6) episodes of atrial fibrillation or atrial flutter, sustained and brief. 3. Event monitor 11/2021  Sustained Afib, atrial run likely Aflutter and NSVT     Echo: 10/8/20  Ejection fraction is visually estimated to be 50%. Indeterminate diastolic function. RV is dilated with mildly depressed function  RV volume overload moderate TR     Echo: 5/15/19  Left ventricular cavity size is normal.  Normal left ventricular wall thickness. Ejection fraction is visually estimated to be 40-45%. There is severe hypokinesis of the septal walls. Abnormal (paradoxical) septal motion is present. Indeterminate diastolic function  Mitral valve leaflets appear thickened with mitral valve prolapse.   Moderate mitral regurgitation is present. The left atrium is mildly dilated. Tricuspid valve appear redundant. Mild to moderate tricuspid regurgitation. The right atrium is moderately dilated. IVC size is normal (<2.1 cm) but collapses < 50% with respiration consistent  with elevated RA pressure (8 mmHg). Estimated pulmonary artery systolic pressure is mildly elevated at 34 mmHg  assuming a right atrial pressure of 8 mmHg.   Ejection fraction is visually estimated to be 40-45%. LA volume/Index: 69 ml /40 ml/m2     Echo:10/29/18  Left ventricular cavity size is normal.  Normal left ventricular wall thickness. Difficult to estimate EF due to increase rate and rhythm but appears depressed  Mitral valve leaflets appear mildly thickened. Mild mitral regurgitation. The left atrium is dilated. Aortic valve appears sclerotic but opens adequately. Trivial aortic regurgitation. Tricuspid valve is structurally normal.  Mild tricuspid regurgitation with RVSP of 26 mmHg. The right atrium is dilated. Suggest repeat limited echo with bubble study to r/o ASD or PFO     Echo limited 11/2/18   Left ventricular cavity size is normal.  Normal left ventricular wall thickness. Ejection fraction is visually estimated to be 30-35%, though difficult to adequately assess due to arrhythmia. Severe hypo to akinesis of the septum. Abnormal (paradoxical) septal motion is present. Tricuspid valve appears thickened with redundancy. The right atrium is mildly dilated. IVC not well visualized. A bubble study was performed and fails to show evidence of shunting.     3. Stress Test:    12/21/18 no evidence of ischemia.      4. Cath:   None     I independently reviewed the ECG, MCOT, echocardiogram, stress test, and coronary angiography/PCI results and used them for my plan of care. Procedures:  1.1/10/2020: Cardioversion   2. 5/8/2020: Electrophysiology study with radiofrequency ablation of atrial fibrillation and pulmonary vein isolation   Additional focal ablation for Atrial fibrillation, outside the pulmonary vein along the posterior wall of left atrium, floor of left atrium and interatrial septum.  Typical atrial flutter ablation with Buzz Kasper MD   3. 8/11/2021:Pre- and post-procedure diagnoses were paroxysmal atrial fibrillation and right atrial tachycardia with CL 427ms originating from the low posterior wall with proximal-distal activation   - Pulmonary vein isolations using provides an effective immediate therapy with success rates of 75% or higher, but it provides no short nor long term efficacy. Anti-arrhythmic medications provide a very effective short term therapy, but even with our most potent anti-arrhythmic medication there is limited long term efficacy (clinical studies have shown that 40% of patients remain atrial fibrillation-free after 4 years of follow-up after starting one of the more powerful anti-arrhythmic medication (amiodarone), and, if extrapolated, may have further diminishing success as time goes on). Against atrial flutter, any anti-arrhythmic medication would be nearly ineffective. Atrial fibrillation and atrial flutter ablation is a potentially curative therapy with very reasonable success rate after a first time procedure and with improving success rates with subsequent procedures.      The risks, benefits and alternatives of the ablation procedure were discussed with the patient. The risks including, but not limited to, the risks of bleeding, infection, radiation exposure, injury to vascular, cardiac and surrounding structures (including pneumothorax), stroke, cardiac perforation, tamponade, need for emergent open heart surgery, need for pacemaker implantation, injury to the phrenic nerve, injury to the esophagus, myocardial infarction and death were discussed in detail. The patient was also counseled at length about the risks of arleth Covid-19 in the jennifer-operative and post-operative states including the recovery window of their procedure. The patient was made aware that arleth Covid-19 after a surgical procedure may worsen their prognosis for recovering from the virus and lend to a higher morbidity and or mortality risk. The patient was given the option of postponing their procedure. The patient was also presented reasonable alternatives to the proposed care, treatment, and services.  The discussion I have had with the patient encompassed risks, benefits, and side effects related to the alternatives and the risks related to not receiving the proposed care, treatment and services.     I spent 40 minutes face to face with the patient, with greater than 50% of that time spent in counseling on the above.     The patient would like some time to deliberate further regarding procedure. If he wishes to proceed, he will contact our nurse, Chayo Jones at which time we will schedule for a radiofrequency ablation with [employer] system with ANSON immediately prior to this ablation. We will order BMP, CBC, PT/INR, and Type & Screen prior to the procedure. A cardiac CTA will be ordered for pulmonary vein mapping prior to this procedure. Informed that he may be scheduled with Dr. Sean Scanlon as well.         Chronic systolic heart failure/NICM  -EF now 50% by most recent Echo 10/2020  -Pt appears euvolemic today  -Continue with medical therapy   -ACE-I and B-blocker  -No ASA secondary to Eliquis for AF  -Encouraged a low sodium diet. -Management per Dr. Abby Crowe      Follow up with EP post procedure and as scheduled with Dr. Silvestre Contreras you for allowing me to participate in the care of [facility] [address] [place] [address]. All questions and concerns were addressed to the patient/family. Alternatives to my treatment were discussed.      I have reviewed the history and physical and examined the patient and find no relevant changes. I have reviewed with the patient and/or family the risks and benefits to the proposed procedure. The patient was presented with the option of postponing the proposed procedure. The patient was also presented reasonable alternatives to the proposed care, treatment, and services.  The discussion I have had with the patient encompassed risks, benefits, and side effects related to the alternatives and the risks related to not receiving the proposed care, treatment and services.   Ivet Garcia MD, MS, [facility], [address] [place] [address] [address], [facility]4 Jose Ledezma [facility] [address]  [phone]

## 2022-03-04 NOTE — ANESTHESIA PRE PROCEDURE
[facility] Department of Anesthesiology  Pre-Anesthesia Evaluation/Consultation       Name:  Paula White  : 1940  Age:  79 y.o. MRN:  [id number]  Date: 3/4/2022           Surgeon: Radiology    Procedure ablation in cardiac lab     Allergies   Allergen Reactions    Amiodarone      Pt is not sure that he is allergic to this.       Patient Active Problem List   Diagnosis    OA (osteoarthritis)    Thymoma    Essential hypertension    Encounter to discuss treatment options    Chest wall mass    Cardiomyopathy ([address]    Chronic systolic CHF (congestive heart failure), NYHA class 1 (HCC)    S/P ablation of atrial fibrillation    Persistent atrial fibrillation (HCC)    Atrial tachycardia (HCC)     Past Medical History:   Diagnosis Date    Atrial fibrillation ([address]     [facility]omy[facility]     Chronic tachycardia 2018    Erectile dysfunction     Hypertension     Osteoarthritis     Pneumonia     Thymoma, benign     Tubulovillous adenoma polyp of colon 13    also sessile serrated adenoma    Wears glasses      Past Surgical History:   Procedure Laterality Date    CARDIOVERSION  01/10/2020    COLONOSCOPY N/A 1/15/2020    COLONOSCOPY performed by Maury Villegas MD at [facility]      S/P [facility] THYROID BIOPSY  2021     THYROID BIOPSY 2021 [facility] ULTRASOUND     Social History     Tobacco Use    Smoking status: Former Smoker     Packs/day: 1.00     Types: Cigarettes     Start date: 1955     Quit date: 1993     Years since quittin.1    Smokeless tobacco: Never Used   Vaping Use    Vaping Use: Never used   Substance Use Topics    Alcohol use: Yes     Comment: 1-2 daily    Drug use: Not Currently     Medications  Current Outpatient Medications on File Prior to Visit   Medication Sig Dispense Refill    apixaban (ELIQUIS) 5 MG TABS tablet Take 1 tablet by mouth 2 times daily 60 tablet 3    flecainide (TAMBOCOR) 50 MG tablet Take 25 mg by mouth 2 times daily      lisinopril (PRINIVIL;ZESTRIL) 10 MG tablet TAKE ONE TABLET BY MOUTH DAILY 90 tablet 1    metoprolol succinate (TOPROL XL) 25 MG extended release tablet TAKE 1/2 TABLET BY MOUTH DAILY 45 tablet 1    triamcinolone (ARISTOCORT) 0.5 % cream Apply topically every 4 hours as needed (eczema) Apply topically 3 times daily. 15 g 3     Current Facility-Administered Medications on File Prior to Visit   Medication Dose Route Frequency Provider Last Rate Last Admin    0.9 % sodium chloride infusion   IntraVENous Continuous Kashif Grant MD        sodium chloride flush 0.9 % injection 5-40 mL  5-40 mL IntraVENous 2 times per day Kashif Grant MD        sodium chloride flush 0.9 % injection 5-40 mL  5-40 mL IntraVENous PRN Kashif Grant MD        0.9 % sodium chloride infusion  25 mL IntraVENous PRN Kashif Grant MD         Current Outpatient Medications   Medication Sig Dispense Refill    apixaban (ELIQUIS) 5 MG TABS tablet Take 1 tablet by mouth 2 times daily 60 tablet 3    flecainide (TAMBOCOR) 50 MG tablet Take 25 mg by mouth 2 times daily      lisinopril (PRINIVIL;ZESTRIL) 10 MG tablet TAKE ONE TABLET BY MOUTH DAILY 90 tablet 1    metoprolol succinate (TOPROL XL) 25 MG extended release tablet TAKE 1/2 TABLET BY MOUTH DAILY 45 tablet 1    triamcinolone (ARISTOCORT) 0.5 % cream Apply topically every 4 hours as needed (eczema) Apply topically 3 times daily. 15 g 3     No current facility-administered medications for this visit.      Facility-Administered Medications Ordered in Other Visits   Medication Dose Route Frequency Provider Last Rate Last Admin    0.9 % sodium chloride infusion   IntraVENous Continuous Kashif Grant MD        sodium chloride flush 0.9 % injection 5-40 mL  5-40 mL IntraVENous 2 times per day Kashif Grant MD        sodium chloride flush 0.9 % injection 5-40 mL  5-40 mL IntraVENous PRN Sadi Loving MD        0.9 % sodium chloride infusion  25 mL IntraVENous PRN Sadi Loving MD         Vital Signs (Current)   There were no vitals filed for this visit. Vital Signs Statistics (for past 48 hrs)     No data recorded    BP Readings from Last 3 Encounters:   02/01/22 122/60   01/03/22 136/64   12/17/21 130/72     BMI  There is no height or weight on file to calculate BMI. Estimated body mass index is 17.49 kg/m² as calculated from the following:    Height as of 2/1/22: 5' 8\" (1.727 m). Weight as of 2/1/22: 115 lb (52.2 kg). CBC   Lab Results   Component Value Date    WBC 5.5 08/05/2021    RBC 4.03 08/05/2021    HGB 12.4 08/05/2021    HCT 36.8 08/05/2021    MCV 91.4 08/05/2021    RDW 13.7 08/05/2021     08/05/2021     CMP    Lab Results   Component Value Date     08/05/2021    K 5.0 08/05/2021    K 3.9 12/29/2019     08/05/2021    CO2 25 08/05/2021    BUN 26 08/05/2021    CREATININE 0.8 08/05/2021    GFRAA >60 08/05/2021    AGRATIO 1.8 06/28/2019    LABGLOM >60 08/05/2021    GLUCOSE 88 08/05/2021    PROT 6.7 06/28/2019    CALCIUM 9.6 08/05/2021    BILITOT 0.9 06/28/2019    ALKPHOS 70 06/28/2019    AST 21 06/28/2019    ALT 17 06/28/2019     BMP    Lab Results   Component Value Date     08/05/2021    K 5.0 08/05/2021    K 3.9 12/29/2019     08/05/2021    CO2 25 08/05/2021    BUN 26 08/05/2021    CREATININE 0.8 08/05/2021    CALCIUM 9.6 08/05/2021    GFRAA >60 08/05/2021    LABGLOM >60 08/05/2021    GLUCOSE 88 08/05/2021     POCGlucose  No results for input(s): GLUCOSE in the last 72 hours.    Coags    Lab Results   Component Value Date    PROTIME 12.3 08/27/2020    INR 1.06 08/27/2020    APTT 33.5 02/44/4343     HCG (If Applicable) No results found for: Enrike Guitar, HCG, HCGQUANT   ABGs   Lab Results   Component Value Date    PHART 7.379 10/30/2018    PO2ART 72.8 10/30/2018    DOC7KDR 24.7 10/30/2018    YXU8ERQ 14.3 10/30/2018    BEART -9.0 10/30/2018    J6HKBMQN 93.8 10/30/2018      Type & Screen (If Applicable)  No results found for: LABABO, LABRH                         BMI: Wt Readings from Last 3 Encounters:       NPO Status: 8 hours                          Anesthesia Evaluation  Patient summary reviewed no history of anesthetic complications:   Airway: Mallampati: III  TM distance: >3 FB   Neck ROM: full   Dental:    (+) upper dentures, lower dentures and edentulous      Pulmonary:normal exam        (-) pneumonia                           Cardiovascular:  Exercise tolerance: good (>4 METS),   (+) hypertension:, valvular problems/murmurs:, dysrhythmias: atrial fibrillation, CHF (Ef 45):, pulmonary hypertension:,       ECG reviewed  Rhythm: regular  Rate: normal  Echocardiogram reviewed         Beta Blocker:  Dose within 24 Hrs      ROS comment: Echo 2022: Normal left ventricle size, wall thickness, and low normal systolic function   with an estimated ejection fraction of 50%. mild septal hypokinesis   Indeterminate diastolic function. Mitral valve leaflets appear mild-moderately thickened. Mild mitral annular calcification. Two seperate jets of Moderate mitral regurgitation. The left atrium is moderately to severely dilated. Dilated right ventricle. moderate tricuspid regurgitation. Neuro/Psych:   Negative Neuro/Psych ROS              GI/Hepatic/Renal: Neg GI/Hepatic/Renal ROS            Endo/Other:    (+) blood dyscrasia (eliquis): anticoagulation therapy, arthritis: OA., .    (-) diabetes mellitus, hypothyroidism, hyperthyroidism               Abdominal:             Vascular: negative vascular ROS. Other Findings:               Anesthesia Plan      general     ASA 3       Induction: intravenous. MIPS: Prophylactic antiemetics administered. Anesthetic plan and risks discussed with patient. Plan discussed with CRNA.               This pre-anesthesia assessment may be used as a history and physical.    DOS STAFF ADDENDUM:    Pt seen and examined, chart reviewed (including anesthesia, drug and allergy history). No interval changes to history and physical examination. Anesthetic plan, risks, benefits, alternatives, and personnel involved discussed with patient. Questions and concerns addressed. Patient(family) verbalized an understanding and agrees to proceed.       Inocencio Shea MD  March 4, 2022  6:51 AM

## 2022-03-04 NOTE — ANESTHESIA POSTPROCEDURE EVALUATION
Department of Anesthesiology  Postprocedure Note    Patient: Emeka Mares  MRN: [id number]  YOB: 1940  Date of evaluation: 3/4/2022  Time:  11:15 AM     Procedure Summary     Date: 03/04/22 Room / Location: [facility] Cath Lab    Anesthesia Start: 0745 Anesthesia Stop: 7589    Procedure: WST ANSON AFIB ABLATION W ANES Diagnosis:     Scheduled Providers:  Responsible Provider: López Warner MD    Anesthesia Type: general ASA Status: 3          Anesthesia Type: general    Christy Phase I: Christy Score: 10    Christy Phase II:      Last vitals: Reviewed and per EMR flowsheets.        Anesthesia Post Evaluation    Patient location during evaluation: PACU  Patient participation: complete - patient participated  Level of consciousness: awake and alert  Pain score: 0  Airway patency: patent  Nausea & Vomiting: no nausea and no vomiting  Complications: no  Cardiovascular status: blood pressure returned to baseline  Respiratory status: acceptable  Hydration status: euvolemic yes

## 2022-03-04 NOTE — PROCEDURES
[address]     Electrophysiology Procedure Note       Date of Procedure: 3/4/2022  Patient's Name: Princess Quiñonez  YOB: 1940   Medical Record Number: [id number]  Referring Physician: Deepak Echeverria MD  Procedure Performed by:  Asia Swann MD    Procedure performed:  · Electrophysiology study with radiofrequency ablation of atrial fibrillation and pulmonary vein isolation   · Ablation of left atrial tachycardia arising from the low anterior wall with 416ms with proximal-distal activation  · Ablation of mitral isthmus dependent left atrial flutter with  ms with distal-proximal activation  · Additional ablation with creation of a roof line (for atrial fibrillation), anterior line from mitral annulus to the roof (for atrial fibrillation) and mitral isthmus (for mitral isthmus dependent left atrial flutter), low anterior wall ablation (for left atrial tachycardia)  · Additional ablation of complex fractionated atrial electrograms (for atrial fibrillation) on the LA septal wall  · Additional ablation of complex fractionated atrial electrograms (for atrial fibrillation) on the anterior base of the LA appendage  · 3-D electroanatomical mapping of the left atrium    · Transseptal blunt puncture through a patent foramen ovale x 2 under intracardiac ultrasound guidance without fluoroscopic guidance   · Intracardiac echocardiography  · Left ventricular pacing and recording  · Drug infusion with an attempt to induce atrial tachydysrhythmia  · External cardioversion of the atrial arrhythmias   · Anesthesia: General anesthesia provided by the Anesthesia service    Indications for procedure:    Princess Quiñonez is a 79 y.o. male who has a history of paroxysmal atrial fibrillation s/p ablation for atrial fibrillation in 2020 followed by repeat ablation for atrial fibrillation and left atrial flutter in 2021 who has recurrence of both atrial fibrillation and left atrial flutter who is symptomatic with symptoms of dyspnea with minimal exertion and fatigue who has failed antiarrhythmic therapy in the past is now here for an ablation for paroxysmal atrial fibrillation and left atrial flutter. Details of Procedure: The risks, benefits and alternatives of the ablation procedure were discussed with the patient. The risks including, but not limited to, the risks of bleeding, infection, radiation exposure, injury to vascular, cardiac and surrounding structures (including pneumothorax), stroke, cardiac perforation, tamponade, need for emergent open heart surgery, need for pacemaker implantation, esophageal injury and fistula, myocardial infarction and death were discussed in detail. The patient was also counseled at length about the risks of arleth Covid-19 in the jennifer-operative and post-operative states including the recovery window of their procedure. The patient was made aware that arleth Covid-19 after a surgical procedure may worsen their prognosis for recovering from the virus and lend to a higher morbidity and or mortality risk. The patient was given the option of postponing their procedure. The patient was also presented reasonable alternatives to the proposed care, treatment, and services. The discussion I have had with the patient encompassed risks, benefits, and side effects related to the alternatives and the risks related to not receiving the proposed care, treatment and services. The patient opted to proceed with the ablation. Written informed consent was signed and placed in the chart. Patient was brought to the EP lab in a fasting non-sedate state. Patient underwent general anesthesia by anesthesia team. The patient was monitored continuously with ECG, pulse oximetry, blood pressure monitoring, and direct observation. No urinary chase was placed in order to prevent any hematuria or urinary tract infection. Both groins were prepped in a sterile fashion.  We gained access in the right femoral vein. One 8 Micronesian short sheath for ICE and subsequently for CS catheters were placed in the right femoral vein using modified seldinger technique. Two 8.5F SLO sheaths were introduced into the right femoral vein using modified Seldinger technique. Then a CS cathter was placed inside the coronary sinus without fluoroscopy but with 3-D electroanatomic mapping for recording and mapping of the left atrium. Using ICE we delineated the left pulmonary vein, left atrial appendage,  mitral valve, and right superior and right inferior pulmonary veins, and the trivial amount of pericardial effusion prior to ablation. Two transeptal punctures were attempted but we found a patent foramen ovale through which, with blunt perforation performed under intracardiac echocardiogram, pressure monitoring, and without fluoroscopic guidance, we entered the LA. Patient received a bolus of heparin shortly after each transseptal puncture followed by continuous monitoring of the ACT every 15-30 minutes, and additional boluses of heparin during the procedure to keep the ACT between 300-400 sec. We placed both SLO sheaths inside the left atrium. Also an esophageal temperature probe (JobFlash Temperature Probe 12Fr) that was tied with sutures to an accompanying [employer] quadripolar 6 Micronesian 5-5-5 electrode spacing catheter was advanced into the esophagus for real-time mapping of the esophagus and careful monitoring of the esophageal temperature during ablation. Using the Penta ray cathter and Carto navigation system a three dimensional electro anatomical mapping of the left atrium, in addition to right and left sided pulmonary vein anatomy was created. During sinus rhythm, we found that all four pulmonary veins (left superior, left inferior, right superior, and right inferior) did NOT have PV fascicles, the triggers for the atrial fibrillation.  The associated L and R antrums were electrically isolated. The roof did not appear electrically isolated by 3-D electroanatomic mapping, even though the transit time across the roof showed a long transit time. With rapid atrial pacing at 250 ms, we were able to easily induce atrial fibrillation which then organized into a left atrial flutter with TCL 305ms with distal-proximal activation, most likely utilizing the mitral isthmus. We then proceeded with a linear ablation on the mitral isthmus connecting the mitral annulus to the L posterolateral antrum lesions. As we did so, the mitral isthmus dependent left atrial flutter was eradicated, but then disorganized into atrial fibrillation. Given that the roof did not appear electrically blocked, we ablated a linear line along the roof of the left atrium as a separate mechanism for the initiation and perpetuation of atrial fibrillation. We extended this roof linear ablation to the R anterior antrum. After completing this, the patient persisted in atrial fibrillation. We then proceeded with a linear ablation on the anterior wall from the mitral annulus to the roof to target the anterior wall dependent left atrial flutter as a separate mechanism for the initiation and perpetuation of atrial fibrillation. Even after competing this, the patient persisted in atrial fibrillation. Patient was then cardioverted with 200J of synchronized, biphasic shock which successfully converted the patient to sinus rhythm in order to evaluate for exit block across the L and R antrum. We then evaluated the left atrium for complex fractionated atrial electrogram, a separate mechanism that would initiate and/or perpetuate atrial fibrillation apart from the pulmonary vein fascicles and antral ablations. We found 1 site(s) on the septal aspect of the LA and ablated these foci.     We then evaluated the base of the LA appendage for complex fractionated atrial electrogram, a seprate mechanism that would initiate and/or perpetuate atrial fibrillation apart from the pulmonary vein fascicles and antral ablations. We found 1 site(s) on the anterior base of the LA appendage and ablated these foci. After ablation was complete, catheters were placed in the left and right atrium, His-position, right ventricle, and left ventricle for pacing and recording. Arrhythmia was attempted to be induced by rapid atrial and ventricular pacing, and there was induction of a left atrial tachycardia with TCL 416ms with proximal-distal activation. We performed activation mapping and found the earliest atrial potential to be on the low anterior wall 1.5 - 2 cm superior to the mitral annulus. As we ablated this focus, it solicited much atrial ectopy with an eradication of the atrial tachycardia for the remainder of the procedure. Maximum output (20mA) pacing in the L antrum and R antrum were also performed and showed dissociation from the rest of the left atrium. This was checked circumferentially around the antrums and verified many times. We evaluated the roof line and found that there was complete, bidirectional block. The anterior wall ablated line was evaluated and showed complete bidirectional block, as evidenced by a longer transit time on the medial aspect of the ablated line when compared to the lateral aspect of the ablated line when pacing from the LA appendage. The mitral isthmus was evaluated and showed incomplete, bidirectional block, as evidenced by a distal-proximal activation on CS catheter when pacing from the LA appendage. Adenosine bolus of 18mg was also given once to assess for acute re-connection and to attempt to induce atrial fibrillation. We had adequate adenosine effect (AV blocks of > 3 seconds) and there were no pulmonary fascicles seen in any of the veins nor were there any atrial tachydysrhythmia induced.  We then administered dobutamine infusion up to 10 mcg/kg/min in order to achieve at least a 20% increase in heart rate from the basal heart rate to induce any atrial tachydysrhythmia, and there were no atrial tachydysrhythmia induced. We then re-evaluated the cavotricuspid isthmus which was previously ablated. Bidirectional block was confirmed by noting trans-isthmus conduction time of 337 ms from the CS to a focus just lateral to the line, and a conduction time of 135 ms from the CS to a focus more lateral to the ablated line. Similarly, when pacing from the high lateral RA, the conduction time to the CS was 148 ms compared to a transit time of 318 ms just lateral to the ablated line to the CS. We then performed an EP study and programmed stimulation using our CS catheter and ablation catheter to assess the cardiac conduction system and to attempt to induce atrial tachydysrhythmia. The ablation catheter were moved from the left atrium to the left ventricular apex and His bundle position. His bundle potentials was recorded and pacing and recordings were performed from right atrium, coronary sinus and LV apex with the following results:     Sinus cycle length was 656 msec  WV interval was 157 msec  QRS duration was 99 msec  QT interval was 367 msec  AH interval was 59 msec  HV interval was 52 msec  Pacing from left atrium, 1:1 conduction over AV node with (AV wenckebach) was 330  msec  Pacing from left atrium, AV rashid ERP was <600/260 msec   Pacing from LV apex, 1:1 retrograde conduction over AV node (VA wenckebach) was 300 msec  Pacing from LV apex, showed VAERP of 400/240 msec. Then both the ablation, Pentarray, and CS catheters were removed from the body, and all 3 sheaths were removed from the right femoral vein with a figure-of-eight suture that was placed to provide hemostasis while awaiting the downtrending of the ACT. Protamine 150mg IV x 1 was administered to partially reverse the IV heparin that was administered during the atrial fibrillation ablation procedure.     Under intracardiac ultrasound guidance, we evaluated for pericardial effusion, and there was no evidence of such. Specimen collected: none    Estimated blood loss: < 50 cc    The patient tolerated the procedure well and there were no complications. Patient was extubated and transferred to the floor in stable condition. Conclusion:     - Pre- and post-procedure diagnoses were paroxysmal atrial fibrillation, left atrial tachycardia with TCL 416ms with proximal-distal activation arising from the low anterior wall, and mitral isthmus dependent left atrial flutter with  ms with distal-proximal activation  - Pulmonary vein isolations using wide area circumferential radiofrequency ablation around R anterior antrum  - Additional ablation with creation of roof line (for atrial fibrillation), anterior line from the mitral annulus to the roof (for atrial fibrillation), and mitral isthmus (for mitral isthmus dependent left atrial flutter), low anterior wall  - Ablation of complex fractionated atrial electrogram in the left atrial septal wall (for atrial fibrillation)  - Ablation of complex fractionated atrial electrogram in the left atrial appendage anterior base (for atrial fibrillation)    Plan:   The patient will be monitored and receive the usual post ablation care. If there are no complications, the patient will be discharged from the hospital either later today or tomorrow with pre-admission Flecainide 25mg po BID, pre-admission Toprol XL 25mg daily, and pre-admission apixaban 5mg po BID. The patient will follow-up with the EP NP in 3 month's time. Thank you for allowing us to participate in the care of your patient. If you have any questions or concerns, please do not hesitate to contact me.     Alec Spencer MD, MS, [facility]  Cardiac Electrophysiology  [address]  [address] [facility] [place][note 2], [facility] [facility] [facility]  [place] [address]  [phone]

## 2022-03-25 NOTE — PROGRESS NOTES
[address]      Cardiology Progress Note    Primo Mosqueda  1940 January 28, 2021        CC: \"I feel good. \"     HPI:  The patient is 79 y.o. male with a past medical history significant for atrial fibrillation, hypertension, and systolic heart failure. He was admitted 10/27 - 11/6/18 for worsening shortness of breath and weakness. He is a marathon runner and first noticed the symptoms while in [facility]. In the ER he was noted to be in Atrial fibrillation with RVR and also evidence of pneumonia. He had an echocardiogram completed that showed an LVEF of 30-35%. He was treated with amiodarone but was discontinued due to abnormal LFTs. He was discharged home on a BB and Eliquis at that time. He is s/p DCCV 1/2020 followed by EPS AFIB ablation and pulm vein isol. 5/8/20. Today, he reports that he has felt well with no recurrent or new cardiac sounding complaints. He reports that he is compliant with medical therapy and tolerating. Patient denies exertional chest pain/pressure, fatigue, dyspnea at rest, VILLELA, PND, orthopnea, palpitations, lightheadedness, dizziness weight changes, changes in LE edema, near syncope and syncope. He denies any abnomral bruising or bleeding on chronic anticoagulation therapy.        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    also sessile serrated adenoma    Wears glasses      Past Surgical History:   Procedure Laterality Date    CARDIOVERSION  01/10/2020    COLONOSCOPY N/A 1/15/2020    COLONOSCOPY performed by Susannah Patrick MD at [facility]      S/P TURP    THYMECTOMY       Family History   Problem Relation Age of Onset    Heart Disease Father     Cancer Sister         Colon    Cancer Brother Colon    Heart Disease Brother     Heart Disease Brother      Social History     Tobacco Use    Smoking status: Former Smoker     Packs/day: 1.00     Types: Cigarettes     Start date: 1955     Quit date: 1993     Years since quittin.0    Smokeless tobacco: Never Used   Substance Use Topics    Alcohol use: Yes     Comment: socially    Drug use: Not Currently       Allergies   Allergen Reactions    Amiodarone      Current Outpatient Medications   Medication Sig Dispense Refill    furosemide (LASIX) 20 MG tablet Take 1 tablet by mouth daily 90 tablet 1    apixaban (ELIQUIS) 5 MG TABS tablet Take 1 tablet by mouth 2 times daily 180 tablet 1    lisinopril (PRINIVIL;ZESTRIL) 10 MG tablet Take 1 tablet by mouth daily 90 tablet 3    metoprolol succinate (TOPROL XL) 25 MG extended release tablet Take 0.5 tablets by mouth daily 90 tablet 3    flecainide (TAMBOCOR) 100 MG tablet TAKE ONE TABLET BY MOUTH EVERY 12 HOURS 60 tablet 2    triamcinolone (ARISTOCORT) 0.5 % cream Apply topically every 4 hours as needed (eczema) Apply topically 3 times daily. 15 g 3     No current facility-administered medications for this visit. Review of Systems:  · Constitutional: no unanticipated weight loss. There's been no change in energy level, sleep pattern, or activity level. No fevers, chills. · Eyes: No visual changes or diplopia. No scleral icterus. · ENT: No Headaches, hearing loss or vertigo. No mouth sores or sore throat. · Cardiovascular: as reviewed in HPI  · Respiratory: No cough or wheezing, no sputum production. No hematemesis. · Gastrointestinal: No abdominal pain, appetite loss, blood in stools. No change in bowel or bladder habits. · Genitourinary: No dysuria, trouble voiding, or hematuria. · Musculoskeletal:  No gait disturbance, no joint complaints. · Integumentary: No rash or pruritis. · Neurological: No headache, diplopia, change in muscle strength, numbness or tingling. · Psychiatric: No anxiety or depression. · Endocrine: No temperature intolerance. No excessive thirst, fluid intake, or urination. No tremor. · Hematologic/Lymphatic: No abnormal bruising or bleeding, blood clots or swollen lymph nodes. · Allergic/Immunologic: No nasal congestion or hives. Physical Exam:   /60   Pulse 54   Temp 97.3 °F (36.3 °C)   Ht 5' 9\" (1.753 m)   Wt 119 lb (54 kg) Comment: with shoes  SpO2 98%   BMI 17.57 kg/m²   Wt Readings from Last 3 Encounters:   01/28/21 119 lb (54 kg)   01/18/21 114 lb 12.8 oz (52.1 kg)   10/14/20 116 lb (52.6 kg)     Physical Exam:   Constitutional: The patient is oriented to person, place, and time. Appears well-developed and well-nourished. In no acute distress. Head: Normocephalic and atraumatic. Pupils equal and round. Neck: Neck supple. No JVP or carotid bruit appreciated. No mass and no thyromegaly present. No lymphadenopathy present. Cardiovascular: Irregularly irregular. Normal heart sounds. Exam reveals no gallop and no friction rub. 5-6/9 systolic murmur at the base  Pulmonary/Chest: Effort normal and breath sounds normal. No respiratory distress. No wheezes, rhonchi or rales. Abdominal: Soft, non-tender. Bowel sounds are normal. Exhibits no organomegaly, mass or bruit. Extremities: No edema. No cyanosis or clubbing. Pulses are 2+ radial and carotid bilaterally. Neurological: No gross cranial nerve deficit. Coordination normal.   Skin: Skin is warm and dry. There is no rash or diaphoresis. Psychiatric: Patient has a normal mood and affect.  Speech is normal and behavior is normal.       Lab Review:   Lab Results   Component Value Date    TRIG 58 03/29/2018    HDL 78 06/28/2019    LDLCALC 76 06/28/2019    LABVLDL 15 06/28/2019     Lab Results   Component Value Date     08/27/2020      Lab Results   Component Value Date    HGB 12.4 08/27/2020    HCT 36.6 08/27/2020       Lab Results   Component Value Date     08/27/2020 Lab Results   Component Value Date    K 4.8 08/27/2020    K 3.9 12/29/2019     Lab Results   Component Value Date    BUN 28 08/27/2020    CREATININE 1.0 08/27/2020       EKG Interpretation:   12/7/18 SR with PACs   1/2/19 Sinus bradycardia with PAC's  8/15/19 Sinus bradycardia with PAC's  1/3/20 Atrial fibrillation  2/21/2020 Atrial fibrillation  8/26/20 Sinus  Rhythm with Nonspecific QRS widening. Nonspecific T-abnormality. 1/28/21 Sinus  Bradycardia  - occasional PAC, # PACs = 1. Nonspecific QRS widening and right axis, 50 bpm.       Image Review:     Echo 10/29/18  Left ventricular cavity size is normal.  Normal left ventricular wall thickness. Difficult to estimate EF due to increase rate and rhythm but appears depressed  Mitral valve leaflets appear mildly thickened. Mild mitral regurgitation. The left atrium is dilated. Aortic valve appears sclerotic but opens adequately. Trivial aortic regurgitation. Tricuspid valve is structurally normal.  Mild tricuspid regurgitation with RVSP of 26 mmHg. The right atrium is dilated. Suggest repeat limited echo with bubble study to r/o ASD or PFO     Echo limited 11/2/18   Left ventricular cavity size is normal.  Normal left ventricular wall thickness. Ejection fraction is visually estimated to be 30-35%, though difficult to adequately assess due to arrhythmia. Severe hypo to akinesis of the septum. Abnormal (paradoxical) septal motion is present. Tricuspid valve appears thickened with redundancy. The right atrium is mildly dilated. IVC not well visualized. A bubble study was performed and fails to show evidence of shunting. Stress 12/21/18  1. Technically a satisfactory study.    2. Positive treadmill exercise stress portion of the study.    3. No evidence of Ischemia by Myocardial Perfusion Imaging.    4. Gated Study shows normal LV size and Systolic function; EF is 50%.    5. The patient exercised for 6 min.  on the Ramírez protocol to achieve a HR Encouraged a low sodium diet, daily fluid intake daily monitoring and daily weights. BMP 8/27/20 abnormal BUN 28 otherwise wnl. Hypertension, essential   Controlled today on medical therapy. I have encouraged him to work on a low sodium diet. Follow up with EP for 1 year follow up (4-5/2020) and I will see him back in follow up in 6-7 months. Instructed to obtain flu vaccine this season, annually and obtain COVID-19 vaccine once available. Thank you very much for allowing me to participate in the care of your patient. Please do not hesitate to contact me if you have any questions. Sincerely,  Dyana Roe M.D. [address] [place], [place] [address] [address]  Ph: [phone]  Fax: [phone]     This note was scribed in the presence of Dr. Alba Yeboah MD by Jourdan Shah RN. Physician Attestation:  The scribes documentation has been prepared under my direction and personally reviewed by me in its entirety. I confirm that the note above accurately reflects all work, treatment, procedures, and medical decision making performed by me. (4) excellent

## 2022-04-23 NOTE — PROGRESS NOTES
Hospitalist Progress Note      PCP: Shannon Lennon MD    Date of Admission: 10/27/2018      Subjective: feels better, no more generalized aches, no fever, chills, chest pain ,abdominal pain, nausea, vomiting or diarrhea, nurse at bedside. Medications:  Reviewed    Infusion Medications    sodium chloride 75 mL/hr at 10/31/18 0000    amiodarone 450mg/250ml D5W infusion 1 mg/min (10/29/18 1533)    diltiazem (CARDIZEM) 125 mg in dextrose 5% 125 mL infusion Stopped (10/28/18 0111)     Scheduled Medications    metoprolol succinate  50 mg Oral Daily    amiodarone  200 mg Oral BID    [START ON 11/4/2018] amiodarone  200 mg Oral Daily    cefTRIAXone (ROCEPHIN) IV  1 g Intravenous Q24H    And    azithromycin  500 mg Intravenous Q24H    apixaban  5 mg Oral BID    potassium chloride  20 mEq Oral Daily with breakfast    sodium chloride flush  10 mL Intravenous 2 times per day    aspirin  81 mg Oral Daily     PRN Meds: acetaminophen, regadenoson, diphenhydrAMINE, sodium chloride flush, magnesium hydroxide, ondansetron, albuterol      Intake/Output Summary (Last 24 hours) at 10/31/18 0742  Last data filed at 10/31/18 0600   Gross per 24 hour   Intake             1315 ml   Output              100 ml   Net             1215 ml       Physical Exam Performed:    /75   Pulse 57   Temp 97.9 °F (36.6 °C) (Oral)   Resp 16   Ht 5' 9\" (1.753 m)   Wt 127 lb 3.3 oz (57.7 kg)   SpO2 95%   BMI 18.78 kg/m²     General appearance: No apparent distress  Neck: Supple  Respiratory: coarse breathing sounds. Cardiovascular: Regular rate and rhythm with normal S1/S2 without murmurs, rubs or gallops. Abdomen: Soft, non-tender, non-distended with normal bowel sounds. Musculoskeletal: No clubbing, cyanosis or edema bilaterally. Full range of motion without deformity. Skin: Skin color, texture, turgor normal.  No rashes or lesions.   Neurologic:  No focal weakness   Psychiatric: Alert, oriented to place, time, person with some delay. Capillary Refill: Brisk,< 3 seconds   Peripheral Pulses: +2 palpable, equal bilaterally       Labs:   Recent Labs      10/30/18   0913   WBC  9.6   HGB  14.0   HCT  42.6   PLT  515*     Recent Labs      10/29/18   0559  10/30/18   0913   NA  141  141   K  4.4  4.9   CL  106  105   CO2  24  18*   BUN  13  18   CREATININE  0.7*  1.0   CALCIUM  8.9  9.6     Recent Labs      10/30/18   0913   AST  183*   ALT  123*   BILITOT  0.7   ALKPHOS  124     No results for input(s): INR in the last 72 hours. No results for input(s): Edwina Colleen in the last 72 hours. Urinalysis:    Lab Results   Component Value Date    NITRU Negative 03/20/2017    WBCUA 2 03/20/2017    RBCUA 6 03/20/2017    BLOODU Negative 03/20/2017    SPECGRAV 1.017 03/20/2017    GLUCOSEU Negative 03/20/2017       Radiology:  XR CHEST PORTABLE   Final Result   Stable right infrahilar masslike opacity. Generalized interstitial prominence and bibasilar atelectasis/scarring. Persistent elevation of the right hemidiaphragm. CT CHEST PULMONARY EMBOLISM W CONTRAST   Final Result   No gross findings of pulmonary embolism. Bilateral heterogeneous ground-glass and nodular infiltrate with mucous   plugging, likely reflecting pneumonia. Follow-up to resolution is   recommended. 5.2 cm anterior right chest wall mass is similar to slightly increased in   size as compared to prior. XR CHEST STANDARD (2 VW)   Final Result   Right perihilar mass is increased in size as compared to prior. Increasing left basilar pulmonary opacity, which may reflect progressive   fibrosis or superimposed pneumonia. Assessment/Plan:    Active Hospital Problems    Diagnosis Date Noted    Atrial fibrillation with rapid ventricular response (HCC) [I48.91]     Pneumonia [J18.9] 10/27/2018     1.  Pneumonia, community-acquired, Continue ceftriaxone and azithromycin, one blood culture is positive for coag neg scott, I believe that is a contaminant, had elevated LA yesterday and hypothermia, given iv fluids, bolus and maintenance, LA improved, still high though, feels better this am, still mildly confused. 2. Atrial fibrillation with rapid ventricular response, patient has past history of tachycardia episodes but was never diagnosed with atrial fibrillation. CHADS2 score is 3, requiring anticoagulation. Was on therapeutic Lovenox, changed to eliquis, discussed with patient in details risk of stroke and bleeding with [address] [place] [address], he verbalized understanding and ok to continue [address] [place] [address] Road. Started on amiodarone drip by cardiology yesterday, then stopped. Echocardiogram with depressed EF, might need further workup. 3. Elevated LFTs, will repeat this am  4. Elevated LA, improved with iv fluids  5. Acute metabolic encephalopathy, improved this am.   6. 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  Follow-up with own oncologist as outpatient. 7. Hypertension, controlled  8. Elevated TSH, repeat as outpatient.      DVT Prophylaxis: eliquis  Diet: DIET GENERAL;  Code Status: Full Code        Dispo - 2-3 days    Erik Kaur MD Unable to assess

## 2022-05-06 RX ORDER — TRIAMCINOLONE ACETONIDE 5 MG/G
CREAM TOPICAL EVERY 4 HOURS PRN
Qty: 454 G | Refills: 2 | Status: SHIPPED | OUTPATIENT
Start: 2022-05-06 | End: 2022-09-22

## 2022-05-12 ENCOUNTER — TELEPHONE (OUTPATIENT)
Dept: PRIMARY CARE CLINIC | Age: 82
End: 2022-05-12

## 2022-05-12 NOTE — TELEPHONE ENCOUNTER
Pharmacy says the need clarification on application of Triamcinolone cream. Please call and advise ASAP

## 2022-05-12 NOTE — TELEPHONE ENCOUNTER
There were 2 sets of directions every 4 hrs PRN, and TID. Gave clarification for 1st set of directions.

## 2022-05-24 RX ORDER — FLECAINIDE ACETATE 50 MG/1
TABLET ORAL
Qty: 30 TABLET | OUTPATIENT
Start: 2022-05-24

## 2022-05-25 RX ORDER — LISINOPRIL 10 MG/1
TABLET ORAL
Qty: 90 TABLET | Refills: 1 | Status: SHIPPED | OUTPATIENT
Start: 2022-05-25 | End: 2022-11-01 | Stop reason: SDUPTHER

## 2022-05-31 NOTE — TELEPHONE ENCOUNTER
Medication Refill    Medication needing refilled: Flecainide    Dosage of the medication: 50 mg    How are you taking this medication (QD, BID, TID, QID, PRN): 1 tablet 2x daily    30 or 90 day supply called in:     When will you run out of your medication:    Which Pharmacy are we sending the medication to?: [facility] [id number] [facility] [id number], [address] [facility] [phone]   [address] [place] [address], [address] [place] [address]   Phone:  [phone]  Fax:  [phone]

## 2022-06-01 RX ORDER — FLECAINIDE ACETATE 50 MG/1
25 TABLET ORAL 2 TIMES DAILY
Qty: 30 TABLET | Refills: 3 | Status: SHIPPED | OUTPATIENT
Start: 2022-06-01 | End: 2022-06-07 | Stop reason: ALTCHOICE

## 2022-06-01 NOTE — TELEPHONE ENCOUNTER
Received refill request for Metoprolol from [facility].      Last OV: 02/01/2022 w/ AND    Last Refill: 11/30/2021 #45 w/ 1 refill     Next Appointment: 08/25/2022 w/ AND

## 2022-06-03 RX ORDER — METOPROLOL SUCCINATE 25 MG/1
TABLET, EXTENDED RELEASE ORAL
Qty: 45 TABLET | Refills: 1 | Status: ON HOLD
Start: 2022-06-03 | End: 2022-10-29 | Stop reason: HOSPADM

## 2022-06-03 NOTE — PROGRESS NOTES
[address]   Electrophysiology      Date: 6/7/2022    Primary Cardiologist: Hope Hernandez MD  PCP: Lillian Winter MD     Chief Complaint:   Chief Complaint   Patient presents with    Follow-up     afib     History of Present Illness:    I saw Shannon Clement in the office for electrophysiology follow up today. He is an 79 y.o. male with a past medical history of hypertension, systolic heart failure, CAD seen on CTA and persistent atrial fibrillation. He was first diagnosed with atrial fibrillation in October 2018 after presented to ED with worsening shortness of breath and weakness. EKG confirmed afib with RVR. Echocardiogram was completed and showed an LVEF on 30-35%. He was initially treated with amiodarone but it was discontinued due to abnormal LFTs. He underwent successful cardioversion to SR on 1/10/2020. On 5/8/2020 he underwent ellectrophysiology study with radiofrequency ablation of atrial fibrillation and pulmonary vein isolation 5/8/2020 with Dr. Amrita Emerson. His flecainide was decreased by Dr. Mary Wadsworth in January 2021. He had recurrent atrial fibrillation and flutter on a monitor in May 2021. He underwent repeat atrial fibrillation ablation, right atrial tachycardia, posterior wall line (for A fib) and CAFE (for A fib) ablation on 8/11/21 with Dr. Leslie Pérez. He had recurrent, sustained atrial fibrillation seen on 30 day monitor in November 2021. He underwent atrial fibrillation (PVI, CAFE, roof line, anterior line), left atrial flutter (mitral isthmus) and left atrial tachycardia (low anterior wall) ablation on 3/4/22 with Dr. Leslie Pérez. He presents today for follow up. He has been doing well since his ablation. He will check his pulse on his BP cuff and says it has been at his normal since ablation. Denies any palpitations or dyspnea. No chest pain. No syncope. No edema. No bleeding issues. He continues to be active with running. Allergies:   Allergies   Allergen Reactions    Amiodarone      Pt is not sure that he is allergic to this. Home Medications:  Prior to Visit Medications    Medication Sig Taking? Authorizing Provider   metoprolol succinate (TOPROL XL) 25 MG extended release tablet TAKE 1/2 TABLET BY MOUTH DAILY Yes Diane M Enzweiler, APRN - CNP   lisinopril (PRINIVIL;ZESTRIL) 10 MG tablet TAKE ONE TABLET BY MOUTH DAILY Yes Keith Martin MD   triamcinolone (ARISTOCORT) 0.5 % cream Apply topically every 4 hours as needed (eczema) Apply topically 3 times daily. 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 THYROID BIOPSY  6/18/2021     THYROID BIOPSY 6/18/2021 [facility] ULTRASOUND       Social History:   reports that he quit smoking about 29 years ago. His smoking use included cigarettes. He started smoking about 67 years ago. He smoked 1.00 pack per day. He has never used smokeless tobacco. He reports current alcohol use. He reports previous drug use. Family History:      Problem Relation Age of Onset    Heart Disease Father     Cancer Sister         Colon    Cancer Brother         Colon    Heart Disease Brother     Heart Disease Brother        Review of Systems   Constitutional: Negative for chills, fatigue, fever and unexpected weight change.    HENT: Negative for congestion, hearing loss, sinus pressure, sore throat and trouble swallowing. Respiratory: Negative for cough, shortness of breath and wheezing. Cardiovascular: Negative for chest pain, palpitations and leg swelling. Gastrointestinal: Negative for abdominal pain, blood in stool, constipation, diarrhea, nausea and vomiting. Genitourinary: Negative for hematuria. Musculoskeletal: Negative for arthralgias, back pain, gait problem and myalgias. Skin: Negative for color change, rash and wound. Neurological: Negative for dizziness, seizures, syncope, speech difficulty, weakness and light-headedness. Hematological: Does not bruise/bleed easily. Physical Examination:  Vitals:    06/07/22 1302   BP: 126/70   Pulse: 51   SpO2: 99%      Wt Readings from Last 3 Encounters:   06/07/22 111 lb (50.3 kg)   03/04/22 114 lb (51.7 kg)   02/01/22 115 lb (52.2 kg)       Physical Exam  Vitals reviewed. Constitutional:       General: He is not in acute distress. Appearance: Normal appearance. HENT:      Head: Normocephalic and atraumatic. Nose: Nose normal.      Mouth/Throat:      Mouth: Mucous membranes are moist.   Eyes:      Conjunctiva/sclera: Conjunctivae normal.      Pupils: Pupils are equal, round, and reactive to light. Cardiovascular:      Rate and Rhythm: Normal rate and regular rhythm. Heart sounds: No murmur heard. No friction rub. No gallop. Pulmonary:      Effort: No respiratory distress. Breath sounds: No wheezing, rhonchi or rales. Abdominal:      General: Abdomen is flat. Bowel sounds are normal.      Palpations: Abdomen is soft. Musculoskeletal:         General: Normal range of motion. Right lower leg: No edema. Left lower leg: No edema. Skin:     General: Skin is warm and dry. Findings: No bruising. Neurological:      General: No focal deficit present. Mental Status: He is alert and oriented to person, place, and time. Motor: No weakness. Psychiatric:         Mood and Affect: Mood normal.         Behavior: Behavior normal.          Pertinent labs, diagnostic, device, and imaging results reviewed as a part of this visit    LABS    CBC:   Lab Results   Component Value Date    WBC 4.3 2022    HGB 12.1 (L) 2022    HCT 36.1 (L) 2022    MCV 90.2 2022     2022     BMP:   Lab Results   Component Value Date    CREATININE 0.8 2022    BUN 21 (H) 2022     2022    K 4.2 2022     2022    CO2 23 2022     Estimated Creatinine Clearance: 51 mL/min (based on SCr of 0.8 mg/dL). No results found for: BNP    Thyroid:   Lab Results   Component Value Date    TSH 3.98 2019     Lipid Panel:   Lab Results   Component Value Date    CHOL 193 2018    HDL 78 2019    TRIG 58 2018     LFTs:  Lab Results   Component Value Date    ALT 17 2019    AST 21 2019    GGT 83 (H) 10/31/2018    ALKPHOS 70 2019    BILITOT 0.9 2019     Coags:   Lab Results   Component Value Date    PROTIME 12.3 2020    INR 1.06 2020    APTT 33.5 2019       EC2022   SB at 50 BPM. PAC. Echo: 10/8/20   Summary   Ejection fraction is visually estimated to be 50%. Indeterminate diastolic function. RV is dilated with mildly depressed function   RV volume overload   moderate TR    Stress test: 18   Summary    Treadmill MPI Results        1. Technically a satisfactory study.    2. Positive treadmill exercise stress portion of the study.    3. No evidence of Ischemia by Myocardial Perfusion Imaging.    4. Gated Study shows normal LV size and Systolic function; EF is 67%.    5. The patient exercised for 6 min. on the Ramírez protocol to achieve a HR of    133, which is 94% of PMHR. The study was stopped due to elevated BP. There    was no chest pain .  1mm ST depression in lateral leads.    6. Diastolic BP crept upto 569 with peak exercise     Procedures:  1. Electrophysiology study with radiofrequency ablation of atrial fibrillation and pulmonary vein isolation 5/8/2020.  2. Successful cardioversion using 200 Joules of synchronised current 1/10/2020.  3. A fib and R AT ablation, 8/11/21, Dr. Kenya Arzola  4. Atrial fibrillation (PVI, CAFE, roof line, anterior line), left atrial flutter (mitral isthmus) and left atrial tachycardia (low anterior wall) ablation on 3/4/22, Dr. Olive Peterson:    Persistent atrial fibrillation   - noted initially in 10/18 s/p cardioversion 1/20 and ablation 5/20, repeat ablation on 8/11/21   - had recurrence of atrial fibrillation for >4 hours on monitor 11/21   - s/p repeat A fib ablation on 3/4/22 with Dr. Kenya Arzola   - EKG today with SB   - stop flecainide - if has recurrence, may consider resuming   - 30 day cardiac monitor was recommended but pt declined - will monitor for symptoms and get an EKG if symptomatic   - continue Toprol given history of cardiomyopathy/SHF   - CHADS2-VASc 5 (age, HTN, HF, CAD) on Eliquis 5mg BID    Left atrial flutter   - seen on EP study s/p ablation 3/2/22   - see above    Atrial tachycardia   - in both the RA and LA, seen on previous EP studies s/p ablation 8/21 and 3/22    Recovered non-ischemic cardiomyopathy   - EF recovered to 50%    - likely tachycardia-mediated as EF improved with SR and stress test at that time with no ischemia   - on Toprol 12.5mg QD, lisinopril 10mg QD, Lasix   - appears well compensated   - follows with Dr. Karen Mathis    CAD   - seen on cardiac CTA, described as \"moderate\" on report in 2020   - no angina and pt is a very active runner without issues    Essential HTN   - managed by Dr. Karen Mathis    Thank you for allowing to us to participate in the care of Brayan Nails [address]ila. Return in about 4 months (around 10/7/2022) for an appointment with Oscar Finley NP.      AYLIN Cain  [facility] [address], [address] [facility]  Phone: [phone]  Fax: [phone]    Electronically signed by AYLIN Caballero CNP on 6/7/2022 at 1:34 PM

## 2022-06-07 ENCOUNTER — OFFICE VISIT (OUTPATIENT)
Dept: CARDIOLOGY CLINIC | Age: 82
End: 2022-06-07
Payer: MEDICARE

## 2022-06-07 VITALS
SYSTOLIC BLOOD PRESSURE: 126 MMHG | HEART RATE: 51 BPM | HEIGHT: 68 IN | OXYGEN SATURATION: 99 % | DIASTOLIC BLOOD PRESSURE: 70 MMHG | WEIGHT: 111 LBS | BODY MASS INDEX: 16.82 KG/M2

## 2022-06-07 DIAGNOSIS — I48.19 PERSISTENT ATRIAL FIBRILLATION (HCC): Primary | ICD-10-CM

## 2022-06-07 DIAGNOSIS — I42.8 OTHER CARDIOMYOPATHY (HCC): ICD-10-CM

## 2022-06-07 DIAGNOSIS — I48.92 LEFT ATRIAL FLUTTER BY ELECTROCARDIOGRAM (HCC): ICD-10-CM

## 2022-06-07 DIAGNOSIS — I10 ESSENTIAL HYPERTENSION: ICD-10-CM

## 2022-06-07 DIAGNOSIS — I47.1 ATRIAL TACHYCARDIA (HCC): ICD-10-CM

## 2022-06-07 PROCEDURE — 99214 OFFICE O/P EST MOD 30 MIN: CPT | Performed by: NURSE PRACTITIONER

## 2022-06-07 PROCEDURE — 93000 ELECTROCARDIOGRAM COMPLETE: CPT | Performed by: NURSE PRACTITIONER

## 2022-06-07 PROCEDURE — G8419 CALC BMI OUT NRM PARAM NOF/U: HCPCS | Performed by: NURSE PRACTITIONER

## 2022-06-07 PROCEDURE — G8427 DOCREV CUR MEDS BY ELIG CLIN: HCPCS | Performed by: NURSE PRACTITIONER

## 2022-06-07 PROCEDURE — 1123F ACP DISCUSS/DSCN MKR DOCD: CPT | Performed by: NURSE PRACTITIONER

## 2022-06-07 PROCEDURE — 1036F TOBACCO NON-USER: CPT | Performed by: NURSE PRACTITIONER

## 2022-06-07 ASSESSMENT — ENCOUNTER SYMPTOMS
COUGH: 0
ABDOMINAL PAIN: 0
CONSTIPATION: 0
VOMITING: 0
BACK PAIN: 0
TROUBLE SWALLOWING: 0
WHEEZING: 0
DIARRHEA: 0
SHORTNESS OF BREATH: 0
SINUS PRESSURE: 0
BLOOD IN STOOL: 0
NAUSEA: 0
COLOR CHANGE: 0
SORE THROAT: 0

## 2022-08-22 ENCOUNTER — TELEPHONE (OUTPATIENT)
Dept: PRIMARY CARE CLINIC | Age: 82
End: 2022-08-22

## 2022-08-24 NOTE — PROGRESS NOTES
[facility]      Cardiology Progress Note    Chris Mosqueda  1940 August 25, 2022        CC: \"I am doing good. \"     HPI:  The patient is 79 y.o. male with a past medical history significant for atrial fibrillation, hypertension, and systolic heart failure. He was admitted 10/27 - 11/6/18 for worsening shortness of breath and weakness. He is a marathon runner and first noticed the symptoms while in [facility]. In the ER he was noted to be in Atrial fibrillation with RVR and also evidence of pneumonia. He had an echocardiogram completed that showed an LVEF of 30-35%. He was treated with amiodarone but was discontinued due to abnormal LFTs. He was discharged home on a BB and Eliquis at that time. He is s/p DCCV 1/2020 followed by EPS AFIB ablation and pulm vein isol. 5/8/20. S?p atrial flutter ablation on 3/4/2022. Today, he is here for follow up. He states that he is doing well. He monitors his heart rate at home. He is unsure when he started taking flecainide. Patient denies exertional chest pain/pressure, dyspnea at rest, VILLELA, PND, orthopnea, palpitations, lightheadedness, weight changes, changes in LE edema, and syncope.        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    also sessile serrated adenoma    Wears glasses      Past Surgical History:   Procedure Laterality Date    CARDIOVERSION  01/10/2020    COLONOSCOPY N/A 1/15/2020    COLONOSCOPY performed by Jose Gamble MD at [address] [place] [address]       S/P NICKY    THYMECTOMY      US THYROID BIOPSY  6/18/2021    US THYROID BIOPSY 6/18/2021 [facility] ULTRASOUND     Family History   Problem Relation Age of Onset    Heart Disease Father     Cancer Sister         Colon    Cancer Brother         Colon    Heart Disease Brother     Heart Disease Brother      Social History     Tobacco Use    Smoking status: Former     Packs/day: 1.00     Types: Cigarettes     Start date: 1955     Quit date: 1993     Years since quittin.6    Smokeless tobacco: Never   Vaping Use    Vaping Use: Never used   Substance Use Topics    Alcohol use: Yes     Comment: 1-2 daily    Drug use: Not Currently       Allergies   Allergen Reactions    Amiodarone      Pt is not sure that he is allergic to this. Current Outpatient Medications   Medication Sig Dispense Refill    flecainide (TAMBOCOR) 50 MG tablet Take 50 mg by mouth in the morning and at bedtime      metoprolol succinate (TOPROL XL) 25 MG extended release tablet TAKE 1/2 TABLET BY MOUTH DAILY 45 tablet 1    lisinopril (PRINIVIL;ZESTRIL) 10 MG tablet TAKE ONE TABLET BY MOUTH DAILY 90 tablet 1    triamcinolone (ARISTOCORT) 0.5 % cream Apply topically every 4 hours as needed (eczema) Apply topically 3 times daily. 454 g 2    apixaban (ELIQUIS) 2.5 MG TABS tablet Take 1 tablet by mouth 2 times daily 60 tablet 5     No current facility-administered medications for this visit. Review of Systems:  Constitutional: no unanticipated weight loss. There's been no change in energy level, sleep pattern, or activity level. No fevers, chills. Eyes: No visual changes or diplopia. No scleral icterus. ENT: No Headaches, hearing loss or vertigo. No mouth sores or sore throat. Cardiovascular: as reviewed in HPI  Respiratory: No cough or wheezing, no sputum production. No hematemesis. Gastrointestinal: No abdominal pain, appetite loss, blood in stools. No change in bowel or bladder habits. Genitourinary: No dysuria, trouble voiding, or hematuria. Musculoskeletal:  No gait disturbance, no joint complaints. Integumentary: No rash or pruritis. Neurological: No headache, diplopia, change in muscle strength, numbness or tingling.    Psychiatric: No anxiety 07:00 AM     Lab Results   Component Value Date/Time    K 4.2 03/04/2022 07:00 AM    K 3.9 12/29/2019 06:08 AM     Lab Results   Component Value Date/Time    BUN 21 03/04/2022 07:00 AM    CREATININE 0.8 03/04/2022 07:00 AM       EKG Interpretation:   12/7/18 SR with PACs   1/2/19 Sinus bradycardia with PAC's  8/15/19 Sinus bradycardia with PAC's  1/3/20 Atrial fibrillation  2/21/2020 Atrial fibrillation  8/26/20 Sinus  Rhythm with Nonspecific QRS widening. Nonspecific T-abnormality. 1/28/21 Sinus  Bradycardia  - occasional PAC, # PACs = 1. Nonspecific QRS widening and right axis, 50 bpm.   2/1/22: not completed  8/25/2022 Sinus bradycardia      Image Review:     Echo 10/29/18  Left ventricular cavity size is normal.  Normal left ventricular wall thickness. Difficult to estimate EF due to increase rate and rhythm but appears depressed  Mitral valve leaflets appear mildly thickened. Mild mitral regurgitation. The left atrium is dilated. Aortic valve appears sclerotic but opens adequately. Trivial aortic regurgitation. Tricuspid valve is structurally normal.  Mild tricuspid regurgitation with RVSP of 26 mmHg. The right atrium is dilated. Suggest repeat limited echo with bubble study to r/o ASD or PFO     Echo limited 11/2/18   Left ventricular cavity size is normal.  Normal left ventricular wall thickness. Ejection fraction is visually estimated to be 30-35%, though difficult to adequately assess due to arrhythmia. Severe hypo to akinesis of the septum. Abnormal (paradoxical) septal motion is present. Tricuspid valve appears thickened with redundancy. The right atrium is mildly dilated. IVC not well visualized. A bubble study was performed and fails to show evidence of shunting. Stress 12/21/18  1. Technically a satisfactory study. 2. Positive treadmill exercise stress portion of the study. 3. No evidence of Ischemia by Myocardial Perfusion Imaging.     4. Gated Study shows normal LV size and Systolic function; EF is 39%. 5. The patient exercised for 6 min. on the Ramírez protocol to achieve a HR of    133, which is 94% of PMHR. The study was stopped due to elevated BP. There    was no chest pain . 1mm ST depression in lateral leads. 6. Diastolic BP crept upto 177 with peak exercise     Event monitor 2/13/19  NSR. Few episodes if PSVT. ECHO 5/15/19  Mitral valve leaflets appear thickened with mitral valve prolapse. moderate mitral regurgitation is present. The left atrium is mildly dilated. Tricuspid valve appear redundant. Mild to moderate tricuspid regurgitation. The right atrium is moderately dilated. IVC size is normal (<2.1 cm) but collapses < 50% with respiration consistent  with elevated RA pressure (8 mmHg). Estimated pulmonary artery systolic pressure is mildly elevated at 34 mmHg  assuming a right atrial pressure of 8 mmHg. Ejection fraction is visually estimated to be 40-45%. Echo: 10/8/20  Ejection fraction is visually estimated to be 50%. Indeterminate diastolic function. RV is dilated with mildly depressed function  RV volume overload  moderate TR    Echo: 1/20/22  Normal left ventricle size, wall thickness, and low normal systolic function  with an estimated ejection fraction of 50%. mild septal hypokinesis  Indeterminate diastolic function. Mitral valve leaflets appear mild-moderately thickened. Mild mitral annular calcification. Two seperate jets of Moderate mitral regurgitation. The left atrium is moderately to severely dilated. Dilated right ventricle. moderate tricuspid regurgitation. Cannot r/o tricuspid valve prolapse  No evidence of tricuspid stenosis. The right atrium is dilated. IVC size is normal (<2.1 cm) but collapses < 50% with respiration consistent  with elevated RA pressure (8 mmHg). Estimated pulmonary artery systolic pressure is mildly elevated at 44 mmHg  assuming a right atrial pressure of 8 mmHg.     Assessment/Plan:     Paroxysmal atrial fibrillation  -underwent cardioversion 1/10/2020 followed by EPS with radiofrequency AFIB ablation 5/8/20.   -8/11/21 AFIB and atrial tachycardia ablation per Dr. Chaya Judd   Postprocedure EKG showed sinus rhythm with PACs  Per Dr. Chaya Judd new prescription for Flecainide 25mg po BID and pre-admission Eliquis 5mg po BID. He will remain on Toprol XL for cardiac anti-remodeling. The patient follow-up with the EP NP in 3 month's time with recurrent AFIB/L atrial flutter noted on cardiac monitor 11/2021 then f/u with  Dr. Chaya Judd 1/3/22-patient would like to deliberate further regarding proceeding with ablation. EKG confirms sinus bradycardia. Discussed with Dr. Chaya Judd and will continue current dose of beta-blocker and flecainide till she is seen for his EP appointment in October    Cardiomyopathy/Chronic systolic heart failure  -Echocardiogram 10/8/20 LVEF improved to 50%. -Echo 1/20/22 LV function low normal 50%  -Appears compensated on exam.   -Continue with medical therapy including B-blocker and lisinopril 10 mg daily.   -Encouraged a low sodium diet, daily fluid intake daily monitoring and daily weights. Hypertension, essential   Controlled  Continue current medical management. I have encouraged him to work on a low sodium diet and walking program.     Systolic murmur  8-8/4 per physical exam   1/2022 echo reviewed revealing trivial AR, aortic sclerosis with no AS, mod. MR and TR. We will plan to follow up in 6 months. I adivsed him to get annual flu vaccine. Thank you very much for allowing me to participate in the care of your patient. Please do not hesitate to contact me if you have any questions. Sincerely,  Oksana Espinoza M.D.       [facility], [address] [facility]  Ph: [phone]  Fax: [phone]    This note was scribed in the presence of Dr Alma Delia Stevens, by Alfred Marx RN

## 2022-08-25 ENCOUNTER — OFFICE VISIT (OUTPATIENT)
Dept: CARDIOLOGY CLINIC | Age: 82
End: 2022-08-25
Payer: MEDICARE

## 2022-08-25 VITALS
OXYGEN SATURATION: 99 % | HEIGHT: 69 IN | DIASTOLIC BLOOD PRESSURE: 62 MMHG | SYSTOLIC BLOOD PRESSURE: 116 MMHG | BODY MASS INDEX: 16.59 KG/M2 | HEART RATE: 54 BPM | WEIGHT: 112 LBS

## 2022-08-25 DIAGNOSIS — I10 ESSENTIAL HYPERTENSION: ICD-10-CM

## 2022-08-25 DIAGNOSIS — I50.22 CHRONIC SYSTOLIC HEART FAILURE (HCC): ICD-10-CM

## 2022-08-25 DIAGNOSIS — I48.19 PERSISTENT ATRIAL FIBRILLATION (HCC): Primary | ICD-10-CM

## 2022-08-25 PROCEDURE — 93000 ELECTROCARDIOGRAM COMPLETE: CPT | Performed by: INTERNAL MEDICINE

## 2022-08-25 PROCEDURE — 1036F TOBACCO NON-USER: CPT | Performed by: INTERNAL MEDICINE

## 2022-08-25 PROCEDURE — G8427 DOCREV CUR MEDS BY ELIG CLIN: HCPCS | Performed by: INTERNAL MEDICINE

## 2022-08-25 PROCEDURE — G8419 CALC BMI OUT NRM PARAM NOF/U: HCPCS | Performed by: INTERNAL MEDICINE

## 2022-08-25 PROCEDURE — 1123F ACP DISCUSS/DSCN MKR DOCD: CPT | Performed by: INTERNAL MEDICINE

## 2022-08-25 PROCEDURE — 99214 OFFICE O/P EST MOD 30 MIN: CPT | Performed by: INTERNAL MEDICINE

## 2022-08-25 RX ORDER — FLECAINIDE ACETATE 50 MG/1
50 TABLET ORAL 2 TIMES DAILY
COMMUNITY
Start: 2022-08-10 | End: 2022-08-31 | Stop reason: SDUPTHER

## 2022-08-31 DIAGNOSIS — I48.19 PERSISTENT ATRIAL FIBRILLATION (HCC): ICD-10-CM

## 2022-08-31 RX ORDER — FLECAINIDE ACETATE 50 MG/1
50 TABLET ORAL 2 TIMES DAILY
Qty: 180 TABLET | Refills: 3 | Status: SHIPPED | OUTPATIENT
Start: 2022-08-31 | End: 2022-10-10

## 2022-08-31 NOTE — TELEPHONE ENCOUNTER
Last OV:2022  Last Labs:3/4/2022  Last Refills:2022  Next Appt Notes: We will plan to follow up in 6 months.        Last EK2022

## 2022-09-22 ENCOUNTER — OFFICE VISIT (OUTPATIENT)
Dept: PRIMARY CARE CLINIC | Age: 82
End: 2022-09-22
Payer: MEDICARE

## 2022-09-22 ENCOUNTER — TELEPHONE (OUTPATIENT)
Dept: PRIMARY CARE CLINIC | Age: 82
End: 2022-09-22

## 2022-09-22 VITALS
BODY MASS INDEX: 17.16 KG/M2 | OXYGEN SATURATION: 98 % | HEIGHT: 68 IN | RESPIRATION RATE: 18 BRPM | TEMPERATURE: 97.4 F | DIASTOLIC BLOOD PRESSURE: 69 MMHG | SYSTOLIC BLOOD PRESSURE: 122 MMHG | HEART RATE: 53 BPM | WEIGHT: 113.2 LBS

## 2022-09-22 DIAGNOSIS — Z13.220 LIPID SCREENING: ICD-10-CM

## 2022-09-22 DIAGNOSIS — I48.19 PERSISTENT ATRIAL FIBRILLATION (HCC): ICD-10-CM

## 2022-09-22 DIAGNOSIS — D49.89 THYMOMA: ICD-10-CM

## 2022-09-22 DIAGNOSIS — I50.22 CHRONIC SYSTOLIC CHF (CONGESTIVE HEART FAILURE), NYHA CLASS 1 (HCC): ICD-10-CM

## 2022-09-22 DIAGNOSIS — I10 ESSENTIAL HYPERTENSION: Primary | ICD-10-CM

## 2022-09-22 DIAGNOSIS — Z98.890 S/P ABLATION OF ATRIAL FIBRILLATION: ICD-10-CM

## 2022-09-22 DIAGNOSIS — Z13.29 THYROID DISORDER SCREENING: ICD-10-CM

## 2022-09-22 DIAGNOSIS — Z00.00 MEDICARE ANNUAL WELLNESS VISIT, SUBSEQUENT: ICD-10-CM

## 2022-09-22 DIAGNOSIS — Z13.1 DIABETES MELLITUS SCREENING: ICD-10-CM

## 2022-09-22 DIAGNOSIS — Z86.79 S/P ABLATION OF ATRIAL FIBRILLATION: ICD-10-CM

## 2022-09-22 DIAGNOSIS — I10 ESSENTIAL HYPERTENSION: ICD-10-CM

## 2022-09-22 LAB
A/G RATIO: 2 (ref 1.1–2.2)
ALBUMIN SERPL-MCNC: 4.4 G/DL (ref 3.4–5)
ALP BLD-CCNC: 66 U/L (ref 40–129)
ALT SERPL-CCNC: 18 U/L (ref 10–40)
ANION GAP SERPL CALCULATED.3IONS-SCNC: 12 MMOL/L (ref 3–16)
AST SERPL-CCNC: 23 U/L (ref 15–37)
BASOPHILS ABSOLUTE: 0 K/UL (ref 0–0.2)
BASOPHILS RELATIVE PERCENT: 0.9 %
BILIRUB SERPL-MCNC: 0.8 MG/DL (ref 0–1)
BUN BLDV-MCNC: 20 MG/DL (ref 7–20)
CALCIUM SERPL-MCNC: 9.5 MG/DL (ref 8.3–10.6)
CHLORIDE BLD-SCNC: 104 MMOL/L (ref 99–110)
CHOLESTEROL, FASTING: 175 MG/DL (ref 0–199)
CO2: 23 MMOL/L (ref 21–32)
CREAT SERPL-MCNC: 0.9 MG/DL (ref 0.8–1.3)
EOSINOPHILS ABSOLUTE: 0.1 K/UL (ref 0–0.6)
EOSINOPHILS RELATIVE PERCENT: 1.7 %
GFR AFRICAN AMERICAN: >60
GFR NON-AFRICAN AMERICAN: >60
GLUCOSE FASTING: 81 MG/DL (ref 70–99)
HCT VFR BLD CALC: 35.9 % (ref 40.5–52.5)
HDLC SERPL-MCNC: 86 MG/DL (ref 40–60)
HEMOGLOBIN: 11.9 G/DL (ref 13.5–17.5)
LDL CHOLESTEROL CALCULATED: 80 MG/DL
LYMPHOCYTES ABSOLUTE: 1.1 K/UL (ref 1–5.1)
LYMPHOCYTES RELATIVE PERCENT: 26.9 %
MCH RBC QN AUTO: 30.4 PG (ref 26–34)
MCHC RBC AUTO-ENTMCNC: 33.3 G/DL (ref 31–36)
MCV RBC AUTO: 91.2 FL (ref 80–100)
MONOCYTES ABSOLUTE: 0.4 K/UL (ref 0–1.3)
MONOCYTES RELATIVE PERCENT: 9.2 %
NEUTROPHILS ABSOLUTE: 2.6 K/UL (ref 1.7–7.7)
NEUTROPHILS RELATIVE PERCENT: 61.3 %
PDW BLD-RTO: 15 % (ref 12.4–15.4)
PLATELET # BLD: 182 K/UL (ref 135–450)
PMV BLD AUTO: 10.9 FL (ref 5–10.5)
POTASSIUM SERPL-SCNC: 4.6 MMOL/L (ref 3.5–5.1)
RBC # BLD: 3.93 M/UL (ref 4.2–5.9)
SODIUM BLD-SCNC: 139 MMOL/L (ref 136–145)
T4 FREE: 1.2 NG/DL (ref 0.9–1.8)
TOTAL PROTEIN: 6.6 G/DL (ref 6.4–8.2)
TRIGLYCERIDE, FASTING: 46 MG/DL (ref 0–150)
TSH SERPL DL<=0.05 MIU/L-ACNC: 3.43 UIU/ML (ref 0.27–4.2)
VLDLC SERPL CALC-MCNC: 9 MG/DL
WBC # BLD: 4.3 K/UL (ref 4–11)

## 2022-09-22 PROCEDURE — G8427 DOCREV CUR MEDS BY ELIG CLIN: HCPCS | Performed by: FAMILY MEDICINE

## 2022-09-22 PROCEDURE — 36415 COLL VENOUS BLD VENIPUNCTURE: CPT | Performed by: FAMILY MEDICINE

## 2022-09-22 PROCEDURE — G0439 PPPS, SUBSEQ VISIT: HCPCS | Performed by: FAMILY MEDICINE

## 2022-09-22 PROCEDURE — 1036F TOBACCO NON-USER: CPT | Performed by: FAMILY MEDICINE

## 2022-09-22 PROCEDURE — 1123F ACP DISCUSS/DSCN MKR DOCD: CPT | Performed by: FAMILY MEDICINE

## 2022-09-22 PROCEDURE — G8419 CALC BMI OUT NRM PARAM NOF/U: HCPCS | Performed by: FAMILY MEDICINE

## 2022-09-22 PROCEDURE — 99213 OFFICE O/P EST LOW 20 MIN: CPT | Performed by: FAMILY MEDICINE

## 2022-09-22 ASSESSMENT — LIFESTYLE VARIABLES
HOW MANY STANDARD DRINKS CONTAINING ALCOHOL DO YOU HAVE ON A TYPICAL DAY: 1 OR 2
HOW OFTEN DURING THE LAST YEAR HAVE YOU FAILED TO DO WHAT WAS NORMALLY EXPECTED FROM YOU BECAUSE OF DRINKING: 0
HOW OFTEN DURING THE LAST YEAR HAVE YOU NEEDED AN ALCOHOLIC DRINK FIRST THING IN THE MORNING TO GET YOURSELF GOING AFTER A NIGHT OF HEAVY DRINKING: 0
HOW OFTEN DURING THE LAST YEAR HAVE YOU BEEN UNABLE TO REMEMBER WHAT HAPPENED THE NIGHT BEFORE BECAUSE YOU HAD BEEN DRINKING: 0
HOW OFTEN DO YOU HAVE A DRINK CONTAINING ALCOHOL: 4 OR MORE TIMES A WEEK
HOW OFTEN DURING THE LAST YEAR HAVE YOU FOUND THAT YOU WERE NOT ABLE TO STOP DRINKING ONCE YOU HAD STARTED: 0
HAVE YOU OR SOMEONE ELSE BEEN INJURED AS A RESULT OF YOUR DRINKING: 0
HAS A RELATIVE, FRIEND, DOCTOR, OR ANOTHER HEALTH PROFESSIONAL EXPRESSED CONCERN ABOUT YOUR DRINKING OR SUGGESTED YOU CUT DOWN: 0
HOW OFTEN DURING THE LAST YEAR HAVE YOU HAD A FEELING OF GUILT OR REMORSE AFTER DRINKING: 0

## 2022-09-22 ASSESSMENT — PATIENT HEALTH QUESTIONNAIRE - PHQ9
SUM OF ALL RESPONSES TO PHQ9 QUESTIONS 1 & 2: 1
1. LITTLE INTEREST OR PLEASURE IN DOING THINGS: 0
SUM OF ALL RESPONSES TO PHQ QUESTIONS 1-9: 1
2. FEELING DOWN, DEPRESSED OR HOPELESS: 1
SUM OF ALL RESPONSES TO PHQ QUESTIONS 1-9: 1

## 2022-09-22 NOTE — PROGRESS NOTES
Medicare Annual Wellness Visit    Laura Morrissey is here for Medicare AWV    Assessment & Plan   Medicare annual wellness visit, subsequent    Essential hypertension  Controlled. Continue lisinopril 10 mg daily and Toprol-XL 12.5 mg daily. Persistent atrial fibrillation (HCC) S/P ablation of atrial fibrillation/   Controlled ventricular rate. Continue Tambocor 50 mg twice a day, beta-blocker Toprol 12.5 mg daily and  Eliquis 2.5 mg twice a day. Follow up with Dr Aayush Rivera    Chronic systolic CHF (congestive heart failure), NYHA class 1 ([address]  LV function recovered, echo 1/20/22 showed EF at 50 %. Follow up with cardiologist, Dr Mark Talamantes  Might have something to do for the weight loss. Advised to see oncologist Dr Fiorella Kapadia. Will order blood test - TSH,free T4, CBC,CMP . Has negative colonoscopy 1/15/2020. CT of chest 1/24/22 stable findings compared to prior      Recommendations for Preventive Services Due: see orders and patient instructions/AVS.  Recommended screening schedule for the next 5-10 years is provided to the patient in written form: see Patient Instructions/AVS.     Return in 1 year (on 9/22/2023) for Medicare Annual Wellness Visit in 1 year. Subjective   The following acute and/or chronic problems were also addressed today:  Has hypertension patient has hypertension controlled with Toprol-XL 25 mg tablet 1/2 tablet daily and lisinopril 10 mg daily. He has paroxysmal A. fib s/p catheter ablation, takes flecainide 50 mg twice daily and beta-blocker Toprol-XL 12.5 mg daily also on Eliquis 2.5 mg twice daily. She has chronic systolic congestive heart failure recovered LV ejection fraction echocardiogram 1/20/2022 showed LV ejection fraction up to 50%. .  Patient denies chest pain or shortness of breath or palpitation is still complaining of not gaining weight. He has history of thymoma goes to hematologist oncologist Dr. Fiorella Kapadia.   He has negative colonoscopy in 2020, has CT of the chest January 2022 with the stable finding compared to prior studies. Patient's complete Health Risk Assessment and screening values have been reviewed and are found in Flowsheets. The following problems were reviewed today and where indicated follow up appointments were made and/or referrals ordered. Positive Risk Factor Screenings with Interventions:             General Health and ACP:  General  In general, how would you say your health is?: Very Good  In the past 7 days, have you experienced any of the following: New or Increased Pain, New or Increased Fatigue, Loneliness, Social Isolation, Stress or Anger?: (!) Yes  Select all that apply: (!) New or Increased Fatigue, Loneliness  Do you get the social and emotional support that you need?: Yes  Do you have a Living Will?: (!) No    Advance Directives       Power of  Living Will ACP-Advance Directive ACP-Power of     Not on File Not on File Not on File Not on File        General Health Risk Interventions:  No Living Will: ACP documents already completed- patient asked to provide copy to the office    Health Habits/Nutrition:  Physical Activity: Sufficiently Active    Days of Exercise per Week: 7 days    Minutes of Exercise per Session: 60 min     Have you lost any weight without trying in the past 3 months?: No (discuss weight loss)  Body mass index: (!) 17.08  Have you seen the dentist within the past year?: (!) No  Health Habits/Nutrition Interventions:  Dental exam overdue:  patient encouraged to make appointment with his/her dentist    Hearing/Vision:  Do you or your family notice any trouble with your hearing that hasn't been managed with hearing aids?: No  Do you have difficulty driving, watching TV, or doing any of your daily activities because of your eyesight?: No  Have you had an eye exam within the past year?: (!) No  No results found.   Hearing/Vision Interventions:  Vision concerns:  patient encouraged to make appointment with his/her eye specialist    Safety:  Do you have working smoke detectors?: Yes  Do you have any tripping hazards - loose or unsecured carpets or rugs?: No  Do you have any tripping hazards - clutter in doorways, halls, or stairs?: No  Do you have either shower bars, grab bars, non-slip mats or non-slip surfaces in your shower or bathtub?: (!) No  Do all of your stairways have a railing or banister?: Yes  Do you always fasten your seatbelt when you are in a car?: Yes  Safety Interventions:  Patient declines any further evaluation/treatment for this issue    ADLs:  In the past 7 days, did you need help from others to perform any of the following everyday activities: Eating, dressing, grooming, bathing, toileting, or walking/balance?: No  In the past 7 days, did you need help from others to take care of any of the following: Laundry, housekeeping, banking/finances, shopping, telephone use, food preparation, transportation, or taking medications?: (!) Yes  Select all that apply: [employer], Laundry, Housekeeping, Shopping  ADL Interventions:  Patient declines any further evaluation/treatment for this issue          Objective   Vitals:    09/22/22 1140   BP: 122/69   Pulse: 53   Resp: 18   Temp: 97.4 °F (36.3 °C)   TempSrc: Temporal   SpO2: 98%   Weight: 113 lb 3.2 oz (51.3 kg)   Height: 5' 8.25\" (1.734 m)      Body mass index is 17.09 kg/m².       General Appearance: alert and oriented to person, place and time, well developed and well- nourished, in no acute distress  Skin: warm and dry, no rash or erythema  Head: normocephalic and atraumatic  Eyes: pupils equal, round, and reactive to light, extraocular eye movements intact, conjunctivae normal  ENT: tympanic membrane, external ear and ear canal normal bilaterally, nose without deformity, nasal mucosa and turbinates normal without polyps  Neck: supple and non-tender without mass, no thyromegaly or thyroid nodules, no cervical lymphadenopathy  Pulmonary/Chest: clear to auscultation bilaterally- no wheezes, rales or rhonchi, normal air movement, no respiratory distress  Cardiovascular: normal rate, regular rhythm, normal S1 and S2, no murmurs, rubs, clicks, or gallops, distal pulses intact, no carotid bruits  Abdomen: soft, non-tender, non-distended, normal bowel sounds, no masses or organomegaly  Extremities: no cyanosis, clubbing or edema  Musculoskeletal: normal range of motion, no joint swelling, deformity or tenderness  Neurologic: reflexes normal and symmetric, no cranial nerve deficit, gait, coordination and speech normal       Allergies   Allergen Reactions    Amiodarone      Pt is not sure that he is allergic to this. Prior to Visit Medications    Medication Sig Taking?  Authorizing Provider   flecainide (TAMBOCOR) 50 MG tablet Take 1 tablet by mouth in the morning and at bedtime Yes Darron Ventura MD   metoprolol succinate (TOPROL XL) 25 MG extended release tablet TAKE 1/2 TABLET BY MOUTH DAILY Yes Diane M Enzweiler, APRN - CNP   lisinopril (PRINIVIL;ZESTRIL) 10 MG tablet TAKE ONE TABLET BY MOUTH DAILY Yes Darron Ventura MD   apixaban (ELIQUIS) 2.5 MG TABS tablet Take 1 tablet by mouth 2 times daily Yes AYLIN Smith CNP       CareTeam (Including outside providers/suppliers regularly involved in providing care):   Patient Care Team:  Apryl Yee MD as PCP - General (Internal Medicine)  Bailee Carter MD as PCP - Hematology/Oncology (Hematology and Oncology)  Apryl Yee MD as PCP - [facility]  Mitzy Monroy MD as Consulting Physician (Gastroenterology)  Boo Jackson MD as Referring Physician (Pulmonary Disease)  Myranda Lane MD as Consulting Physician (Cardiology)     Reviewed and updated this visit:  Tobacco  Allergies  Meds  Med Hx  Surg Hx  Soc Hx  Fam Hx

## 2022-09-23 NOTE — PATIENT INSTRUCTIONS
Personalized Preventive Plan for Caleb Jolley - 9/22/2022  Medicare offers a range of preventive health benefits. Some of the tests and screenings are paid in full while other may be subject to a deductible, co-insurance, and/or copay. Some of these benefits include a comprehensive review of your medical history including lifestyle, illnesses that may run in your family, and various assessments and screenings as appropriate. After reviewing your medical record and screening and assessments performed today your provider may have ordered immunizations, labs, imaging, and/or referrals for you. A list of these orders (if applicable) as well as your Preventive Care list are included within your After Visit Summary for your review. Other Preventive Recommendations:    A preventive eye exam performed by an eye specialist is recommended every 1-2 years to screen for glaucoma; cataracts, macular degeneration, and other eye disorders. A preventive dental visit is recommended every 6 months. Try to get at least 150 minutes of exercise per week or 10,000 steps per day on a pedometer . Order or download the FREE \"Exercise & Physical Activity: Your Everyday Guide\" from The "[employer]. Call 9-190.838.8515 or search The "[employer] online. You need 8640-4847 mg of calcium and 7174-8514 IU of vitamin D per day. It is possible to meet your calcium requirement with diet alone, but a vitamin D supplement is usually necessary to meet this goal.  When exposed to the sun, use a sunscreen that protects against both UVA and UVB radiation with an SPF of 30 or greater. Reapply every 2 to 3 hours or after sweating, drying off with a towel, or swimming. Always wear a seat belt when traveling in a car. Always wear a helmet when riding a bicycle or motorcycle.

## 2022-09-28 NOTE — PROGRESS NOTES
Allergen Reactions    Amiodarone      Pt is not sure that he is allergic to this. Home Medications:  Prior to Visit Medications    Medication Sig Taking? Authorizing Provider   flecainide (TAMBOCOR) 50 MG tablet Take 0.5 tablets by mouth in the morning and at bedtime Yes AYLIN Lugo CNP   metoprolol succinate (TOPROL XL) 25 MG extended release tablet TAKE 1/2 TABLET BY MOUTH DAILY Yes Diane M Enzweiler, APRN - CNP   lisinopril (PRINIVIL;ZESTRIL) 10 MG tablet TAKE ONE TABLET BY MOUTH DAILY Yes Lowell Childs MD   apixaban (ELIQUIS) 2.5 MG TABS tablet Take 1 tablet by mouth 2 times daily Yes AYLIN Olson CNP        Past Medical History:  Past Medical History:   Diagnosis Date    Atrial fibrillation ([facility] [address]     Cardiomyopathy ([facility] [address]     Chronic tachycardia 5/8/2018    Erectile dysfunction     Hypertension     Osteoarthritis     Pneumonia     Thymoma, benign     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 His smoking use included cigarettes. He started smoking about 67 years ago. He smoked an average of 1 pack per day. He has never used smokeless tobacco. He reports current alcohol use. He reports that he does not currently use drugs.      Family History:      Problem Relation Age of Onset    Heart Disease Father     Cancer Sister         Colon    Cancer Brother         Colon    Heart Disease Brother     Heart Disease Brother        Review of Systems   Constitutional:  Negative for chills, fatigue, fever and unexpected weight change. HENT:  Negative for congestion, hearing loss, sinus pressure, sore throat and trouble swallowing. Respiratory:  Negative for cough, shortness of breath and wheezing. Cardiovascular:  Positive for palpitations. Negative for chest pain and leg swelling. Gastrointestinal:  Negative for abdominal pain, blood in stool, constipation, diarrhea, nausea and vomiting. Genitourinary:  Negative for hematuria. Musculoskeletal:  Negative for arthralgias, back pain, gait problem and myalgias. Skin:  Negative for color change, rash and wound. Neurological:  Negative for dizziness, seizures, syncope, speech difficulty, weakness and light-headedness. Hematological:  Does not bruise/bleed easily. Physical Examination:  Vitals:    10/10/22 1251   BP: 128/78   Pulse: 62   SpO2: 94%      Wt Readings from Last 3 Encounters:   10/10/22 116 lb (52.6 kg)   09/22/22 113 lb 3.2 oz (51.3 kg)   08/25/22 112 lb (50.8 kg)       Physical Exam  Vitals reviewed. Constitutional:       General: He is not in acute distress. Appearance: Normal appearance. HENT:      Head: Normocephalic and atraumatic. Nose: Nose normal.      Mouth/Throat:      Mouth: Mucous membranes are moist.   Eyes:      Conjunctiva/sclera: Conjunctivae normal.      Pupils: Pupils are equal, round, and reactive to light. Cardiovascular:      Rate and Rhythm: Normal rate and regular rhythm. Heart sounds: No murmur heard. No friction rub. No gallop. Pulmonary:      Effort: No respiratory distress. Breath sounds: No wheezing, rhonchi or rales. Abdominal:      General: Abdomen is flat. Bowel sounds are normal.      Palpations: Abdomen is soft. Musculoskeletal:         General: Normal range of motion. Right lower leg: No edema. Left lower leg: No edema. Skin:     General: Skin is warm and dry. Findings: No bruising. Neurological:      General: No focal deficit present.       Mental Status: He is alert and oriented to person, place, and time. Motor: No weakness. Psychiatric:         Mood and Affect: Mood normal.         Behavior: Behavior normal.        Pertinent labs, diagnostic, device, and imaging results reviewed as a part of this visit    LABS    CBC:   Lab Results   Component Value Date    WBC 4.3 09/22/2022    HGB 11.9 (L) 09/22/2022    HCT 35.9 (L) 09/22/2022    MCV 91.2 09/22/2022     09/22/2022     BMP:   Lab Results   Component Value Date    CREATININE 0.9 09/22/2022    BUN 20 09/22/2022     09/22/2022    K 4.6 09/22/2022     09/22/2022    CO2 23 09/22/2022     Estimated Creatinine Clearance: 46 mL/min (based on SCr of 0.9 mg/dL). No results found for: BNP    Thyroid:   Lab Results   Component Value Date    TSH 3.43 09/22/2022     Lipid Panel:   Lab Results   Component Value Date/Time    CHOL 193 03/29/2018 09:15 AM    HDL 86 09/22/2022 11:30 AM    TRIG 58 03/29/2018 09:15 AM     LFTs:  Lab Results   Component Value Date    ALT 18 09/22/2022    AST 23 09/22/2022    GGT 83 (H) 10/31/2018    ALKPHOS 66 09/22/2022    BILITOT 0.8 09/22/2022     Coags:   Lab Results   Component Value Date    PROTIME 12.3 08/27/2020    INR 1.06 08/27/2020    APTT 33.5 12/27/2019       ECG: 10/10/2022   SB at 53 BPM. IVCD. Baseline wander. Echo: 10/8/20   Summary   Ejection fraction is visually estimated to be 50%. Indeterminate diastolic function. RV is dilated with mildly depressed function   RV volume overload   moderate TR    Stress test: 12/21/18   Summary    Treadmill MPI Results        1. Technically a satisfactory study. 2. Positive treadmill exercise stress portion of the study. 3. No evidence of Ischemia by Myocardial Perfusion Imaging. 4. Gated Study shows normal LV size and Systolic function; EF is 96%. 5. The patient exercised for 6 min. on the Ramírez protocol to achieve a HR of    133, which is 94% of PMHR. The study was stopped due to elevated BP. with Cheri Mcneal NP.      AYLIN Riddle  [facility] [place] [address], [address] [place] [address]  Phone: [phone]  Fax: [phone]    Electronically signed by AYLIN Aranda - CNP on 10/10/2022 at 1:57 PM

## 2022-10-10 ENCOUNTER — OFFICE VISIT (OUTPATIENT)
Dept: CARDIOLOGY CLINIC | Age: 82
End: 2022-10-10
Payer: MEDICARE

## 2022-10-10 VITALS
HEIGHT: 68 IN | BODY MASS INDEX: 17.58 KG/M2 | WEIGHT: 116 LBS | HEART RATE: 62 BPM | SYSTOLIC BLOOD PRESSURE: 128 MMHG | OXYGEN SATURATION: 94 % | DIASTOLIC BLOOD PRESSURE: 78 MMHG

## 2022-10-10 DIAGNOSIS — I48.19 PERSISTENT ATRIAL FIBRILLATION (HCC): Primary | ICD-10-CM

## 2022-10-10 DIAGNOSIS — I47.1 ATRIAL TACHYCARDIA (HCC): ICD-10-CM

## 2022-10-10 DIAGNOSIS — I42.8 OTHER CARDIOMYOPATHY (HCC): ICD-10-CM

## 2022-10-10 DIAGNOSIS — I48.92 LEFT ATRIAL FLUTTER BY ELECTROCARDIOGRAM (HCC): ICD-10-CM

## 2022-10-10 DIAGNOSIS — I10 ESSENTIAL HYPERTENSION: ICD-10-CM

## 2022-10-10 DIAGNOSIS — I25.10 CORONARY ARTERY DISEASE INVOLVING NATIVE CORONARY ARTERY OF NATIVE HEART WITHOUT ANGINA PECTORIS: ICD-10-CM

## 2022-10-10 PROCEDURE — 1123F ACP DISCUSS/DSCN MKR DOCD: CPT | Performed by: NURSE PRACTITIONER

## 2022-10-10 PROCEDURE — G8427 DOCREV CUR MEDS BY ELIG CLIN: HCPCS | Performed by: NURSE PRACTITIONER

## 2022-10-10 PROCEDURE — 99214 OFFICE O/P EST MOD 30 MIN: CPT | Performed by: NURSE PRACTITIONER

## 2022-10-10 PROCEDURE — 93000 ELECTROCARDIOGRAM COMPLETE: CPT | Performed by: NURSE PRACTITIONER

## 2022-10-10 PROCEDURE — 1036F TOBACCO NON-USER: CPT | Performed by: NURSE PRACTITIONER

## 2022-10-10 PROCEDURE — G8484 FLU IMMUNIZE NO ADMIN: HCPCS | Performed by: NURSE PRACTITIONER

## 2022-10-10 PROCEDURE — G8419 CALC BMI OUT NRM PARAM NOF/U: HCPCS | Performed by: NURSE PRACTITIONER

## 2022-10-10 RX ORDER — FLECAINIDE ACETATE 50 MG/1
25 TABLET ORAL 2 TIMES DAILY
Qty: 180 TABLET | Refills: 3
Start: 2022-10-10

## 2022-10-10 ASSESSMENT — ENCOUNTER SYMPTOMS
SHORTNESS OF BREATH: 0
VOMITING: 0
WHEEZING: 0
CONSTIPATION: 0
NAUSEA: 0
BACK PAIN: 0
ABDOMINAL PAIN: 0
SINUS PRESSURE: 0
DIARRHEA: 0
SORE THROAT: 0
COUGH: 0
COLOR CHANGE: 0
TROUBLE SWALLOWING: 0
BLOOD IN STOOL: 0

## 2022-10-12 RX ORDER — APIXABAN 5 MG/1
TABLET, FILM COATED ORAL
Qty: 60 TABLET | Refills: 3 | Status: ON HOLD | OUTPATIENT
Start: 2022-10-12 | End: 2022-10-29 | Stop reason: SDUPTHER

## 2022-10-26 ENCOUNTER — TELEPHONE (OUTPATIENT)
Dept: CARDIOLOGY CLINIC | Age: 82
End: 2022-10-26

## 2022-10-26 NOTE — TELEPHONE ENCOUNTER
Would avoid using Levaquin with flecainide and should be prescribed alternative antibiotic through PCP ideally.

## 2022-10-26 NOTE — TELEPHONE ENCOUNTER
Benny's son in law Bernette Mon called in this morning stating that Amina Butler hasn't been feeling well, so they took him up to Urgent Care, and the doctor at Urgent Care prescribed him Levofloxacin 750 mg. When they went to the pharmacy to pick it up, the pharmacist told them to call Dr. Su Offer office to confirm with Dr. Tricia Brown that it's okay to start the medication with all his other meds and that it won't interact. Herlinda Decker states that his BP has also been running low. Today was 99/66.      You can reach Herlinda Decker at #[phone]

## 2022-10-26 NOTE — TELEPHONE ENCOUNTER
Teresa, please advise in Dr. Valverde [place] absence. Patient has a sinus infection and prescribed Levoflaxacin 750 mg daily for 5 days along with prednisone. Based on UpToDate Levofloxacin and flecainide taken together can cause QT prolongation. Please advise if patient may take the antibiotic for the 5 days or if he should take an alternative antibiotic, thanks.      He has a hx of:   Left atrial flutter s/p ablation 3/2/22  Atrial tachycardia s/p ablation 8/21 and 3/22  Persistent atrial fibrillation s/p 5/20, 8/11/21 and 3/24/22

## 2022-10-27 ENCOUNTER — APPOINTMENT (OUTPATIENT)
Dept: GENERAL RADIOLOGY | Age: 82
DRG: 871 | End: 2022-10-27
Payer: MEDICARE

## 2022-10-27 ENCOUNTER — HOSPITAL ENCOUNTER (INPATIENT)
Age: 82
LOS: 2 days | Discharge: HOME | DRG: 871 | End: 2022-10-29
Attending: EMERGENCY MEDICINE | Admitting: PEDIATRICS
Payer: MEDICARE

## 2022-10-27 ENCOUNTER — TELEPHONE (OUTPATIENT)
Dept: PRIMARY CARE CLINIC | Age: 82
End: 2022-10-27

## 2022-10-27 DIAGNOSIS — J18.9 PNEUMONIA OF BOTH LOWER LOBES DUE TO INFECTIOUS ORGANISM: ICD-10-CM

## 2022-10-27 DIAGNOSIS — J96.01 ACUTE RESPIRATORY FAILURE WITH HYPOXIA (HCC): Primary | ICD-10-CM

## 2022-10-27 DIAGNOSIS — I50.33 ACUTE ON CHRONIC DIASTOLIC CONGESTIVE HEART FAILURE (HCC): ICD-10-CM

## 2022-10-27 LAB
A/G RATIO: 1.3 (ref 1.1–2.2)
ALBUMIN SERPL-MCNC: 4 G/DL (ref 3.4–5)
ALP BLD-CCNC: 75 U/L (ref 40–129)
ALT SERPL-CCNC: 24 U/L (ref 10–40)
ANION GAP SERPL CALCULATED.3IONS-SCNC: 14 MMOL/L (ref 3–16)
AST SERPL-CCNC: 32 U/L (ref 15–37)
BASOPHILS ABSOLUTE: 0 K/UL (ref 0–0.2)
BASOPHILS RELATIVE PERCENT: 0 %
BILIRUB SERPL-MCNC: 1.7 MG/DL (ref 0–1)
BUN BLDV-MCNC: 38 MG/DL (ref 7–20)
CALCIUM SERPL-MCNC: 9.9 MG/DL (ref 8.3–10.6)
CHLORIDE BLD-SCNC: 95 MMOL/L (ref 99–110)
CO2: 23 MMOL/L (ref 21–32)
CREAT SERPL-MCNC: 1.3 MG/DL (ref 0.8–1.3)
EOSINOPHILS ABSOLUTE: 0 K/UL (ref 0–0.6)
EOSINOPHILS RELATIVE PERCENT: 0 %
GFR SERPL CREATININE-BSD FRML MDRD: 54 ML/MIN/{1.73_M2}
GLUCOSE BLD-MCNC: 99 MG/DL (ref 70–99)
HCT VFR BLD CALC: 37.8 % (ref 40.5–52.5)
HEMOGLOBIN: 12.6 G/DL (ref 13.5–17.5)
LYMPHOCYTES ABSOLUTE: 0.3 K/UL (ref 1–5.1)
LYMPHOCYTES RELATIVE PERCENT: 3.3 %
MAGNESIUM: 2.2 MG/DL (ref 1.8–2.4)
MCH RBC QN AUTO: 30.1 PG (ref 26–34)
MCHC RBC AUTO-ENTMCNC: 33.3 G/DL (ref 31–36)
MCV RBC AUTO: 90.3 FL (ref 80–100)
MONOCYTES ABSOLUTE: 0.4 K/UL (ref 0–1.3)
MONOCYTES RELATIVE PERCENT: 5.1 %
NEUTROPHILS ABSOLUTE: 7.9 K/UL (ref 1.7–7.7)
NEUTROPHILS RELATIVE PERCENT: 91.6 %
PDW BLD-RTO: 14.7 % (ref 12.4–15.4)
PLATELET # BLD: 253 K/UL (ref 135–450)
PMV BLD AUTO: 10 FL (ref 5–10.5)
POTASSIUM SERPL-SCNC: 4.5 MMOL/L (ref 3.5–5.1)
PRO-BNP: 7404 PG/ML (ref 0–449)
RBC # BLD: 4.19 M/UL (ref 4.2–5.9)
SARS-COV-2, NAAT: NOT DETECTED
SODIUM BLD-SCNC: 132 MMOL/L (ref 136–145)
TOTAL PROTEIN: 7 G/DL (ref 6.4–8.2)
TROPONIN: <0.01 NG/ML
WBC # BLD: 8.6 K/UL (ref 4–11)

## 2022-10-27 PROCEDURE — 87635 SARS-COV-2 COVID-19 AMP PRB: CPT

## 2022-10-27 PROCEDURE — 99285 EMERGENCY DEPT VISIT HI MDM: CPT

## 2022-10-27 PROCEDURE — 83880 ASSAY OF NATRIURETIC PEPTIDE: CPT

## 2022-10-27 PROCEDURE — 96365 THER/PROPH/DIAG IV INF INIT: CPT

## 2022-10-27 PROCEDURE — 83735 ASSAY OF MAGNESIUM: CPT

## 2022-10-27 PROCEDURE — 71045 X-RAY EXAM CHEST 1 VIEW: CPT

## 2022-10-27 PROCEDURE — 2580000003 HC RX 258: Performed by: EMERGENCY MEDICINE

## 2022-10-27 PROCEDURE — 85025 COMPLETE CBC W/AUTO DIFF WBC: CPT

## 2022-10-27 PROCEDURE — 1200000000 HC SEMI PRIVATE

## 2022-10-27 PROCEDURE — 80053 COMPREHEN METABOLIC PANEL: CPT

## 2022-10-27 PROCEDURE — 6360000002 HC RX W HCPCS: Performed by: EMERGENCY MEDICINE

## 2022-10-27 PROCEDURE — 84484 ASSAY OF TROPONIN QUANT: CPT

## 2022-10-27 PROCEDURE — 93005 ELECTROCARDIOGRAM TRACING: CPT | Performed by: EMERGENCY MEDICINE

## 2022-10-27 PROCEDURE — 96375 TX/PRO/DX INJ NEW DRUG ADDON: CPT

## 2022-10-27 RX ORDER — FUROSEMIDE 10 MG/ML
40 INJECTION INTRAMUSCULAR; INTRAVENOUS ONCE
Status: COMPLETED | OUTPATIENT
Start: 2022-10-27 | End: 2022-10-27

## 2022-10-27 RX ADMIN — CEFTRIAXONE 1000 MG: 1 INJECTION, POWDER, FOR SOLUTION INTRAMUSCULAR; INTRAVENOUS at 23:20

## 2022-10-27 RX ADMIN — FUROSEMIDE 40 MG: 10 INJECTION, SOLUTION INTRAMUSCULAR; INTRAVENOUS at 23:15

## 2022-10-27 NOTE — TELEPHONE ENCOUNTER
Patient son called in stating that the patient went to urgent care and was prescribed Levothyroxine for an infection/pneumonia. That medication conflicts with his flecainide (TAMBOCOR) 50 MG tablet   So he wants to know if he can be prescribed something else.       Please advise  [phone] Jorge-son

## 2022-10-27 NOTE — TELEPHONE ENCOUNTER
Patient was prescribed levofloxacin for pneumonia. Howere, drug interacts with his flecainide and was advise by cardiology not to take it. First available appointment for Dr. Bianca Sawyer and patient is in need of something sooner at least as far as a different antibiotic is concerned.  Please advise ASAP

## 2022-10-28 PROBLEM — I50.32 CHRONIC DIASTOLIC HEART FAILURE (HCC): Status: ACTIVE | Noted: 2022-10-28

## 2022-10-28 PROBLEM — Z51.81 ENCOUNTER FOR MONITORING FLECAINIDE THERAPY: Status: ACTIVE | Noted: 2022-10-28

## 2022-10-28 PROBLEM — Z79.899 ENCOUNTER FOR MONITORING FLECAINIDE THERAPY: Status: ACTIVE | Noted: 2022-10-28

## 2022-10-28 PROBLEM — I34.0 NONRHEUMATIC MITRAL VALVE REGURGITATION: Status: ACTIVE | Noted: 2022-10-28

## 2022-10-28 PROBLEM — E43 SEVERE MALNUTRITION (HCC): Chronic | Status: ACTIVE | Noted: 2022-10-28

## 2022-10-28 LAB
ANION GAP SERPL CALCULATED.3IONS-SCNC: 16 MMOL/L (ref 3–16)
BASOPHILS ABSOLUTE: 0 K/UL (ref 0–0.2)
BASOPHILS RELATIVE PERCENT: 0.1 %
BUN BLDV-MCNC: 33 MG/DL (ref 7–20)
CALCIUM SERPL-MCNC: 9.4 MG/DL (ref 8.3–10.6)
CHLORIDE BLD-SCNC: 98 MMOL/L (ref 99–110)
CO2: 22 MMOL/L (ref 21–32)
CREAT SERPL-MCNC: 1.1 MG/DL (ref 0.8–1.3)
EKG ATRIAL RATE: 94 BPM
EKG DIAGNOSIS: NORMAL
EKG P-R INTERVAL: 152 MS
EKG Q-T INTERVAL: 354 MS
EKG QRS DURATION: 106 MS
EKG QTC CALCULATION (BAZETT): 442 MS
EKG R AXIS: 94 DEGREES
EKG T AXIS: 92 DEGREES
EKG VENTRICULAR RATE: 94 BPM
EOSINOPHILS ABSOLUTE: 0 K/UL (ref 0–0.6)
EOSINOPHILS RELATIVE PERCENT: 0 %
GFR SERPL CREATININE-BSD FRML MDRD: >60 ML/MIN/{1.73_M2}
GLUCOSE BLD-MCNC: 102 MG/DL (ref 70–99)
HCT VFR BLD CALC: 34.4 % (ref 40.5–52.5)
HEMOGLOBIN: 11.6 G/DL (ref 13.5–17.5)
L. PNEUMOPHILA SEROGP 1 UR AG: NORMAL
LACTIC ACID: 1.8 MMOL/L (ref 0.4–2)
LYMPHOCYTES ABSOLUTE: 0.3 K/UL (ref 1–5.1)
LYMPHOCYTES RELATIVE PERCENT: 4.4 %
MCH RBC QN AUTO: 29.9 PG (ref 26–34)
MCHC RBC AUTO-ENTMCNC: 33.6 G/DL (ref 31–36)
MCV RBC AUTO: 88.9 FL (ref 80–100)
MONOCYTES ABSOLUTE: 0.4 K/UL (ref 0–1.3)
MONOCYTES RELATIVE PERCENT: 5.9 %
NEUTROPHILS ABSOLUTE: 5.8 K/UL (ref 1.7–7.7)
NEUTROPHILS RELATIVE PERCENT: 89.6 %
PDW BLD-RTO: 14.2 % (ref 12.4–15.4)
PLATELET # BLD: 222 K/UL (ref 135–450)
PMV BLD AUTO: 10.1 FL (ref 5–10.5)
POTASSIUM REFLEX MAGNESIUM: 3.7 MMOL/L (ref 3.5–5.1)
PROCALCITONIN: 5.21 NG/ML (ref 0–0.15)
RAPID INFLUENZA  B AGN: NEGATIVE
RAPID INFLUENZA A AGN: NEGATIVE
RBC # BLD: 3.87 M/UL (ref 4.2–5.9)
SODIUM BLD-SCNC: 136 MMOL/L (ref 136–145)
STREP PNEUMONIAE ANTIGEN, URINE: NORMAL
WBC # BLD: 6.5 K/UL (ref 4–11)

## 2022-10-28 PROCEDURE — 87205 SMEAR GRAM STAIN: CPT

## 2022-10-28 PROCEDURE — 2060000000 HC ICU INTERMEDIATE R&B

## 2022-10-28 PROCEDURE — 2500000003 HC RX 250 WO HCPCS: Performed by: NURSE PRACTITIONER

## 2022-10-28 PROCEDURE — 83605 ASSAY OF LACTIC ACID: CPT

## 2022-10-28 PROCEDURE — 2500000003 HC RX 250 WO HCPCS: Performed by: EMERGENCY MEDICINE

## 2022-10-28 PROCEDURE — 6370000000 HC RX 637 (ALT 250 FOR IP): Performed by: NURSE PRACTITIONER

## 2022-10-28 PROCEDURE — 51798 US URINE CAPACITY MEASURE: CPT

## 2022-10-28 PROCEDURE — 87070 CULTURE OTHR SPECIMN AEROBIC: CPT

## 2022-10-28 PROCEDURE — 84145 PROCALCITONIN (PCT): CPT

## 2022-10-28 PROCEDURE — 94761 N-INVAS EAR/PLS OXIMETRY MLT: CPT

## 2022-10-28 PROCEDURE — 87186 SC STD MICRODIL/AGAR DIL: CPT

## 2022-10-28 PROCEDURE — 87804 INFLUENZA ASSAY W/OPTIC: CPT

## 2022-10-28 PROCEDURE — 87077 CULTURE AEROBIC IDENTIFY: CPT

## 2022-10-28 PROCEDURE — 87449 NOS EACH ORGANISM AG IA: CPT

## 2022-10-28 PROCEDURE — 2580000003 HC RX 258: Performed by: NURSE PRACTITIONER

## 2022-10-28 PROCEDURE — 85025 COMPLETE CBC W/AUTO DIFF WBC: CPT

## 2022-10-28 PROCEDURE — 99223 1ST HOSP IP/OBS HIGH 75: CPT | Performed by: INTERNAL MEDICINE

## 2022-10-28 PROCEDURE — 80048 BASIC METABOLIC PNL TOTAL CA: CPT

## 2022-10-28 PROCEDURE — 2700000000 HC OXYGEN THERAPY PER DAY

## 2022-10-28 PROCEDURE — 94640 AIRWAY INHALATION TREATMENT: CPT

## 2022-10-28 PROCEDURE — 6370000000 HC RX 637 (ALT 250 FOR IP)

## 2022-10-28 PROCEDURE — 93010 ELECTROCARDIOGRAM REPORT: CPT | Performed by: INTERNAL MEDICINE

## 2022-10-28 PROCEDURE — 92610 EVALUATE SWALLOWING FUNCTION: CPT

## 2022-10-28 PROCEDURE — 36415 COLL VENOUS BLD VENIPUNCTURE: CPT

## 2022-10-28 PROCEDURE — 6360000002 HC RX W HCPCS: Performed by: NURSE PRACTITIONER

## 2022-10-28 PROCEDURE — 2500000003 HC RX 250 WO HCPCS: Performed by: PEDIATRICS

## 2022-10-28 PROCEDURE — 2580000003 HC RX 258: Performed by: EMERGENCY MEDICINE

## 2022-10-28 RX ORDER — DOXYCYCLINE HYCLATE 100 MG
100 TABLET ORAL EVERY 12 HOURS SCHEDULED
Status: DISCONTINUED | OUTPATIENT
Start: 2022-10-28 | End: 2022-10-29 | Stop reason: HOSPADM

## 2022-10-28 RX ORDER — METOPROLOL TARTRATE 5 MG/5ML
5 INJECTION INTRAVENOUS ONCE
Status: COMPLETED | OUTPATIENT
Start: 2022-10-28 | End: 2022-10-28

## 2022-10-28 RX ORDER — SODIUM CHLORIDE 9 MG/ML
INJECTION, SOLUTION INTRAVENOUS CONTINUOUS
Status: DISCONTINUED | OUTPATIENT
Start: 2022-10-28 | End: 2022-10-28

## 2022-10-28 RX ORDER — FUROSEMIDE 10 MG/ML
20 INJECTION INTRAMUSCULAR; INTRAVENOUS 2 TIMES DAILY
Status: DISCONTINUED | OUTPATIENT
Start: 2022-10-28 | End: 2022-10-28

## 2022-10-28 RX ORDER — LISINOPRIL 10 MG/1
10 TABLET ORAL DAILY
Status: DISCONTINUED | OUTPATIENT
Start: 2022-10-28 | End: 2022-10-29 | Stop reason: HOSPADM

## 2022-10-28 RX ORDER — SODIUM CHLORIDE 9 MG/ML
INJECTION, SOLUTION INTRAVENOUS PRN
Status: DISCONTINUED | OUTPATIENT
Start: 2022-10-28 | End: 2022-10-29 | Stop reason: HOSPADM

## 2022-10-28 RX ORDER — FLECAINIDE ACETATE 50 MG/1
25 TABLET ORAL 2 TIMES DAILY
Status: DISCONTINUED | OUTPATIENT
Start: 2022-10-28 | End: 2022-10-29

## 2022-10-28 RX ORDER — SODIUM CHLORIDE 0.9 % (FLUSH) 0.9 %
10 SYRINGE (ML) INJECTION EVERY 12 HOURS SCHEDULED
Status: DISCONTINUED | OUTPATIENT
Start: 2022-10-28 | End: 2022-10-29 | Stop reason: HOSPADM

## 2022-10-28 RX ORDER — ALBUTEROL SULFATE 2.5 MG/3ML
2.5 SOLUTION RESPIRATORY (INHALATION)
Status: DISCONTINUED | OUTPATIENT
Start: 2022-10-28 | End: 2022-10-29 | Stop reason: HOSPADM

## 2022-10-28 RX ORDER — SODIUM CHLORIDE 0.9 % (FLUSH) 0.9 %
10 SYRINGE (ML) INJECTION PRN
Status: DISCONTINUED | OUTPATIENT
Start: 2022-10-28 | End: 2022-10-29 | Stop reason: HOSPADM

## 2022-10-28 RX ORDER — METOPROLOL SUCCINATE 25 MG/1
12.5 TABLET, EXTENDED RELEASE ORAL DAILY
Status: DISCONTINUED | OUTPATIENT
Start: 2022-10-28 | End: 2022-10-29

## 2022-10-28 RX ORDER — ACETAMINOPHEN 325 MG/1
650 TABLET ORAL EVERY 6 HOURS PRN
Status: DISCONTINUED | OUTPATIENT
Start: 2022-10-28 | End: 2022-10-29 | Stop reason: HOSPADM

## 2022-10-28 RX ORDER — ONDANSETRON 4 MG/1
4 TABLET, ORALLY DISINTEGRATING ORAL EVERY 8 HOURS PRN
Status: DISCONTINUED | OUTPATIENT
Start: 2022-10-28 | End: 2022-10-29 | Stop reason: HOSPADM

## 2022-10-28 RX ORDER — IPRATROPIUM BROMIDE AND ALBUTEROL SULFATE 2.5; .5 MG/3ML; MG/3ML
1 SOLUTION RESPIRATORY (INHALATION)
Status: DISCONTINUED | OUTPATIENT
Start: 2022-10-28 | End: 2022-10-28

## 2022-10-28 RX ORDER — IPRATROPIUM BROMIDE AND ALBUTEROL SULFATE 2.5; .5 MG/3ML; MG/3ML
1 SOLUTION RESPIRATORY (INHALATION) 2 TIMES DAILY
Status: DISCONTINUED | OUTPATIENT
Start: 2022-10-28 | End: 2022-10-29 | Stop reason: HOSPADM

## 2022-10-28 RX ORDER — ACETAMINOPHEN 650 MG/1
650 SUPPOSITORY RECTAL EVERY 6 HOURS PRN
Status: DISCONTINUED | OUTPATIENT
Start: 2022-10-28 | End: 2022-10-29 | Stop reason: HOSPADM

## 2022-10-28 RX ORDER — FUROSEMIDE 20 MG/1
20 TABLET ORAL DAILY
Status: DISCONTINUED | OUTPATIENT
Start: 2022-10-29 | End: 2022-10-29 | Stop reason: HOSPADM

## 2022-10-28 RX ORDER — ONDANSETRON 2 MG/ML
4 INJECTION INTRAMUSCULAR; INTRAVENOUS EVERY 6 HOURS PRN
Status: DISCONTINUED | OUTPATIENT
Start: 2022-10-28 | End: 2022-10-29 | Stop reason: HOSPADM

## 2022-10-28 RX ADMIN — DOXYCYCLINE 100 MG: 100 INJECTION, POWDER, LYOPHILIZED, FOR SOLUTION INTRAVENOUS at 00:15

## 2022-10-28 RX ADMIN — METOPROLOL TARTRATE 5 MG: 5 INJECTION INTRAVENOUS at 02:24

## 2022-10-28 RX ADMIN — Medication 10 ML: at 11:11

## 2022-10-28 RX ADMIN — APIXABAN 2.5 MG: 2.5 TABLET, FILM COATED ORAL at 11:01

## 2022-10-28 RX ADMIN — IPRATROPIUM BROMIDE AND ALBUTEROL SULFATE 1 AMPULE: .5; 3 SOLUTION RESPIRATORY (INHALATION) at 08:04

## 2022-10-28 RX ADMIN — CEFTRIAXONE 1000 MG: 1 INJECTION, POWDER, FOR SOLUTION INTRAMUSCULAR; INTRAVENOUS at 21:02

## 2022-10-28 RX ADMIN — METOPROLOL TARTRATE 5 MG: 5 INJECTION INTRAVENOUS at 21:03

## 2022-10-28 RX ADMIN — APIXABAN 2.5 MG: 2.5 TABLET, FILM COATED ORAL at 21:03

## 2022-10-28 RX ADMIN — IPRATROPIUM BROMIDE AND ALBUTEROL SULFATE 1 AMPULE: .5; 3 SOLUTION RESPIRATORY (INHALATION) at 19:24

## 2022-10-28 RX ADMIN — FLECAINIDE ACETATE 25 MG: 50 TABLET ORAL at 11:00

## 2022-10-28 RX ADMIN — DOXYCYCLINE HYCLATE 100 MG: 100 TABLET, COATED ORAL at 11:01

## 2022-10-28 RX ADMIN — FLECAINIDE ACETATE 25 MG: 50 TABLET ORAL at 21:08

## 2022-10-28 RX ADMIN — METOPROLOL TARTRATE 5 MG: 5 INJECTION INTRAVENOUS at 03:59

## 2022-10-28 RX ADMIN — ACETAMINOPHEN 650 MG: 325 TABLET ORAL at 04:00

## 2022-10-28 RX ADMIN — DOXYCYCLINE HYCLATE 100 MG: 100 TABLET, COATED ORAL at 21:03

## 2022-10-28 RX ADMIN — FUROSEMIDE 20 MG: 10 INJECTION, SOLUTION INTRAMUSCULAR; INTRAVENOUS at 11:11

## 2022-10-28 RX ADMIN — METOPROLOL SUCCINATE 12.5 MG: 25 TABLET, FILM COATED, EXTENDED RELEASE ORAL at 11:01

## 2022-10-28 ASSESSMENT — PAIN SCALES - GENERAL
PAINLEVEL_OUTOF10: 0
PAINLEVEL_OUTOF10: 0

## 2022-10-28 NOTE — CONSULTS
Consult received. Will continue to follow along peripherally. At this time, his chronic diastolic heart failure may not be his biggest problem since he , according to Dr. Ange Bray note\" does not appear grossly volume overloaded. BNP above baseline which could be in part related to his pneumonia. With relative hypotension, will discontinue IV Lasix. \"

## 2022-10-28 NOTE — CARE COORDINATION
Advance Care Planning     Advance Care Planning Activator (Inpatient)  Conversation Note      Date of ACP Conversation: 10/28/2022     Conversation Conducted with: Patient with Decision Making Capacity    ACP Activator: VERA Daniel    Health Care Decision Maker:     Current Designated Health Care Decision Maker:     Primary Decision Maker: Johnny Mosqueda [facility] - [phone]    Care Preferences    Ventilation: \"If you were in your present state of health and suddenly became very ill and were unable to breathe on your own, what would your preference be about the use of a ventilator (breathing machine) if it were available to you? \"      Would the patient desire the use of ventilator (breathing machine)?: yes    \"If your health worsens and it becomes clear that your chance of recovery is unlikely, what would your preference be about the use of a ventilator (breathing machine) if it were available to you? \"     Would the patient desire the use of ventilator (breathing machine)?: No      Resuscitation  \"CPR works best to restart the heart when there is a sudden event, like a heart attack, in someone who is otherwise healthy. Unfortunately, CPR does not typically restart the heart for people who have serious health conditions or who are very sick. \"    \"In the event your heart stopped as a result of an underlying serious health condition, would you want attempts to be made to restart your heart (answer \"yes\" for attempt to resuscitate) or would you prefer a natural death (answer \"no\" for do not attempt to resuscitate)? \" yes       [] Yes   [] No   Educated Patient / [place] Apgar regarding differences between Advance Directives and portable DNR orders.     Length of ACP Conversation in minutes:  4    Conversation Outcomes:  [x] ACP discussion completed  [] Existing advance directive reviewed with patient; no changes to patient's previously recorded wishes  [] New Advance Directive completed  [] Portable Do Not Rescitate prepared for Provider review and signature  [] POLST/POST/MOLST/MOST prepared for Provider review and signature      Follow-up plan:    [] Schedule follow-up conversation to continue planning  [] Referred individual to Provider for additional questions/concerns   [] Advised patient/agent/surrogate to review completed ACP document and update if needed with changes in condition, patient preferences or care setting    [x] This note routed to one or more involved healthcare providers Charley Aguilera MD primary care physician. Respectfully submitted,    JOSE A Carpio-S  [facility]   [phone]    Electronically signed by VERA Zavaleta on 10/28/2022 at 11:03 AM

## 2022-10-28 NOTE — RT PROTOCOL NOTE
RT Inhaler-Nebulizer Bronchodilator Protocol Note    There is a bronchodilator order in the chart from a provider indicating to follow the RT Bronchodilator Protocol and there is an Initiate RT Inhaler-Nebulizer Bronchodilator Protocol order as well (see protocol at bottom of note). CXR Findings:  XR CHEST PORTABLE    Result Date: 10/27/2022  Bilateral lower lobe infiltrates consistent with pneumonia. Chronic COPD. Cardiomegaly and postthoracotomy changes. The findings from the last RT Protocol Assessment were as follows:   History Pulmonary Disease: None or smoker <15 pack years  Respiratory Pattern: Regular pattern and RR 12-20 bpm  Breath Sounds: Clear breath sounds  Cough: Strong, spontaneous, non-productive  Indication for Bronchodilator Therapy:    Bronchodilator Assessment Score: 0    Aerosolized bronchodilator medication orders have been revised according to the RT Inhaler-Nebulizer Bronchodilator Protocol below. Respiratory Therapist to perform RT Therapy Protocol Assessment initially then follow the protocol. Repeat RT Therapy Protocol Assessment PRN for score 0-3 or on second treatment, BID, and PRN for scores above 3. No Indications - adjust the frequency to every 6 hours PRN wheezing or bronchospasm, if no treatments needed after 48 hours then discontinue using Per Protocol order mode. If indication present, adjust the RT bronchodilator orders based on the Bronchodilator Assessment Score as indicated below. Use Inhaler orders unless patient has one or more of the following: on home nebulizer, not able to hold breath for 10 seconds, is not alert and oriented, cannot activate and use MDI correctly, or respiratory rate 25 breaths per minute or more, then use the equivalent nebulizer order(s) with same Frequency and PRN reasons based on the score. If a patient is on this medication at home then do not decrease Frequency below that used at home.     0-3 - enter or revise RT bronchodilator order(s) to equivalent RT Bronchodilator order with Frequency of every 4 hours PRN for wheezing or increased work of breathing using Per Protocol order mode. 4-6 - enter or revise RT Bronchodilator order(s) to two equivalent RT bronchodilator orders with one order with BID Frequency and one order with Frequency of every 4 hours PRN wheezing or increased work of breathing using Per Protocol order mode. 7-10 - enter or revise RT Bronchodilator order(s) to two equivalent RT bronchodilator orders with one order with TID Frequency and one order with Frequency of every 4 hours PRN wheezing or increased work of breathing using Per Protocol order mode. 11-13 - enter or revise RT Bronchodilator order(s) to one equivalent RT bronchodilator order with QID Frequency and an Albuterol order with Frequency of every 4 hours PRN wheezing or increased work of breathing using Per Protocol order mode. Greater than 13 - enter or revise RT Bronchodilator order(s) to one equivalent RT bronchodilator order with every 4 hours Frequency and an Albuterol order with Frequency of every 2 hours PRN wheezing or increased work of breathing using Per Protocol order mode. RT to enter RT Home Evaluation for COPD & MDI Assessment order using Per Protocol order mode.     Electronically signed by Ruy Palomares RCP on 10/28/2022 at 8:11 AM

## 2022-10-28 NOTE — H&P
Hospital Medicine History & Physical      PCP: Jayne Vaughan MD    Date of Admission: 10/27/2022    Date of Service: Pt seen/examined on 10/27/2022 and Admitted to Inpatient with expected LOS greater than two midnights due to medical therapy. Chief Complaint:  shortness of breath \"I think I have pneumonia\"      History Of Present Illness:      79 y.o. male with PMHx of A-fib, Cardiomyopathy and HTN presented to [facility] with shortness of breath and cough. Productive at times. No fever + body aches. No nausea or vomiting. No chest pain or dizziness. No abdominal pain. 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  Authorizing Provider   ELIQUIS 5 MG TABS tablet TAKE ONE TABLET BY MOUTH TWICE A DAY 10/12/22   Keith Holden MD   flecainide (TAMBOCOR) 50 MG tablet Take 0.5 tablets by mouth in the morning and at bedtime 10/10/22   AYLIN Newton CNP   metoprolol succinate (TOPROL XL) 25 MG extended release tablet TAKE 1/2 TABLET BY MOUTH DAILY 6/3/22   AYLIN Collins CNP   lisinopril (PRINIVIL;ZESTRIL) 10 MG tablet TAKE ONE TABLET BY MOUTH DAILY 5/25/22   Lisette Pereyra MD   apixaban (ELIQUIS) 2.5 MG TABS tablet Take 1 tablet by mouth 2 times daily 3/4/22   Cira Diaz, APRN - CNP       Allergies:  Amiodarone    Social History:        TOBACCO:   reports that he quit smoking about 29 years ago. His smoking use included cigarettes. He started smoking about 67 years ago. He smoked an average of 1 pack per day. He has never used smokeless tobacco.  ETOH:   reports current alcohol use. Family History:      Reviewed in detail positive as follows:        Problem Relation Age of Onset    Heart Disease Father     Cancer Sister         Colon    Cancer Brother         Colon    Heart Disease Brother     Heart Disease Brother        REVIEW OF SYSTEMS:   Pertinent positives as noted in the HPI. All other systems reviewed and negative. PHYSICAL EXAM PERFORMED:    /71   Pulse 98   Temp 98.6 °F (37 °C) (Oral)   Resp 23   Ht 5' 8\" (1.727 m)   Wt 112 lb 10.5 oz (51.1 kg)   SpO2 97%   BMI 17.13 kg/m²     General appearance:  Well developed, well nourished, elderly  male lying on ED cart, ill appearing but in no apparent distress, appears stated age and cooperative. HEENT:  Normal cephalic, atraumatic without obvious deformity. Pupils equal, round, and reactive to light. Conjunctivae/corneas clear. Neck: Supple, with full range of motion. No jugular venous distention. Trachea midline. Respiratory:  Normal respiratory effort. Rhonchi bilateral bases without accessory muscle use. Cardiovascular:  Regular rate with irregular rhythm without murmurs, no lower extremity edema. Abdomen: Soft, thin abdomen, non-tender, non-distended, without rebound or guarding. Normal bowel sounds. Musculoskeletal:  Moves all extremities equally. Full range of motion without deformity. Skin: Skin warm, dry and intact. No rashes or lesions. Neurologic:  Neurovascularly intact without any focal sensory/motor deficits.  Cranial nerves: II-XII intact, grossly non-focal.  Psychiatric:  Alert and oriented, thought content appropriate, normal insight  Capillary Refill: Brisk,< 3 seconds   Peripheral Pulses: +2 palpable, equal bilaterally       Labs:     Recent Labs     10/27/22  2141   WBC 8.6   HGB 12.6*   HCT 37.8*        Recent Labs     10/27/22  2141   *   K 4.5   CL 95*   CO2 23   BUN 38*   CREATININE 1.3   CALCIUM 9.9     Recent Labs     10/27/22  2141   AST 32   ALT 24   BILITOT 1.7*   ALKPHOS 75     No results for input(s): INR in the last 72 hours. Recent Labs     10/27/22  2141   Gweneth Acron <0.01       Urinalysis:      Lab Results   Component Value Date/Time    NITRU Negative 03/20/2017 03:25 PM    WBCUA 2 03/20/2017 03:25 PM    RBCUA 6 03/20/2017 03:25 PM    BLOODU negative 07/09/2019 11:00 AM    BLOODU Negative 03/20/2017 03:25 PM    SPECGRAV 1.005 07/09/2019 11:00 AM    SPECGRAV 1.017 03/20/2017 03:25 PM    GLUCOSEU negative 07/09/2019 11:00 AM    GLUCOSEU Negative 03/20/2017 03:25 PM       Radiology:     EKG:  I have reviewed the EKG with the following interpretation: sinus rhythm, rate 94. Cardiac monitor during evaluation is a-fib rate 90's    XR CHEST PORTABLE   Final Result   Bilateral lower lobe infiltrates consistent with pneumonia. Chronic COPD. Cardiomegaly and postthoracotomy changes.              ASSESSMENT:    Active Hospital Problems    Diagnosis Date Noted    Acute respiratory failure with hypoxia ([facility] [address] [J96.01] 10/27/2022     Priority: Medium         PLAN:    Acute hypoxic respiratory failure due to pneumonia and CHF  - with increased work of breath, tachypnea and hypoxia  - room air saturation 86%  - provide supplemental oxygen as necessary to keep SaO2 92% or greater.  - Pt has been started on Rocephin and doxycycline.  - Blood cultures x 2 ordered  - Legionella pneumophilia and Strep pneumoniae urine antigens ordered  - Rapid Influenza A&B ordered  - Procalcitonin ordered  - supplemental O2, Nebs PRN  - mucinex     Chronic systolic heart failure/Cardiomyopathy  - last Echo 1/2022 EF 50%              - BNP 7404, trop 0.01  - continue Toprol 12.5mg QD, lisinopril 10mg QD  - lasix 20 mg IV bid  - follows with Dr. Duque Katy, consult placed  - daily wts and I/Os'  - low salt diet and fluid restriction    Atrial Fibrillation   - currently rate controlled  - continue flecainide, metoprolol and eliquis   - multiple ablations (2020 and 2021) with recurrence 11/21 most recent  s/p ablation on 3/4/22 with Dr. Urszula Ramirez     HTN  - monitor blood pressure  - continue home meds    DVT Prophylaxis: Xarelto  Diet: ADULT DIET; Regular; Low Sodium (2 gm); 1500 ml  Code Status: Full Code    Dispo - Inpatient       [address], APRN - CNP    Thank you Annie Jones MD for the opportunity to be involved in this patient's care. If you have any questions or concerns please feel free to contact me at [phone].

## 2022-10-28 NOTE — PROGRESS NOTES
4 Eyes Skin Assessment     NAME:  Cherelle Molina  YOB: 1940  MEDICAL RECORD NUMBER:  [id number]    The patient is being assess for  Admission    I agree that 2 RN's have performed a thorough Head to Toe Skin Assessment on the patient. ALL assessment sites listed below have been assessed. Areas assessed by both nurses:    Head, Face, Ears, Shoulders, Back, Chest, Arms, Elbows, Hands, Sacrum. Buttock, Coccyx, Ischium, and Legs. Feet and Heels        Does the Patient have a Wound?  No noted wound(s)       Isaac Prevention initiated:  Yes   Wound Care Orders initiated:  NA    Pressure Injury (Stage 3,4, Unstageable, DTI, NWPT, and Complex wounds) if present place referral/consult order under [de-identified] NA    New and Established Ostomies if present place consult order under : NA      Nurse 1 eSignature: Electronically signed by Kenneth Pond RN on 10/28/22 at 2:03 AM EDT    **SHARE this note so that the co-signing nurse is able to place an eSignature**    Nurse 2 eSignature: Electronically signed by Georgiann Bernheim, RN on 10/28/22 at 2:43 AM EDT

## 2022-10-28 NOTE — CONSULTS
Referring practitioner: Flash Erickson NP  Reason for Consultation: \"Acute respiratory failure with hypoxia, dx pneumonia and hx of heart failure, elevated BNP\"  Chief Complaint: Short of breath    Subjective:   History of Present Illness:  Shubham Cruz is a 79 y.o. patient who presented to the hospital with complaints of generalized fatigue, cough, and shortness of breath. The patient typically follows with Dr. Kendy Abdi and EP. He is a challenging historian. He answers orientation questions correctly but is very difficult to follow the recent events. No family members are present bedside. Per chart review the patient was quite confused yesterday but again is currently oriented. He seems to be describing a recent productive cough but states that he has been feeling weak and fatigued recently. He denies associated chest pains. He denies weight gain, lower extremity edema, PND, or orthopnea. The patient was seen at an urgent care and prescribed an antibiotic for pneumonia. He was hypoxic at home with a sat in the 80s and sought medical attention in the emergency department. The patient has known paroxysmal atrial fibrillation has had numerous ablations and continues to have episodes of paroxysmal atrial fibrillation while hospitalized. He does not seem particularly symptomatic with the events and denies palpitations or an awareness of his heart rate being elevated. He has being treated as a pneumonia as his procalcitonin is significantly elevated at 5.2. He reports compliance with his medications at home. Past Medical History:   has a past medical history of Atrial fibrillation ([address] Cardiomyopathy ([address] Chronic tachycardia, Erectile dysfunction, Hypertension, Osteoarthritis, Pneumonia, Thymoma, benign, Tubulovillous adenoma polyp of colon, and Wears glasses. Surgical History:   has a past surgical history that includes Prostate surgery; Hemorrhoid surgery;  Thymectomy; Colonoscopy (N/A, 1/15/2020); Cardioversion (01/10/2020); and US BIOPSY THYROID (6/18/2021). Social History:   reports that he quit smoking about 29 years ago. His smoking use included cigarettes. He started smoking about 67 years ago. He smoked an average of 1 pack per day. He has never used smokeless tobacco. He reports current alcohol use. He reports that he does not currently use drugs. Family History:  family history includes Cancer in his brother and sister; Heart Disease in his brother, brother, and father. Home Medications:  Were reviewed and are listed in nursing record and/or below  Prior to Admission medications    Medication Sig Start Date End Date Taking?  Authorizing Provider   ELIQUIS 5 MG TABS tablet TAKE ONE TABLET BY MOUTH TWICE A DAY 10/12/22   Keith De Los Santos MD   flecainide (TAMBOCOR) 50 MG tablet Take 0.5 tablets by mouth in the morning and at bedtime 10/10/22   AYLIN Ha CNP   metoprolol succinate (TOPROL XL) 25 MG extended release tablet TAKE 1/2 TABLET BY MOUTH DAILY 6/3/22   AYLIN Eckert CNP   lisinopril (PRINIVIL;ZESTRIL) 10 MG tablet TAKE ONE TABLET BY MOUTH DAILY 5/25/22   Keith De Los Santos MD        CURRENT Medications:  flecainide (TAMBOCOR) tablet 25 mg, BID  lisinopril (PRINIVIL;ZESTRIL) tablet 10 mg, Daily  metoprolol succinate (TOPROL XL) extended release tablet 12.5 mg, Daily  sodium chloride flush 0.9 % injection 10 mL, 2 times per day  sodium chloride flush 0.9 % injection 10 mL, PRN  0.9 % sodium chloride infusion, PRN  ondansetron (ZOFRAN-ODT) disintegrating tablet 4 mg, Q8H PRN   Or  ondansetron (ZOFRAN) injection 4 mg, Q6H PRN  magnesium hydroxide (MILK OF MAGNESIA) 400 MG/5ML suspension 30 mL, Daily PRN  acetaminophen (TYLENOL) tablet 650 mg, Q6H PRN   Or  acetaminophen (TYLENOL) suppository 650 mg, Q6H PRN  albuterol (PROVENTIL) nebulizer solution 2.5 mg, Q2H PRN  cefTRIAXone (ROCEPHIN) 1,000 mg in dextrose 5 % 50 mL IVPB mini-bag, Nightly And  doxycycline hyclate (VIBRA-TABS) tablet 100 mg, 2 times per day  furosemide (LASIX) injection 20 mg, BID  apixaban (ELIQUIS) tablet 2.5 mg, BID  ipratropium-albuterol (DUONEB) nebulizer solution 1 ampule, BID    Allergies:  Amiodarone     Review of Systems:   Constitutional: no unanticipated weight loss. There's been no change in energy level, sleep pattern, or activity level. No fevers, chills. Eyes: No visual changes or diplopia. No scleral icterus. ENT: No Headaches, hearing loss or vertigo. No mouth sores or sore throat. Cardiovascular: as reviewed in HPI  Respiratory: No cough or wheezing, no sputum production. No hemoptysis. Gastrointestinal: No abdominal pain, appetite loss, blood in stools. No change in bowel or bladder habits. Genitourinary: No dysuria, trouble voiding, or hematuria. Musculoskeletal:  No gait disturbance, no joint complaints. Integumentary: No rash or pruritis. Neurological: No headache, diplopia, change in muscle strength, numbness or tingling. Psychiatric: No anxiety or depression. Endocrine: No temperature intolerance. No excessive thirst, fluid intake, or urination. No tremor. Hematologic/Lymphatic: No abnormal bruising or bleeding, blood clots or swollen lymph nodes. Allergic/Immunologic: No nasal congestion or hives. Objective:   PHYSICAL EXAM:    Vitals:    10/28/22 1101   BP: (!) 98/54   Pulse: 97   Resp: 19   Temp: 98.1 °F (36.7 °C)   SpO2: 95%    Weight: 104 lb 11.5 oz (47.5 kg)       General Appearance:  Alert, cooperative, no respiratory distress. Thin. Elderly. Head:  Normocephalic, without obvious abnormality, atraumatic. Eyes:  Pupils equal and round. No scleral icterus. Mouth: Moist mucosa, no pharyngeal erythema. Nose: Nares normal. No drainage or sinus tenderness. Neck: Supple, symmetrical, trachea midline. No adenopathy. No tenderness/mass/nodules. No carotid bruit or elevated JVD.    Lungs:   Respirations unlabored.  + Expiratory wheezing. Chest Wall:  No tenderness or deformity. Heart:  Regular rate. S1/S2 normal. No murmur, rub, or gallop. Abdomen:   Soft, non-tender, bowel sounds active. Musculoskeletal: + Muscle wasting. No digital clubbing. Extremities: Extremities normal, atraumatic. No cyanosis or edema. Pulses: 2+ radial and carotid pulses, symmetric. Skin: No rashes or lesions. Pysch: Normal mood and affect. Alert and oriented x 4. Neurologic: Normal gross motor and sensory exam.       Labs     CBC:   Lab Results   Component Value Date/Time    WBC 6.5 10/28/2022 05:17 AM    RBC 3.87 10/28/2022 05:17 AM    HGB 11.6 10/28/2022 05:17 AM    HCT 34.4 10/28/2022 05:17 AM    MCV 88.9 10/28/2022 05:17 AM    RDW 14.2 10/28/2022 05:17 AM     10/28/2022 05:17 AM     CMP:  Lab Results   Component Value Date/Time     10/28/2022 05:17 AM    K 3.7 10/28/2022 05:17 AM    CL 98 10/28/2022 05:17 AM    CO2 22 10/28/2022 05:17 AM    BUN 33 10/28/2022 05:17 AM    CREATININE 1.1 10/28/2022 05:17 AM    GFRAA >60 09/22/2022 11:30 AM    AGRATIO 1.3 10/27/2022 09:41 PM    LABGLOM >60 10/28/2022 05:17 AM    GLUCOSE 102 10/28/2022 05:17 AM    PROT 7.0 10/27/2022 09:41 PM    CALCIUM 9.4 10/28/2022 05:17 AM    BILITOT 1.7 10/27/2022 09:41 PM    ALKPHOS 75 10/27/2022 09:41 PM    AST 32 10/27/2022 09:41 PM    ALT 24 10/27/2022 09:41 PM     PT/INR:  No results found for: PTINR  HgBA1c:  Lab Results   Component Value Date    LABA1C 5.6 05/08/2018     Lab Results   Component Value Date    CKTOTAL 395 (H) 10/31/2018    TROPONINI <0.01 10/27/2022       Cardiac Data     EKG: Personally interpreted. 10/27/2022. Sinus rhythm with PACs. Rightward axis. Nonspecific ST-T wave abnormalities. Echo: 1/20/2022. Normal left ventricle size, wall thickness, and low normal systolic function with an estimated ejection fraction of 50%. Mild septal hypokinesis. Indeterminate diastolic function.   Mitral valve leaflets appear mild-moderately thickened. Mild mitral annular calcification. Two seperate jets of Moderate mitral regurgitation. The left atrium is moderately to severely dilated. Dilated right ventricle. Moderate tricuspid regurgitation. The right atrium is dilated. IVC size is normal (<2.1 cm) but collapses < 50% with respiration consistent with elevated RA pressure (8 mmHg). Estimated pulmonary artery systolic pressure is mildly elevated at 44 mmHg assuming a right atrial pressure of 8 mmHg. Telemetry: Personally interpreted. Paroxysmal atrial fibrillation. Assessment and Plan   1) Acute respiratory failure with hypoxia/pneumonia/possible aspiration/sepsis. Will defer work-up and treatment to primary team.  Currently on IV antibiotics. 2) Chronic diastolic heart failure. EF 50%. Patient does not appear grossly volume overloaded. BNP above baseline which could be in part related to his pneumonia. With relative hypotension, will discontinue IV Lasix. 3) Paroxysmal atrial fibrillation s/p ablation 5/2020, 8/2021, 11/2021, and 3/2022. Currently in sinus rhythm but having episodes of PAF during hospitalization. Will continue attempts at rhythm control with flecainide. Continue beta-blocker and DOAC. 4) Nonrheumatic mitral and tricuspid regurgitation. No acute intervention. Continue attempts at euvolemia and blood pressure control. 5) Cachexia. BMI 15. Will defer nutritional recommendations to primary team.    Overall, the problems requiring hospitalization are high in severity. Thank you for allowing us to participate in the care of Brayan Schmidt [facility]. Nilsa Cline.  German Vivi, [address] [place] [address] Road  10/28/2022 2:04 PM

## 2022-10-28 NOTE — TELEPHONE ENCOUNTER
Unfortunately these messages came in after Dr Marielena Mejia had left for the day yesterday. By the time we got here today, pt had been admitted to the hospital with Acute Respiratory Failure with Hypoxia;  Pneumonia. Message printed and reviewed with Dr Marielena Mejia.

## 2022-10-28 NOTE — ED NOTES
Pt presents to ED with his family with complaints feeling poorly for past week. Pt was seen at urgent care and dx with possible pneumonia, prescribed some abx which his cardiologist recommended not to take due to med interaction. Pt now reports worsening  Symptoms  Including SOB, family reports his home pulse ox was reading in the  Mid 80s.      Bentley Taylor RN  10/27/22 3186

## 2022-10-28 NOTE — ACP (ADVANCE CARE PLANNING)
Advance Care Planning     Advance Care Planning Inpatient Note  Spiritual Care Department    Today's Date: 10/28/2022  Unit: [facility] PROGRESSIVE CARE    Received request from IDT Member. Upon review of chart and communication with care team, patient's decision making abilities are not in question. . Patient was/were present in the room during visit. Goals of ACP Conversation:  Discuss advance care planning documents    Health Care Decision Makers:       Primary Decision Maker: Johnny Mosqueda - Child - [phone]  Summary:  No Decision Maker named by patient at this time    [address] (Patient Wishes):  None     Assessment:  Patient was alert and oriented. Pt requested to review and complete AD documents when appropriate. Pt was give blank copies of POA/LW to review and complete. Pt was instructed to notify the nurse or contact [facility] when completed. Provided spiritual support through active listening, affirmed feelings, thoughts and concerns. Pt expressed gratitude. Interventions:  Provided education on documents for clarity and greater understanding  Discussed and provided education on state decision maker hierarchy    Care Preferences Communicated:   No    Outcomes/Plan:  ACP Discussion: Postponed. Pt will notify the nurse when appropriate.     Electronically signed by Jaskaran Coelho on 10/28/2022 at 3:20 PM

## 2022-10-28 NOTE — PROGRESS NOTES
Hospitalist Progress Note      PCP: Uyen Acosta MD    Date of Admission: 10/27/2022    Chief Complaint:   Shortness of breath    Hospital Course:    79 y.o. male with PMHx of A-fib, Cardiomyopathy and HTN presented to [facility] with shortness of breath and cough. Productive at times. No fever + body aches. No nausea or vomiting. No chest pain or dizziness. No abdominal pain. States the last time I felt like this I had pneumonia. Subjective:   Patient still feeling restless  Has a cough productive of yellowish sputum  Fevers on and off      Medications:  Reviewed    Infusion Medications    sodium chloride       Scheduled Medications    flecainide  25 mg Oral BID    lisinopril  10 mg Oral Daily    metoprolol succinate  12.5 mg Oral Daily    sodium chloride flush  10 mL IntraVENous 2 times per day    cefTRIAXone (ROCEPHIN) IV  1,000 mg IntraVENous Nightly    And    doxycycline hyclate  100 mg Oral 2 times per day    apixaban  2.5 mg Oral BID    ipratropium-albuterol  1 ampule Inhalation BID    [START ON 10/29/2022] furosemide  20 mg Oral Daily     PRN Meds: sodium chloride flush, sodium chloride, ondansetron **OR** ondansetron, magnesium hydroxide, acetaminophen **OR** acetaminophen, albuterol      Intake/Output Summary (Last 24 hours) at 10/28/2022 1836  Last data filed at 10/28/2022 1509  Gross per 24 hour   Intake 690 ml   Output 1200 ml   Net -510 ml       Physical Exam Performed:    BP 98/68   Pulse 93   Temp 97.8 °F (36.6 °C) (Oral)   Resp 25   Ht 5' 9\" (1.753 m)   Wt 104 lb 11.5 oz (47.5 kg)   SpO2 93%   BMI 15.46 kg/m²     General appearance: No apparent distress, appears stated age and cooperative. HEENT: Pupils equal, round, and reactive to light. Conjunctivae/corneas clear. Neck: Supple, with full range of motion. No jugular venous distention. Trachea midline.   Respiratory:  Normal respiratory effort. , Crackles bilaterally from the mid zones downwards c no conversational dyspnea. Cardiovascular: Regular rate and rhythm with normal S1/S2 without murmurs, rubs or gallops. Abdomen: Soft, non-tender, non-distended with normal bowel sounds. Musculoskeletal: No clubbing, cyanosis. No edema bilaterally. Full range of motion without deformity. Skin: Skin color, texture, turgor normal.  No rashes or lesions. Neurologic:  Neurovascularly intact without any focal sensory/motor deficits. Cranial nerves: II-XII intact, grossly non-focal.  Psychiatric: Alert and oriented, thought content appropriate, normal insight  Capillary Refill: Brisk, 3 seconds, normal   Peripheral Pulses: +2 palpable, equal bilaterally       Labs:   Recent Labs     10/27/22  2141 10/28/22  0517   WBC 8.6 6.5   HGB 12.6* 11.6*   HCT 37.8* 34.4*    222     Recent Labs     10/27/22  2141 10/28/22  0517   * 136   K 4.5 3.7   CL 95* 98*   CO2 23 22   BUN 38* 33*   CREATININE 1.3 1.1   CALCIUM 9.9 9.4     Recent Labs     10/27/22  2141   AST 32   ALT 24   BILITOT 1.7*   ALKPHOS 75     No results for input(s): INR in the last 72 hours. Recent Labs     10/27/22  2141   Tom Skates <0.01       Urinalysis:      Lab Results   Component Value Date/Time    NITRU Negative 03/20/2017 03:25 PM    WBCUA 2 03/20/2017 03:25 PM    RBCUA 6 03/20/2017 03:25 PM    BLOODU negative 07/09/2019 11:00 AM    BLOODU Negative 03/20/2017 03:25 PM    SPECGRAV 1.005 07/09/2019 11:00 AM    SPECGRAV 1.017 03/20/2017 03:25 PM    GLUCOSEU negative 07/09/2019 11:00 AM    GLUCOSEU Negative 03/20/2017 03:25 PM       Radiology:  XR CHEST PORTABLE   Final Result   Bilateral lower lobe infiltrates consistent with pneumonia. Chronic COPD. Cardiomegaly and postthoracotomy changes. Assessment/Plan:     Acute hypoxic respiratory failure due to pneumonia and CHF  -Supplemental oxygen , keep pulse ox above 90% .       Community-acquired pneumonia; procalcitonin >5  -Continue ceftriaxone and doxycycline  Follow-up blood cultures  Follow Legionella/pneumococcal urinary antigen test result  - Rapid Influenza A&B ordered     Chronic systolic heart failure/Cardiomyopathy  - last Echo 1/2022 EF 50%              - BNP 7404, trop 0.01  - continue Toprol 12.5mg QD, lisinopril 10mg QD  - lasix 20 mg IV bid  -   Atrial Fibrillation   - currently rate controlled  - continue flecainide, metoprolol and eliquis   - multiple ablations (2020 and 2021) with recurrence 11/21 most recent  s/p ablation on 3/4/22 with Dr. Desiree Chisholm      HTN  - monitor blood pressure  - continue home meds     DVT Prophylaxis: Xarelto  Diet: ADULT DIET; Regular;  Low Sodium (2 gm); 1500 ml  Code Status: Full Code     Dispo - Inpatient    Appropriate for A1 Discharge Unit: No      Patricia Buchanan MD

## 2022-10-28 NOTE — PROGRESS NOTES
Facility/Department: [address] PROGRESSIVE CARE  Initial Assessment  DYSPHAGIA BEDSIDE SWALLOW EVALUATION     Patient: Keira Howell   : 1940   MRN: [id number]      Evaluation Date: 10/28/2022   Admitting Diagnosis: Acute on chronic diastolic congestive heart failure ([facility] [address] [I50.33]  Acute respiratory failure with hypoxia ([facility] [address] [J96.01]  Pneumonia of both lower lobes due to infectious organism [J18.9]  Pain: Denies                                                         Chart Review:   H&P: 79 y.o. male with PMHx of A-fib, Cardiomyopathy and HTN presented to [facility] with shortness of breath and cough. Productive at times. No fever + body aches. No nausea or vomiting. No chest pain or dizziness. No abdominal pain. States the last time I felt like this I had pneumonia    10/28: RN note indicates patient appeared to be choking while trying to drink water overnight. Made NPO and SLP order added    Current Diet Level (prior to evaluation): NPO  NPO   Respiratory Status:   []Room Air   [x]O2 via nasal cannula   []Other:    Imaging:  CHEST -RAY 10/27/22  Impression   Bilateral lower lobe infiltrates consistent with pneumonia. Chronic COPD. Cardiomegaly and postthoracotomy changes       Modified Barium Swallow Study: any recent history in chart review: None in EMR    Reason for referral: SLP evaluation orders received due to patient choking on water    History/Prior Level of Function:   Baseline diet: Regular/thin   Prior Dysphagia History: None in EMR, patient reports some difficulty swallowing nuts and bread    Dysphagia Impressions/Diagnosis: Oropharyngeal Dysphagia   OROPHARYNGEAL DYSPHAGIA DIAGNOSIS: Mild-mod oral dysphagia and mild pharyngeal dysphagia  Accepted and tolerated evaluation at bedside  Patient cooperative and pleasant. Patient oriented x4, able to follow complex dx and verbally responsive.    Patient presents with mild mod oral dysphagia characterized by reduced mastication of regular textures, prolonged formation of bolus, suspect at risk for premature loss of bolus. Patient reports increased difficulty masticating regular textures, concern for excessive labor   Mild pharyngeal dysphagia characterized by delayed swallow initiation, decreased laryngeal elevation and no overt s/s of aspiration or penetration even when challenged with continuous gulps of thin liquids via straw. Recommend soft and bite-sized, thin liquids, meds in puree carrier  ST to continue to follow during acute admission  If s/s of aspiration or penetration arise consider downgrade    Recommended Diet and Intervention 10/28/2022:  Diet Solids Recommendation:  Dysphagia III Soft and bite sized  Liquid Consistency Recommendation: Thin liquids  Recommended form of Meds: Meds in puree      Compensatory Swallowing Strategies: Alternate solids/liquids , Upright as possible with all PO intake , Small bites/sips , Eat/feed slowly, Remain upright 30-45 min     SHORT TERM DYSPHAGIA GOALS/PLAN OF CARE: Speech therapy for dysphagia tx 3-5 times per week during acute care stay. Pt will functionally tolerate recommended diet with no overt clinical s/s of aspiration   Pt will demonstrate understanding of aspiration risk and precautions via education/demonstration with occasional prompting     Dysphagia Therapeutic Intervention:  Diet Tolerance Monitoring , Patient/Family Education     Dysphagia Prognosis: [x] good []fair  []guarded []poor     Discharge Recommendations: Do not anticipate need for further speech/dysphagia therapy upon discharge from hospital       TEST DATA  Vision: wears glasses  Hearing: [facility]    Consistencies Presented:    Thin liquid;   Puree food  Minced and moist food   Soft/bite size food  Regular solid food    Cognitive/behavioral:   []orientation [x] to self [x] place [x] date [x] reason for admit [x] purpose of evaluation  []distractible  [x]verbally responsive  [x]follows one step commands  []agitated  []impulsive  [] other:       Patient Positioning: Upright in bed     Dentition:  []Adequate  [x]Dentures   []Missing Many Teeth  []Edentulous  []Other:    Baseline Vocal Quality:  [x]Normal  []Dysphonic   []Aphonic   []Hoarse  []Wet  []Weak  []Other:    Volitional Cough:  [x]Strong  []Weak  []Wet  []Absent  []Congested  []Other:    Volitional Swallow:   []Absent   [x]Delayed     []Adequate     []Required use of drink     Oral Mechanism Exam:  [x]WFL []Mild   [] Moderate  []Severe  []To be assessed  Impaired:   []Left side      []Right side    []Labial ROM/Coordination    []Labial Symmetry   []Lingual ROM/Coordination   []Lingual Symmetry  []Gag  []Other:     Oral Phase: []WFL [x]Mild   [x] Moderate  []Severe  []To be assessed   [x]Impaired/Prolonged Mastication:   []Spillage Left:   []Spillage Right:  []Pocketing Left:   []Pocketing Right:   []Decreased Anterior to Posterior Transit:   [x]Suspected Premature Bolus Loss:   []Lingual/Palatal Residue:   []Other:     Pharyngeal Phase: []WFL [x]Mild   [] Moderate  []Severe  []To be assessed   [x]Delayed Swallow:   []Suspected Pharyngeal Pooling:   [x]Decreased Laryngeal Elevation:   []Absent Swallow:  []Wet Vocal Quality:   []Throat Clearing-Immediate:   []Throat Clearing-Delayed:   []Cough-Immediate:   []Cough-Delayed:  []Change in Vital Signs:  []Suspected Delayed Pharyngeal Clearing:  []Other:       Eating Assistance:  []Independent  [x]Setup or clean-up assistance   [] Supervision or touching assistance   [] Partial or moderate assistance   [] Substantial or maximal assistance  [] Dependent       EDUCATION:   Provided education regarding role of SLP, results of assessment, recommendations and general speech pathology plan of care. [x] Pt verbalized understanding and agreement   [] Pt requires ongoing learning   [] No evidence of comprehension     If patient discharges prior to next visit, this note will serve as discharge.      Timed Code Minutes: 0  Total Treatment Minutes: 40    Electronically signed by:   Keyanna Morales, [address] [place] [address]  Speech-Language Pathologist  On 10/28/22 at 11:28 AM

## 2022-10-28 NOTE — PROGRESS NOTES
Patient progressively more confused since son left, shortly after arrival to floor. Patient has several times set off bed alarm in an attempt to get out of bed for a myriad of reasons, not of which patient can articulate particularly well. Redirected back into bed and bed alarm reactivated. Patient on wait list for telecamera.     Electronically signed by Anna Miller RN on 10/28/2022 at 5:24 AM

## 2022-10-28 NOTE — ED NOTES
Per Dr. Heidi Payne, no blood cultures required before starting IV abx.      Alberto Joe RN  10/27/22 5184

## 2022-10-28 NOTE — ED NOTES
Pt transferred to 5250 via stretcher in stable condition on 3LO2 NC. RN at bedside upon pt arrival. All belongings sent with pt. Care transferred.      Farzad Caceres RN  10/28/22 1103

## 2022-10-28 NOTE — ED PROVIDER NOTES
CHIEF COMPLAINT  Shortness of Breath      HISTORY OF PRESENT ILLNESS  Shubham Cruz is a 79 y.o. male who  has a past medical history of Atrial fibrillation ([address] Cardiomyopathy ([address] Chronic tachycardia, Erectile dysfunction, Hypertension, Osteoarthritis, Pneumonia, Thymoma, benign, Tubulovillous adenoma polyp of colon, and Wears glasses. presents to the ED complaining of shortness of breath and cough. Patient states that he feels that he may have pneumonia. States the last time he felt similar to this with body aches and cough he had pneumonia. Denies any fevers. Denies any nausea or vomiting. States he does have history of atrial fibrillation and has been compliant with his Eliquis. Denies any tobacco use. Denies any chest pain. No abdominal pain. Occasionally has sputum production with his cough but is unsure of the color. Normal urinary habits. Denies any lower extremity pain or swelling. Normal bowel movements. Denies any home oxygen use at baseline. No other complaints, modifying factors or associated symptoms. Pt was seen during the [place] 19 pandemic. Appropriate PPE worn by ME during patient encounters. Patient was cared for during a time with constrained hospital bed capacity with nationwide stress on resources and staffing. Nursing notes reviewed.    Past Medical History:   Diagnosis Date    Atrial fibrillation ([address]     Cardiomyopathy ([address]     Chronic tachycardia 5/8/2018    Erectile dysfunction     Hypertension     Osteoarthritis     Pneumonia     Thymoma, benign     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    also sessile serrated adenoma    Wears glasses      Past Surgical History:   Procedure Laterality Date    CARDIOVERSION  01/10/2020    COLONOSCOPY N/A 1/15/2020    COLONOSCOPY performed by Loretta Emmanuel MD at [facility] THYROID BIOPSY  6/18/2021     THYROID BIOPSY 2021 [facility] ULTRASOUND     Family History   Problem Relation Age of Onset    Heart Disease Father     Cancer Sister         Colon    Cancer Brother         Colon    Heart Disease Brother     Heart Disease Brother      Social History     Socioeconomic History    Marital status:       Spouse name: Not on file    Number of children: Not on file    Years of education: Not on file    Highest education level: Not on file   Occupational History    Not on file   Tobacco Use    Smoking status: Former     Packs/day: 1.00     Types: Cigarettes     Start date: 1955     Quit date: 1993     Years since quittin.8    Smokeless tobacco: Never   Vaping Use    Vaping Use: Never used   Substance and Sexual Activity    Alcohol use: Yes     Comment: 1-2 daily    Drug use: Not Currently    Sexual activity: Not Currently   Other Topics Concern    Not on file   Social History Narrative    Not on file     Social Determinants of Health     Financial Resource Strain: Not on file   Food Insecurity: Not on file   Transportation Needs: Not on file   Physical Activity: Sufficiently Active    Days of Exercise per Week: 7 days    Minutes of Exercise per Session: 60 min   Stress: Not on file   Social Connections: Not on file   Intimate Partner Violence: Not on file   Housing Stability: Not on file     Current Facility-Administered Medications   Medication Dose Route Frequency Provider Last Rate Last Admin    cefTRIAXone (ROCEPHIN) 1,000 mg in dextrose 5 % 50 mL IVPB mini-bag  1,000 mg IntraVENous Once Sabino Bumpers, MD        And    doxycycline (VIBRAMYCIN) 100 mg in dextrose 5 % 100 mL IVPB  100 mg IntraVENous Once Sabino Bumpers, MD        furosemide (LASIX) injection 40 mg  40 mg IntraVENous Once Sabino Bumpers, MD         Current Outpatient Medications   Medication Sig Dispense Refill    ELIQUIS 5 MG TABS tablet TAKE ONE TABLET BY MOUTH TWICE A DAY 60 tablet 3    flecainide (TAMBOCOR) 50 MG tablet Take 0.5 tablets by mouth in the morning and at bedtime 180 tablet 3    metoprolol succinate (TOPROL XL) 25 MG extended release tablet TAKE 1/2 TABLET BY MOUTH DAILY 45 tablet 1    lisinopril (PRINIVIL;ZESTRIL) 10 MG tablet TAKE ONE TABLET BY MOUTH DAILY 90 tablet 1    apixaban (ELIQUIS) 2.5 MG TABS tablet Take 1 tablet by mouth 2 times daily 60 tablet 5     Allergies   Allergen Reactions    Amiodarone      Pt is not sure that he is allergic to this. REVIEW OF SYSTEMS  (up to 7 for level 4, 8 or more for level 5) pertinent positives per HPI otherwise noted to be negative    PHYSICAL EXAM  /72   Pulse 92   Temp 98.6 °F (37 °C) (Oral)   Resp 25   Ht 5' 8\" (1.727 m)   Wt 112 lb 10.5 oz (51.1 kg)   SpO2 94%   BMI 17.13 kg/m²      CONSTITUTIONAL: AOx4, cooperative with exam, afebrile   HEAD: normocephalic, atraumatic   EYES: PERRL, EOMI, anicteric sclera   ENT: Moist mucous membranes, uvula midline   NECK: Supple, symmetric, trachea midline, no meningismus   LUNGS: Bilateral breath sounds, rhonchi in the lung bases bilaterally, no wheezing, normal lung sounds upper fields bilaterally   CARDIOVASCULAR: RRR, normal S1/S2, no m/r/g, 2+ pulses throughout   ABDOMEN: Soft, non-tender, non-distended, +BS   NEUROLOGIC:  MAEx4, GCS 15   MUSCULOSKELETAL: No clubbing, cyanosis or edema, no lower extremity swelling or calf tenderness bilaterally   SKIN: No rash, pallor or wounds on exposed surfaces         RADIOLOGY  X-RAYS:  I have reviewed radiologic plain film image(s). ALL OTHER NON-PLAIN FILM IMAGES SUCH AS CT, ULTRASOUND AND MRI HAVE BEEN READ BY THE RADIOLOGIST. XR CHEST PORTABLE   Final Result   Bilateral lower lobe infiltrates consistent with pneumonia. Chronic COPD. Cardiomegaly and postthoracotomy changes.                 EKG INTERPRETATION  Sinus rhythm with occasional supraventricular complex and a rate of 94, rightward axis, ,  and QTc 442, no ST elevations, nonspecific ST changes rhythm change from sinus bradycardia to sinus rhythm with occasional supraventricular complex and compared EKG from 10/10/2022 otherwise no acute changes. PROCEDURES      ED COURSE/MDM  COPD exacerbation, asthma, pneumothorax, anaphylaxis, anxiety, PE , pericardial effusion, CHF, ACS/MI, atelectasis, lower airway obstruction, aspiration  Patient seen and evaluated. History and physical as above. Nontoxic, afebrile. Patient presents complaining of shortness of breath. Patient's O2 saturation mid 80s on arrival.  No home oxygen use at baseline. Patient in intermittent A. fib on the monitor on arrival.  Heart rate sitting mainly around the 80s. No lower extremity swelling or pain bilaterally. Does have history of CHF. Will obtain routine labs, troponin, BNP, chest x-ray. Supplemental oxygen. Likely admission. ED Course as of 10/27/22 2308   Thu Oct 27, 2022   2255 Patient BNP elevated at 7404. Troponin negative. Creatinine 1.3 with BUN of 38. Sodium 132, potassium 4.5. White count normal 8.6. Hemoglobin 12.6. Patient's chest x-ray read as bilateral lower lobes infiltrate consistent with pneumonia. Patient also does have history of CHF and elevated BNP. Will start patient on ceftriaxone and doxycycline. Patient also has new oxygen requirement and is on 4 L nasal cannula at this time. Consult placed to hospitalist for admission. Patient is agreeable with admission. [DS]      ED Course User Index  [DS] Maciej Newsome MD       Is this patient to be included in the SEP-1 Core Measure due to severe sepsis or septic shock? No   Exclusion criteria - the patient is NOT to be included for SEP-1 Core Measure due to:  May have criteria for sepsis, but does not meet criteria for severe sepsis or septic shock    CRITICAL CARE TIME   Total Critical Care time was 34 minutes, excluding separately reportable procedures.   There was a high probability of clinically significant/life threatening deterioration in the patient's condition which required my urgent intervention. Acute respiratory failure with hypoxia pneumonia      Patient was given scripts for the following medications. I counseled patient how to take these medications. New Prescriptions    No medications on file           CLINICAL IMPRESSION  1. Acute respiratory failure with hypoxia ([address]    2. Pneumonia of both lower lobes due to infectious organism    3. Acute on chronic diastolic congestive heart failure (HCC)        Blood pressure 116/72, pulse 92, temperature 98.6 °F (37 °C), temperature source Oral, resp. rate 25, height 5' 8\" (1.727 m), weight 112 lb 10.5 oz (51.1 kg), SpO2 94 %. DISPOSITION Decision To Admit 10/27/2022 11:06:34 PM      No follow-up provider specified. Disclaimer: All medical record entries made by [employer] dictation.       (Please note that this note was completed with a voice recognition program. Every attempt was made to edit the dictations, but inevitably there remain words that are mis-transcribed.)            Frank Myers MD  10/27/22 9632

## 2022-10-28 NOTE — PROGRESS NOTES
Patient's HR continues to maintain A-fib 130-140s with brief periods of time where he flips into NSR with rates in the 70-80s. Obtained order for second dose of metoprolol IV; patient's HR remains in 130s. Upon 0400 vitals, patient noted to be running 101.1 fever; administered Tylenol per MAR. Patient noted to be choking on water/medications during administration. Placed NPO diet order until speech can evaluate patient. [employer] message sent to Dr. Mikayla Harp about concern for risk of aspiration. Will continue to monitor.     Electronically signed by Jurgen Hastings RN on 10/28/2022 at 4:13 AM

## 2022-10-28 NOTE — PROGRESS NOTES
Comprehensive Nutrition Assessment    Type and Reason for Visit:  Initial, Positive Nutrition Screen    Nutrition Recommendations/Plan:   Soft and Bite Sized   Recommend diet free of therapeutic restrictions to promote intake   Ensure TID     Malnutrition Assessment:  Malnutrition Status:  Severe malnutrition (10/28/22 1430)    Context:  Chronic Illness     Findings of the 6 clinical characteristics of malnutrition:  Energy Intake:  75% or less estimated energy requirements for 1 month or longer  Weight Loss:  Greater than 5% over 1 month     Body Fat Loss:  Severe body fat loss Triceps   Muscle Mass Loss:  Severe muscle mass loss Clavicles (pectoralis & deltoids), Hand (interosseous)  Fluid Accumulation:  No significant fluid accumulation     Strength:  Not Performed    Nutrition Assessment:    + Screen for MST 2. Pt admitted with A-fib, Cardiomyopathy and HTN. Admitted with respiratory failure. Pt reports decreased intake PTA for several weeks d/t issues swallowing, taste changes. SLP eval is complete and pt on Soft and Bite Sized diet. Had lunch, reported he tolerated diet well but appetite still low. Reported wt loss, per our records pt was 113 lb x 1 month ago. Currently at 104 lb. Reports he has tried to gain weight in the past unsuccessfully. Did discuss ways to add calories and protein to diet. Pt is favorable to ONS, will order. Nutrition Related Findings:    Reviewed labs Wound Type: None       Current Nutrition Intake & Therapies:    Average Meal Intake: 26-50% (per observation)  Average Supplements Intake: None Ordered  ADULT DIET; Dysphagia - Soft and Bite Sized    Anthropometric Measures:  Height: 5' 9\" (175.3 cm)  Ideal Body Weight (IBW): 160 lbs (73 kg)       Current Body Weight: 104 lb (47.2 kg), 65 % IBW.     Current BMI (kg/m2): 15.4  Usual Body Weight: 113 lb (51.3 kg) (1 month ago)  % Weight Change (Calculated): -8                    BMI Categories: Underweight (BMI less than 22) age over 72    Estimated Daily Nutrient Needs:  Energy Requirements Based On: Kcal/kg  Weight Used for Energy Requirements: Current  Energy (kcal/day): 3157-7525 kcal (30-35 kcal/kg ABW)  Weight Used for Protein Requirements: Current  Protein (g/day): 58-72 gm (1.2-1.5 gm/kg ABW)  Method Used for Fluid Requirements: 1 ml/kcal  Fluid (ml/day):      Nutrition Diagnosis:   Severe malnutrition related to inadequate protein-energy intake as evidenced by Criteria as identified in malnutrition assessment    Nutrition Interventions:   Food and/or Nutrient Delivery: Continue Current Diet, Start Oral Nutrition Supplement  Nutrition Education/Counseling: No recommendation at this time  Coordination of Nutrition Care: Continue to monitor while inpatient       Goals:     Goals: Meet at least 75% of estimated needs       Nutrition Monitoring and Evaluation:   Behavioral-Environmental Outcomes: None Identified  Food/Nutrient Intake Outcomes: Food and Nutrient Intake, Supplement Intake  Physical Signs/Symptoms Outcomes: Biochemical Data, Nutrition Focused Physical Findings, Skin, Weight    Discharge Planning:    Continue Oral Nutrition Supplement     Kaylan Carbajal, [address] [place] [address],   Contact: [phone]

## 2022-10-28 NOTE — PROGRESS NOTES
Pt arrived to floor via stretcher from ED and ambulated to bed. Telemetry activated and confirmed with [facility]. Patient oriented to room and use of call light. Call light and personal items within reach. Admission and assessment initiated. POC and education initiated and reviewed with patient and patient's son, Jose D Kinney. Denied further needs or questions at this time. Will continue to monitor.     Electronically signed by Paola Bennett RN on 10/28/2022 at 2:03 AM

## 2022-10-28 NOTE — PLAN OF CARE
Problem: Discharge Planning  Goal: Discharge to home or other facility with appropriate resources  Outcome: Progressing     Problem: Skin/Tissue Integrity  Goal: Absence of new skin breakdown  Outcome: Progressing     Problem: Safety - Adult  Goal: Free from fall injury  Outcome: Progressing     Problem: Cardiovascular - Adult  Goal: Maintains optimal cardiac output and hemodynamic stability  Outcome: Progressing  Goal: Absence of cardiac dysrhythmias or at baseline  Outcome: Progressing     Problem: Hematologic - Adult  Goal: Maintains hematologic stability  Outcome: Progressing Home

## 2022-10-29 VITALS
HEART RATE: 100 BPM | BODY MASS INDEX: 16.26 KG/M2 | DIASTOLIC BLOOD PRESSURE: 69 MMHG | OXYGEN SATURATION: 99 % | RESPIRATION RATE: 18 BRPM | HEIGHT: 69 IN | WEIGHT: 109.79 LBS | TEMPERATURE: 98.4 F | SYSTOLIC BLOOD PRESSURE: 103 MMHG

## 2022-10-29 LAB
ANION GAP SERPL CALCULATED.3IONS-SCNC: 10 MMOL/L (ref 3–16)
BASOPHILS ABSOLUTE: 0 K/UL (ref 0–0.2)
BASOPHILS RELATIVE PERCENT: 0.2 %
BUN BLDV-MCNC: 35 MG/DL (ref 7–20)
CALCIUM SERPL-MCNC: 9.2 MG/DL (ref 8.3–10.6)
CHLORIDE BLD-SCNC: 97 MMOL/L (ref 99–110)
CO2: 25 MMOL/L (ref 21–32)
CREAT SERPL-MCNC: 1 MG/DL (ref 0.8–1.3)
EOSINOPHILS ABSOLUTE: 0 K/UL (ref 0–0.6)
EOSINOPHILS RELATIVE PERCENT: 0.3 %
GFR SERPL CREATININE-BSD FRML MDRD: >60 ML/MIN/{1.73_M2}
GLUCOSE BLD-MCNC: 101 MG/DL (ref 70–99)
HCT VFR BLD CALC: 33.4 % (ref 40.5–52.5)
HEMOGLOBIN: 11.5 G/DL (ref 13.5–17.5)
LYMPHOCYTES ABSOLUTE: 0.7 K/UL (ref 1–5.1)
LYMPHOCYTES RELATIVE PERCENT: 8.8 %
MAGNESIUM: 2.1 MG/DL (ref 1.8–2.4)
MCH RBC QN AUTO: 29.9 PG (ref 26–34)
MCHC RBC AUTO-ENTMCNC: 34.3 G/DL (ref 31–36)
MCV RBC AUTO: 87.1 FL (ref 80–100)
MONOCYTES ABSOLUTE: 0.7 K/UL (ref 0–1.3)
MONOCYTES RELATIVE PERCENT: 8.9 %
NEUTROPHILS ABSOLUTE: 6.6 K/UL (ref 1.7–7.7)
NEUTROPHILS RELATIVE PERCENT: 81.8 %
PDW BLD-RTO: 14.2 % (ref 12.4–15.4)
PLATELET # BLD: 254 K/UL (ref 135–450)
PMV BLD AUTO: 10.3 FL (ref 5–10.5)
POTASSIUM REFLEX MAGNESIUM: 3.3 MMOL/L (ref 3.5–5.1)
RBC # BLD: 3.84 M/UL (ref 4.2–5.9)
SODIUM BLD-SCNC: 132 MMOL/L (ref 136–145)
WBC # BLD: 8.1 K/UL (ref 4–11)

## 2022-10-29 PROCEDURE — 6370000000 HC RX 637 (ALT 250 FOR IP)

## 2022-10-29 PROCEDURE — 83735 ASSAY OF MAGNESIUM: CPT

## 2022-10-29 PROCEDURE — 2580000003 HC RX 258: Performed by: NURSE PRACTITIONER

## 2022-10-29 PROCEDURE — 94680 O2 UPTK RST&XERS DIR SIMPLE: CPT

## 2022-10-29 PROCEDURE — 6370000000 HC RX 637 (ALT 250 FOR IP): Performed by: INTERNAL MEDICINE

## 2022-10-29 PROCEDURE — 94640 AIRWAY INHALATION TREATMENT: CPT

## 2022-10-29 PROCEDURE — 85025 COMPLETE CBC W/AUTO DIFF WBC: CPT

## 2022-10-29 PROCEDURE — 99233 SBSQ HOSP IP/OBS HIGH 50: CPT | Performed by: INTERNAL MEDICINE

## 2022-10-29 PROCEDURE — 80048 BASIC METABOLIC PNL TOTAL CA: CPT

## 2022-10-29 PROCEDURE — 94760 N-INVAS EAR/PLS OXIMETRY 1: CPT

## 2022-10-29 PROCEDURE — 36415 COLL VENOUS BLD VENIPUNCTURE: CPT

## 2022-10-29 PROCEDURE — 51798 US URINE CAPACITY MEASURE: CPT

## 2022-10-29 PROCEDURE — 6370000000 HC RX 637 (ALT 250 FOR IP): Performed by: NURSE PRACTITIONER

## 2022-10-29 RX ORDER — POTASSIUM CHLORIDE 20 MEQ/1
40 TABLET, EXTENDED RELEASE ORAL ONCE
Status: COMPLETED | OUTPATIENT
Start: 2022-10-29 | End: 2022-10-29

## 2022-10-29 RX ORDER — DOXYCYCLINE HYCLATE 100 MG
100 TABLET ORAL EVERY 12 HOURS SCHEDULED
Qty: 8 TABLET | Refills: 0 | Status: SHIPPED
Start: 2022-10-29 | End: 2022-10-30 | Stop reason: HOSPADM

## 2022-10-29 RX ORDER — FUROSEMIDE 20 MG/1
20 TABLET ORAL DAILY
Qty: 60 TABLET | Refills: 3 | Status: SHIPPED | OUTPATIENT
Start: 2022-10-30

## 2022-10-29 RX ORDER — METOPROLOL SUCCINATE 25 MG/1
25 TABLET, EXTENDED RELEASE ORAL DAILY
Qty: 30 TABLET | Refills: 3 | Status: SHIPPED | OUTPATIENT
Start: 2022-10-30 | End: 2022-11-01 | Stop reason: SDUPTHER

## 2022-10-29 RX ORDER — FLECAINIDE ACETATE 100 MG/1
50 TABLET ORAL 2 TIMES DAILY
Status: DISCONTINUED | OUTPATIENT
Start: 2022-10-29 | End: 2022-10-29 | Stop reason: HOSPADM

## 2022-10-29 RX ORDER — METOPROLOL SUCCINATE 25 MG/1
25 TABLET, EXTENDED RELEASE ORAL DAILY
Status: DISCONTINUED | OUTPATIENT
Start: 2022-10-30 | End: 2022-10-29 | Stop reason: HOSPADM

## 2022-10-29 RX ORDER — FLECAINIDE ACETATE 50 MG/1
25 TABLET ORAL ONCE
Status: COMPLETED | OUTPATIENT
Start: 2022-10-29 | End: 2022-10-29

## 2022-10-29 RX ORDER — METOPROLOL SUCCINATE 25 MG/1
25 TABLET, EXTENDED RELEASE ORAL ONCE
Status: COMPLETED | OUTPATIENT
Start: 2022-10-29 | End: 2022-10-29

## 2022-10-29 RX ADMIN — Medication 10 ML: at 09:17

## 2022-10-29 RX ADMIN — POTASSIUM CHLORIDE 40 MEQ: 1500 TABLET, EXTENDED RELEASE ORAL at 08:57

## 2022-10-29 RX ADMIN — DOXYCYCLINE HYCLATE 100 MG: 100 TABLET, COATED ORAL at 08:57

## 2022-10-29 RX ADMIN — METOPROLOL SUCCINATE 12.5 MG: 25 TABLET, FILM COATED, EXTENDED RELEASE ORAL at 08:57

## 2022-10-29 RX ADMIN — LISINOPRIL 10 MG: 10 TABLET ORAL at 08:57

## 2022-10-29 RX ADMIN — IPRATROPIUM BROMIDE AND ALBUTEROL SULFATE 1 AMPULE: .5; 3 SOLUTION RESPIRATORY (INHALATION) at 10:44

## 2022-10-29 RX ADMIN — APIXABAN 2.5 MG: 2.5 TABLET, FILM COATED ORAL at 08:57

## 2022-10-29 RX ADMIN — FUROSEMIDE 20 MG: 20 TABLET ORAL at 08:57

## 2022-10-29 RX ADMIN — METOPROLOL SUCCINATE 25 MG: 25 TABLET, FILM COATED, EXTENDED RELEASE ORAL at 10:02

## 2022-10-29 RX ADMIN — FLECAINIDE ACETATE 25 MG: 50 TABLET ORAL at 10:03

## 2022-10-29 RX ADMIN — FLECAINIDE ACETATE 25 MG: 50 TABLET ORAL at 09:06

## 2022-10-29 NOTE — DISCHARGE SUMMARY
Hospital Medicine Discharge Summary    Patient: Alicia Cunha     Gender: male  : 1940   Age: 79 y.o. MRN: [id number]    Admitting Physician: Trevor White MD  Discharge Physician: Kemar Vance DO       Admit Date: 10/27/2022   Discharge Date:   10/29/2022    Disposition:  Home    Discharge Diagnoses: Active Hospital Problems    Diagnosis Date Noted    Severe malnutrition ([facility] [E43] 10/28/2022     Priority: Medium    Chronic diastolic heart failure ([address] [I50.32] 10/28/2022     Priority: Medium    Encounter for monitoring flecainide therapy [Z51.81, Z79.899] 10/28/2022     Priority: Medium    Nonrheumatic mitral valve regurgitation [I34.0] 10/28/2022     Priority: Medium    Acute respiratory failure with hypoxia ([address] [J96.01] 10/27/2022     Priority: Medium    Paroxysmal atrial fibrillation ([address] [I48.0] 2018       Follow-up appointments:  one week    Outpatient to do list: f/u with PCP in 1-2 weeks    Condition at Discharge:  [address] Course: 79 y.o. male with PMHx of A-fib, Cardiomyopathy and HTN presented to [facility] with shortness of breath and cough. Productive at times. No fever + body aches. No nausea or vomiting. No chest pain or dizziness. No abdominal pain. States the last time I felt like this I had pneumonia    Admit, Cardio; Acute hypoxic respiratory failure; weaned off O2; likely d/t BLL PNA, Klebsiella pneumonia sputum Cx;  Rx Levaquin; VM left with Pharmacy; CHF less likely per Cardio; dc home in stable condition; f/u as above      Discharge Medications:   Current Discharge Medication List        Current Discharge Medication List        Current Discharge Medication List        CONTINUE these medications which have NOT CHANGED    Details   ELIQUIS 5 MG TABS tablet TAKE ONE TABLET BY MOUTH TWICE A DAY  Qty: 60 tablet, Refills: 3      flecainide (TAMBOCOR) 50 MG tablet Take 0.5 tablets by mouth in the morning and at bedtime  Qty: 180 tablet, Refills: 3    Associated Diagnoses: Persistent atrial fibrillation (HCC)      metoprolol succinate (TOPROL XL) 25 MG extended release tablet TAKE 1/2 TABLET BY MOUTH DAILY  Qty: 45 tablet, Refills: 1      lisinopril (PRINIVIL;ZESTRIL) 10 MG tablet TAKE ONE TABLET BY MOUTH DAILY  Qty: 90 tablet, Refills: 1           Current Discharge Medication List                           Note that greater than 30 minutes was spent in preparing discharge papers, discussing discharge with patient, medication review, etc.       Signed:    Kike Winters DO   10/29/2022      Thank you Ana Lamb MD for the opportunity to be involved in this patient's care.  If you have any questions or concerns please feel free to contact me at [facility]

## 2022-10-29 NOTE — PLAN OF CARE
Problem: Pain  Goal: Verbalizes/displays adequate comfort level or baseline comfort level  Outcome: Progressing     Problem: Skin/Tissue Integrity  Goal: Absence of new skin breakdown  Description: 1. Monitor for areas of redness and/or skin breakdown  2. Assess vascular access sites hourly  3. Every 4-6 hours minimum:  Change oxygen saturation probe site  4. Every 4-6 hours:  If on nasal continuous positive airway pressure, respiratory therapy assess nares and determine need for appliance change or resting period.   Outcome: Progressing     Problem: Safety - Adult  Goal: Free from fall injury  Outcome: Progressing     Problem: ABCDS Injury Assessment  Goal: Absence of physical injury  Outcome: Progressing     Problem: Respiratory - Adult  Goal: Achieves optimal ventilation and oxygenation  Outcome: Progressing     Problem: Cardiovascular - Adult  Goal: Maintains optimal cardiac output and hemodynamic stability  Outcome: Progressing

## 2022-10-29 NOTE — PROGRESS NOTES
Referring practitioner: Safia Brandt NP  Reason for Consultation: \"Acute respiratory failure with hypoxia, dx pneumonia and hx of heart failure, elevated BNP\"  Chief Complaint: Short of breath    Subjective:   History of Present Illness:  Rakesh Martinez is a 79 y.o. patient who presented to the hospital with complaints of generalized fatigue, cough, and shortness of breath. The patient typically follows with Dr. Nimo Johnston and EP. He is a challenging historian. He answers orientation questions correctly but is very difficult to follow the recent events. No family members are present bedside. Per chart review the patient was quite confused yesterday but again is currently oriented. He seems to be describing a recent productive cough but states that he has been feeling weak and fatigued recently. He denies associated chest pains. He denies weight gain, lower extremity edema, PND, or orthopnea. The patient was seen at an urgent care and prescribed an antibiotic for pneumonia. He was hypoxic at home with a sat in the 80s and sought medical attention in the emergency department. The patient has known paroxysmal atrial fibrillation has had numerous ablations and continues to have episodes of paroxysmal atrial fibrillation while hospitalized. He does not seem particularly symptomatic with the events and denies palpitations or an awareness of his heart rate being elevated. He has being treated as a pneumonia as his procalcitonin is significantly elevated at 5.2. He reports compliance with his medications at home. Interval history:  Patient with continued episodes of paroxysmal atrial fibrillation with RVR. Seemingly asymptomatic from the arrhythmia. Heart rate currently 110-120s and denies palpitations, worsening shortness of breath, chest pain, or lightheadedness. In general, he feels his shortness of breath and cough are improving since hospitalization but does not feel back to baseline.     Past Medical History:   has a past medical history of Atrial fibrillation ([facility] [address] Cardiomyopathy ([facility] [address] Chronic tachycardia, Erectile dysfunction, Hypertension, Osteoarthritis, Pneumonia, Thymoma, benign, Tubulovillous adenoma polyp of colon, and Wears glasses. Surgical History:   has a past surgical history that includes Prostate surgery; Hemorrhoid surgery; Thymectomy; Colonoscopy (N/A, 1/15/2020); Cardioversion (01/10/2020); and US BIOPSY THYROID (6/18/2021). Social History:   reports that he quit smoking about 29 years ago. His smoking use included cigarettes. He started smoking about 67 years ago. He smoked an average of 1 pack per day. He has never used smokeless tobacco. He reports current alcohol use. He reports that he does not currently use drugs. Family History:  family history includes Cancer in his brother and sister; Heart Disease in his brother, brother, and father. Home Medications:  Were reviewed and are listed in nursing record and/or below  Prior to Admission medications    Medication Sig Start Date End Date Taking?  Authorizing Provider   ELIQUIS 5 MG TABS tablet TAKE ONE TABLET BY MOUTH TWICE A DAY 10/12/22   Keith Jones MD   flecainide (TAMBOCOR) 50 MG tablet Take 0.5 tablets by mouth in the morning and at bedtime 10/10/22   AYLIN Monterroso CNP   metoprolol succinate (TOPROL XL) 25 MG extended release tablet TAKE 1/2 TABLET BY MOUTH DAILY 6/3/22   AYLIN Carpenter CNP   lisinopril (PRINIVIL;ZESTRIL) 10 MG tablet TAKE ONE TABLET BY MOUTH DAILY 5/25/22   Keith Jones MD        CURRENT Medications:  flecainide (TAMBOCOR) tablet 25 mg, BID  lisinopril (PRINIVIL;ZESTRIL) tablet 10 mg, Daily  metoprolol succinate (TOPROL XL) extended release tablet 12.5 mg, Daily  sodium chloride flush 0.9 % injection 10 mL, 2 times per day  sodium chloride flush 0.9 % injection 10 mL, PRN  0.9 % sodium chloride infusion, PRN  ondansetron (ZOFRAN-ODT) disintegrating tablet 4 mg, Q8H PRN   Or  ondansetron (ZOFRAN) injection 4 mg, Q6H PRN  magnesium hydroxide (MILK OF MAGNESIA) 400 MG/5ML suspension 30 mL, Daily PRN  acetaminophen (TYLENOL) tablet 650 mg, Q6H PRN   Or  acetaminophen (TYLENOL) suppository 650 mg, Q6H PRN  albuterol (PROVENTIL) nebulizer solution 2.5 mg, Q2H PRN  cefTRIAXone (ROCEPHIN) 1,000 mg in dextrose 5 % 50 mL IVPB mini-bag, Nightly   And  doxycycline hyclate (VIBRA-TABS) tablet 100 mg, 2 times per day  apixaban (ELIQUIS) tablet 2.5 mg, BID  ipratropium-albuterol (DUONEB) nebulizer solution 1 ampule, BID  furosemide (LASIX) tablet 20 mg, Daily  Allergies:  Amiodarone     Review of Systems:   Constitutional: no unanticipated weight loss. There's been no change in energy level, sleep pattern, or activity level. No fevers, chills. Eyes: No visual changes or diplopia. No scleral icterus. ENT: No Headaches, hearing loss or vertigo. No mouth sores or sore throat. Cardiovascular: as reviewed in HPI  Respiratory: No cough or wheezing, no sputum production. No hemoptysis. Gastrointestinal: No abdominal pain, appetite loss, blood in stools. No change in bowel or bladder habits. Genitourinary: No dysuria, trouble voiding, or hematuria. Musculoskeletal:  No gait disturbance, no joint complaints. Integumentary: No rash or pruritis. Neurological: No headache, diplopia, change in muscle strength, numbness or tingling. Psychiatric: No anxiety or depression. Endocrine: No temperature intolerance. No excessive thirst, fluid intake, or urination. No tremor. Hematologic/Lymphatic: No abnormal bruising or bleeding, blood clots or swollen lymph nodes. Allergic/Immunologic: No nasal congestion or hives. Objective:   PHYSICAL EXAM:    Vitals:    10/29/22 0508   BP: 100/71   Pulse: 94   Resp: 16   Temp: 98   SpO2: 97    Weight: 109 lb 12.6 oz (49.8 kg)       General Appearance:  Alert, cooperative, no respiratory distress. Thin. Elderly.    Head:  Normocephalic, without obvious abnormality, atraumatic. Eyes:  Pupils equal and round. No scleral icterus. Mouth: Moist mucosa, no pharyngeal erythema. Nose: Nares normal. No drainage or sinus tenderness. Neck: Supple, symmetrical, trachea midline. No adenopathy. No tenderness/mass/nodules. No carotid bruit or elevated JVD. Lungs:   Respirations unlabored.  + Expiratory wheezing. Chest Wall:  No tenderness or deformity. Heart:  Irregularly irregular and tachycardic. S1/S2 normal. No murmur, rub, or gallop. Abdomen:   Soft, non-tender, bowel sounds active. Musculoskeletal: + Muscle wasting. No digital clubbing. Extremities: Extremities normal, atraumatic. No cyanosis or edema. Pulses: 2+ radial and carotid pulses, symmetric. Skin: No rashes or lesions. Pysch: Normal mood and affect. Alert and oriented x 4.    Neurologic: Normal gross motor and sensory exam.       Labs     CBC:   Lab Results   Component Value Date/Time    WBC 8.1 10/29/2022 05:28 AM    RBC 3.84 10/29/2022 05:28 AM    HGB 11.5 10/29/2022 05:28 AM    HCT 33.4 10/29/2022 05:28 AM    MCV 87.1 10/29/2022 05:28 AM    RDW 14.2 10/29/2022 05:28 AM     10/29/2022 05:28 AM     CMP:  Lab Results   Component Value Date/Time     10/29/2022 05:28 AM    K 3.3 10/29/2022 05:28 AM    CL 97 10/29/2022 05:28 AM    CO2 25 10/29/2022 05:28 AM    BUN 35 10/29/2022 05:28 AM    CREATININE 1.0 10/29/2022 05:28 AM    GFRAA >60 09/22/2022 11:30 AM    AGRATIO 1.3 10/27/2022 09:41 PM    LABGLOM >60 10/29/2022 05:28 AM    GLUCOSE 101 10/29/2022 05:28 AM    PROT 7.0 10/27/2022 09:41 PM    CALCIUM 9.2 10/29/2022 05:28 AM    BILITOT 1.7 10/27/2022 09:41 PM    ALKPHOS 75 10/27/2022 09:41 PM    AST 32 10/27/2022 09:41 PM    ALT 24 10/27/2022 09:41 PM     PT/INR:  No results found for: PTINR  HgBA1c:  Lab Results   Component Value Date    LABA1C 5.6 05/08/2018     Lab Results   Component Value Date    CKTOTAL 395 (H) 10/31/2018    TROPONINI <0.01 10/27/2022       Cardiac Data EKG: Personally interpreted. 10/27/2022. Sinus rhythm with PACs. Rightward axis. Nonspecific ST-T wave abnormalities. Echo: 1/20/2022. Normal left ventricle size, wall thickness, and low normal systolic function with an estimated ejection fraction of 50%. Mild septal hypokinesis. Indeterminate diastolic function. Mitral valve leaflets appear mild-moderately thickened. Mild mitral annular calcification. Two seperate jets of Moderate mitral regurgitation. The left atrium is moderately to severely dilated. Dilated right ventricle. Moderate tricuspid regurgitation. The right atrium is dilated. IVC size is normal (<2.1 cm) but collapses < 50% with respiration consistent with elevated RA pressure (8 mmHg). Estimated pulmonary artery systolic pressure is mildly elevated at 44 mmHg assuming a right atrial pressure of 8 mmHg. Telemetry: Personally interpreted. Paroxysmal atrial fibrillation currently with RVR. Assessment and Plan   1) Acute respiratory failure with hypoxia/pneumonia/possible aspiration/sepsis. Will defer work-up and treatment to primary team.  Currently on IV antibiotics. Likely the cause of his acute symptoms as he seems unaware of the PAF episodes which have likely been occurring for years. 2) Chronic diastolic heart failure. EF 50%. Patient does not appear grossly volume overloaded. BNP above baseline which could be in part related to his pneumonia. Continue low-dose oral Lasix, ACE-I, and beta-blocker. 3) Paroxysmal atrial fibrillation s/p ablation 5/2020, 8/2021, 11/2021, and 3/2022. Currently in atrial fibrillation with rapid ventricular response. Will continue attempts at rhythm control with flecainide and increased dose to 50 mg twice daily. Will also uptitrate Toprol-XL. Continue Eliquis 2.5 mg twice daily. 4) Nonrheumatic mitral and tricuspid regurgitation. No acute intervention. Continue attempts at euvolemia and blood pressure control.     5) Cachexia. BMI 15. Will defer nutritional recommendations to primary team.    Overall, the problems requiring hospitalization are high in severity. Thank you for allowing us to participate in the care of Brayan Schmidt [id number]. Brianna Watson.  Trenton Lemus, [address] [place] [address]  10/29/2022 8:24 AM

## 2022-10-29 NOTE — CARE COORDINATION
Discharge order noted. Chart reviewed. Plan is to return home with family. No longer on O2 or needing IV Abx. No additional discharge needs identified at this time.     Electronically signed by Chito Terrazas RN MSN on 10/29/2022 at 10:11 AM

## 2022-10-29 NOTE — DISCHARGE INSTR - COC
Continuity of Care Form    Patient Name: Richard Weaver   :  1940  MRN:  [id number]    Admit date:  10/27/2022  Discharge date:  10/29/22    Code Status Order: Full Code   Advance Directives:     Admitting Physician:  Christina Bernardo MD  PCP: Rob Bledsoe MD    Discharging Nurse: [facility] AT [facility] Unit/Room#: S5T-0010/5250-01  Discharging Unit Phone Number: [phone]    Emergency Contact:   Extended Emergency Contact Information  Primary Emergency Contact: Johnny Mosqueda  Address: [address] [place] [address], [address] [place] [address] Charanjit Stevenson of [facility] Phone: [phone]  Mobile Phone: [phone]  Relation: Child  Secondary Emergency Contact: Poonam Middleton of [address]  Phone: [phone]  Mobile Phone: [phone]  Relation: Other    Past Surgical History:  Past Surgical History:   Procedure Laterality Date    CARDIOVERSION  01/10/2020    COLONOSCOPY N/A 1/15/2020    COLONOSCOPY performed by Marissa Mcginnis MD at [address] THYROID BIOPSY  2021     THYROID BIOPSY 2021 [facility] ULTRASOUND       Immunization History:   Immunization History   Administered Date(s) Administered    COVID-19, PFIZER PURPLE top, DILUTE for use, (age 15 y+), 30mcg/0.3mL 2021, 2021, 2021    Influenza Virus Vaccine 2015, 2016, 2017    Influenza, FLUAD, (age 72 y+), Adjuvanted, 0.5mL 10/14/2020, 2021    Influenza, FLUARIX, FLULAVAL, FLUZONE (age 10 mo+) AND AFLURIA, (age 1 y+), PF, 0.5mL 2017    Influenza, FLUBLOK, (age 25 y+), PF, 0.5mL 10/01/2018    Influenza, High Dose (Fluzone 65 yrs and older) 2013    Influenza, Triv, inactivated, subunit, adjuvanted, IM (Fluad 65 yrs and older) 2019    Pneumococcal Conjugate 13-valent (Hysnvar13) 2017    Pneumococcal Conjugate 7-valent (Prevnar7) 2004    Pneumococcal Polysaccharide (Glwsvdpok83) 12/02/2013    Tdap (Boostrix, Adacel) 03/30/2018    Tetanus 02/03/2009    Zoster Live (Zostavax) 02/10/2015       Active Problems:  Patient Active Problem List   Diagnosis Code    OA (osteoarthritis) M19.90    Thymoma D49.89    Essential hypertension I10    Encounter to discuss treatment options Z71.89    Chest wall mass R22.2    Cardiomyopathy ([facility] [address] I42.9    Paroxysmal atrial fibrillation (HCC) I48.0    Chronic systolic CHF (congestive heart failure), NYHA class 1 ([facility]) I50.22    S/P ablation of atrial fibrillation Z98.890, Z86.79    Persistent atrial fibrillation ([facility]) I48.19    Atrial tachycardia ([facility]) I47.1    Left atrial flutter by electrocardiogram ([facility]) I48.92    Acute respiratory failure with hypoxia ([facility]) J96.01    Severe malnutrition ([facility]) E43    Chronic diastolic heart failure ([facility]) I50.32    Encounter for monitoring flecainide therapy Z51.81, Z79.899    Nonrheumatic mitral valve regurgitation I34.0       Isolation/Infection:   Isolation            No Isolation          Patient Infection Status       Infection Onset Added Last Indicated Last Indicated By Review Planned Expiration Resolved Resolved By    None active    Resolved    COVID-19 (Rule Out) 10/27/22 10/27/22 10/27/22 COVID-19, Rapid (Ordered)   10/27/22 Rule-Out Test Resulted    COVID-19 (Rule Out) 05/08/20 05/08/20 05/08/20 COVID-19 (Ordered)   05/08/20 Rule-Out Test Resulted            Nurse Assessment:  Last Vital Signs: /69   Pulse 100   Temp 98.4 °F (36.9 °C) (Oral)   Resp 18   Ht 5' 9\" (1.753 m)   Wt 109 lb 12.6 oz (49.8 kg)   SpO2 99%   BMI 16.21 kg/m²     Last documented pain score (0-10 scale): Pain Level: 0  Last Weight:   Wt Readings from Last 1 Encounters:   10/29/22 109 lb 12.6 oz (49.8 kg)     Mental Status:  oriented and alert    IV Access:  - None    Nursing Mobility/ADLs:  Walking   Independent  Transfer  Independent  Bathing  Independent  Dressing  Independent  Toileting Independent  Feeding  Independent  Med Admin  Independent  Med Delivery   whole and prefers mixed with applesauce    Wound Care Documentation and Therapy:  Incision 08/11/21 Femoral Anterior;Proximal;Right (Active)   Number of days: 443        Elimination:  Continence: Bowel: Yes  Bladder: Yes  Urinary Catheter: None   Colostomy/Ileostomy/Ileal Conduit: No       Date of Last BM: 10/27    Intake/Output Summary (Last 24 hours) at 10/29/2022 1321  Last data filed at 10/29/2022 1015  Gross per 24 hour   Intake 600 ml   Output 800 ml   Net -200 ml     I/O last 3 completed shifts: In: 80 [P.O.:640; IV Piggyback:50]  Out: 1200 [Urine:1200]    Safety Concerns: At Risk for Falls    Impairments/Disabilities:      None    Nutrition Therapy:  Current Nutrition Therapy:   - Oral Diet:  General    Routes of Feeding: Oral  Liquids: Thin Liquids  Daily Fluid Restriction: no  Last Modified Barium Swallow with Video (Video Swallowing Test): not done    Treatments at the Time of Hospital Discharge:   Respiratory Treatments: ***  Oxygen Therapy:  is not on home oxygen therapy.   Ventilator:    - No ventilator support    Rehab Therapies: Physical Therapy and Occupational Therapy  Weight Bearing Status/Restrictions: No weight bearing restrictions  Other Medical Equipment (for information only, NOT a DME order):  walker  Other Treatments: ***    Patient's personal belongings (please select all that are sent with patient):  Glasses, cell phone    RN SIGNATURE:  Electronically signed by Winston Rivera RN on 10/29/22 at 1:24 PM EDT    CASE MANAGEMENT/SOCIAL WORK SECTION    Inpatient Status Date: ***    Readmission Risk Assessment Score:  Readmission Risk              Risk of Unplanned Readmission:  11           Discharging to Facility/ Agency   Name:   Address:  Phone:  Fax:    Dialysis Facility (if applicable)   Name:  Address:  Dialysis Schedule:  Phone:  Fax:    / signature: {Esignature:[id number]}    PHYSICIAN SECTION    Prognosis: {Prognosis:9395974349}    Condition at Discharge: [address] Patient Condition:481083093}    Rehab Potential (if transferring to Rehab): {Prognosis:0148828843}    Recommended Labs or Other Treatments After Discharge: ***    Physician Certification: I certify the above information and transfer of Imelda Massey  is necessary for the continuing treatment of the diagnosis listed and that he requires {Admit to Appropriate Level of Care:29109} for {GREATER/LESS:616437999} 30 days.      Update Admission H&P: {CHP DME Changes in SSYOU:632526246}    PHYSICIAN SIGNATURE:  {Esignature:473638971}

## 2022-10-29 NOTE — PROGRESS NOTES
HOME OXYGEN EVALUATION: Pt is 99% SpO2 on room air at rest; and is 93% SpO2 on room air with ambulation.  Electronically signed by Reina Perez RCP on 10/29/2022 at 10:48 AM

## 2022-10-30 LAB
CULTURE, RESPIRATORY: ABNORMAL
CULTURE, RESPIRATORY: ABNORMAL
GRAM STAIN RESULT: ABNORMAL
ORGANISM: ABNORMAL

## 2022-10-30 RX ORDER — LEVOFLOXACIN 500 MG/1
500 TABLET, FILM COATED ORAL DAILY
Qty: 3 TABLET | Refills: 0 | Status: SHIPPED | OUTPATIENT
Start: 2022-10-30 | End: 2022-11-01 | Stop reason: SDUPTHER

## 2022-10-30 NOTE — RESULT ENCOUNTER NOTE
Culture reviewed, no further action. Patient was admitted. On intravenous Rocephin while inpatient and discharged on doxycycline.   Discharge summary indicates inpatient team was aware of the positive culture

## 2022-10-30 NOTE — RESULT ENCOUNTER NOTE
Culture reviewed, patient was just discharged today but discharge summary reads that inpatient team was aware of the sputum culture and had called in an antibiotic to the pharmacy.

## 2022-10-31 ENCOUNTER — CARE COORDINATION (OUTPATIENT)
Dept: CASE MANAGEMENT | Age: 82
End: 2022-10-31

## 2022-10-31 NOTE — CARE COORDINATION
[facility] Transitions Initial Follow Up Call    Call within 2 business days of discharge: Yes    Patient: Rosalie Encarnacion Patient : 1940   MRN: [id number]  Reason for Admission: Acute respiratory failure with hypoxia Discharge Date: 10/29/22 RARS: Readmission Risk Score: 15.2      Last Discharge [address]       Date Complaint Diagnosis Description Type Department Provider    10/27/22 Shortness of Breath Acute respiratory failure with hypoxia ([address] . .. ED to Hosp-Admission (Discharged) (ADMITTED) YUKO IqbalN Ann Marie Harris MD; Farhana Mixon . .. Spoke with: [facility] attempted outreach 24 hr hospital discharge for care transition follow up. No message left voice mailbox was a business phone. Will try again at another time.      Facility: [address] [place] [address] Transitions 24 Hour Call    Do you have all of your prescriptions and are they filled?: Yes  Care Transitions Interventions         Follow Up  Future Appointments   Date Time Provider Rod Villalobos   2022  3:30 PM MD Boby Kelly RD [facility] [place] - DYD   2022 10:20 AM WEST CT RM 2 [facility] CT [employer] H   2023  1:30 PM MD MALCOLM Gallego   4/10/2023  1:00 PM AYLIN Milian - LOU Tenorio LPN

## 2022-10-31 NOTE — PROGRESS NOTES
Physician Progress Note      PATIENT:               Sravani Pleitez  CSN #:                  [id number]  :                       1940  ADMIT DATE:       10/27/2022 9:12 PM  [address] [place] DATE:        10/29/2022 2:11 PM  RESPONDING  PROVIDER #:        Shonna Tucrios MD          QUERY TEXT:    Dear Dr. Edward Mancera,  [facility] admitted 10/27 11:00 PM with Pneumonia and acute respiratory failure. Pt   noted to have Fever 101.1, tachycardia, tachypnea at 0350 AM. If possible,   please document in the progress notes and discharge summary if you are   evaluating and /or treating any of the following: The medical record reflects the following:  Risk Factors: Hx A-fib, Cardiomyopathy, CHF,  Clinical Indicators: 10/27 ED for SOB and cough, O2 sat mid 80s on arrival,   not on Home O2, CXR-bilateral lower lobes infiltrate consistent with   pneumonia, IL=277, Admit for Acute hypoxic respiratory failure due to   pneumonia and CHF- with increased work of breath, tachypnea and hypoxia, 10/28   0350 Temp=101.1, Resp=44, ON=611, B/P=104/69. Later B/P=94/52  Treatment: Rocephin, Doxycycline O2 4L [place], Tylenol,  Thank you,  Petra Habermann RN, [email or web address]. [email or web address]  Options provided:  -- Evolving Sepsis due to pneumonia present on admission  -- Pneumonia without Sepsis  -- Other - I will add my own diagnosis  -- Disagree - Not applicable / Not valid  -- Disagree - Clinically unable to determine / Unknown  -- Refer to Clinical Documentation Reviewer    PROVIDER RESPONSE TEXT:    This patient has evolving sepsis due to pneumonia which was present on   admission. Query created by:  [email or web address] W   on 10/28/2022 12:59 PM      Electronically signed by:  Shonna Turcois MD 10/31/2022 10:39 AM

## 2022-11-01 ENCOUNTER — CARE COORDINATION (OUTPATIENT)
Dept: CASE MANAGEMENT | Age: 82
End: 2022-11-01

## 2022-11-01 ENCOUNTER — OFFICE VISIT (OUTPATIENT)
Dept: PRIMARY CARE CLINIC | Age: 82
End: 2022-11-01
Payer: MEDICARE

## 2022-11-01 VITALS
DIASTOLIC BLOOD PRESSURE: 75 MMHG | OXYGEN SATURATION: 93 % | SYSTOLIC BLOOD PRESSURE: 107 MMHG | HEART RATE: 125 BPM | RESPIRATION RATE: 20 BRPM | TEMPERATURE: 96.6 F | BODY MASS INDEX: 15.8 KG/M2 | WEIGHT: 107 LBS

## 2022-11-01 DIAGNOSIS — E43 SEVERE MALNUTRITION (HCC): Chronic | ICD-10-CM

## 2022-11-01 DIAGNOSIS — I48.0 PAROXYSMAL ATRIAL FIBRILLATION (HCC): ICD-10-CM

## 2022-11-01 DIAGNOSIS — D49.89 THYMOMA: ICD-10-CM

## 2022-11-01 DIAGNOSIS — J96.01 ACUTE RESPIRATORY FAILURE WITH HYPOXIA (HCC): Primary | ICD-10-CM

## 2022-11-01 DIAGNOSIS — I50.32 CHRONIC DIASTOLIC HEART FAILURE (HCC): Primary | ICD-10-CM

## 2022-11-01 DIAGNOSIS — I10 ESSENTIAL HYPERTENSION: ICD-10-CM

## 2022-11-01 DIAGNOSIS — I50.32 CHRONIC DIASTOLIC HEART FAILURE (HCC): ICD-10-CM

## 2022-11-01 DIAGNOSIS — J18.9 COMMUNITY ACQUIRED PNEUMONIA, UNSPECIFIED LATERALITY: ICD-10-CM

## 2022-11-01 DIAGNOSIS — Z98.890 S/P ABLATION OF ATRIAL FIBRILLATION: ICD-10-CM

## 2022-11-01 DIAGNOSIS — Z86.79 S/P ABLATION OF ATRIAL FIBRILLATION: ICD-10-CM

## 2022-11-01 PROCEDURE — 99496 TRANSJ CARE MGMT HIGH F2F 7D: CPT | Performed by: FAMILY MEDICINE

## 2022-11-01 PROCEDURE — 1111F DSCHRG MED/CURRENT MED MERGE: CPT | Performed by: FAMILY MEDICINE

## 2022-11-01 RX ORDER — LEVOFLOXACIN 500 MG/1
500 TABLET, FILM COATED ORAL DAILY
Qty: 3 TABLET | Refills: 0 | Status: SHIPPED | OUTPATIENT
Start: 2022-11-01 | End: 2022-11-04

## 2022-11-01 RX ORDER — LISINOPRIL 10 MG/1
5 TABLET ORAL DAILY
Qty: 90 TABLET | Refills: 1
Start: 2022-11-01

## 2022-11-01 RX ORDER — METOPROLOL SUCCINATE 50 MG/1
50 TABLET, EXTENDED RELEASE ORAL DAILY
Qty: 90 TABLET | Refills: 1 | Status: SHIPPED | OUTPATIENT
Start: 2022-11-01

## 2022-11-01 NOTE — CARE COORDINATION
[facility] Care Transitions Initial Follow Up Call    Call within 2 business days of discharge: Yes    LPN Care Coordinator contacted the family by telephone to perform post hospital discharge assessment. Verified name and  with family as identifiers. Provided introduction to self, and explanation of the LPN Care Coordinator role. Patient: Luann Reyes Patient : 1940   MRN: [id number]  Reason for Admission: PNA  Discharge Date: 10/29/22 RARS: Readmission Risk Score: 15.2      Last Discharge  [address]       Date Complaint Diagnosis Description Type Department Provider    10/27/22 Shortness of Breath Acute respiratory failure with hypoxia ([facility] [address] . .. ED to Hosp-Admission (Discharged) (ADMITTED) YUKO 5N Jose Luis Ruano MD; Suzan Flannery . .. Was this an external facility discharge? No Discharge Facility: NA    Challenges to be reviewed by the provider   Additional needs identified to be addressed with provider: No  none               Method of communication with provider: none. LPN CC spoke with patient's son, Katie Stoll, HIPAA verified. States patient is doing well. Denies SOB, CP, dizziness. States cough is improved. Appetite good. Denies problems with bowels or bladder. Full medication reconciliation and 1111f completed. Son prepares medications for patient. PCP f/u today. Denies needs. Dalia Watson LPN [address] [place] [address]  Care Transitions  [phone]    [facility] Care Coordinator reviewed discharge instructions, medical action plan, and red flags with family who verbalized understanding. The family was given an opportunity to ask questions and does not have any further questions or concerns at this time. Were discharge instructions available to patient? Yes. Reviewed appropriate site of care based on symptoms and resources available to patient including: PCP  Specialist  Urgent care clinics  When to call [address]  The family agrees to contact the PCP office for questions related to their healthcare. Advance Care Planning:   Does patient have an Advance Directive: not on file. Medication reconciliation was performed with family, who verbalizes understanding of administration of home medications. Medications reviewed, 1111F entered: yes    Was patient discharged with a pulse oximeter? no    Non-face-to-face services provided:  Obtained and reviewed discharge summary and/or continuity of care documents  Education of patient/family/caregiver/guardian to support self-management-f/u  Assessment and support for treatment adherence and medication management-full medication reconciliation completed, discussed completing atbx    Offered patient enrollment in the Remote Patient Monitoring (RPM) program for in-home monitoring: Yes, but did not enroll at this time. Care Transitions 24 Hour Call    Do you have a copy of your discharge instructions?: Yes  Do you have all of your prescriptions and are they filled?: Yes  Have you been contacted by a [address]?: No  Have you scheduled your follow up appointment?: Yes  How are you going to get to your appointment?: Car - family or friend to transport  Do you have support at home?: Child  Do you feel like you have everything you need to keep you well at home?: Yes  Are you an active caregiver in your home?: No  Care Transitions Interventions         Follow Up  Future Appointments   Date Time Provider Rod Villalobos   11/1/2022  3:30 PM MD Rachel Lepe RD PC [place] - DYD   11/25/2022 10:20 AM [facility] Mark Arndt 112   2/28/2023  1:30 PM Ly Barkley MD [facility]   4/10/2023  1:00 PM AYLIN Monterroso - CNP [address] Care Coordinator provided contact information. Plan for follow-up call in 5-7 days based on severity of symptoms and risk factors. Plan for next call: symptom management-SOB, cough  follow-up appointment-PCP 11/1  medication management-new or changed?, atbx?   RPM?    Lb Olguin LPN [facility] Transitions Initial Follow Up Call    Call within 2 business days of discharge: Yes    Patient: Baldomero France Patient : 1940   MRN: [id number]  Reason for Admission: PNA  Discharge Date: 10/29/22 RARS: Readmission Risk Score: 15.2      Last Discharge  Street       Date Complaint Diagnosis Description Type Department Provider    10/27/22 Shortness of Breath Acute respiratory failure with hypoxia ([facility] [address] . .. ED to Hosp-Admission (Discharged) (ADMITTED) YUKO Gongora MD; Dayami Whitten . .. Spoke with: CHARO    Facility: [facility]    Second and final attempt to reach patient via phone for initial post hospital transition call. VM left stating purpose of call along with my contact information requesting a return call. Informantonline message sent to attempt outreach. Episode ended d/t unsuccessful contact.    Domingo Ayala  [facility]  Care Transitions  [phone]    Care Transitions 24 Hour Call    Do you have all of your prescriptions and are they filled?: Yes  Care Transitions Interventions         Follow Up  Future Appointments   Date Time Provider Rod Villalobos   2022  3:30 PM MD Gabe Chowdhury RD PC [place] - DYD   2022 10:20 AM [facility] Aubrie MARTINEZ   2023  1:30 PM Blu Villarreal MD R[facility] [place]   4/10/2023  1:00 PM AYLIN Hood - CNP [facility]       Domingo Ayala LPN

## 2022-11-01 NOTE — PROGRESS NOTES
Post-Discharge Transitional Care  Follow Up      Brayan Schmidt [id number]   YOB: 1940    Date of Office Visit:  11/1/2022  Date of Hospital Admission: 10/27/22  Date of Hospital Discharge: 10/29/22  Risk of hospital readmission (high >=14%. Medium >=10%) :Readmission Risk Score: 15.2      Care management risk score Rising risk (score 2-5) and Complex Care (Scores >=6): No Risk Score On File     Non face to face  following discharge, date last encounter closed (first attempt may have been earlier): 10/31/2022    Call initiated 2 business days of discharge: Yes    ASSESSMENT/PLAN:   Acute respiratory failure with hypoxia (HCC  -Improved. Oxygen saturation today at the office is 93% on room air at rest.    Community acquired pneumonia, unspecified laterality  -Patient was prescribed Levaquin 500 mg daily for 3 days after discharge but has not picked up the prescription  -     levoFLOXacin (LEVAQUIN) 500 MG tablet; Take 1 tablet by mouth daily for 3 days, Disp-3 tablet, R-0Normal    Paroxysmal atrial fibrillation (HCC)/S/P ablation of atrial fibrillation  Heart rate is still fast at 125/min so will increase Toprol-XL from 25 to 50 mg daily. He also takes lisinopril 10 mg daily but due to soft BP will reduced it to 5 mg. .  Continue flecainide 50 mg half tablet twice daily and Eliquis 2. 5 mg twice daily. Follow-up with cardiologist Dr. Elkin Al.  -     metoprolol succinate (TOPROL XL) 50 MG extended release tablet; Take 1 tablet by mouth daily, Disp-90 tablet, R-1Normal    Essential hypertension  Will reduce lisinopril 10mg to 5 mg daily. Continue Toprol-XL 50 mg daily. Chronic diastolic heart failure (HCC)  Continue Lasix 20 mg daily. Severe malnutrition ([address]  Encouraged high-calorie diet and supplement. 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     On this date 11/1/2022 I have spent 21 minutes reviewing previous notes, test results and face to face with the patient discussing the diagnosis and importance of compliance with the treatment plan as well as documenting on the day of the visit. Subjective:   HPI:  Follow up of Hospital problems/diagnosis(es): . Inpatient course: Discharge summary reviewed- see chart. Interval history/Current status: 79years old white male with history of A. fib, cardiomyopathy and hypertension presented to [facility] due to shortness of breath associated with productive cough. He was found to have acute hypoxic respiratory failure presumed to be due to pneumonia. He was discharged home on antibiotics Levaquin 500 mg daily for 3 days which she has not picked up the prescription. His breathing is better today at the office with oxygen saturation at 93% on room air. He denies chest pain denies shortness of breath. However he is still tachycardic with a heart rate of 125/min. He has A. fib post catheter ablation and takes Toprol-XL 25 mg daily for rate control  He also takes flecainide 25 mg twice daily and Eliquis. He goes to cardiologist Dr. Kenny Saxena. He has congestive heart failure appears to be compensated, takes Lasix 20 mg daily. He is malnourished ,body mass index only 15.   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 Fulton    Patient Active Problem List   Diagnosis    OA (osteoarthritis)    Thymoma    Essential hypertension    Encounter to discuss treatment options    Chest wall mass    Cardiomyopathy ([facility] [address]    Paroxysmal atrial fibrillation (HCC)    Chronic systolic CHF (congestive heart failure), NYHA class 1 ([facility])    S/P ablation of atrial fibrillation    Persistent atrial fibrillation (HCC)    Atrial tachycardia (HCC)    Left atrial flutter by electrocardiogram ([address]    Acute respiratory failure with hypoxia (HCC)    Severe malnutrition (HCC)    Chronic diastolic heart failure ([address]    Encounter for monitoring flecainide therapy    Nonrheumatic mitral valve regurgitation Medications listed as ordered at the time of discharge from hospital     Medication List            Accurate as of November 1, 2022  4:19 PM. If you have any questions, ask your nurse or doctor.                 CHANGE how you take these medications      metoprolol succinate 50 MG extended release tablet  Commonly known as: TOPROL XL  Take 1 tablet by mouth daily  What changed:   medication strength  how much to take  Changed by: Uyen Acosta MD            CONTINUE taking these medications      apixaban 5 MG Tabs tablet  Commonly known as: Eliquis  Take 0.5 tablets by mouth 2 times daily     flecainide 50 MG tablet  Commonly known as: TAMBOCOR  Take 0.5 tablets by mouth in the morning and at bedtime     furosemide 20 MG tablet  Commonly known as: LASIX  Take 1 tablet by mouth daily     levoFLOXacin 500 MG tablet  Commonly known as: LEVAQUIN  Take 1 tablet by mouth daily for 3 days     lisinopril 10 MG tablet  Commonly known as: PRINIVIL;ZESTRIL  TAKE ONE TABLET BY MOUTH DAILY               Where to Get Your Medications        These medications were sent to [facility] [id number] [place], [address]      Phone: [phone]   levoFLOXacin 500 MG tablet  metoprolol succinate 50 MG extended release tablet           Medications marked \"taking\" at this time  Outpatient Medications Marked as Taking for the 11/1/22 encounter (Office Visit) with Noah Washington MD   Medication Sig Dispense Refill    levoFLOXacin (LEVAQUIN) 500 MG tablet Take 1 tablet by mouth daily for 3 days 3 tablet 0    metoprolol succinate (TOPROL XL) 50 MG extended release tablet Take 1 tablet by mouth daily 90 tablet 1    apixaban (ELIQUIS) 5 MG TABS tablet Take 0.5 tablets by mouth 2 times daily 60 tablet 3    furosemide (LASIX) 20 MG tablet Take 1 tablet by mouth daily 60 tablet 3    flecainide (TAMBOCOR) 50 MG tablet Take 0.5 tablets by mouth in the

## 2022-11-02 ENCOUNTER — CARE COORDINATION (OUTPATIENT)
Dept: CASE MANAGEMENT | Age: 82
End: 2022-11-02

## 2022-11-02 NOTE — CARE COORDINATION
LPN CC received inbound call from patient's son, Debora Reynolds, HIPAA verified. States PCP prescribed patient levofloxacin at f/u yesterday. Son was informed by pharmacist upon picking up RX that this could have a serious interaction with flecainide. Son is inquiring if flecainide should or can be held or if patient should be switched to a different medication. LPN CC to route note to cardiology. Per Dr. Poncho El, patient may stop the flecainide during the course of the atbx and resume once they are completed OR see if patient can be changed to a non fluoroquinolone atbx. Spoke with patient's son Debora Reynolds. States they will temporarily hold the flecainide while on atbx course. States patient has already had flecainide today, thus they will begin atbx tomorrow and resume flecainide the day after atbx are completed.   Jimena Alejo [address] [place], [place] [phone]

## 2022-11-03 ENCOUNTER — CARE COORDINATION (OUTPATIENT)
Dept: CASE MANAGEMENT | Age: 82
End: 2022-11-03

## 2022-11-03 NOTE — CARE COORDINATION
[facility] Care Transitions Follow Up Call    Care Transition Nurse contacted the family by telephone to follow up after admission on 10/27/22. Verified name and  with family as identifiers. Patient: Eula Ivory  Patient : 1940   MRN: [id number]  Reason for Admission: CHF  Discharge Date: 10/29/22 RARS: Readmission Risk Score: 15.2      Needs to be reviewed by the provider   Additional needs identified to be addressed with provider: No  none             Method of communication with provider: none. Conversation:  spoke with Debora Reynolds after 2 IDs. Hippa verified. Dad is doing well. He is taking his meds properly and they understand need to hold flecainide. Also reviewed the AVS from the PCP office visit on 22. Is taking lisinopril and metoprolol appropiately. Reviewed CHF zone. States was not told to reduce salt but will watch a little and ask Dr Poncho El about this. Does watch edema and take weights daily. Agreeable to calls. Addressed changes since last contact:   AVS review  Discussed follow-up appointments. If no appointment was previously scheduled, appointment scheduling offered: Yes. Is follow up appointment scheduled within 7 days of discharge? Yes. Follow Up  Future Appointments   Date Time Provider Rod Villalobos   2022 10:20 AM [address]   2022 11:30 AM MD Irish Patel RD [facility] [place] - DYD   2023  1:30 PM Liz Arguello MD [facility] R[place]   4/10/2023  1:00 PM AYLIN Lua CNP [facility]     Non-[facility] follow up appointment(s):     Care Transition Nurse reviewed discharge instructions with family and discussed any barriers to care and/or understanding of plan of care after discharge. Discussed appropriate site of care based on symptoms and resources available to patient including: PCP. Denies current edema increase, SOB, weakness or weight gain.  The family agrees to contact the PCP office for questions related to their healthcare. Advance Care Planning:   not on file. Patients top risk factors for readmission: medical condition-CHF  Interventions to address risk factors: Education of patient/family/caregiver/guardian to support self-management-CHF zone    Offered patient enrollment in the Remote Patient Monitoring (RPM) program for in-home monitoring: NA.     Care Transitions Subsequent and Final Call    Subsequent and Final Calls  Care Transitions Interventions  Other Interventions:             Care Transition Nurse provided contact information for future needs. Plan for follow-up call in 7-10 days based on severity of symptoms and risk factors.   Plan for next call: symptom management-  Assess overall status, abnormal signs and symptoms, availability and effectiveness of medications, activity level and tolerance, falls/other unexpected events, findings from follow up appointments, seeking medical attention, who/when to call prn any needs, etc.   QUYEN HurtadoN, RN   [address] [place] [address] Transition Nurse  [phone]

## 2022-11-04 ENCOUNTER — CARE COORDINATION (OUTPATIENT)
Dept: CASE MANAGEMENT | Age: 82
End: 2022-11-04

## 2022-11-04 NOTE — CARE COORDINATION
[address] Transitions Follow Up Call    2022    Patient: Gabby Day  Patient : 1940   MRN: <[id number]>  Reason for Admission: CHF  Discharge Date: 10/29/22 RARS: Readmission Risk Score: 15.2         Spoke with: son, Ashutosh Chisholm, HIPAA verified    LPN CC spoke with patient's son, Ashutosh Chisholm per inbound call. States patient is reporting fatigue since starting the levofloxacin and has concerns that this is a side effect of the atbx. LPN CC advised that patient may just be healing & that this requires rest. Denies CP,  palpitations, dizziness, edema. Has sl SOB. Weight 109 on home scale at time of call. Had not eaten anything yet today. States they do have pulse ox, but had trouble getting a reading with it at time of call. LPN CC counseled patients hands may just need to be warmed up. This can be done by washing with warm water, or simply resting them under a blanket for a little while. LPN CC advised for them to call cardiology r/t parameters r/t pulse, but that generally as long as it is  that it is normal. Advised that the fatigue may be recovery related or related to the cessation of the flecainide. LPN CC advised if patient experiences any new or worsening sx to call provider or seek emergency medical tx. Patient to complete levofloxacin , resume flecainide . LPN CC reviewed this & son verbalized understanding, thanked LPN CC, & ended call. Patient may be a good candidate for RPM.   Jimena Alejo , LPN CC  Care Transitions  [phone]    Care Transitions Subsequent and Final Call    Subsequent and Final Calls  Care Transitions Interventions  Other Interventions:              Follow Up  Future Appointments   Date Time Provider Rod Villalobos   2022 10:20 AM [facility]   2022 11:30 AM MD Giancarlo Ribeiro RD [facility] [place] - DYD   2023  1:30 PM MD MALCOLM Owens   4/10/2023  1:00 PM Anisa Cardona APRN - [facility] Christopher Sharma LPN

## 2022-11-08 ENCOUNTER — CARE COORDINATION (OUTPATIENT)
Dept: CASE MANAGEMENT | Age: 82
End: 2022-11-08

## 2022-11-08 NOTE — CARE COORDINATION
[facility] Care Transitions Follow Up Call    [facility] Care Coordinator contacted the family by telephone to follow up after admission on 10/27-10/29. Verified name and  with family as identifiers. Patient: Breana Hodges  Patient : 1940   MRN: [id number]  Reason for Admission: CHF  Discharge Date: 10/29/22 RARS: Readmission Risk Score: 15.2      Needs to be reviewed by the provider   Additional needs identified to be addressed with provider: No  none             Method of communication with provider: none. PASQUALE CC spoke with patient's son, Janeen Johnson, HIPAA verified. States patient is well, but a little fatigued. Feels it is r/t transition back to flecainide. States if fatigue persists he will contact PCP. Weight is \"stable\". No values given. Denies SOB, cough, CP, fever/chills. Appetite good. Denies problems with bowels or bladder. Denies medication changes. Denies needs. Jeet Nunez  [facility]  Care Transitions  [phone]    Addressed changes since last contact:  none  Discussed follow-up appointments. If no appointment was previously scheduled, appointment scheduling offered: Yes. Is follow up appointment scheduled within 7 days of discharge? Yes. Follow Up  Future Appointments   Date Time Provider Rod Villalobos   2022 10:20 AM [address]   2022 11:30 AM MD Elida Yusuf RD PC [place] - DYD   2023  1:30 PM Nils Cruz MD [facility] R[place]   4/10/2023  1:00 PM AYLIN Elizabeth - CNP [facility]     Non-[facility] follow up appointment(s): NA    LPN Care Coordinator reviewed medical action plan and red flags with family and discussed any barriers to care and/or understanding of plan of care after discharge. Discussed appropriate site of care based on symptoms and resources available to patient including: PCP  Specialist  Urgent care clinics  When to call 911. The family agrees to contact the PCP office for questions related to their healthcare. Advance Care Planning:   not on file. Patients top risk factors for readmission: medical condition-CHF  Interventions to address risk factors: Assessment and support for treatment adherence and medication management-denied new or changed meds    Offered patient enrollment in the Remote Patient Monitoring (RPM) program for in-home monitoring: Yes, but did not enroll at this time. Care Transitions Subsequent and Final Call    Subsequent and Final Calls  Do you have any ongoing symptoms?: No  Have your medications changed?: No  Do you have any questions related to your medications?: No  Do you currently have any active services?: No  Do you have any needs or concerns that I can assist you with?: No  Identified Barriers: None  Care Transitions Interventions  Other Interventions:             LPN Care Coordinator provided contact information for future needs. Plan for follow-up call in 7-10 days based on severity of symptoms and risk factors.   Plan for next call: symptom management-SOB, cough, CP, edema  self management-weight  medication management-new or changed  Maxi Rodgers LPN

## 2022-11-14 ENCOUNTER — CARE COORDINATION (OUTPATIENT)
Dept: CASE MANAGEMENT | Age: 82
End: 2022-11-14

## 2022-11-14 NOTE — CARE COORDINATION
[facility] Care Transitions Follow Up Call    Patient: Sorin Grayson  Patient : 1940   MRN: [id number]  Reason for Admission: CHF exacerbation, ARF  Discharge Date: 10/29/22 RARS: Readmission Risk Score: 15.2      Needs to be reviewed by the provider   Additional needs identified to be addressed with provider: No  none             Method of communication with provider: none. Follow up outreach call attempt, no answer. CTN left  with contact information and request for return call. CTN will continue with outreach call attempts.      Follow Up  Future Appointments   Date Time Provider Rod Villalobos   2022 10:20 AM [facility]   2022 11:30 AM MD Dante Mann RD PC [place] - DYD   2023  1:30 PM Keith De Los Santos MD [facility] [place]   4/10/2023  1:00 PM AYLIN Ha - CNP [facility]        Care Transitions Subsequent and Final Call    Subsequent and Final Calls  Care Transitions Interventions  Other Interventions:           MICHELLE Vilchis, RN   Care Transition Nurse  Mobile: [phone]

## 2022-11-16 ENCOUNTER — CARE COORDINATION (OUTPATIENT)
Dept: CASE MANAGEMENT | Age: 82
End: 2022-11-16

## 2022-11-16 NOTE — CARE COORDINATION
[facility] Care Transitions Follow Up Call    Patient: Shubham Cruz  Patient : 1940   MRN: [id number]  Reason for Admission: ARF  Discharge Date: 10/29/22 RARS: Readmission Risk Score: 15.2    Second and final outreach call attempt, no answer. CTN left  with contact information and request for return call. CTN will resolve episode and hand off to [facility].     Follow Up  Future Appointments   Date Time Provider Rod Villalobos   2022 10:20 AM [address]   2022 11:30 AM MD Bee Farrell RD [facility] [place] - DYD   2023  1:30 PM Blayne Conrad MD [facility]   4/10/2023  1:00 PM Elías Armenta APRN - CNP [facility]     MICHELLE Lieberman, RN   Care Transition Nurse  Mobile: [phone]

## 2022-11-25 ENCOUNTER — HOSPITAL ENCOUNTER (OUTPATIENT)
Dept: CT IMAGING | Age: 82
Discharge: HOME | End: 2022-11-25
Payer: MEDICARE

## 2022-11-25 DIAGNOSIS — C37 MALIGNANT NEOPLASM OF THYMUS (HCC): ICD-10-CM

## 2022-11-25 PROCEDURE — 71260 CT THORAX DX C+: CPT

## 2022-11-25 PROCEDURE — 6360000004 HC RX CONTRAST MEDICATION: Performed by: INTERNAL MEDICINE

## 2022-11-25 RX ADMIN — IOPAMIDOL 75 ML: 755 INJECTION, SOLUTION INTRAVENOUS at 10:09

## 2022-12-02 ENCOUNTER — OFFICE VISIT (OUTPATIENT)
Dept: PRIMARY CARE CLINIC | Age: 82
End: 2022-12-02
Payer: MEDICARE

## 2022-12-02 VITALS
TEMPERATURE: 97.3 F | BODY MASS INDEX: 16.1 KG/M2 | DIASTOLIC BLOOD PRESSURE: 70 MMHG | OXYGEN SATURATION: 98 % | RESPIRATION RATE: 18 BRPM | WEIGHT: 109 LBS | HEART RATE: 45 BPM | SYSTOLIC BLOOD PRESSURE: 139 MMHG

## 2022-12-02 DIAGNOSIS — I48.0 PAROXYSMAL ATRIAL FIBRILLATION (HCC): ICD-10-CM

## 2022-12-02 DIAGNOSIS — I10 ESSENTIAL HYPERTENSION: ICD-10-CM

## 2022-12-02 DIAGNOSIS — Z23 NEED FOR INFLUENZA VACCINATION: ICD-10-CM

## 2022-12-02 DIAGNOSIS — E43 SEVERE MALNUTRITION (HCC): Chronic | ICD-10-CM

## 2022-12-02 DIAGNOSIS — Z98.890 S/P ABLATION OF ATRIAL FIBRILLATION: ICD-10-CM

## 2022-12-02 DIAGNOSIS — I50.32 CHRONIC DIASTOLIC HEART FAILURE (HCC): ICD-10-CM

## 2022-12-02 DIAGNOSIS — I42.9 CARDIOMYOPATHY, UNSPECIFIED TYPE (HCC): ICD-10-CM

## 2022-12-02 DIAGNOSIS — Z86.79 S/P ABLATION OF ATRIAL FIBRILLATION: ICD-10-CM

## 2022-12-02 DIAGNOSIS — D49.89 THYMOMA: ICD-10-CM

## 2022-12-02 PROCEDURE — 1123F ACP DISCUSS/DSCN MKR DOCD: CPT | Performed by: FAMILY MEDICINE

## 2022-12-02 PROCEDURE — G0008 ADMIN INFLUENZA VIRUS VAC: HCPCS | Performed by: FAMILY MEDICINE

## 2022-12-02 PROCEDURE — 3078F DIAST BP <80 MM HG: CPT | Performed by: FAMILY MEDICINE

## 2022-12-02 PROCEDURE — 90694 VACC AIIV4 NO PRSRV 0.5ML IM: CPT | Performed by: FAMILY MEDICINE

## 2022-12-02 PROCEDURE — 3074F SYST BP LT 130 MM HG: CPT | Performed by: FAMILY MEDICINE

## 2022-12-02 PROCEDURE — G8419 CALC BMI OUT NRM PARAM NOF/U: HCPCS | Performed by: FAMILY MEDICINE

## 2022-12-02 PROCEDURE — G8484 FLU IMMUNIZE NO ADMIN: HCPCS | Performed by: FAMILY MEDICINE

## 2022-12-02 PROCEDURE — 1036F TOBACCO NON-USER: CPT | Performed by: FAMILY MEDICINE

## 2022-12-02 PROCEDURE — G8427 DOCREV CUR MEDS BY ELIG CLIN: HCPCS | Performed by: FAMILY MEDICINE

## 2022-12-02 PROCEDURE — 99214 OFFICE O/P EST MOD 30 MIN: CPT | Performed by: FAMILY MEDICINE

## 2022-12-02 ASSESSMENT — ENCOUNTER SYMPTOMS
ABDOMINAL PAIN: 0
CONSTIPATION: 0
TROUBLE SWALLOWING: 0
DIARRHEA: 0
SHORTNESS OF BREATH: 0
BLOOD IN STOOL: 0
VOICE CHANGE: 0

## 2022-12-02 NOTE — PROGRESS NOTES
2022     Alicia Cunha (:  1940) is a 79 y.o. male, here for evaluation of the following medical concerns:    HPI  Patient presented to the office for regular follow-up. He has hypertension and takes Toprol-XL and lisinopril. He has paroxysmal A. fib status post catheter ablation, heart rate controlled with Toprol and flecainide, also takes Eliquis for thromboembolic reduction. He has cardiomyopathy and chronic diastolic heart failure compensated, appears to be euvolemic ,takes Lasix 20 mg daily, denies weight gain or shortness of breath or leg edema. He has thymoma. Oncologist Dr. Jorge Watson and recent CT of the chest showed reduction in size of thymoma. Patient is still wondering why he is losing weight ,apparently he has very poor food intake , eat only once a day. ,  thyroid function test is normal, nothing in the blood test to explain weight loss    Review of Systems   Constitutional:  Negative for activity change. HENT:  Negative for trouble swallowing and voice change. Eyes:  Negative for visual disturbance. Respiratory:  Negative for shortness of breath. Cardiovascular:  Negative for chest pain and leg swelling. Gastrointestinal:  Negative for abdominal pain, blood in stool, constipation and diarrhea. Genitourinary:  Negative for difficulty urinating, dysuria, frequency, hematuria and scrotal swelling. Musculoskeletal:  Negative for arthralgias and myalgias. Skin:  Negative for rash. Neurological:  Negative for dizziness. Psychiatric/Behavioral:  Negative for behavioral problems. Prior to Visit Medications    Medication Sig Taking?  Authorizing Provider   metoprolol succinate (TOPROL XL) 50 MG extended release tablet Take 1 tablet by mouth daily Yes Prem Ny MD   lisinopril (PRINIVIL;ZESTRIL) 10 MG tablet Take 0.5 tablets by mouth daily Yes Prem Ny MD   apixaban (ELIQUIS) 5 MG TABS tablet Take 0.5 tablets by mouth 2 times daily Yes Lala Valdivia, DO   furosemide (LASIX) 20 MG tablet Take 1 tablet by mouth daily Yes Lala Garsia Juanchetan, DO   flecainide (TAMBOCOR) 50 MG tablet Take 0.5 tablets by mouth in the morning and at bedtime Yes AYLIN Myles - CNP        Social History     Tobacco Use    Smoking status: Former     Packs/day: 1.00     Types: Cigarettes     Start date: 1955     Quit date: 1993     Years since quittin.9    Smokeless tobacco: Never   Substance Use Topics    Alcohol use: Yes     Comment: 1-2 daily        Vitals:    22 1142   BP: 139/70   Pulse: (!) 45   Resp: 18   Temp: 97.3 °F (36.3 °C)   TempSrc: Temporal   SpO2: 98%   Weight: 109 lb (49.4 kg)     Estimated body mass index is 16.1 kg/m² as calculated from the following:    Height as of 10/28/22: 5' 9\" (1.753 m). Weight as of this encounter: 109 lb (49.4 kg). Physical Exam  Constitutional:       Appearance: He is well-developed. HENT:      Head: Normocephalic. Eyes:      Conjunctiva/sclera: Conjunctivae normal.      Pupils: Pupils are equal, round, and reactive to light. Neck:      Thyroid: No thyromegaly. Cardiovascular:      Rate and Rhythm: Normal rate and regular rhythm. Heart sounds: Normal heart sounds. No murmur heard. Pulmonary:      Effort: Pulmonary effort is normal.      Breath sounds: Normal breath sounds. Abdominal:      General: Bowel sounds are normal.      Palpations: Abdomen is soft. There is no mass. Genitourinary:     Penis: Normal.       Prostate: Normal.   Musculoskeletal:         General: Normal range of motion. Cervical back: Normal range of motion and neck supple. Skin:     General: Skin is warm. Findings: No rash. Neurological:      Mental Status: He is alert. Psychiatric:         Behavior: Behavior normal.       ASSESSMENT/PLAN:  1. Essential hypertension  Controlled. Continue Toprol-XL 50 mg daily and lisinopril now at 5 mg daily    2.  Paroxysmal atrial fibrillation

## 2022-12-12 RX ORDER — LISINOPRIL 10 MG/1
TABLET ORAL
Qty: 90 TABLET | Refills: 3 | Status: SHIPPED | OUTPATIENT
Start: 2022-12-12

## 2022-12-12 NOTE — TELEPHONE ENCOUNTER
Last OV:10/10/2022  Last Labs:10/29/2022  Last Refills:9/13/2022  Next Appt:2/28/2022  Last EKG:  10/27/2022

## 2023-02-28 ENCOUNTER — OFFICE VISIT (OUTPATIENT)
Dept: CARDIOLOGY CLINIC | Age: 83
End: 2023-02-28

## 2023-02-28 VITALS
HEART RATE: 48 BPM | DIASTOLIC BLOOD PRESSURE: 68 MMHG | SYSTOLIC BLOOD PRESSURE: 122 MMHG | HEIGHT: 69 IN | WEIGHT: 109 LBS | BODY MASS INDEX: 16.14 KG/M2

## 2023-02-28 DIAGNOSIS — E87.6 HYPOKALEMIA: ICD-10-CM

## 2023-02-28 DIAGNOSIS — R01.1 SYSTOLIC MURMUR: ICD-10-CM

## 2023-02-28 DIAGNOSIS — I48.19 PERSISTENT ATRIAL FIBRILLATION (HCC): Primary | ICD-10-CM

## 2023-02-28 DIAGNOSIS — I42.8 OTHER CARDIOMYOPATHY (HCC): ICD-10-CM

## 2023-02-28 DIAGNOSIS — I10 ESSENTIAL HYPERTENSION: ICD-10-CM

## 2023-02-28 DIAGNOSIS — I50.22 CHRONIC SYSTOLIC HEART FAILURE (HCC): ICD-10-CM

## 2023-02-28 DIAGNOSIS — I48.0 PAROXYSMAL ATRIAL FIBRILLATION (HCC): ICD-10-CM

## 2023-02-28 DIAGNOSIS — E87.1 HYPONATREMIA: ICD-10-CM

## 2023-02-28 LAB
ANION GAP SERPL CALCULATED.3IONS-SCNC: 14 MMOL/L (ref 3–16)
BUN BLDV-MCNC: 35 MG/DL (ref 7–20)
CALCIUM SERPL-MCNC: 10.3 MG/DL (ref 8.3–10.6)
CHLORIDE BLD-SCNC: 100 MMOL/L (ref 99–110)
CO2: 28 MMOL/L (ref 21–32)
CREAT SERPL-MCNC: 1.1 MG/DL (ref 0.8–1.3)
GFR SERPL CREATININE-BSD FRML MDRD: >60 ML/MIN/{1.73_M2}
GLUCOSE BLD-MCNC: 95 MG/DL (ref 70–99)
MAGNESIUM: 2.4 MG/DL (ref 1.8–2.4)
POTASSIUM SERPL-SCNC: 4.3 MMOL/L (ref 3.5–5.1)
SODIUM BLD-SCNC: 142 MMOL/L (ref 136–145)

## 2023-02-28 RX ORDER — METOPROLOL SUCCINATE 25 MG/1
25 TABLET, EXTENDED RELEASE ORAL DAILY
Qty: 90 TABLET | Refills: 4 | Status: SHIPPED | OUTPATIENT
Start: 2023-02-28

## 2023-02-28 NOTE — PROGRESS NOTES
[address]      Cardiology Progress Note    Akhil Mosqueda  1940 February 28, 2023        CC: \"I have no heart symptoms. \"     HPI:  The patient is 79 y.o. male with a past medical history significant for atrial fibrillation, hypertension, and systolic heart failure. He was admitted 10/27 - 11/6/18 for worsening shortness of breath and weakness. He is a marathon runner and first noticed the symptoms while in [facility]. In the ER he was noted to be in Atrial fibrillation with RVR and also evidence of pneumonia. He had an echocardiogram completed that showed an LVEF of 30-35%. He was treated with amiodarone but was discontinued due to abnormal LFTs. He was discharged home on a BB and Eliquis at that time. He is s/p DCCV 1/2020 followed by EPS AFIB ablation and pulm vein isol. 5/8/20. S?p atrial flutter ablation on 3/4/2022. Today, he reports he is doing well and feeling well. He currently denies any new or recurrent sounding cardiac complaints. Patient denies exertional chest pain/pressure, dyspnea at rest, VILLELA, PND, orthopnea, palpitations, lightheadedness, weight changes, changes in LE edema, and syncope. He admits to medical therapy compliance and tolerating.        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    also sessile serrated adenoma    Wears glasses      Past Surgical History:   Procedure Laterality Date    CARDIOVERSION  01/10/2020    COLONOSCOPY N/A 1/15/2020    COLONOSCOPY performed by Roya Edmond MD at [address] THYROID BIOPSY  6/18/2021    US THYROID BIOPSY 6/18/2021 [facility]AS[facility]     Family History   Problem Relation Age of Onset    Heart Disease Father Cancer Sister         Colon    Cancer Brother         Colon    Heart Disease Brother     Heart Disease Brother      Social History     Tobacco Use    Smoking status: Former     Packs/day: 1.00     Types: Cigarettes     Start date: 1955     Quit date: 1993     Years since quittin.1    Smokeless tobacco: Never   Vaping Use    Vaping Use: Never used   Substance Use Topics    Alcohol use: Yes     Comment: 1-2 daily    Drug use: Not Currently       No Known Allergies    Current Outpatient Medications   Medication Sig Dispense Refill    lisinopril (PRINIVIL;ZESTRIL) 10 MG tablet TAKE ONE TABLET BY MOUTH DAILY 90 tablet 3    metoprolol succinate (TOPROL XL) 50 MG extended release tablet Take 1 tablet by mouth daily 90 tablet 1    apixaban (ELIQUIS) 5 MG TABS tablet Take 0.5 tablets by mouth 2 times daily 60 tablet 3    furosemide (LASIX) 20 MG tablet Take 1 tablet by mouth daily 60 tablet 3    flecainide (TAMBOCOR) 50 MG tablet Take 0.5 tablets by mouth in the morning and at bedtime 180 tablet 3     No current facility-administered medications for this visit. Review of Systems:  Constitutional: no unanticipated weight loss. There's been no change in energy level, sleep pattern, or activity level. No fevers, chills. Eyes: No visual changes or diplopia. No scleral icterus. ENT: No Headaches, hearing loss or vertigo. No mouth sores or sore throat. Cardiovascular: as reviewed in HPI  Respiratory: No cough or wheezing, no sputum production. No hematemesis. Gastrointestinal: No abdominal pain, appetite loss, blood in stools. No change in bowel or bladder habits. Genitourinary: No dysuria, trouble voiding, or hematuria. Musculoskeletal:  No gait disturbance, no joint complaints. Integumentary: No rash or pruritis. Neurological: No headache, diplopia, change in muscle strength, numbness or tingling. Psychiatric: No anxiety or depression. Endocrine: No temperature intolerance.  No excessive thirst, fluid intake, or urination. No tremor. Hematologic/Lymphatic: No abnormal bruising or bleeding, blood clots or swollen lymph nodes. Allergic/Immunologic: No nasal congestion or hives. Physical Exam:   /68   Pulse (!) 48   Ht 5' 9\" (1.753 m)   Wt 109 lb (49.4 kg)   BMI 16.10 kg/m²   Wt Readings from Last 3 Encounters:   02/28/23 109 lb (49.4 kg)   12/02/22 109 lb (49.4 kg)   11/01/22 107 lb (48.5 kg)     Physical Exam:   Constitutional: The patient is oriented to person, place, and time. Appears well-developed and well-nourished. In no acute distress. Head: Normocephalic and atraumatic. Pupils equal and round. Neck: Neck supple. No JVP or carotid bruit appreciated. No mass and no thyromegaly present. No lymphadenopathy present. Cardiovascular: Bradycardia. Regular rate and rhythm. Normal heart sounds. Exam reveals no gallop and no friction rub. 9-6/7 systolic murmur at the base  Pulmonary/Chest: Effort normal and breath sounds normal. No respiratory distress. No wheezes, rhonchi or rales. Abdominal: Soft, non-tender. Bowel sounds are normal. Exhibits no organomegaly, mass or bruit. Extremities: No edema. No cyanosis or clubbing. Pulses are 2+ radial and carotid bilaterally. Neurological: No gross cranial nerve deficit. Coordination normal.   Skin: Skin is warm and dry. There is no rash or diaphoresis. Psychiatric: Patient has a normal mood and affect.  Speech is normal and behavior is normal.       Lab Review:   Lab Results   Component Value Date/Time    TRIG 58 03/29/2018 09:15 AM    HDL 86 09/22/2022 11:30 AM    LDLCALC 80 09/22/2022 11:30 AM    LABVLDL 9 09/22/2022 11:30 AM     Lab Results   Component Value Date/Time     10/29/2022 05:28 AM      Lab Results   Component Value Date/Time    HGB 11.5 10/29/2022 05:28 AM    HCT 33.4 10/29/2022 05:28 AM       Lab Results   Component Value Date/Time     10/29/2022 05:28 AM     Lab Results   Component Value Date/Time    K 3.3 10/29/2022 05:28 AM     Lab Results   Component Value Date/Time    BUN 35 10/29/2022 05:28 AM    CREATININE 1.0 10/29/2022 05:28 AM       EKG Interpretation:   12/7/18 SR with PACs   1/2/19 Sinus bradycardia with PAC's  8/15/19 Sinus bradycardia with PAC's  1/3/20 Atrial fibrillation  2/21/2020 Atrial fibrillation  8/26/20 Sinus  Rhythm with Nonspecific QRS widening. Nonspecific T-abnormality. 1/28/21 Sinus  Bradycardia  - occasional PAC, # PACs = 1. Nonspecific QRS widening and right axis, 50 bpm.   2/1/22: not completed  8/25/2022 Sinus bradycardia  2/28/23 Marked sinus  Bradycardia  -Frequent PACs -atrial bigeminy, Nonspecific T-abnormality. Rightward axis and rotation -possible pulmonary disease. Image Review:     Echo 10/29/18  Left ventricular cavity size is normal.  Normal left ventricular wall thickness. Difficult to estimate EF due to increase rate and rhythm but appears depressed  Mitral valve leaflets appear mildly thickened. Mild mitral regurgitation. The left atrium is dilated. Aortic valve appears sclerotic but opens adequately. Trivial aortic regurgitation. Tricuspid valve is structurally normal.  Mild tricuspid regurgitation with RVSP of 26 mmHg. The right atrium is dilated. Suggest repeat limited echo with bubble study to r/o ASD or PFO     Echo limited 11/2/18   Left ventricular cavity size is normal.  Normal left ventricular wall thickness. Ejection fraction is visually estimated to be 30-35%, though difficult to adequately assess due to arrhythmia. Severe hypo to akinesis of the septum. Abnormal (paradoxical) septal motion is present. Tricuspid valve appears thickened with redundancy. The right atrium is mildly dilated. IVC not well visualized. A bubble study was performed and fails to show evidence of shunting. Stress 12/21/18  1. Technically a satisfactory study. 2. Positive treadmill exercise stress portion of the study.     3. No evidence of Ischemia by Myocardial Perfusion Imaging. 4. Gated Study shows normal LV size and Systolic function; EF is 56%. 5. The patient exercised for 6 min. on the Ramírez protocol to achieve a HR of    133, which is 94% of PMHR. The study was stopped due to elevated BP. There    was no chest pain . 1mm ST depression in lateral leads. 6. Diastolic BP crept upto 554 with peak exercise     Event monitor 2/13/19  NSR. Few episodes if PSVT. ECHO 5/15/19  Mitral valve leaflets appear thickened with mitral valve prolapse. moderate mitral regurgitation is present. The left atrium is mildly dilated. Tricuspid valve appear redundant. Mild to moderate tricuspid regurgitation. The right atrium is moderately dilated. IVC size is normal (<2.1 cm) but collapses < 50% with respiration consistent  with elevated RA pressure (8 mmHg). Estimated pulmonary artery systolic pressure is mildly elevated at 34 mmHg  assuming a right atrial pressure of 8 mmHg. Ejection fraction is visually estimated to be 40-45%. Echo: 10/8/20  Ejection fraction is visually estimated to be 50%. Indeterminate diastolic function. RV is dilated with mildly depressed function  RV volume overload  moderate TR    Echo: 1/20/22  Normal left ventricle size, wall thickness, and low normal systolic function  with an estimated ejection fraction of 50%. mild septal hypokinesis  Indeterminate diastolic function. Mitral valve leaflets appear mild-moderately thickened. Mild mitral annular calcification. Two seperate jets of Moderate mitral regurgitation. The left atrium is moderately to severely dilated. Dilated right ventricle. moderate tricuspid regurgitation. Cannot r/o tricuspid valve prolapse  No evidence of tricuspid stenosis. The right atrium is dilated. IVC size is normal (<2.1 cm) but collapses < 50% with respiration consistent  with elevated RA pressure (8 mmHg).   Estimated pulmonary artery systolic pressure is mildly elevated at 44 mmHg  assuming a right atrial pressure of 8 mmHg. Assessment/Plan:     Paroxysmal atrial fibrillation  -underwent cardioversion 1/10/2020 followed by EPS with radiofrequency AFIB ablation 5/8/20.   -8/11/21 AFIB and atrial tachycardia ablation per Dr. Blank Stephens     -Today, he denies any cardiac symptoms. -EKG again confirms sinus bradycardia. -I will reduce his Toprol-XL from 50 mg daily to 25 mg daily for his bradycardia. Cardiomyopathy/Chronic systolic heart failure  -Echocardiogram 10/8/20 LVEF improved to 50%. -Echo 1/20/22 LV function low normal 50%  -today, denies any s/s of CHF  -Appears compensated on exam.   -Continue with medical therapy including B-blocker reducing to 25 mg daily for bradycardia and lisinopril 10 mg daily.   -Encouraged a low sodium diet, daily fluid intake daily monitoring and daily weights. Hypertension, essential   Controlled  Continue current medical management. I have encouraged him to work on a low sodium diet and walking program.     Systolic murmur  8-2/6 per physical exam   1/2022 echo reviewed revealing trivial AR, aortic sclerosis with no AS, mod. MR and TR. Hypokalemia and hyponatremia  -10/2022 K 3.3, , Mg wnl.   -not currently on KCL  -will repeat BMP, Mg now     We will plan to follow up between 4-5 months. Thank you very much for allowing me to participate in the care of your patient. Please do not hesitate to contact me if you have any questions. Sincerely,  Trenton Márquez M.D. [facility], [address] [facility]  Ph: [phone]  Fax: [phone]    This note was scribed in the presence of Dr. Jaqueline Bamberger, MD by Salma Wallis RN.

## 2023-02-28 NOTE — PATIENT INSTRUCTIONS
1) Labs today [address]-orders given   2) REDUCE metoprolol succinate/Toprol-XL from 50 mg daily to 25 mg daily.    3) Okay to split metoprolol 50 mg tablets in half to equal 25 mg daily  until supply runs out  3) New prescription of metoprolol will be for 25 mg tablets once daily  4) Return in follow up between 4-5 months

## 2023-03-01 ENCOUNTER — TELEPHONE (OUTPATIENT)
Dept: CARDIOLOGY CLINIC | Age: 83
End: 2023-03-01

## 2023-03-01 NOTE — TELEPHONE ENCOUNTER
Ebony Chen called in this morning, he is returning a call to [place]. He can be reached at [phone].

## 2023-04-10 PROBLEM — D68.69 SECONDARY HYPERCOAGULABLE STATE (HCC): Status: ACTIVE | Noted: 2023-04-10

## 2023-04-28 ENCOUNTER — TELEPHONE (OUTPATIENT)
Dept: PRIMARY CARE CLINIC | Age: 83
End: 2023-04-28

## 2023-04-28 NOTE — TELEPHONE ENCOUNTER
Pt needs a refill on furosemide (LASIX) 20 MG tablet  send to   [facility] [id number] [address] - P [phone] - F [phone]

## 2023-04-29 DIAGNOSIS — I48.0 PAROXYSMAL ATRIAL FIBRILLATION (HCC): ICD-10-CM

## 2023-04-29 RX ORDER — FUROSEMIDE 20 MG/1
20 TABLET ORAL DAILY
Qty: 90 TABLET | Refills: 1 | Status: SHIPPED | OUTPATIENT
Start: 2023-04-29

## 2023-04-29 RX ORDER — METOPROLOL SUCCINATE 50 MG/1
TABLET, EXTENDED RELEASE ORAL
Qty: 90 TABLET | Refills: 1 | OUTPATIENT
Start: 2023-04-29

## 2023-04-29 RX ORDER — METOPROLOL SUCCINATE 25 MG/1
12.5 TABLET, EXTENDED RELEASE ORAL DAILY
Qty: 45 TABLET | Refills: 4 | Status: SHIPPED | OUTPATIENT
Start: 2023-04-29

## 2023-05-04 NOTE — TELEPHONE ENCOUNTER
Please add Metoprolol Rx refill      Medication Refill    Medication needing refilled:  metoprolol succinate (TOPROL XL)      Dosage of the medication:  25 MG extended release tablet       How are you taking this medication (QD, BID, TID, QID, PRN):  TAKE 1/2 TABLET BY MOUTH DAILY      30 or 90 day supply called in:90      Which Pharmacy are we sending the medication to?:  [facility] [address] [facility] [phone] Lynnette Mas [phone]   [address] [place] [address]   Phone:  [phone]  Fax:  [phone]
No

## 2023-06-28 ENCOUNTER — TELEPHONE (OUTPATIENT)
Dept: CARDIOLOGY CLINIC | Age: 83
End: 2023-06-28

## 2023-07-25 NOTE — TELEPHONE ENCOUNTER
Per eliquis dosing guidelines, patient is > 79years old and < 60 kg with stable creatinine. He will remain on Eliquis 2.5 mg BID. Will send to pharmacy accordingly.

## 2023-09-18 NOTE — TELEPHONE ENCOUNTER
Medication Refill    Medication needing refilled:ELIQUIS    Dosage of the medication:2.5mg    How are you taking this medication (QD, BID, TID, QID, PRN):Take 1 tablet by mouth 2 times daily    30 or 90 day supply called in:180    When will you run out of your medication:4 pills    Which Pharmacy are we sending the medication to?:[facility] [id number] - [facility], [address] [place] [address], [address] [place] [id number]     [facility] # [phone]

## 2023-09-19 ENCOUNTER — OFFICE VISIT (OUTPATIENT)
Dept: CARDIOLOGY CLINIC | Age: 83
End: 2023-09-19
Payer: MEDICARE

## 2023-09-19 VITALS
HEART RATE: 46 BPM | DIASTOLIC BLOOD PRESSURE: 62 MMHG | WEIGHT: 108 LBS | SYSTOLIC BLOOD PRESSURE: 100 MMHG | HEIGHT: 69 IN | BODY MASS INDEX: 16 KG/M2 | OXYGEN SATURATION: 98 %

## 2023-09-19 DIAGNOSIS — I10 ESSENTIAL HYPERTENSION: ICD-10-CM

## 2023-09-19 DIAGNOSIS — R00.1 BRADYCARDIA: ICD-10-CM

## 2023-09-19 DIAGNOSIS — Z79.899 ENCOUNTER FOR MONITORING FLECAINIDE THERAPY: ICD-10-CM

## 2023-09-19 DIAGNOSIS — E87.6 HYPOKALEMIA: ICD-10-CM

## 2023-09-19 DIAGNOSIS — I95.9 HYPOTENSION, UNSPECIFIED HYPOTENSION TYPE: ICD-10-CM

## 2023-09-19 DIAGNOSIS — R01.1 SYSTOLIC MURMUR: ICD-10-CM

## 2023-09-19 DIAGNOSIS — I50.22 CHRONIC SYSTOLIC HEART FAILURE (HCC): ICD-10-CM

## 2023-09-19 DIAGNOSIS — Z51.81 ENCOUNTER FOR MONITORING FLECAINIDE THERAPY: ICD-10-CM

## 2023-09-19 DIAGNOSIS — I48.19 PERSISTENT ATRIAL FIBRILLATION (HCC): ICD-10-CM

## 2023-09-19 DIAGNOSIS — I42.8 OTHER CARDIOMYOPATHY (HCC): Primary | ICD-10-CM

## 2023-09-19 DIAGNOSIS — R42 LIGHTHEADED: ICD-10-CM

## 2023-09-19 DIAGNOSIS — E87.1 HYPONATREMIA: ICD-10-CM

## 2023-09-19 PROCEDURE — G8427 DOCREV CUR MEDS BY ELIG CLIN: HCPCS | Performed by: INTERNAL MEDICINE

## 2023-09-19 PROCEDURE — G8419 CALC BMI OUT NRM PARAM NOF/U: HCPCS | Performed by: INTERNAL MEDICINE

## 2023-09-19 PROCEDURE — 1036F TOBACCO NON-USER: CPT | Performed by: INTERNAL MEDICINE

## 2023-09-19 PROCEDURE — 1123F ACP DISCUSS/DSCN MKR DOCD: CPT | Performed by: INTERNAL MEDICINE

## 2023-09-19 PROCEDURE — 99214 OFFICE O/P EST MOD 30 MIN: CPT | Performed by: INTERNAL MEDICINE

## 2023-09-19 PROCEDURE — 3078F DIAST BP <80 MM HG: CPT | Performed by: INTERNAL MEDICINE

## 2023-09-19 PROCEDURE — 93000 ELECTROCARDIOGRAM COMPLETE: CPT | Performed by: INTERNAL MEDICINE

## 2023-09-19 PROCEDURE — 3074F SYST BP LT 130 MM HG: CPT | Performed by: INTERNAL MEDICINE

## 2023-09-19 NOTE — PATIENT INSTRUCTIONS
1)  STOP furosemide/Lasix AND metoprolol succinate/Toprol-XL  2) Make an appt for 1 month after stopping medication to have a 30 day heart monitor completed   3) Complete labs 1-2 days prior to next office visit with Dr. Balaji Cramer   4) Follow up in 10-12 weeks.

## 2023-09-19 NOTE — PROGRESS NOTES
30-35%, though difficult to adequately assess due to arrhythmia. Severe hypo to akinesis of the septum. Abnormal (paradoxical) septal motion is present. Tricuspid valve appears thickened with redundancy. The right atrium is mildly dilated. IVC not well visualized. A bubble study was performed and fails to show evidence of shunting. Stress 12/21/18  1. Technically a satisfactory study. 2. Positive treadmill exercise stress portion of the study. 3. No evidence of Ischemia by Myocardial Perfusion Imaging. 4. Gated Study shows normal LV size and Systolic function; EF is 72%. 5. The patient exercised for 6 min. on the Ramírez protocol to achieve a HR of    133, which is 94% of PMHR. The study was stopped due to elevated BP. There    was no chest pain . 1mm ST depression in lateral leads. 6. Diastolic BP crept upto 835 with peak exercise     Event monitor 2/13/19  NSR. Few episodes if PSVT. ECHO 5/15/19  Mitral valve leaflets appear thickened with mitral valve prolapse. moderate mitral regurgitation is present. The left atrium is mildly dilated. Tricuspid valve appear redundant. Mild to moderate tricuspid regurgitation. The right atrium is moderately dilated. IVC size is normal (<2.1 cm) but collapses < 50% with respiration consistent  with elevated RA pressure (8 mmHg). Estimated pulmonary artery systolic pressure is mildly elevated at 34 mmHg  assuming a right atrial pressure of 8 mmHg. Ejection fraction is visually estimated to be 40-45%. Echo: 10/8/20  Ejection fraction is visually estimated to be 50%. Indeterminate diastolic function. RV is dilated with mildly depressed function  RV volume overload  moderate TR    Echo: 1/20/22  Normal left ventricle size, wall thickness, and low normal systolic function  with an estimated ejection fraction of 50%. mild septal hypokinesis  Indeterminate diastolic function. Mitral valve leaflets appear mild-moderately thickened.   Mild mitral

## 2023-10-31 RX ORDER — FUROSEMIDE 20 MG/1
20 TABLET ORAL DAILY
Qty: 90 TABLET | Refills: 1 | OUTPATIENT
Start: 2023-10-31

## 2023-10-31 NOTE — TELEPHONE ENCOUNTER
Per Dr Amanda Collazo note  9/19/23:      Hypertension, essential   Soft with lightheadedness  Continue current medical management with lisinopril   Discontinue metoprolol and Lasix   I have encouraged him to work on a low sodium diet and walking program.

## 2023-11-29 ENCOUNTER — OFFICE VISIT (OUTPATIENT)
Dept: CARDIOLOGY CLINIC | Age: 83
End: 2023-11-29
Payer: MEDICARE

## 2023-11-29 ENCOUNTER — TELEPHONE (OUTPATIENT)
Dept: CARDIOLOGY CLINIC | Age: 83
End: 2023-11-29

## 2023-11-29 VITALS
SYSTOLIC BLOOD PRESSURE: 100 MMHG | OXYGEN SATURATION: 93 % | DIASTOLIC BLOOD PRESSURE: 56 MMHG | HEIGHT: 69 IN | WEIGHT: 114 LBS | BODY MASS INDEX: 16.88 KG/M2 | HEART RATE: 130 BPM

## 2023-11-29 DIAGNOSIS — I50.22 CHRONIC SYSTOLIC HEART FAILURE (HCC): ICD-10-CM

## 2023-11-29 DIAGNOSIS — I10 ESSENTIAL HYPERTENSION: ICD-10-CM

## 2023-11-29 DIAGNOSIS — I48.19 PERSISTENT ATRIAL FIBRILLATION (HCC): Primary | ICD-10-CM

## 2023-11-29 DIAGNOSIS — I42.8 OTHER CARDIOMYOPATHY (HCC): ICD-10-CM

## 2023-11-29 DIAGNOSIS — E87.1 HYPONATREMIA: ICD-10-CM

## 2023-11-29 DIAGNOSIS — E87.6 HYPOKALEMIA: ICD-10-CM

## 2023-11-29 PROCEDURE — 1123F ACP DISCUSS/DSCN MKR DOCD: CPT | Performed by: INTERNAL MEDICINE

## 2023-11-29 PROCEDURE — 3078F DIAST BP <80 MM HG: CPT | Performed by: INTERNAL MEDICINE

## 2023-11-29 PROCEDURE — 99214 OFFICE O/P EST MOD 30 MIN: CPT | Performed by: INTERNAL MEDICINE

## 2023-11-29 PROCEDURE — G8484 FLU IMMUNIZE NO ADMIN: HCPCS | Performed by: INTERNAL MEDICINE

## 2023-11-29 PROCEDURE — 93000 ELECTROCARDIOGRAM COMPLETE: CPT | Performed by: INTERNAL MEDICINE

## 2023-11-29 PROCEDURE — 1036F TOBACCO NON-USER: CPT | Performed by: INTERNAL MEDICINE

## 2023-11-29 PROCEDURE — 3074F SYST BP LT 130 MM HG: CPT | Performed by: INTERNAL MEDICINE

## 2023-11-29 PROCEDURE — G8427 DOCREV CUR MEDS BY ELIG CLIN: HCPCS | Performed by: INTERNAL MEDICINE

## 2023-11-29 PROCEDURE — G8419 CALC BMI OUT NRM PARAM NOF/U: HCPCS | Performed by: INTERNAL MEDICINE

## 2023-11-29 RX ORDER — LISINOPRIL 10 MG/1
TABLET ORAL
Qty: 90 TABLET | Refills: 3 | Status: SHIPPED | OUTPATIENT
Start: 2023-11-29 | End: 2023-11-29

## 2023-11-29 RX ORDER — METOPROLOL SUCCINATE 25 MG/1
25 TABLET, EXTENDED RELEASE ORAL DAILY
Qty: 90 TABLET | Refills: 1 | Status: SHIPPED | OUTPATIENT
Start: 2023-11-29

## 2023-11-29 NOTE — TELEPHONE ENCOUNTER
Medication was refilled and sent to pharmacy. Dr. Florentin Simon saw patient in office and d/c his Lisinopril. Son Malachy Capron called the office wanting to verify if he is to d/c medication as it shows on his meds list on AVS.    Please advise.

## 2023-11-29 NOTE — PROGRESS NOTES
Continues with off/on lightheadedness but improved fatigue. Patient denies exertional chest pain/pressure, dyspnea at rest, VILLELA, PND, orthopnea,lightheadedness, weight changes, changes in LE edema, and syncope. The patient admits to medical therapy compliance and feels he is tolerating. Past Medical History:   Diagnosis Date    Atrial fibrillation ([address])     Cardiomyopathy ([address])     Chronic tachycardia 2018    Erectile dysfunction     Hypertension     Osteoarthritis     Pneumonia     Thymoma, benign     Tubulovillous adenoma polyp of colon 13    also sessile serrated adenoma    Wears glasses      Past Surgical History:   Procedure Laterality Date    CARDIOVERSION  01/10/2020    COLONOSCOPY N/A 1/15/2020    COLONOSCOPY performed by Sarah Harris MD at [facility]      S/P TUR    [address] THYROID BIOPSY  2021     THYROID BIOPSY 2021 [facility] ULTRASOUND     Family History   Problem Relation Age of Onset    Heart Disease Father     Cancer Sister         Colon    Cancer Brother         Colon    Heart Disease Brother     Heart Disease Brother      Social History     Tobacco Use    Smoking status: Former     Packs/day: 1     Types: Cigarettes     Start date: 1955     Quit date: 1993     Years since quittin.9    Smokeless tobacco: Never   Vaping Use    Vaping Use: Never used   Substance Use Topics    Alcohol use: Yes     Comment: 1-2 daily    Drug use: Not Currently       No Known Allergies    Current Outpatient Medications   Medication Sig Dispense Refill    apixaban (ELIQUIS) 2.5 MG TABS tablet Take 1 tablet by mouth 2 times daily 180 tablet 3    lisinopril (PRINIVIL;ZESTRIL) 10 MG tablet TAKE ONE TABLET BY MOUTH DAILY 90 tablet 3    flecainide (TAMBOCOR) 50 MG tablet Take 0.5 tablets by mouth in the morning and at bedtime 180 tablet 3     No current facility-administered medications for this visit.        Review of

## 2023-11-29 NOTE — TELEPHONE ENCOUNTER
Office visit today. AFIB  Today he notes his heart beat \"is not right. \"   His EKG and physical exam reveal AFIB with RVR  -I have instructed him to complete the labs that were ordered for this visit-TSH with reflex FT4, BMP, CBC. -he will remain on Eliquis, flecainide 25 mg BID and will resume toprol-XL 25 mg daily. We will then discontinue Lisinopril due to lightheadedness and soft BP.  -he will return to Dr. Halle Aiken to discuss AFIB treatment options and myself in 6 month f/u after EP eval and tx. Left Yayo Fish son at [phone] detailed vm with above information. Also it was written on AVS and highlighted.

## 2023-11-29 NOTE — PATIENT INSTRUCTIONS
Labs today, [address]    STOP Lisinopril    START metoprolol succinate 25 mg daily    Return visit with Dr. Mike Garcia to discuss AFIB treatment options and return after completion for a routine 6  month follow up.

## 2023-12-14 PROBLEM — R79.89 ELEVATED TROPONIN: Status: ACTIVE | Noted: 2023-12-14

## 2023-12-14 PROBLEM — I42.8 NONISCHEMIC CARDIOMYOPATHY (HCC): Status: ACTIVE | Noted: 2023-12-14

## 2023-12-14 PROBLEM — I48.91 A-FIB (HCC): Status: ACTIVE | Noted: 2023-12-14

## 2023-12-14 PROBLEM — I25.10 CORONARY ARTERY DISEASE INVOLVING NATIVE CORONARY ARTERY OF NATIVE HEART WITHOUT ANGINA PECTORIS: Status: ACTIVE | Noted: 2023-12-14

## 2023-12-14 PROBLEM — I10 PRIMARY HYPERTENSION: Status: ACTIVE | Noted: 2023-12-14

## 2023-12-14 PROBLEM — N30.00 ACUTE CYSTITIS WITHOUT HEMATURIA: Status: ACTIVE | Noted: 2023-12-14

## 2023-12-14 PROBLEM — I48.91 ATRIAL FIBRILLATION WITH RVR (HCC): Status: ACTIVE | Noted: 2023-12-14

## 2023-12-16 PROBLEM — I50.23 ACUTE ON CHRONIC SYSTOLIC HEART FAILURE (HCC): Status: ACTIVE | Noted: 2019-11-01

## 2023-12-16 PROBLEM — J90 PLEURAL EFFUSION, BILATERAL: Status: ACTIVE | Noted: 2023-12-16

## 2023-12-20 NOTE — PROGRESS NOTES
Cardiac Electrophysiology Follow Up  Date: 12/20/2023  Reason for Consultation: Atrial fibrillation  Consult Requesting Physician: Keith Rondon M.D.  Primary Care Physician: Mauro Moreira MD    No chief complaint on file.     HPI: Benny Mosqueda is a 83 y.o. patient with a history of atrial fibrillation, hypertension, and systolic heart failure He was first diagnosed with atrial fibrillation in October of 2018 after presenting to the ED with c/p worsening shortness of breath and weakness. In the ER he was noted to be in Atrial fibrillation with RVR and also evidence of pneumonia. He had an echocardiogram completed that showed an LVEF of 30-35%. He was treated with amiodarone but was discontinued due to abnormal LFTs. He was discharged home on a BB and Eliquis. He wore a 30 day event monitor that showed he was predominantly in normal sinus rhythm with 6 episodes of atrial fibrillation or atrial flutter. No symptoms were reported. Underwent successful CV on 1/10/2020 (Dr. Rondon). On 5/8/20 he underwent ablation of atrial fibrillation with Dr. Lobo. Noted to have recurrent AF and flutter on holter monitor 5/2021 and underwent repeat AF ablation on 8/11/21 with Dr. Pagan. Flecainide discontinued during follow up with Teresa Mar on 11/16/21 and 30 day event monitor placed. Patient called the office on 11/19/21 to report that he was \"back in Afib.\" He denied any symptoms, stating his Kardiamobile device showed a \"skipping heart beat.\" Teresa reviewed strips from event monitor revealing sustained Afib >4 hours, 21 seconds of an atrial run likely atrial flutter and NSVT .     He returned to office on 1/03/2022  for follow up to discuss findings on cardiac monitor. Stated he had an episode where he thought he had a rapid heart rate, but it felt differently than when he had atrial fibrillation. Stated he was instructed to resume Flecainide 25 mg bid and has remained compliant with therapy. He continued to

## 2023-12-21 PROBLEM — E87.1 HYPONATREMIA: Status: ACTIVE | Noted: 2023-12-21

## 2023-12-21 PROBLEM — E87.6 HYPOKALEMIA: Status: ACTIVE | Noted: 2023-12-21

## 2023-12-21 PROBLEM — I50.22 CHRONIC SYSTOLIC HEART FAILURE (HCC): Status: ACTIVE | Noted: 2023-12-21

## 2023-12-21 NOTE — PROGRESS NOTES
[address] [facility] [address]      Cardiology Progress Note    Lydia Mosqueda  1940 December 21, 2023        CC: \"    HPI:  The patient is 79 y.o. male with a past medical history significant for atrial fibrillation, hypertension, and systolic heart failure. He was admitted 10/27 - 11/6/18 for worsening shortness of breath and weakness. He is a marathon runner and first noticed the symptoms while in [place]. In the ER he was noted to be in Atrial fibrillation with RVR and also evidence of pneumonia. He had an echocardiogram completed that showed an LVEF of 30-35%. He was treated with amiodarone but was discontinued due to abnormal LFTs. He was discharged home on a BB and Eliquis at that time. He is s/p DCCV 1/2020 followed by EPS AFIB ablation and pulm vein isol. 5/8/20. S?p atrial flutter ablation on 3/4/2022. EP follow up with Dio Gallego NP 4/2023 with plans to continue flecainide 25mg BID, not interested in repeat ablation at this time if A fib/flutter can be documented but will call and get EKG done if has known recurrence. Decrease Toprol to 12.5mg QD given fatigue, bradycardia and soft BP.     9/19/2023 office visit, he was overall he reports he is doing well and feeling well from a cardiac standpoint with no specific cardiac complaints. he notes improvement with reduction in metoprolol per Dio Gallego NP back in the spring. He reports the occasional lightheadedness. Today his BP and HR are soft. Patient denies exertional chest pain/pressure, dyspnea at rest, VILLELA, PND, orthopnea, palpitations, weight changes, changes in LE edema, and syncope. The patient admits to medical therapy compliance and feels he is tolerating. 30 day Event monitor in 1 month after discontinuing metoprolol and furosemide   No showed to the monitor placement visit and GHISLAINE Moore 10/2023.  12/19/23, he reports that his \"heartbeat is not right sometimes. \" Otherwise he is asymptomatic.  Continues with off/on lightheadedness but

## 2023-12-26 ENCOUNTER — OFFICE VISIT (OUTPATIENT)
Dept: CARDIOLOGY CLINIC | Age: 83
End: 2023-12-26
Payer: MEDICARE

## 2023-12-26 VITALS
OXYGEN SATURATION: 95 % | HEART RATE: 50 BPM | HEIGHT: 69 IN | WEIGHT: 106 LBS | SYSTOLIC BLOOD PRESSURE: 104 MMHG | DIASTOLIC BLOOD PRESSURE: 70 MMHG | BODY MASS INDEX: 15.7 KG/M2

## 2023-12-26 DIAGNOSIS — I10 ESSENTIAL HYPERTENSION: ICD-10-CM

## 2023-12-26 DIAGNOSIS — I48.91 ATRIAL FIBRILLATION WITH RAPID VENTRICULAR RESPONSE (HCC): ICD-10-CM

## 2023-12-26 DIAGNOSIS — I50.22 CHRONIC SYSTOLIC HEART FAILURE (HCC): ICD-10-CM

## 2023-12-26 DIAGNOSIS — I42.9 CARDIOMYOPATHY, UNSPECIFIED TYPE (HCC): ICD-10-CM

## 2023-12-26 DIAGNOSIS — I10 ESSENTIAL HYPERTENSION: Primary | ICD-10-CM

## 2023-12-26 DIAGNOSIS — E87.1 HYPONATREMIA: ICD-10-CM

## 2023-12-26 DIAGNOSIS — E87.6 HYPOKALEMIA: ICD-10-CM

## 2023-12-26 LAB
ANION GAP SERPL CALCULATED.3IONS-SCNC: 10 MMOL/L (ref 3–16)
BUN SERPL-MCNC: 20 MG/DL (ref 7–20)
CALCIUM SERPL-MCNC: 9.6 MG/DL (ref 8.3–10.6)
CHLORIDE SERPL-SCNC: 96 MMOL/L (ref 99–110)
CO2 SERPL-SCNC: 28 MMOL/L (ref 21–32)
CREAT SERPL-MCNC: 1.2 MG/DL (ref 0.8–1.3)
GFR SERPLBLD CREATININE-BSD FMLA CKD-EPI: 59 ML/MIN/{1.73_M2}
GLUCOSE SERPL-MCNC: 88 MG/DL (ref 70–99)
POTASSIUM SERPL-SCNC: 4.7 MMOL/L (ref 3.5–5.1)
SODIUM SERPL-SCNC: 134 MMOL/L (ref 136–145)

## 2023-12-26 PROCEDURE — 1123F ACP DISCUSS/DSCN MKR DOCD: CPT | Performed by: INTERNAL MEDICINE

## 2023-12-26 PROCEDURE — G8427 DOCREV CUR MEDS BY ELIG CLIN: HCPCS | Performed by: INTERNAL MEDICINE

## 2023-12-26 PROCEDURE — 93000 ELECTROCARDIOGRAM COMPLETE: CPT | Performed by: INTERNAL MEDICINE

## 2023-12-26 PROCEDURE — 1036F TOBACCO NON-USER: CPT | Performed by: INTERNAL MEDICINE

## 2023-12-26 PROCEDURE — G8419 CALC BMI OUT NRM PARAM NOF/U: HCPCS | Performed by: INTERNAL MEDICINE

## 2023-12-26 PROCEDURE — 99214 OFFICE O/P EST MOD 30 MIN: CPT | Performed by: INTERNAL MEDICINE

## 2023-12-26 PROCEDURE — 3074F SYST BP LT 130 MM HG: CPT | Performed by: INTERNAL MEDICINE

## 2023-12-26 PROCEDURE — 3078F DIAST BP <80 MM HG: CPT | Performed by: INTERNAL MEDICINE

## 2023-12-26 PROCEDURE — 1111F DSCHRG MED/CURRENT MED MERGE: CPT | Performed by: INTERNAL MEDICINE

## 2023-12-26 PROCEDURE — G8484 FLU IMMUNIZE NO ADMIN: HCPCS | Performed by: INTERNAL MEDICINE

## 2023-12-26 NOTE — PROGRESS NOTES
[address] [facility]      Cardiology Progress Note    Larry Mosqueda  1940 December 26, 2023        CC: \"I feel better\"    HPI:  The patient is 79 y.o. male with a past medical history significant for atrial fibrillation, hypertension, and systolic heart failure. He was admitted 10/27 - 11/6/18 for worsening shortness of breath and weakness. He is a marathon runner and first noticed the symptoms while in [place]. In the ER he was noted to be in Atrial fibrillation with RVR and also evidence of pneumonia. He had an echocardiogram completed that showed an LVEF of 30-35%. He was treated with amiodarone but was discontinued due to abnormal LFTs. He was discharged home on a BB and Eliquis at that time. He is s/p DCCV 1/2020 followed by EPS AFIB ablation and pulm vein isol. 5/8/20. S?p atrial flutter ablation on 3/4/2022. EP follow up with Nash Murrieta NP 4/2023 with plans to continue flecainide 25mg BID, not interested in repeat ablation at this time if A fib/flutter can be documented but will call and get EKG done if has known recurrence. Decrease Toprol to 12.5mg QD given fatigue, bradycardia and soft BP.     9/19/2023 office visit, he was overall he reports he is doing well and feeling well from a cardiac standpoint with no specific cardiac complaints. he notes improvement with reduction in metoprolol per Nash Murrieta, NP back in the spring. He reports the occasional lightheadedness. Today his BP and HR are soft. Patient denies exertional chest pain/pressure, dyspnea at rest, VILLELA, PND, orthopnea, palpitations, weight changes, changes in LE edema, and syncope. The patient admits to medical therapy compliance and feels he is tolerating. 30 day Event monitor in 1 month after discontinuing metoprolol and furosemide   No showed to the monitor placement visit and GHISLAINE Moore 10/2023.  12/19/23, he reports that his \"heartbeat is not right sometimes. \" Otherwise he is asymptomatic.  Continues with off/on

## 2023-12-29 NOTE — PROGRESS NOTES
Cardiac Electrophysiology Follow Up  Date: 1/5/2024  Reason for Consultation: Atrial fibrillation  Consult Requesting Physician: Keith Rondon M.D.  Primary Care Physician: Mauro Moreira MD    Chief Complaint   Patient presents with    Follow-up     PT concerned with low O2, VILLELA, and more confused than normal.  These symptoms came about within the last 2 days.      HPI: Benny Mosqueda is a 83 y.o. patient with a history of atrial fibrillation, hypertension, and systolic heart failure He was first diagnosed with atrial fibrillation in October of 2018 after presenting to the ED with c/p worsening shortness of breath and weakness. In the ER he was noted to be in Atrial fibrillation with RVR and also evidence of pneumonia. He had an echocardiogram completed that showed an LVEF of 30-35%. He was treated with amiodarone but was discontinued due to abnormal LFTs. He was discharged home on a BB and Eliquis. He wore a 30 day event monitor that showed he was predominantly in normal sinus rhythm with 6 episodes of atrial fibrillation or atrial flutter. No symptoms were reported. Underwent successful CV on 1/10/2020 (Dr. Rondon). On 5/8/20 he underwent ablation of atrial fibrillation with Dr. Lobo. Noted to have recurrent AF and flutter on holter monitor 5/2021 and underwent repeat AF ablation on 8/11/21 with Dr. Pagan. Flecainide discontinued during follow up with Teresa Mar on 11/16/21 and 30 day event monitor placed. Patient called the office on 11/19/21 to report that he was \"back in Afib.\" He denied any symptoms, stating his Kardiamobile device showed a \"skipping heart beat.\" Teresa reviewed strips from event monitor revealing sustained Afib >4 hours, 21 seconds of an atrial run likely atrial flutter and NSVT .     He returned to office on 1/03/2022  for follow up to discuss findings on cardiac monitor. Stated he had an episode where he thought he had a rapid heart rate, but it felt differently than when he

## 2024-01-01 ENCOUNTER — APPOINTMENT (OUTPATIENT)
Dept: CT IMAGING | Age: 84
End: 2024-01-01
Payer: MEDICARE

## 2024-01-01 ENCOUNTER — HOSPITAL ENCOUNTER (INPATIENT)
Age: 84
LOS: 4 days | End: 2024-05-03
Attending: EMERGENCY MEDICINE | Admitting: HOSPITALIST
Payer: MEDICARE

## 2024-01-01 ENCOUNTER — APPOINTMENT (OUTPATIENT)
Dept: GENERAL RADIOLOGY | Age: 84
End: 2024-01-01
Payer: MEDICARE

## 2024-01-01 VITALS
HEIGHT: 69 IN | DIASTOLIC BLOOD PRESSURE: 47 MMHG | BODY MASS INDEX: 16.85 KG/M2 | WEIGHT: 113.76 LBS | HEART RATE: 20 BPM | SYSTOLIC BLOOD PRESSURE: 81 MMHG | RESPIRATION RATE: 28 BRPM | TEMPERATURE: 97.6 F | OXYGEN SATURATION: 82 %

## 2024-01-01 DIAGNOSIS — S01.111A LACERATION OF RIGHT EYEBROW, INITIAL ENCOUNTER: ICD-10-CM
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
## 2024-01-01 DIAGNOSIS — N18.9 ACUTE KIDNEY INJURY SUPERIMPOSED ON CKD (HCC): ICD-10-CM

## 2024-01-01 DIAGNOSIS — D64.9 ANEMIA, UNSPECIFIED TYPE: Primary | ICD-10-CM

## 2024-01-01 DIAGNOSIS — N17.9 ACUTE KIDNEY INJURY SUPERIMPOSED ON CKD (HCC): ICD-10-CM

## 2024-01-01 DIAGNOSIS — S09.90XA CLOSED HEAD INJURY, INITIAL ENCOUNTER: ICD-10-CM

## 2024-01-01 LAB
ALBUMIN SERPL-MCNC: 3.9 G/DL (ref 3.4–5)
ALBUMIN/GLOB SERPL: 1.8 {RATIO} (ref 1.1–2.2)
ALP SERPL-CCNC: 86 U/L (ref 40–129)
ALT SERPL-CCNC: 37 U/L (ref 10–40)
ANION GAP SERPL CALCULATED.3IONS-SCNC: 11 MMOL/L (ref 3–16)
ANION GAP SERPL CALCULATED.3IONS-SCNC: 12 MMOL/L (ref 3–16)
ANION GAP SERPL CALCULATED.3IONS-SCNC: 9 MMOL/L (ref 3–16)
ANION GAP SERPL CALCULATED.3IONS-SCNC: 9 MMOL/L (ref 3–16)
AST SERPL-CCNC: 56 U/L (ref 15–37)
BACTERIA UR CULT: NORMAL
BACTERIA URNS QL MICRO: ABNORMAL /HPF
BASOPHILS # BLD: 0 K/UL (ref 0–0.2)
BASOPHILS NFR BLD: 0.2 %
BASOPHILS NFR BLD: 0.3 %
BASOPHILS NFR BLD: 0.4 %
BASOPHILS NFR BLD: 0.7 %
BILIRUB SERPL-MCNC: 0.9 MG/DL (ref 0–1)
BILIRUB UR QL STRIP.AUTO: NEGATIVE
BUN SERPL-MCNC: 13 MG/DL (ref 7–20)
BUN SERPL-MCNC: 15 MG/DL (ref 7–20)
BUN SERPL-MCNC: 18 MG/DL (ref 7–20)
BUN SERPL-MCNC: 19 MG/DL (ref 7–20)
CALCIUM SERPL-MCNC: 8.6 MG/DL (ref 8.3–10.6)
CALCIUM SERPL-MCNC: 8.9 MG/DL (ref 8.3–10.6)
CALCIUM SERPL-MCNC: 9.3 MG/DL (ref 8.3–10.6)
CALCIUM SERPL-MCNC: 9.4 MG/DL (ref 8.3–10.6)
CHLORIDE SERPL-SCNC: 94 MMOL/L (ref 99–110)
CHLORIDE SERPL-SCNC: 95 MMOL/L (ref 99–110)
CHLORIDE SERPL-SCNC: 98 MMOL/L (ref 99–110)
CHLORIDE SERPL-SCNC: 99 MMOL/L (ref 99–110)
CLARITY UR: CLEAR
CO2 SERPL-SCNC: 22 MMOL/L (ref 21–32)
CO2 SERPL-SCNC: 23 MMOL/L (ref 21–32)
COLOR UR: YELLOW
CREAT SERPL-MCNC: 0.7 MG/DL (ref 0.8–1.3)
CREAT SERPL-MCNC: 0.9 MG/DL (ref 0.8–1.3)
CREAT SERPL-MCNC: 1 MG/DL (ref 0.8–1.3)
CREAT SERPL-MCNC: 1.5 MG/DL (ref 0.8–1.3)
DEPRECATED RDW RBC AUTO: 15.1 % (ref 12.4–15.4)
DEPRECATED RDW RBC AUTO: 15.4 % (ref 12.4–15.4)
DEPRECATED RDW RBC AUTO: 15.5 % (ref 12.4–15.4)
DEPRECATED RDW RBC AUTO: 15.6 % (ref 12.4–15.4)
EKG ATRIAL RATE: 54 BPM
EKG DIAGNOSIS: NORMAL
EKG P-R INTERVAL: 240 MS
EKG Q-T INTERVAL: 528 MS
EKG QRS DURATION: 164 MS
EKG QTC CALCULATION (BAZETT): 500 MS
EKG R AXIS: 90 DEGREES
EKG T AXIS: 55 DEGREES
EKG VENTRICULAR RATE: 54 BPM
EOSINOPHIL # BLD: 0 K/UL (ref 0–0.6)
EOSINOPHIL NFR BLD: 0.1 %
EOSINOPHIL NFR BLD: 0.4 %
EOSINOPHIL NFR BLD: 0.4 %
EOSINOPHIL NFR BLD: 0.7 %
EPI CELLS #/AREA URNS AUTO: 0 /HPF (ref 0–5)
FERRITIN SERPL IA-MCNC: 84.3 NG/ML (ref 30–400)
GFR SERPLBLD CREATININE-BSD FMLA CKD-EPI: 45 ML/MIN/{1.73_M2}
GFR SERPLBLD CREATININE-BSD FMLA CKD-EPI: 74 ML/MIN/{1.73_M2}
GFR SERPLBLD CREATININE-BSD FMLA CKD-EPI: 84 ML/MIN/{1.73_M2}
GFR SERPLBLD CREATININE-BSD FMLA CKD-EPI: >90 ML/MIN/{1.73_M2}
GLUCOSE BLD-MCNC: 122 MG/DL (ref 70–99)
GLUCOSE BLD-MCNC: 142 MG/DL (ref 70–99)
GLUCOSE SERPL-MCNC: 83 MG/DL (ref 70–99)
GLUCOSE SERPL-MCNC: 86 MG/DL (ref 70–99)
GLUCOSE SERPL-MCNC: 89 MG/DL (ref 70–99)
GLUCOSE SERPL-MCNC: 96 MG/DL (ref 70–99)
GLUCOSE UR STRIP.AUTO-MCNC: >=1000 MG/DL
HCT VFR BLD AUTO: 22.9 % (ref 40.5–52.5)
HCT VFR BLD AUTO: 24.9 % (ref 40.5–52.5)
HCT VFR BLD AUTO: 25.8 % (ref 40.5–52.5)
HCT VFR BLD AUTO: 26.9 % (ref 40.5–52.5)
HEMOCCULT STL QL: NORMAL
HGB BLD-MCNC: 8.1 G/DL (ref 13.5–17.5)
HGB BLD-MCNC: 8.8 G/DL (ref 13.5–17.5)
HGB BLD-MCNC: 8.9 G/DL (ref 13.5–17.5)
HGB BLD-MCNC: 9.6 G/DL (ref 13.5–17.5)
HGB UR QL STRIP.AUTO: ABNORMAL
HYALINE CASTS #/AREA URNS AUTO: 3 /LPF (ref 0–8)
IRON SATN MFR SERPL: 13 % (ref 20–50)
IRON SERPL-MCNC: 20 UG/DL (ref 59–158)
KETONES UR STRIP.AUTO-MCNC: NEGATIVE MG/DL
LEUKOCYTE ESTERASE UR QL STRIP.AUTO: ABNORMAL
LYMPHOCYTES # BLD: 0.5 K/UL (ref 1–5.1)
LYMPHOCYTES # BLD: 0.5 K/UL (ref 1–5.1)
LYMPHOCYTES # BLD: 0.6 K/UL (ref 1–5.1)
LYMPHOCYTES # BLD: 0.7 K/UL (ref 1–5.1)
LYMPHOCYTES NFR BLD: 12.1 %
LYMPHOCYTES NFR BLD: 16.1 %
LYMPHOCYTES NFR BLD: 6.5 %
LYMPHOCYTES NFR BLD: 8.6 %
MCH RBC QN AUTO: 32 PG (ref 26–34)
MCH RBC QN AUTO: 32.7 PG (ref 26–34)
MCH RBC QN AUTO: 32.8 PG (ref 26–34)
MCH RBC QN AUTO: 32.8 PG (ref 26–34)
MCHC RBC AUTO-ENTMCNC: 34.4 G/DL (ref 31–36)
MCHC RBC AUTO-ENTMCNC: 35.4 G/DL (ref 31–36)
MCHC RBC AUTO-ENTMCNC: 35.5 G/DL (ref 31–36)
MCHC RBC AUTO-ENTMCNC: 35.7 G/DL (ref 31–36)
MCV RBC AUTO: 91.5 FL (ref 80–100)
MCV RBC AUTO: 92.5 FL (ref 80–100)
MCV RBC AUTO: 92.7 FL (ref 80–100)
MCV RBC AUTO: 92.9 FL (ref 80–100)
MONOCYTES # BLD: 0.3 K/UL (ref 0–1.3)
MONOCYTES # BLD: 0.3 K/UL (ref 0–1.3)
MONOCYTES # BLD: 0.4 K/UL (ref 0–1.3)
MONOCYTES # BLD: 0.4 K/UL (ref 0–1.3)
MONOCYTES NFR BLD: 4.5 %
MONOCYTES NFR BLD: 5.7 %
MONOCYTES NFR BLD: 6.7 %
MONOCYTES NFR BLD: 8.7 %
NEUTROPHILS # BLD: 3.3 K/UL (ref 1.7–7.7)
NEUTROPHILS # BLD: 4 K/UL (ref 1.7–7.7)
NEUTROPHILS # BLD: 5 K/UL (ref 1.7–7.7)
NEUTROPHILS # BLD: 7.1 K/UL (ref 1.7–7.7)
NEUTROPHILS NFR BLD: 74.1 %
NEUTROPHILS NFR BLD: 80.1 %
NEUTROPHILS NFR BLD: 85 %
NEUTROPHILS NFR BLD: 88.7 %
NITRITE UR QL STRIP.AUTO: NEGATIVE
PERFORMED ON: ABNORMAL
PERFORMED ON: ABNORMAL
PH UR STRIP.AUTO: 6 [PH] (ref 5–8)
PLATELET # BLD AUTO: 181 K/UL (ref 135–450)
PLATELET # BLD AUTO: 192 K/UL (ref 135–450)
PLATELET # BLD AUTO: 208 K/UL (ref 135–450)
PLATELET # BLD AUTO: 239 K/UL (ref 135–450)
PMV BLD AUTO: 9.2 FL (ref 5–10.5)
PMV BLD AUTO: 9.5 FL (ref 5–10.5)
PMV BLD AUTO: 9.7 FL (ref 5–10.5)
PMV BLD AUTO: 9.8 FL (ref 5–10.5)
POTASSIUM SERPL-SCNC: 3.8 MMOL/L (ref 3.5–5.1)
POTASSIUM SERPL-SCNC: 4.1 MMOL/L (ref 3.5–5.1)
POTASSIUM SERPL-SCNC: 4.4 MMOL/L (ref 3.5–5.1)
POTASSIUM SERPL-SCNC: 4.6 MMOL/L (ref 3.5–5.1)
PROT SERPL-MCNC: 6.1 G/DL (ref 6.4–8.2)
PROT UR STRIP.AUTO-MCNC: NEGATIVE MG/DL
RBC # BLD AUTO: 2.47 M/UL (ref 4.2–5.9)
RBC # BLD AUTO: 2.69 M/UL (ref 4.2–5.9)
RBC # BLD AUTO: 2.77 M/UL (ref 4.2–5.9)
RBC # BLD AUTO: 2.94 M/UL (ref 4.2–5.9)
RBC CLUMPS #/AREA URNS AUTO: 3 /HPF (ref 0–4)
SODIUM SERPL-SCNC: 127 MMOL/L (ref 136–145)
SODIUM SERPL-SCNC: 130 MMOL/L (ref 136–145)
SODIUM SERPL-SCNC: 130 MMOL/L (ref 136–145)
SODIUM SERPL-SCNC: 131 MMOL/L (ref 136–145)
SP GR UR STRIP.AUTO: 1.02 (ref 1–1.03)
TIBC SERPL-MCNC: 158 UG/DL (ref 260–445)
TROPONIN, HIGH SENSITIVITY: 43 NG/L (ref 0–22)
TROPONIN, HIGH SENSITIVITY: 53 NG/L (ref 0–22)
TROPONIN, HIGH SENSITIVITY: 74 NG/L (ref 0–22)
UA COMPLETE W REFLEX CULTURE PNL UR: YES
UA DIPSTICK W REFLEX MICRO PNL UR: YES
URN SPEC COLLECT METH UR: ABNORMAL
UROBILINOGEN UR STRIP-ACNC: 1 E.U./DL
WBC # BLD AUTO: 4.4 K/UL (ref 4–11)
WBC # BLD AUTO: 4.9 K/UL (ref 4–11)
WBC # BLD AUTO: 5.8 K/UL (ref 4–11)
WBC # BLD AUTO: 8 K/UL (ref 4–11)
WBC #/AREA URNS AUTO: 16 /HPF (ref 0–5)

## 2024-01-01 PROCEDURE — 82728 ASSAY OF FERRITIN: CPT

## 2024-01-01 PROCEDURE — 97535 SELF CARE MNGMENT TRAINING: CPT

## 2024-01-01 PROCEDURE — 97530 THERAPEUTIC ACTIVITIES: CPT

## 2024-01-01 PROCEDURE — 99285 EMERGENCY DEPT VISIT HI MDM: CPT

## 2024-01-01 PROCEDURE — 80048 BASIC METABOLIC PNL TOTAL CA: CPT

## 2024-01-01 PROCEDURE — 36415 COLL VENOUS BLD VENIPUNCTURE: CPT

## 2024-01-01 PROCEDURE — 82270 OCCULT BLOOD FECES: CPT

## 2024-01-01 PROCEDURE — 93306 TTE W/DOPPLER COMPLETE: CPT

## 2024-01-01 PROCEDURE — 85025 COMPLETE CBC W/AUTO DIFF WBC: CPT

## 2024-01-01 PROCEDURE — 81001 URINALYSIS AUTO W/SCOPE: CPT

## 2024-01-01 PROCEDURE — 1200000000 HC SEMI PRIVATE

## 2024-01-01 PROCEDURE — 6370000000 HC RX 637 (ALT 250 FOR IP): Performed by: INTERNAL MEDICINE

## 2024-01-01 PROCEDURE — 87086 URINE CULTURE/COLONY COUNT: CPT

## 2024-01-01 PROCEDURE — 93010 ELECTROCARDIOGRAM REPORT: CPT | Performed by: INTERNAL MEDICINE

## 2024-01-01 PROCEDURE — 6370000000 HC RX 637 (ALT 250 FOR IP): Performed by: HOSPITALIST

## 2024-01-01 PROCEDURE — 84484 ASSAY OF TROPONIN QUANT: CPT

## 2024-01-01 PROCEDURE — 51798 US URINE CAPACITY MEASURE: CPT

## 2024-01-01 PROCEDURE — 2580000003 HC RX 258: Performed by: INTERNAL MEDICINE

## 2024-01-01 PROCEDURE — 94760 N-INVAS EAR/PLS OXIMETRY 1: CPT

## 2024-01-01 PROCEDURE — 90471 IMMUNIZATION ADMIN: CPT | Performed by: EMERGENCY MEDICINE

## 2024-01-01 PROCEDURE — 70450 CT HEAD/BRAIN W/O DYE: CPT

## 2024-01-01 PROCEDURE — 97162 PT EVAL MOD COMPLEX 30 MIN: CPT

## 2024-01-01 PROCEDURE — 72125 CT NECK SPINE W/O DYE: CPT

## 2024-01-01 PROCEDURE — 97166 OT EVAL MOD COMPLEX 45 MIN: CPT

## 2024-01-01 PROCEDURE — 80053 COMPREHEN METABOLIC PANEL: CPT

## 2024-01-01 PROCEDURE — 83550 IRON BINDING TEST: CPT

## 2024-01-01 PROCEDURE — 6360000002 HC RX W HCPCS: Performed by: INTERNAL MEDICINE

## 2024-01-01 PROCEDURE — 2580000003 HC RX 258: Performed by: HOSPITALIST

## 2024-01-01 PROCEDURE — 83540 ASSAY OF IRON: CPT

## 2024-01-01 PROCEDURE — 71045 X-RAY EXAM CHEST 1 VIEW: CPT

## 2024-01-01 PROCEDURE — 99233 SBSQ HOSP IP/OBS HIGH 50: CPT | Performed by: INTERNAL MEDICINE

## 2024-01-01 PROCEDURE — 6360000002 HC RX W HCPCS: Performed by: EMERGENCY MEDICINE

## 2024-01-01 PROCEDURE — 99222 1ST HOSP IP/OBS MODERATE 55: CPT | Performed by: INTERNAL MEDICINE

## 2024-01-01 PROCEDURE — 6360000002 HC RX W HCPCS: Performed by: REGISTERED NURSE

## 2024-01-01 PROCEDURE — 2500000003 HC RX 250 WO HCPCS: Performed by: EMERGENCY MEDICINE

## 2024-01-01 PROCEDURE — 2700000000 HC OXYGEN THERAPY PER DAY

## 2024-01-01 PROCEDURE — 0HQ1XZZ REPAIR FACE SKIN, EXTERNAL APPROACH: ICD-10-PCS | Performed by: EMERGENCY MEDICINE

## 2024-01-01 PROCEDURE — 93005 ELECTROCARDIOGRAM TRACING: CPT | Performed by: EMERGENCY MEDICINE

## 2024-01-01 PROCEDURE — 99232 SBSQ HOSP IP/OBS MODERATE 35: CPT | Performed by: INTERNAL MEDICINE

## 2024-01-01 PROCEDURE — 93356 MYOCRD STRAIN IMG SPCKL TRCK: CPT

## 2024-01-01 PROCEDURE — 12013 RPR F/E/E/N/L/M 2.6-5.0 CM: CPT

## 2024-01-01 PROCEDURE — 3E033XZ INTRODUCTION OF VASOPRESSOR INTO PERIPHERAL VEIN, PERCUTANEOUS APPROACH: ICD-10-PCS | Performed by: EMERGENCY MEDICINE

## 2024-01-01 PROCEDURE — 90715 TDAP VACCINE 7 YRS/> IM: CPT | Performed by: EMERGENCY MEDICINE

## 2024-01-01 RX ORDER — ONDANSETRON 2 MG/ML
4 INJECTION INTRAMUSCULAR; INTRAVENOUS EVERY 6 HOURS PRN
Status: DISCONTINUED | OUTPATIENT
Start: 2024-01-01 | End: 2024-01-01 | Stop reason: HOSPADM

## 2024-01-01 RX ORDER — VALSARTAN 80 MG/1
40 TABLET ORAL DAILY
Status: DISCONTINUED | OUTPATIENT
Start: 2024-01-01 | End: 2024-01-01 | Stop reason: HOSPADM

## 2024-01-01 RX ORDER — CITALOPRAM 20 MG/1
20 TABLET ORAL DAILY
Status: DISCONTINUED | OUTPATIENT
Start: 2024-01-01 | End: 2024-01-01 | Stop reason: HOSPADM

## 2024-01-01 RX ORDER — MORPHINE SULFATE 2 MG/ML
2 INJECTION, SOLUTION INTRAMUSCULAR; INTRAVENOUS
Status: DISCONTINUED | OUTPATIENT
Start: 2024-01-01 | End: 2024-01-01

## 2024-01-01 RX ORDER — SPIRONOLACTONE 25 MG/1
25 TABLET ORAL DAILY
Status: DISCONTINUED | OUTPATIENT
Start: 2024-01-01 | End: 2024-01-01 | Stop reason: HOSPADM

## 2024-01-01 RX ORDER — SODIUM CHLORIDE 9 MG/ML
INJECTION, SOLUTION INTRAVENOUS CONTINUOUS
Status: DISCONTINUED | OUTPATIENT
Start: 2024-01-01 | End: 2024-01-01

## 2024-01-01 RX ORDER — 0.9 % SODIUM CHLORIDE 0.9 %
500 INTRAVENOUS SOLUTION INTRAVENOUS ONCE
Status: COMPLETED | OUTPATIENT
Start: 2024-01-01 | End: 2024-01-01

## 2024-01-01 RX ORDER — TAMSULOSIN HYDROCHLORIDE 0.4 MG/1
0.4 CAPSULE ORAL DAILY
Status: DISCONTINUED | OUTPATIENT
Start: 2024-01-01 | End: 2024-01-01 | Stop reason: HOSPADM

## 2024-01-01 RX ORDER — FUROSEMIDE 10 MG/ML
INJECTION INTRAMUSCULAR; INTRAVENOUS
Status: DISCONTINUED
Start: 2024-01-01 | End: 2024-01-01 | Stop reason: HOSPADM

## 2024-01-01 RX ORDER — ATORVASTATIN CALCIUM 10 MG/1
10 TABLET, FILM COATED ORAL DAILY
Status: DISCONTINUED | OUTPATIENT
Start: 2024-01-01 | End: 2024-01-01 | Stop reason: HOSPADM

## 2024-01-01 RX ORDER — FERROUS SULFATE 324(65)MG
324 TABLET, DELAYED RELEASE (ENTERIC COATED) ORAL
Status: DISCONTINUED | OUTPATIENT
Start: 2024-01-01 | End: 2024-01-01 | Stop reason: HOSPADM

## 2024-01-01 RX ORDER — SODIUM CHLORIDE 0.9 % (FLUSH) 0.9 %
5-40 SYRINGE (ML) INJECTION PRN
Status: DISCONTINUED | OUTPATIENT
Start: 2024-01-01 | End: 2024-01-01 | Stop reason: HOSPADM

## 2024-01-01 RX ORDER — MORPHINE SULFATE 4 MG/ML
4 INJECTION, SOLUTION INTRAMUSCULAR; INTRAVENOUS EVERY 4 HOURS PRN
Status: DISCONTINUED | OUTPATIENT
Start: 2024-01-01 | End: 2024-01-01

## 2024-01-01 RX ORDER — LIDOCAINE HYDROCHLORIDE AND EPINEPHRINE 10; 10 MG/ML; UG/ML
20 INJECTION, SOLUTION INFILTRATION; PERINEURAL ONCE
Status: COMPLETED | OUTPATIENT
Start: 2024-01-01 | End: 2024-01-01

## 2024-01-01 RX ORDER — CITALOPRAM 20 MG/1
20 TABLET ORAL DAILY
COMMUNITY

## 2024-01-01 RX ORDER — AMIODARONE HYDROCHLORIDE 200 MG/1
200 TABLET ORAL DAILY
Status: DISCONTINUED | OUTPATIENT
Start: 2024-01-01 | End: 2024-01-01 | Stop reason: HOSPADM

## 2024-01-01 RX ORDER — ONDANSETRON 4 MG/1
4 TABLET, ORALLY DISINTEGRATING ORAL EVERY 8 HOURS PRN
Status: DISCONTINUED | OUTPATIENT
Start: 2024-01-01 | End: 2024-01-01 | Stop reason: HOSPADM

## 2024-01-01 RX ORDER — ACETAMINOPHEN 650 MG/1
650 SUPPOSITORY RECTAL EVERY 6 HOURS PRN
Status: DISCONTINUED | OUTPATIENT
Start: 2024-01-01 | End: 2024-01-01 | Stop reason: HOSPADM

## 2024-01-01 RX ORDER — MORPHINE SULFATE 2 MG/ML
1 INJECTION, SOLUTION INTRAMUSCULAR; INTRAVENOUS
Status: DISCONTINUED | OUTPATIENT
Start: 2024-01-01 | End: 2024-01-01 | Stop reason: HOSPADM

## 2024-01-01 RX ORDER — SODIUM CHLORIDE 9 MG/ML
INJECTION, SOLUTION INTRAVENOUS PRN
Status: DISCONTINUED | OUTPATIENT
Start: 2024-01-01 | End: 2024-01-01 | Stop reason: HOSPADM

## 2024-01-01 RX ORDER — MIDODRINE HYDROCHLORIDE 5 MG/1
5 TABLET ORAL
Status: DISCONTINUED | OUTPATIENT
Start: 2024-01-01 | End: 2024-01-01 | Stop reason: HOSPADM

## 2024-01-01 RX ORDER — POLYETHYLENE GLYCOL 3350 17 G/17G
17 POWDER, FOR SOLUTION ORAL DAILY PRN
Status: DISCONTINUED | OUTPATIENT
Start: 2024-01-01 | End: 2024-01-01 | Stop reason: HOSPADM

## 2024-01-01 RX ORDER — FUROSEMIDE 10 MG/ML
20 INJECTION INTRAMUSCULAR; INTRAVENOUS ONCE
Status: COMPLETED | OUTPATIENT
Start: 2024-01-01 | End: 2024-01-01

## 2024-01-01 RX ORDER — FUROSEMIDE 10 MG/ML
INJECTION INTRAMUSCULAR; INTRAVENOUS
Status: COMPLETED | OUTPATIENT
Start: 2024-01-01 | End: 2024-01-01

## 2024-01-01 RX ORDER — SODIUM CHLORIDE 0.9 % (FLUSH) 0.9 %
5-40 SYRINGE (ML) INJECTION EVERY 12 HOURS SCHEDULED
Status: DISCONTINUED | OUTPATIENT
Start: 2024-01-01 | End: 2024-01-01 | Stop reason: HOSPADM

## 2024-01-01 RX ORDER — ACETAMINOPHEN 325 MG/1
650 TABLET ORAL EVERY 6 HOURS PRN
Status: DISCONTINUED | OUTPATIENT
Start: 2024-01-01 | End: 2024-01-01 | Stop reason: HOSPADM

## 2024-01-01 RX ADMIN — MIDODRINE HYDROCHLORIDE 5 MG: 5 TABLET ORAL at 18:04

## 2024-01-01 RX ADMIN — TAMSULOSIN HYDROCHLORIDE 0.4 MG: 0.4 CAPSULE ORAL at 08:43

## 2024-01-01 RX ADMIN — MIDODRINE HYDROCHLORIDE 5 MG: 5 TABLET ORAL at 17:26

## 2024-01-01 RX ADMIN — APIXABAN 2.5 MG: 2.5 TABLET, FILM COATED ORAL at 22:28

## 2024-01-01 RX ADMIN — MIDODRINE HYDROCHLORIDE 5 MG: 5 TABLET ORAL at 08:23

## 2024-01-01 RX ADMIN — FERROUS SULFATE TAB EC 324 MG (65 MG FE EQUIVALENT) 324 MG: 324 (65 FE) TABLET DELAYED RESPONSE at 08:23

## 2024-01-01 RX ADMIN — EMPAGLIFLOZIN 10 MG: 10 TABLET, FILM COATED ORAL at 08:27

## 2024-01-01 RX ADMIN — SODIUM CHLORIDE, PRESERVATIVE FREE 10 ML: 5 INJECTION INTRAVENOUS at 22:15

## 2024-01-01 RX ADMIN — SODIUM CHLORIDE, PRESERVATIVE FREE 10 ML: 5 INJECTION INTRAVENOUS at 08:44

## 2024-01-01 RX ADMIN — MIDODRINE HYDROCHLORIDE 5 MG: 5 TABLET ORAL at 12:19

## 2024-01-01 RX ADMIN — ATORVASTATIN CALCIUM 10 MG: 10 TABLET, FILM COATED ORAL at 08:27

## 2024-01-01 RX ADMIN — TETANUS TOXOID, REDUCED DIPHTHERIA TOXOID AND ACELLULAR PERTUSSIS VACCINE, ADSORBED 0.5 ML: 5; 2.5; 8; 8; 2.5 SUSPENSION INTRAMUSCULAR at 15:52

## 2024-01-01 RX ADMIN — AMIODARONE HYDROCHLORIDE 200 MG: 200 TABLET ORAL at 08:23

## 2024-01-01 RX ADMIN — VALSARTAN 40 MG: 80 TABLET ORAL at 12:55

## 2024-01-01 RX ADMIN — APIXABAN 2.5 MG: 2.5 TABLET, FILM COATED ORAL at 23:33

## 2024-01-01 RX ADMIN — LIDOCAINE HYDROCHLORIDE,EPINEPHRINE BITARTRATE 20 ML: 10; .01 INJECTION, SOLUTION INFILTRATION; PERINEURAL at 14:32

## 2024-01-01 RX ADMIN — APIXABAN 2.5 MG: 2.5 TABLET, FILM COATED ORAL at 08:29

## 2024-01-01 RX ADMIN — MORPHINE SULFATE 4 MG: 4 INJECTION, SOLUTION INTRAMUSCULAR; INTRAVENOUS at 20:03

## 2024-01-01 RX ADMIN — FUROSEMIDE 20 MG: 10 INJECTION, SOLUTION INTRAMUSCULAR; INTRAVENOUS at 19:50

## 2024-01-01 RX ADMIN — SODIUM CHLORIDE: 9 INJECTION, SOLUTION INTRAVENOUS at 16:35

## 2024-01-01 RX ADMIN — MIDODRINE HYDROCHLORIDE 5 MG: 5 TABLET ORAL at 08:43

## 2024-01-01 RX ADMIN — ATORVASTATIN CALCIUM 10 MG: 10 TABLET, FILM COATED ORAL at 08:43

## 2024-01-01 RX ADMIN — SODIUM CHLORIDE, PRESERVATIVE FREE 10 ML: 5 INJECTION INTRAVENOUS at 08:29

## 2024-01-01 RX ADMIN — FERROUS SULFATE TAB EC 324 MG (65 MG FE EQUIVALENT) 324 MG: 324 (65 FE) TABLET DELAYED RESPONSE at 08:43

## 2024-01-01 RX ADMIN — CITALOPRAM HYDROBROMIDE 20 MG: 20 TABLET ORAL at 13:06

## 2024-01-01 RX ADMIN — IRON SUCROSE 500 MG: 20 INJECTION, SOLUTION INTRAVENOUS at 13:03

## 2024-01-01 RX ADMIN — SPIRONOLACTONE 25 MG: 25 TABLET ORAL at 10:12

## 2024-01-01 RX ADMIN — APIXABAN 2.5 MG: 2.5 TABLET, FILM COATED ORAL at 08:23

## 2024-01-01 RX ADMIN — TAMSULOSIN HYDROCHLORIDE 0.4 MG: 0.4 CAPSULE ORAL at 08:23

## 2024-01-01 RX ADMIN — AMIODARONE HYDROCHLORIDE 200 MG: 200 TABLET ORAL at 08:43

## 2024-01-01 RX ADMIN — APIXABAN 2.5 MG: 2.5 TABLET, FILM COATED ORAL at 08:44

## 2024-01-01 RX ADMIN — SODIUM CHLORIDE 500 ML: 9 INJECTION, SOLUTION INTRAVENOUS at 16:34

## 2024-01-01 RX ADMIN — AMIODARONE HYDROCHLORIDE 200 MG: 200 TABLET ORAL at 08:28

## 2024-01-01 RX ADMIN — MIDODRINE HYDROCHLORIDE 5 MG: 5 TABLET ORAL at 12:12

## 2024-01-01 RX ADMIN — MIDODRINE HYDROCHLORIDE 5 MG: 5 TABLET ORAL at 12:55

## 2024-01-01 RX ADMIN — ACETAMINOPHEN 325MG 650 MG: 325 TABLET ORAL at 23:41

## 2024-01-01 RX ADMIN — ATORVASTATIN CALCIUM 10 MG: 10 TABLET, FILM COATED ORAL at 08:23

## 2024-01-01 RX ADMIN — SPIRONOLACTONE 25 MG: 25 TABLET ORAL at 08:27

## 2024-01-01 RX ADMIN — ACETAMINOPHEN 325MG 650 MG: 325 TABLET ORAL at 13:11

## 2024-01-01 RX ADMIN — MIDODRINE HYDROCHLORIDE 5 MG: 5 TABLET ORAL at 16:40

## 2024-01-01 RX ADMIN — ACETAMINOPHEN 325MG 650 MG: 325 TABLET ORAL at 18:03

## 2024-01-01 RX ADMIN — FUROSEMIDE 20 MG: 10 INJECTION, SOLUTION INTRAMUSCULAR; INTRAVENOUS at 19:55

## 2024-01-01 RX ADMIN — SODIUM CHLORIDE, PRESERVATIVE FREE 10 ML: 5 INJECTION INTRAVENOUS at 22:33

## 2024-01-01 RX ADMIN — TAMSULOSIN HYDROCHLORIDE 0.4 MG: 0.4 CAPSULE ORAL at 08:27

## 2024-01-01 RX ADMIN — ACETAMINOPHEN 325MG 650 MG: 325 TABLET ORAL at 01:33

## 2024-01-01 RX ADMIN — EMPAGLIFLOZIN 10 MG: 10 TABLET, FILM COATED ORAL at 10:12

## 2024-01-01 RX ADMIN — MIDODRINE HYDROCHLORIDE 5 MG: 5 TABLET ORAL at 08:28

## 2024-01-01 RX ADMIN — SODIUM CHLORIDE: 9 INJECTION, SOLUTION INTRAVENOUS at 05:27

## 2024-01-01 RX ADMIN — FERROUS SULFATE TAB EC 324 MG (65 MG FE EQUIVALENT) 324 MG: 324 (65 FE) TABLET DELAYED RESPONSE at 08:27

## 2024-01-01 RX ADMIN — APIXABAN 2.5 MG: 2.5 TABLET, FILM COATED ORAL at 20:31

## 2024-01-01 RX ADMIN — MORPHINE SULFATE 2 MG: 2 INJECTION, SOLUTION INTRAMUSCULAR; INTRAVENOUS at 21:50

## 2024-01-01 ASSESSMENT — PAIN DESCRIPTION - ORIENTATION
ORIENTATION: LEFT;RIGHT
ORIENTATION: INNER
ORIENTATION: ANTERIOR

## 2024-01-01 ASSESSMENT — PAIN SCALES - GENERAL
PAINLEVEL_OUTOF10: 10
PAINLEVEL_OUTOF10: 0
PAINLEVEL_OUTOF10: 0
PAINLEVEL_OUTOF10: 7
PAINLEVEL_OUTOF10: 8
PAINLEVEL_OUTOF10: 7
PAINLEVEL_OUTOF10: 2
PAINLEVEL_OUTOF10: 0
PAINLEVEL_OUTOF10: 0

## 2024-01-01 ASSESSMENT — PAIN DESCRIPTION - LOCATION
LOCATION: BACK
LOCATION: HEAD
LOCATION: HEAD
LOCATION: PENIS
LOCATION: EYE;FACE

## 2024-01-01 ASSESSMENT — PAIN DESCRIPTION - DESCRIPTORS
DESCRIPTORS: THROBBING
DESCRIPTORS: DISCOMFORT
DESCRIPTORS: ACHING;SORE

## 2024-01-05 ENCOUNTER — OFFICE VISIT (OUTPATIENT)
Dept: CARDIOLOGY CLINIC | Age: 84
End: 2024-01-05
Payer: MEDICARE

## 2024-01-05 VITALS
WEIGHT: 104 LBS | HEART RATE: 92 BPM | SYSTOLIC BLOOD PRESSURE: 80 MMHG | BODY MASS INDEX: 15.4 KG/M2 | HEIGHT: 69 IN | DIASTOLIC BLOOD PRESSURE: 42 MMHG | OXYGEN SATURATION: 85 %

## 2024-01-05 DIAGNOSIS — I42.9 CARDIOMYOPATHY, UNSPECIFIED TYPE (HCC): Primary | ICD-10-CM

## 2024-01-05 PROCEDURE — 3074F SYST BP LT 130 MM HG: CPT | Performed by: INTERNAL MEDICINE

## 2024-01-05 PROCEDURE — 1123F ACP DISCUSS/DSCN MKR DOCD: CPT | Performed by: INTERNAL MEDICINE

## 2024-01-05 PROCEDURE — 93000 ELECTROCARDIOGRAM COMPLETE: CPT | Performed by: INTERNAL MEDICINE

## 2024-01-05 PROCEDURE — G8484 FLU IMMUNIZE NO ADMIN: HCPCS | Performed by: INTERNAL MEDICINE

## 2024-01-05 PROCEDURE — 1111F DSCHRG MED/CURRENT MED MERGE: CPT | Performed by: INTERNAL MEDICINE

## 2024-01-05 PROCEDURE — 3078F DIAST BP <80 MM HG: CPT | Performed by: INTERNAL MEDICINE

## 2024-01-05 PROCEDURE — G8427 DOCREV CUR MEDS BY ELIG CLIN: HCPCS | Performed by: INTERNAL MEDICINE

## 2024-01-05 PROCEDURE — G8419 CALC BMI OUT NRM PARAM NOF/U: HCPCS | Performed by: INTERNAL MEDICINE

## 2024-01-05 PROCEDURE — 1036F TOBACCO NON-USER: CPT | Performed by: INTERNAL MEDICINE

## 2024-01-05 PROCEDURE — 99214 OFFICE O/P EST MOD 30 MIN: CPT | Performed by: INTERNAL MEDICINE

## 2024-01-10 ENCOUNTER — HOSPITAL ENCOUNTER (OUTPATIENT)
Dept: GENERAL RADIOLOGY | Age: 84
Discharge: HOME | End: 2024-01-10
Attending: INTERNAL MEDICINE
Payer: MEDICARE

## 2024-01-10 ENCOUNTER — OFFICE VISIT (OUTPATIENT)
Dept: CARDIOLOGY CLINIC | Age: 84
End: 2024-01-10
Payer: MEDICARE

## 2024-01-10 ENCOUNTER — HOSPITAL ENCOUNTER (OUTPATIENT)
Age: 84
Discharge: HOME | End: 2024-01-10
Payer: MEDICARE

## 2024-01-10 VITALS
WEIGHT: 101 LBS | OXYGEN SATURATION: 86 % | BODY MASS INDEX: 14.96 KG/M2 | HEART RATE: 92 BPM | HEIGHT: 69 IN | SYSTOLIC BLOOD PRESSURE: 110 MMHG | DIASTOLIC BLOOD PRESSURE: 62 MMHG

## 2024-01-10 DIAGNOSIS — E87.6 HYPOKALEMIA: ICD-10-CM

## 2024-01-10 DIAGNOSIS — I48.0 PAROXYSMAL ATRIAL FIBRILLATION (HCC): Primary | ICD-10-CM

## 2024-01-10 DIAGNOSIS — R42 DIZZINESS: ICD-10-CM

## 2024-01-10 DIAGNOSIS — R06.09 DOE (DYSPNEA ON EXERTION): ICD-10-CM

## 2024-01-10 DIAGNOSIS — I10 ESSENTIAL HYPERTENSION: ICD-10-CM

## 2024-01-10 DIAGNOSIS — E87.5 HYPERKALEMIA: ICD-10-CM

## 2024-01-10 DIAGNOSIS — I50.22 CHRONIC SYSTOLIC HEART FAILURE (HCC): ICD-10-CM

## 2024-01-10 DIAGNOSIS — R53.1 WEAKNESS: ICD-10-CM

## 2024-01-10 DIAGNOSIS — I42.8 NICM (NONISCHEMIC CARDIOMYOPATHY) (HCC): ICD-10-CM

## 2024-01-10 DIAGNOSIS — E87.1 HYPONATREMIA: ICD-10-CM

## 2024-01-10 DIAGNOSIS — I42.9 CARDIOMYOPATHY, UNSPECIFIED TYPE (HCC): ICD-10-CM

## 2024-01-10 DIAGNOSIS — R79.89 ELEVATED BRAIN NATRIURETIC PEPTIDE (BNP) LEVEL: ICD-10-CM

## 2024-01-10 DIAGNOSIS — N17.9 AKI (ACUTE KIDNEY INJURY) (HCC): ICD-10-CM

## 2024-01-10 DIAGNOSIS — I42.9 CARDIOMYOPATHY, UNSPECIFIED TYPE (HCC): Primary | ICD-10-CM

## 2024-01-10 LAB
ANION GAP SERPL CALCULATED.3IONS-SCNC: 12 MMOL/L (ref 3–16)
BUN SERPL-MCNC: 65 MG/DL (ref 7–20)
CALCIUM SERPL-MCNC: 10.1 MG/DL (ref 8.3–10.6)
CHLORIDE SERPL-SCNC: 93 MMOL/L (ref 99–110)
CO2 SERPL-SCNC: 25 MMOL/L (ref 21–32)
CREAT SERPL-MCNC: 2.2 MG/DL (ref 0.8–1.3)
DEPRECATED RDW RBC AUTO: 14.6 % (ref 12.4–15.4)
GFR SERPLBLD CREATININE-BSD FMLA CKD-EPI: 29 ML/MIN/{1.73_M2}
GLUCOSE SERPL-MCNC: 111 MG/DL (ref 70–99)
HCT VFR BLD AUTO: 42.4 % (ref 40.5–52.5)
HGB BLD-MCNC: 13.6 G/DL (ref 13.5–17.5)
MCH RBC QN AUTO: 27.6 PG (ref 26–34)
MCHC RBC AUTO-ENTMCNC: 32.1 G/DL (ref 31–36)
MCV RBC AUTO: 85.9 FL (ref 80–100)
NT-PROBNP SERPL-MCNC: 1014 PG/ML (ref 0–449)
PLATELET # BLD AUTO: 286 K/UL (ref 135–450)
PMV BLD AUTO: 11.6 FL (ref 5–10.5)
POTASSIUM SERPL-SCNC: 5.5 MMOL/L (ref 3.5–5.1)
RBC # BLD AUTO: 4.93 M/UL (ref 4.2–5.9)
SODIUM SERPL-SCNC: 130 MMOL/L (ref 136–145)
WBC # BLD AUTO: 7.2 K/UL (ref 4–11)

## 2024-01-10 PROCEDURE — 83880 ASSAY OF NATRIURETIC PEPTIDE: CPT

## 2024-01-10 PROCEDURE — 93000 ELECTROCARDIOGRAM COMPLETE: CPT | Performed by: INTERNAL MEDICINE

## 2024-01-10 PROCEDURE — 1123F ACP DISCUSS/DSCN MKR DOCD: CPT | Performed by: INTERNAL MEDICINE

## 2024-01-10 PROCEDURE — 99214 OFFICE O/P EST MOD 30 MIN: CPT | Performed by: INTERNAL MEDICINE

## 2024-01-10 PROCEDURE — 1111F DSCHRG MED/CURRENT MED MERGE: CPT | Performed by: INTERNAL MEDICINE

## 2024-01-10 PROCEDURE — G8419 CALC BMI OUT NRM PARAM NOF/U: HCPCS | Performed by: INTERNAL MEDICINE

## 2024-01-10 PROCEDURE — 80048 BASIC METABOLIC PNL TOTAL CA: CPT

## 2024-01-10 PROCEDURE — 36415 COLL VENOUS BLD VENIPUNCTURE: CPT

## 2024-01-10 PROCEDURE — 85027 COMPLETE CBC AUTOMATED: CPT

## 2024-01-10 PROCEDURE — 3074F SYST BP LT 130 MM HG: CPT | Performed by: INTERNAL MEDICINE

## 2024-01-10 PROCEDURE — 1036F TOBACCO NON-USER: CPT | Performed by: INTERNAL MEDICINE

## 2024-01-10 PROCEDURE — G8484 FLU IMMUNIZE NO ADMIN: HCPCS | Performed by: INTERNAL MEDICINE

## 2024-01-10 PROCEDURE — 71046 X-RAY EXAM CHEST 2 VIEWS: CPT

## 2024-01-10 PROCEDURE — 3078F DIAST BP <80 MM HG: CPT | Performed by: INTERNAL MEDICINE

## 2024-01-10 PROCEDURE — G8427 DOCREV CUR MEDS BY ELIG CLIN: HCPCS | Performed by: INTERNAL MEDICINE

## 2024-01-10 RX ORDER — FUROSEMIDE 20 MG/1
20 TABLET ORAL DAILY
Qty: 30 TABLET | Refills: 3
Start: 2024-01-10

## 2024-01-10 RX ORDER — SPIRONOLACTONE 25 MG/1
25 TABLET ORAL DAILY
Qty: 30 TABLET | Refills: 3
Start: 2024-01-10

## 2024-01-10 NOTE — PROGRESS NOTES
[facility]      Cardiology Progress Note    Benny Mosqueda  1940    January 10, 2024        CC: \"When I wake up with dizziness and better as the day goes on.\"     HPI:  The patient is 83 y.o. male with a past medical history significant for atrial fibrillation, hypertension, and systolic heart failure. He was admitted 10/27 - 11/6/18 for worsening shortness of breath and weakness. He is a marathon runner and first noticed the symptoms while in [facility]. In the ER he was noted to be in Atrial fibrillation with RVR and also evidence of pneumonia. He had an echocardiogram completed that showed an LVEF of 30-35%. He was treated with amiodarone but was discontinued due to abnormal LFTs. He was discharged home on a BB and Eliquis at that time. He is s/p DCCV 1/2020 followed by EPS AFIB ablation and pulm vein isol. 5/8/20. S?p atrial flutter ablation on 3/4/2022.     EP follow up with GHISLAINE Moore 4/2023 with plans to continue flecainide 25mg BID, not interested in repeat ablation at this time if A fib/flutter can be documented but will call and get EKG done if has known recurrence. Decrease Toprol to 12.5mg QD given fatigue, bradycardia and soft BP.     9/19/2023 office visit, he was overall he reports he is doing well and feeling well from a cardiac standpoint with no specific cardiac complaints. he notes improvement with reduction in metoprolol per GHISLAINE Moore back in the spring. He reports the occasional lightheadedness. Today his BP and HR are soft. Patient denies exertional chest pain/pressure, dyspnea at rest, VILLELA, PND, orthopnea, palpitations, weight changes, changes in LE edema, and syncope. The patient admits to medical therapy compliance and feels he is tolerating.   30 day Event monitor in 1 month after discontinuing metoprolol and furosemide   No showed to the monitor placement visit and GHISLAINE Moore 10/2023.    12/19/23, he reports that his \"heartbeat is not right sometimes.\"

## 2024-01-13 PROBLEM — R79.89 ELEVATED TROPONIN: Status: RESOLVED | Noted: 2023-12-14 | Resolved: 2024-01-13

## 2024-02-13 ENCOUNTER — OFFICE VISIT (OUTPATIENT)
Dept: CARDIOLOGY CLINIC | Age: 84
End: 2024-02-13
Payer: MEDICARE

## 2024-02-13 VITALS
BODY MASS INDEX: 15.85 KG/M2 | DIASTOLIC BLOOD PRESSURE: 62 MMHG | OXYGEN SATURATION: 94 % | WEIGHT: 107 LBS | HEIGHT: 69 IN | SYSTOLIC BLOOD PRESSURE: 126 MMHG | HEART RATE: 52 BPM

## 2024-02-13 DIAGNOSIS — I10 ESSENTIAL HYPERTENSION: ICD-10-CM

## 2024-02-13 DIAGNOSIS — E87.1 HYPONATREMIA: ICD-10-CM

## 2024-02-13 DIAGNOSIS — N17.9 AKI (ACUTE KIDNEY INJURY) (HCC): ICD-10-CM

## 2024-02-13 DIAGNOSIS — I48.91 ATRIAL FIBRILLATION, UNSPECIFIED TYPE (HCC): Primary | ICD-10-CM

## 2024-02-13 DIAGNOSIS — E87.5 HYPERKALEMIA: ICD-10-CM

## 2024-02-13 DIAGNOSIS — R79.89 ELEVATED BRAIN NATRIURETIC PEPTIDE (BNP) LEVEL: ICD-10-CM

## 2024-02-13 DIAGNOSIS — I42.9 CARDIOMYOPATHY, UNSPECIFIED TYPE (HCC): ICD-10-CM

## 2024-02-13 DIAGNOSIS — E87.6 HYPOKALEMIA: ICD-10-CM

## 2024-02-13 DIAGNOSIS — I50.22 CHRONIC SYSTOLIC HEART FAILURE (HCC): ICD-10-CM

## 2024-02-13 DIAGNOSIS — R63.4 WEIGHT LOSS: ICD-10-CM

## 2024-02-13 LAB
ANION GAP SERPL CALCULATED.3IONS-SCNC: 13 MMOL/L (ref 3–16)
BUN SERPL-MCNC: 23 MG/DL (ref 7–20)
CALCIUM SERPL-MCNC: 9.8 MG/DL (ref 8.3–10.6)
CHLORIDE SERPL-SCNC: 99 MMOL/L (ref 99–110)
CO2 SERPL-SCNC: 25 MMOL/L (ref 21–32)
CREAT SERPL-MCNC: 1.1 MG/DL (ref 0.8–1.3)
GFR SERPLBLD CREATININE-BSD FMLA CKD-EPI: >60 ML/MIN/{1.73_M2}
GLUCOSE SERPL-MCNC: 93 MG/DL (ref 70–99)
NT-PROBNP SERPL-MCNC: 971 PG/ML (ref 0–449)
POTASSIUM SERPL-SCNC: 4.9 MMOL/L (ref 3.5–5.1)
SODIUM SERPL-SCNC: 137 MMOL/L (ref 136–145)

## 2024-02-13 PROCEDURE — G8484 FLU IMMUNIZE NO ADMIN: HCPCS | Performed by: INTERNAL MEDICINE

## 2024-02-13 PROCEDURE — G8427 DOCREV CUR MEDS BY ELIG CLIN: HCPCS | Performed by: INTERNAL MEDICINE

## 2024-02-13 PROCEDURE — 1036F TOBACCO NON-USER: CPT | Performed by: INTERNAL MEDICINE

## 2024-02-13 PROCEDURE — 1123F ACP DISCUSS/DSCN MKR DOCD: CPT | Performed by: INTERNAL MEDICINE

## 2024-02-13 PROCEDURE — 3074F SYST BP LT 130 MM HG: CPT | Performed by: INTERNAL MEDICINE

## 2024-02-13 PROCEDURE — 99214 OFFICE O/P EST MOD 30 MIN: CPT | Performed by: INTERNAL MEDICINE

## 2024-02-13 PROCEDURE — 3078F DIAST BP <80 MM HG: CPT | Performed by: INTERNAL MEDICINE

## 2024-02-13 PROCEDURE — 93000 ELECTROCARDIOGRAM COMPLETE: CPT | Performed by: INTERNAL MEDICINE

## 2024-02-13 PROCEDURE — G8419 CALC BMI OUT NRM PARAM NOF/U: HCPCS | Performed by: INTERNAL MEDICINE

## 2024-02-13 RX ORDER — ATORVASTATIN CALCIUM 10 MG/1
10 TABLET, FILM COATED ORAL DAILY
Qty: 90 TABLET | Refills: 5 | Status: SHIPPED | OUTPATIENT
Start: 2024-02-13

## 2024-02-13 NOTE — PROGRESS NOTES
[facility]      Cardiology Progress Note    Benny Mosqueda  1940 February 13, 2024        CC: \"I am feeling better.\"     HPI:  The patient is 83 y.o. male with a past medical history significant for atrial fibrillation, hypertension, and systolic heart failure. He was admitted 10/27 - 11/6/18 for worsening shortness of breath and weakness. He is a marathon runner and first noticed the symptoms while in [facility]. In the ER he was noted to be in Atrial fibrillation with RVR and also evidence of pneumonia. He had an echocardiogram completed that showed an LVEF of 30-35%. He was treated with amiodarone but was discontinued due to abnormal LFTs. He was discharged home on a BB and Eliquis at that time. He is s/p DCCV 1/2020 followed by EPS AFIB ablation and pulm vein isol. 5/8/20. S?p atrial flutter ablation on 3/4/2022.     EP follow up with GHISLAINE Moore 4/2023 with plans to continue flecainide 25mg BID, not interested in repeat ablation at this time if A fib/flutter can be documented but will call and get EKG done if has known recurrence. Decrease Toprol to 12.5mg QD given fatigue, bradycardia and soft BP.     9/19/2023 office visit, he was overall he reports he is doing well and feeling well from a cardiac standpoint with no specific cardiac complaints. he notes improvement with reduction in metoprolol per GHISLAINE Moore back in the spring. He reports the occasional lightheadedness. Today his BP and HR are soft. Patient denies exertional chest pain/pressure, dyspnea at rest, VILLELA, PND, orthopnea, palpitations, weight changes, changes in LE edema, and syncope. The patient admits to medical therapy compliance and feels he is tolerating.   30 day Event monitor in 1 month after discontinuing metoprolol and furosemide   No showed to the monitor placement visit and GHISLAINE Moore 10/2023.    12/19/23, he reports that his \"heartbeat is not right sometimes.\" Otherwise he is asymptomatic. Continues with

## 2024-04-17 ENCOUNTER — OFFICE VISIT (OUTPATIENT)
Dept: CARDIOLOGY CLINIC | Age: 84
End: 2024-04-17

## 2024-04-17 VITALS
SYSTOLIC BLOOD PRESSURE: 116 MMHG | BODY MASS INDEX: 16.59 KG/M2 | HEART RATE: 56 BPM | WEIGHT: 112 LBS | HEIGHT: 69 IN | DIASTOLIC BLOOD PRESSURE: 54 MMHG | OXYGEN SATURATION: 98 %

## 2024-04-17 DIAGNOSIS — E87.1 HYPONATREMIA: ICD-10-CM

## 2024-04-17 DIAGNOSIS — R53.1 WEAKNESS: ICD-10-CM

## 2024-04-17 DIAGNOSIS — I50.22 CHRONIC SYSTOLIC HEART FAILURE (HCC): ICD-10-CM

## 2024-04-17 DIAGNOSIS — E87.6 HYPOKALEMIA: ICD-10-CM

## 2024-04-17 DIAGNOSIS — I42.9 CARDIOMYOPATHY, UNSPECIFIED TYPE (HCC): ICD-10-CM

## 2024-04-17 DIAGNOSIS — R42 DIZZINESS: Primary | ICD-10-CM

## 2024-04-17 DIAGNOSIS — R63.4 WEIGHT LOSS: ICD-10-CM

## 2024-04-17 DIAGNOSIS — R06.09 DOE (DYSPNEA ON EXERTION): ICD-10-CM

## 2024-04-17 DIAGNOSIS — I10 ESSENTIAL HYPERTENSION: ICD-10-CM

## 2024-04-17 DIAGNOSIS — I48.91 ATRIAL FIBRILLATION, UNSPECIFIED TYPE (HCC): ICD-10-CM

## 2024-04-17 RX ORDER — TAMSULOSIN HYDROCHLORIDE 0.4 MG/1
0.4 CAPSULE ORAL DAILY
COMMUNITY
Start: 2024-04-14

## 2024-04-17 NOTE — PATIENT INSTRUCTIONS
1) START Jardiance 10 mg daily   2) Complete labs in 3 months-we will call with results   3) Plan follow up in 4 months  4) Call with any questions

## 2024-04-17 NOTE — PROGRESS NOTES
[facility]      Cardiology Progress Note    Benny Mosqueda  1940 April 17, 2024        CC: \"I     HPI:  The patient is 83 y.o. male with a past medical history significant for atrial fibrillation, hypertension, and systolic heart failure. He was admitted 10/27 - 11/6/18 for worsening shortness of breath and weakness. He is a marathon runner and first noticed the symptoms while in [facility]. In the ER he was noted to be in Atrial fibrillation with RVR and also evidence of pneumonia. He had an echocardiogram completed that showed an LVEF of 30-35%. He was treated with amiodarone but was discontinued due to abnormal LFTs. He was discharged home on a BB and Eliquis at that time. He is s/p DCCV 1/2020 followed by EPS AFIB ablation and pulm vein isol. 5/8/20. S?p atrial flutter ablation on 3/4/2022.     EP follow up with GHISALINE Moore 4/2023 with plans to continue flecainide 25mg BID, not interested in repeat ablation at this time if A fib/flutter can be documented but will call and get EKG done if has known recurrence. Decrease Toprol to 12.5mg QD given fatigue, bradycardia and soft BP.     9/19/2023 office visit, he was overall he reports he is doing well and feeling well from a cardiac standpoint with no specific cardiac complaints. he notes improvement with reduction in metoprolol per GHISLAINE Moore back in the spring. He reports the occasional lightheadedness. Today his BP and HR are soft. Patient denies exertional chest pain/pressure, dyspnea at rest, VILLELA, PND, orthopnea, palpitations, weight changes, changes in LE edema, and syncope. The patient admits to medical therapy compliance and feels he is tolerating.   30 day Event monitor in 1 month after discontinuing metoprolol and furosemide   No showed to the monitor placement visit and GHISLAINE Moore 10/2023.    12/19/23, he reports that his \"heartbeat is not right sometimes.\" Otherwise he is asymptomatic. Continues with off/on lightheadedness

## 2024-04-29 PROBLEM — N17.9 ACUTE RENAL FAILURE (ARF) (HCC): Status: ACTIVE | Noted: 2024-01-01

## 2024-04-29 NOTE — ED PROVIDER NOTES
[facility] EMERGENCY DEPARTMENT  eMERGENCY dEPARTMENT eNCOUnter      Pt Name: Benny Mosqueda  MRN: [id number]  Birthdate 1940  Date of evaluation: 4/29/2024  Provider: LISSY CHU MD    CHIEF COMPLAINT       Chief Complaint   Patient presents with    Fall     Pt fell this am while taking out his garbage.  Pt having a hard time finding his words.  L\    Laceration     Laceration above right eye.  ~ 2.5 cm in length.           CRITICAL CARE TIME   Total Critical Care time was 0 minutes, excluding separately reportable procedures.  There was a high probability of clinically significant/life threatening deterioration in the patient's condition which required my urgent intervention.        HISTORY OF PRESENT ILLNESS  (Location/Symptom, Timing/Onset, Context/Setting, Quality, Duration, Modifying Factors, Severity.)   History From: Patient  Limitations to history : None    Benny Mosqueda is a 83 y.o. male who presents to the emergency department via life squad from home.  He was reportedly taking out the garbage this morning.  He states his legs got weak and gave out on him and he fell.  He hit his head.  He has a laceration above his right eye.  He states he has been having increasing problems with leg weakness.  He denies any visual problems.  No focal extremity weakness.  No numbness tingling or paresthesias.  No speech difficulty.  He does have head pain.  He denies neck pain.  No back pain.  No upper or lower extremity pain.  He does live at home with his son.      Nursing Notes were reviewed and I agree.      SCREENINGS   NIH Stroke Scale  Interval: Baseline  Level of Consciousness (1a): Alert  LOC Questions (1b): Answers both correctly  LOC Commands (1c): Performs both tasks correctly  Best Gaze (2): Normal  Facial Palsy (4): Normal symmetrical movement  Motor Arm, Left (5a): No drift  Motor Arm, Right (5b): No drift  Motor Leg, Left (6a): No drift  Motor Leg, Right (6b):  gave out on him.  He has a eyebrow laceration on the right.  He denies any other injuries from the fall.    Temp 98 with a pulse 57, respiratory 15, blood pressure 132/66.  Oxygen saturation 99%.  1.5 cm right medial eyebrow laceration.  Pupils equal round reactive.  Extraocular movements are intact.  He has abrasions and bruising to the right forehead.  No other facial trauma.  Cardiac exam shows a regular rate and rhythm without murmur or gallop.  Breath sounds are symmetrical and clear.  Abdomen is benign, nontender.  Patient is awake, alert, oriented.  Symmetrical reactive pupils.  Intact extraocular movements.  No facial asymmetry.  No drift to the upper or lower extremities.  No limb ataxia.  Intact sensation to touch.  No dysarthria expressive aphasia.  No visual field deficits.  NIHSS of 0.    H&H of 8.8 24.9.  This compares to an H&H of 13.6 and 42.4 on 1/10/2024.  White blood cell count of 4400.  BUN of 19 with a creatinine of 1.5.  This compares to a BUN of 23 and a creatinine of 1.1 on 2/13/2024.  Glucose of 89.  Sodium 130 with potassium 4.6.  Initial troponin of 74.  Chest x-ray shows no acute findings.  CT head without contrast shows periorbital soft tissue swelling.  Suspected nondisplaced fracture of the superior medial orbital wall on the right.  Atrophy and small vessel ischemic changes.  CT cervical spine shows multilevel degenerative disc disease and facet arthropathy.  No vertebral body fracture noted.  Dominant nodule in the left lobe of the thyroid.  Follow-up ultrasound recommended.  Stable pulmonary nodule in the right upper lobe.    Disposition Considerations (tests considered but not done, Admit vs D/C, Shared Decision Making, Pt Expectation of Test or Tx.): I did do a rectal exam.  The stool was brown in color.  He was sent for Hemoccult testing.  I discussed with the patient and his family their goals of care.  He has hospice care in his home.  He wants to be admitted to the hospital  and worked up further for his weakness and falls with his new anemia and acute on chronic kidney injury.  I discussed his CODE STATUS.  He states she wants everything possible done while in the emergency department including CPR and intubation.  The hospitalist will be consulted to admit for further workup for his anemia, acute on chronic kidney injury, weakness, fall.  His eyebrow laceration was sutured.  See procedure note below.  Tetanus will be updated.      I am the Primary Clinician of Record.      PROCEDURES:  Lac Repair    Date/Time: 4/29/2024 2:28 PM    Performed by: Pasquale Nelson MD  Authorized by: Pasquale Nelson MD    Consent:     Consent obtained:  Verbal    Consent given by:  Patient    Risks, benefits, and alternatives were discussed: yes      Risks discussed:  Infection, need for additional repair, poor cosmetic result, poor wound healing, pain and retained foreign body  Universal protocol:     Procedure explained and questions answered to patient or proxy's satisfaction: yes      Patient identity confirmed:  Verbally with patient and arm band  Anesthesia:     Anesthesia method:  Local infiltration    Local anesthetic:  Lidocaine 1% WITH epi  Laceration details:     Location:  Face    Face location:  R eyebrow    Length (cm):  3  Pre-procedure details:     Preparation:  Patient was prepped and draped in usual sterile fashion and imaging obtained to evaluate for foreign bodies  Exploration:     Hemostasis achieved with:  Direct pressure    Imaging obtained: x-ray      Imaging outcome: foreign body not noted      Wound exploration: entire depth of wound visualized      Wound extent: no foreign bodies/material noted, no muscle damage noted, no nerve damage noted, no underlying fracture noted and no vascular damage noted      Contaminated: no    Treatment:     Area cleansed with:  Chlorhexidine and saline    Amount of cleaning:  Standard    Irrigation solution:  Sterile saline     Irrigation method:  Syringe    Visualized foreign bodies/material removed: no      Debridement:  None  Skin repair:     Repair method:  Sutures    Suture size:  5-0    Suture material:  Nylon    Suture technique:  Simple interrupted    Number of sutures:  9  Approximation:     Approximation:  Close  Repair type:     Repair type:  Simple  Post-procedure details:     Dressing:  Antibiotic ointment    Procedure completion:  Tolerated well, no immediate complications        FINAL IMPRESSION      1. Anemia, unspecified type    2. Acute kidney injury superimposed on CKD (HCC)    3. Fall, initial encounter    4. Laceration of right eyebrow, initial encounter    5. Closed head injury, initial encounter          DISPOSITION/PLAN   DISPOSITION Decision To Admit 04/29/2024 02:24:33 PM      PATIENT REFERRED TO:  No follow-up provider specified.    DISCHARGE MEDICATIONS:  New Prescriptions    No medications on file       (Please note that portions of this note were completed with a voice recognition program.  Efforts were made to edit the dictations but occasionally words are mis-transcribed.)    PASQUALE CHU MD  Attending Emergency Physician        Pasquale Chu MD  04/29/24 [id number]

## 2024-04-29 NOTE — H&P
V2.0  History and Physical      Name:  Benny Mosqueda /Age/Sex: 1940  (83 y.o. male)   MRN & CSN:  [id number] & [id number] Encounter Date/Time: 2024 3:10 PM EDT   Location:  [address] [place] [address] PCP: Mauro Moreira MD       Hospital Day: 1    Assessment and Plan:   Benny Mosqueda is a 83 y.o. male with a pmh of \ who presents with Acute renal failure (ARF) ([facility])    Hospital Problems             Last Modified POA    * (Principal) Acute renal failure (ARF) ([facility]) 2024 Yes           HASMUKH    Hyponatremia    S/p fall    Generalized weakness    H/o Afib    Cardiomyopathy    Chronic systolic CHF    HTN    Plan    Admit inpatient status  Telemetry monitering  IVF NS  Moniter BMP  Avoid NSAIDs  Avoid contrast  Avoid ACEI or ARB  Keep MAP > 65 mmhg  Avoid fluid overload    Check orthostatic VS    Hyponatremia    IVF NS  Moniter Na      Anemia     Stool occult neg  Will moniter Hb  Check anemia labs    Chronic conditions :    Afib : chronic , controlled, on eliquis  CHF : chronic , hold diuretics for now  HTN : hold BP meds  DLP : cont statin    PT and OT evaluation    DVT ppx    Diet    Code status             Disposition:   Current Living situation:  Expected Disposition:   Estimated D/C:     Diet No diet orders on file   DVT Prophylaxis [] Lovenox, []  Heparin, [] SCDs, [] Ambulation,  [] Eliquis, [] Xarelto, [] Coumadin   Code Status Prior   Surrogate Decision Maker/ POA      Personally reviewed Lab Studies and Imaging     Discussed management of the case with ED  who recommended hospital admission    EKG interpreted personally and results : bradycardia    Imaging that was interpreted personally includes CXR   and results no acute pathology    Drugs that require monitoring for toxicity include  and the method of monitoring was        History from:     Patient  family    History of Present Illness:     Chief Complaint:   Benny Mosqueda is a 83 y.o. male with pmh of  who presents   After he  anticoag Has a \"code stroke\" or \"stroke alert\" been called?->No Decision Support Exception - unselect if not a suspected or confirmed emergency medical condition->Emergency Medical Condition (MA) Reason for Exam: fall, head injury FINDINGS: Head: Ventricles are midline in position.  No intracerebral masses are identified. No mass effect.  No midline shift.  No acute intracranial hemorrhage is seen.Prominence of the sulci, compatible with atrophy. There is mild periventricular hypodensity seen. Scattered additional areas of hypodensity are seen throughout the frontal and parietal white matter. There is right periorbital soft tissue swelling is seen.  Small air-fluid level seen in the right frontal sinus.  No significant mastoid opacification. Small air-fluid level seen in the left sphenoid cellule. Small amount of gas is seen in the right orbit, with a questionable nondisplaced fracture of the superomedial orbital wall on the right. Cervical spine: No significant mastoid opacification.  Bony ring of C1 appears intact. Moderate spurring is seen at the tip of the dens.  Scattered levels of disc space narrowing and disc space spur formation are seen, most pronounced C3-C4 through C5-C6.  Scattered levels of facet arthropathy are seen. Scattered levels of foraminal narrowing are seen.  No acute vertebral body fracture noted Hazy ground-glass opacity is seen in the left lung apex..  There is underlying emphysema.  There is a small pulmonary nodule in the right upper lobe, unchanged from prior chest CT measuring 5 mm Focal nodule seen in left lobe of thyroid gland measuring 2.9 cm     Head Periorbital soft tissue swelling is seen.  There is a suspected nondisplaced fracture of the superomedial orbital wall on the right given the gas seen in the right orbit. Atrophy and small-vessel ischemic change.  No acute traumatic intracranial abnormality. Cervical spine Multilevel degenerative disc disease and facet arthropathy.  No  vertebral body fracture noted Dominant nodule in left lobe of thyroid.  Given its size, consider thyroid ultrasound follow-up Stable pulmonary nodule right upper lobe     CT CERVICAL SPINE WO CONTRAST    Result Date: 4/29/2024  EXAMINATION: CT OF THE CERVICAL SPINE WITHOUT CONTRAST; CT OF THE HEAD WITHOUT CONTRAST 4/29/2024 10:57 am TECHNIQUE: CT of the cervical spine was performed without the administration of intravenous contrast. Multiplanar reformatted images are provided for review. Automated exposure control, iterative reconstruction, and/or weight based adjustment of the mA/kV was utilized to reduce the radiation dose to as low as reasonably achievable.; CT of the head was performed without the administration of intravenous contrast. Automated exposure control, iterative reconstruction, and/or weight based adjustment of the mA/kV was utilized to reduce the radiation dose to as low as reasonably achievable. COMPARISON: Head CT December 2023 Chest CT December 2023 HISTORY: ORDERING SYSTEM PROVIDED HISTORY: fall / head injury TECHNOLOGIST PROVIDED HISTORY: Reason for exam:->fall / head injury Decision Support Exception - unselect if not a suspected or confirmed emergency medical condition->Emergency Medical Condition (MA) Reason for Exam: fall, trauma; ORDERING SYSTEM PROVIDED HISTORY: fall / head injury / anticoag TECHNOLOGIST PROVIDED HISTORY: Reason for exam:->fall / head injury / anticoag Has a \"code stroke\" or \"stroke alert\" been called?->No Decision Support Exception - unselect if not a suspected or confirmed emergency medical condition->Emergency Medical Condition (MA) Reason for Exam: fall, head injury FINDINGS: Head: Ventricles are midline in position.  No intracerebral masses are identified. No mass effect.  No midline shift.  No acute intracranial hemorrhage is seen.Prominence of the sulci, compatible with atrophy. There is mild periventricular hypodensity seen. Scattered additional areas of

## 2024-04-29 NOTE — PROGRESS NOTES
Medication Reconciliation    List of medications patient is currently taking is complete.     Source of information: 1. Conversation with patient's family at bedside                                      2. EPIC records     Notes regarding home medications:   1. Patient did not receive any of his home medications prior to arrival to the ER today.    Jovanny Tran [facility], PharmD, BCPS  4/29/2024 2:39 PM

## 2024-04-30 NOTE — PROGRESS NOTES
Pt and family informed RN's of urinary retention. Per family ed attempted straight cath to obtain urine sample due to concern of UTI. Ed unsuccessful, bladder scan performed on admission showed 307, secure message sent to ashley prado NP. Rescanned showed 493, orders placed by NP for chase catheter. Insertion 16 fr coude placed with urine output, urine sample sent to lab. Pt tolerated procedure well.

## 2024-04-30 NOTE — PROGRESS NOTES
Physical Therapy  Facility/Department: [address] MED SURG  Physical Therapy Initial Assessment    Name: Benny Mosqueda  : 1940  MRN: [id number]  Date of Service: 2024    Discharge Recommendations:  Continue to assess pending progress          Patient Diagnosis(es): The primary encounter diagnosis was Anemia, unspecified type. Diagnoses of Acute kidney injury superimposed on CKD (HCC), Fall, initial encounter, Laceration of right eyebrow, initial encounter, and Closed head injury, initial encounter were also pertinent to this visit.  Past Medical History:  has a past medical history of Atrial fibrillation (HCC), Cardiomyopathy (HCC), CHF (congestive heart failure) (HCC), Chronic tachycardia, Erectile dysfunction, Hypertension, Osteoarthritis, Pneumonia, Thymoma, benign, Tubulovillous adenoma polyp of colon, and Wears glasses.  Past Surgical History:  has a past surgical history that includes Prostate surgery; Hemorrhoid surgery; Thymectomy; Colonoscopy (N/A, 1/15/2020); Cardioversion (01/10/2020); and US BIOPSY THYROID (2021).    Assessment   Body Structures, Functions, Activity Limitations Requiring Skilled Therapeutic Intervention: Decreased functional mobility ;Decreased ADL status;Decreased strength;Decreased endurance;Decreased balance;Decreased safe awareness;Decreased cognition  Assessment: Benny Mosqueda is a 83 y.o. male who presents to the emergency department via life squad from home on 24. He was reportedly taking out the garbage this morning. He states his legs got weak and gave out on him and he fell and hit his head. Prior to admission, pt living at home with son, independent mobility, possible use of walker (pt is poor historian). Pt was noted to be under hospice care at end of . Pt currently functioning below baseline. Will continue to assess for appropriate discharge plan.  Treatment Diagnosis: impaired mobility  Therapy Prognosis: Good  Decision Making: Medium  sit<>stand mod I  Short Term Goal 3: ambulate > 40' mod I with RW  Patient Goals   Patient Goals : none stated       Education  Patient Education  Education Given To: Patient  Education Provided: Role of Therapy;Plan of Care  Education Method: Verbal  Barriers to Learning: Cognition  Education Outcome: Continued education needed      Therapy Time   Individual Concurrent Group Co-treatment   Time In 0740         Time Out 0820         Minutes 40         Timed Code Treatment Minutes: 25 Minutes       Perry Oseguera, PT

## 2024-04-30 NOTE — PROGRESS NOTES
4 Eyes Skin Assessment     NAME:  Benny Mosqueda  YOB: 1940  MEDICAL RECORD NUMBER:  [id number]    The patient is being assessed for  Admission    I agree that at least one RN has performed a thorough Head to Toe Skin Assessment on the patient. ALL assessment sites listed below have been assessed.      Areas assessed by both nurses:    Head, Face, Ears, Shoulders, Back, Chest, Arms, Elbows, Hands, Sacrum. Buttock, Coccyx, Ischium, and Legs. Feet and Heels        Does the Patient have a Wound? No noted wound(s)       Isaac Prevention initiated by RN: Yes  Wound Care Orders initiated by RN: No    Pressure Injury (Stage 3,4, Unstageable, DTI, NWPT, and Complex wounds) if present, place Wound referral order by RN under : No    New Ostomies, if present place, Ostomy referral order under : No     Nurse 1 eSignature: Electronically signed by Bee Casas RN on 4/30/24 at 12:24 AM EDT    **SHARE this note so that the co-signing nurse can place an eSignature**    Nurse 2 eSignature: Electronically signed by Krystal Kingsley RN on 4/30/24 at 6:13 AM EDT

## 2024-04-30 NOTE — PROGRESS NOTES
Occupational Therapy  Facility/Department: [address] MED SURG  Occupational Therapy Initial Assessment    Name: Benny Mosqueda  : 1940  MRN: [id number]  Date of Service: 2024    Discharge Recommendations:  Continue to assess pending progress     Benny Mosqueda scored a  on the AM-[facility] ADL Inpatient form.   If patient discharges prior to next session this note will serve as a discharge summary.  Please see below for the latest assessment towards goals.      Patient Diagnosis(es): The primary encounter diagnosis was Anemia, unspecified type. Diagnoses of Acute kidney injury superimposed on CKD (HCC), Fall, initial encounter, Laceration of right eyebrow, initial encounter, and Closed head injury, initial encounter were also pertinent to this visit.  Past Medical History:  has a past medical history of Atrial fibrillation (HCC), Cardiomyopathy (HCC), CHF (congestive heart failure) (HCC), Chronic tachycardia, Erectile dysfunction, Hypertension, Osteoarthritis, Pneumonia, Thymoma, benign, Tubulovillous adenoma polyp of colon, and Wears glasses.  Past Surgical History:  has a past surgical history that includes Prostate surgery; Hemorrhoid surgery; Thymectomy; Colonoscopy (N/A, 1/15/2020); Cardioversion (01/10/2020); and US BIOPSY THYROID (2021).    Treatment Diagnosis: Decreased: ADLs, functional transfers/mobility      Assessment   Performance deficits / Impairments: Decreased functional mobility ;Decreased ADL status;Decreased cognition;Decreased safe awareness;Decreased balance;Decreased endurance;Decreased strength  Assessment: Pt is a 84 yo M who presented to the ED after a fall at home that occurred when he was taking out the trash at home. Admitted with head laceration and UTI. Prior to admit, pt reportedly lives at home w/ his son and is independent(?) with possible use of RW. Pt is a very poor historian. He is below reported baseline at this time, requiring min-mod A for functional  attending to directions  Memory: Decreased recall of recent events;Decreased short term memory  Safety Judgement: Decreased awareness of need for safety;Decreased awareness of need for assistance  Problem Solving: Decreased awareness of errors  Insights: Decreased awareness of deficits  Initiation: Requires cues for some  Sequencing: Requires cues for some  Orientation  Overall Orientation Status: Impaired  Orientation Level: Oriented to person;Oriented to place;Disoriented to situation;Disoriented to time (knew month only. some confusion to situation)                  Education Given To: Patient  Education Provided: Transfer Training;Role of Therapy;ADL Adaptive Strategies;Orientation  Education Method: Verbal;Demonstration  Barriers to Learning: Cognition  Education Outcome: Continued education needed     AM-PAC - ADL  AM-PAC Daily Activity - Inpatient   How much help is needed for putting on and taking off regular lower body clothing?: A Lot  How much help is needed for bathing (which includes washing, rinsing, drying)?: A Lot  How much help is needed for toileting (which includes using toilet, bedpan, or urinal)?: A Lot  How much help is needed for putting on and taking off regular upper body clothing?: A Little  How much help is needed for taking care of personal grooming?: A Little  How much help for eating meals?: None  AM-[facility] Inpatient Daily Activity Raw Score: 16  AM-PAC Inpatient ADL T-Scale Score : 35.96  ADL Inpatient CMS 0-100% Score: 53.32  ADL Inpatient CMS G-Code Modifier : CK    Goals  Short Term Goals  Time Frame for Short Term Goals: Prior to d/c  Short Term Goal 1: Pt will complete ADL transfers w/ SBA  Short Term Goal 2: Pt will toilet w/ min A  Short Term Goal 3: Pt will groom in stance at sink w/ SBA  Short Term Goal 4: Pt will bathe w/ min A  Short Term Goal 5: Pt will complete LB dressing w/ min A  Long Term Goals  Time Frame for Long Term Goals : LTG=STG  Patient Goals   Patient goals : to  return home       Therapy Time   Individual Concurrent Group Co-treatment   Time In 0740         Time Out 0820         Minutes 40         Timed Code Treatment Minutes: 25 Minutes       nAa Betts, OTR/L [id number]

## 2024-04-30 NOTE — CONSULTS
Referring Physician: * No referring provider recorded for this case *  Reason for Consultation: Syncope  Chief Complaint: I passed out and fell on my face      Subjective:   History of Present Illness:  Benny Mosqueda is a 83 y.o. patient well-known to me with prior history of cardiomyopathy and congestive heart failure, paroxysmal atrial fibrillation, hypertension who presented to the hospital after he passed out at home.  He apparently was trying to take his garbage out and felt weak and his legs gave out on him and fell on his face.  He denied any chest tightness or pressure.  He presented to the emergency room and has been admitted for further evaluation and treatment.  He has been found to have urinary tract infection and also has drop in his hemoglobin    I have been asked to provide consultation regarding further management and testing.    Review of Systems:   All 12 point review of symptoms completed. Pertinent positives identified in the HPI, all other review of symptoms negative as below.    Past Medical History:   has a past medical history of Atrial fibrillation (HCC), Cardiomyopathy (HCC), CHF (congestive heart failure) (HCC), Chronic tachycardia, Erectile dysfunction, Hypertension, Osteoarthritis, Pneumonia, Thymoma, benign, Tubulovillous adenoma polyp of colon, and Wears glasses.    Surgical History:   has a past surgical history that includes Prostate surgery; Hemorrhoid surgery; Thymectomy; Colonoscopy (N/A, 1/15/2020); Cardioversion (01/10/2020); and US BIOPSY THYROID (6/18/2021).     Social History:   reports that he quit smoking about 31 years ago. His smoking use included cigarettes. He started smoking about 69 years ago. He has a 38.0 pack-year smoking history. He has never used smokeless tobacco. He reports that he does not currently use alcohol. He reports that he does not currently use drugs.     Family History:  family history includes Cancer in his brother and sister; Heart  Disease in his brother, brother, and father.      Home Medications:  Were reviewed and are listed in nursing record and/or below  Prior to Admission medications    Medication Sig Start Date End Date Taking? Authorizing Provider   citalopram (CELEXA) 20 MG tablet Take 1 tablet by mouth daily   Yes Kristin Ruth MD   tamsulosin (FLOMAX) 0.4 MG capsule Take 1 capsule by mouth daily 4/14/24   Kristin Ruth MD   empagliflozin (JARDIANCE) 10 MG tablet Take 1 tablet by mouth daily 4/17/24   Keith Rondon MD   atorvastatin (LIPITOR) 10 MG tablet Take 1 tablet by mouth daily 2/13/24   Keith Rondon MD   amiodarone (CORDARONE) 200 MG tablet Take 1 tablet by mouth daily 12/22/23   Teresa Mar APRN - CNP   valsartan (DIOVAN) 40 MG tablet Take 1 tablet by mouth daily 12/22/23   Teresa Mar APRN - CNP   midodrine (PROAMATINE) 5 MG tablet Take 1 tablet by mouth 3 times daily (with meals) 12/21/23   Jennifer Ugalde MD   apixaban (ELIQUIS) 2.5 MG TABS tablet Take 1 tablet by mouth 2 times daily 9/19/23   Keith Rondon MD        CURRENT Medications:  ferrous sulfate EC tablet 324 mg, Daily with breakfast  amiodarone (CORDARONE) tablet 200 mg, Daily  apixaban (ELIQUIS) tablet 2.5 mg, BID  atorvastatin (LIPITOR) tablet 10 mg, Daily  midodrine (PROAMATINE) tablet 5 mg, TID WC  tamsulosin (FLOMAX) capsule 0.4 mg, Daily  0.9 % sodium chloride infusion, Continuous  sodium chloride flush 0.9 % injection 5-40 mL, 2 times per day  sodium chloride flush 0.9 % injection 5-40 mL, PRN  0.9 % sodium chloride infusion, PRN  ondansetron (ZOFRAN-ODT) disintegrating tablet 4 mg, Q8H PRN   Or  ondansetron (ZOFRAN) injection 4 mg, Q6H PRN  polyethylene glycol (GLYCOLAX) packet 17 g, Daily PRN  acetaminophen (TYLENOL) tablet 650 mg, Q6H PRN   Or  acetaminophen (TYLENOL) suppository 650 mg, Q6H PRN        Allergies:  Patient has no known allergies.         Objective:   PHYSICAL EXAM:    Vitals:    04/30/24 0741  ALKPHOS 86 2024 10:51 AM    AST 56 2024 10:51 AM    ALT 37 2024 10:51 AM     PT/INR:  No results found for: \"PTINR\"  HgBA1c:  Lab Results   Component Value Date    LABA1C 5.6 2018     Lab Results   Component Value Date    CKTOTAL 395 (H) 10/31/2018    TROPONINI <0.01 10/27/2022         Cardiac Data     Last EK2024.  Personally reviewed                                       Normal sinus rhythm with right bundle branch block    Echo: Severely reduced global systolic function with an ejection fraction   estimated around 25%.   Severe global hypokinesis noted.   Diastolic dysfunction is present- indeterminate grade.   There is moderate mitral regurgitation.   Aortic valve appears sclerotic but opens adequately. Mild aortic   regurgitation.   Thickened tricuspid valve without evidence of stenosis. There is prolapse   with severe eccentric tricuspid regurgitation. PASP estimated at 30-35 mmHg.   Severe right atrial enlargement.   The right ventricle is dilated and hypokinetic. Right ventricular systolic   function appear to be severely reduced.       Stress Test:    Cath:    Assessment & Plan   1) dizziness/possible syncope  -Likely due to generalized weakness and postural hypotension  -Patient UTI also may be contributing  -Cardiac arrhythmias contributing to this seems less likely  -Encourage increase fluid intake  Treatment of urinary tract infection and change his position slowly    Anemia  -Patient hemoglobin has dropped compared to before  -Stool guaiac is negative but will require anemia workup    Paroxysmal atrial fibrillation  -Currently in sinus rhythm on amiodarone and Eliquis therapy    Cardiomyopathy  -No clinical evidence of congestive heart failure  Patient was on small dose of Aldactone, Jardiance and valsartan therapy which are currently on hold  -Will resume them once his UTI is treated    Total visit time > 45 minutes; > 50% spend counseling / coordinating care. I

## 2024-04-30 NOTE — PLAN OF CARE
Problem: Discharge Planning  Goal: Discharge to home or other facility with appropriate resources  Outcome: Progressing  Flowsheets  Taken 4/29/2024 2330  Discharge to home or other facility with appropriate resources:   Identify discharge learning needs (meds, wound care, etc)   Identify barriers to discharge with patient and caregiver  Taken 4/29/2024 2329  Discharge to home or other facility with appropriate resources:   Identify discharge learning needs (meds, wound care, etc)   Identify barriers to discharge with patient and caregiver     Problem: Safety - Adult  Goal: Free from fall injury  Outcome: Progressing     Problem: ABCDS Injury Assessment  Goal: Absence of physical injury  Outcome: Progressing     Problem: Skin/Tissue Integrity  Goal: Absence of new skin breakdown  Description: 1.  Monitor for areas of redness and/or skin breakdown  2.  Assess vascular access sites hourly  3.  Every 4-6 hours minimum:  Change oxygen saturation probe site  4.  Every 4-6 hours:  If on nasal continuous positive airway pressure, respiratory therapy assess nares and determine need for appliance change or resting period.  Outcome: Progressing

## 2024-04-30 NOTE — PLAN OF CARE
Problem: Discharge Planning  Goal: Discharge to home or other facility with appropriate resources  4/30/2024 0950 by Brigette Roe RN  Outcome: Progressing  4/30/2024 0037 by Bee Casas RN  Outcome: Progressing  Flowsheets  Taken 4/29/2024 2330  Discharge to home or other facility with appropriate resources:   Identify discharge learning needs (meds, wound care, etc)   Identify barriers to discharge with patient and caregiver  Taken 4/29/2024 2329  Discharge to home or other facility with appropriate resources:   Identify discharge learning needs (meds, wound care, etc)   Identify barriers to discharge with patient and caregiver     Problem: Safety - Adult  Goal: Free from fall injury  4/30/2024 0950 by Brigette Roe RN  Outcome: Progressing  4/30/2024 0037 by Bee Casas RN  Outcome: Progressing     Problem: ABCDS Injury Assessment  Goal: Absence of physical injury  4/30/2024 0950 by Brigette Roe RN  Outcome: Progressing  4/30/2024 0037 by Bee Casas RN  Outcome: Progressing     Problem: Skin/Tissue Integrity  Goal: Absence of new skin breakdown  Description: 1.  Monitor for areas of redness and/or skin breakdown  2.  Assess vascular access sites hourly  3.  Every 4-6 hours minimum:  Change oxygen saturation probe site  4.  Every 4-6 hours:  If on nasal continuous positive airway pressure, respiratory therapy assess nares and determine need for appliance change or resting period.  4/30/2024 0950 by Brigette Roe RN  Outcome: Progressing  4/30/2024 0037 by Bee Casas RN  Outcome: Progressing     Problem: Pain  Goal: Verbalizes/displays adequate comfort level or baseline comfort level  Outcome: Progressing

## 2024-04-30 NOTE — PROGRESS NOTES
V2.0    [facility] Progress Note      Name:  Benny Mosqueda /Age/Sex: 1940  (83 y.o. male)   MRN & CSN:  [id number] & [id number] Encounter Date/Time: 2024 2:50 PM EDT   Location:  [id number] PCP: Mauro Moreira MD     Attending:Sera Vo MD       Hospital Day: 2    Assessment and Recommendations   Benny Mosqueda is a 83 y.o. male with medical history of A-fib, cardiomyopathy, hypertension presented to hospital after sustained fall, found to have HASMUKH, right orbital fracture, hyponatremia, admitted for further evaluation.    Plan:   Acute renal failure, given IV fluids, creatinine down to 1.0 from 1.5, holding valsartan, spironolactone, wean off fluids   Hyponatremia, mild, acuity?,  Continue close monitoring  Troponin elevation, bradycardia, with history of A-fib, consulted cardiology for further evaluation, 2D echo from 2023 reviewed which showed severely reduced LV systemic function EF 25%, severe global hypokinesis, diastolic dysfunction. Repeat echo ordered.  Chronic systolic heart failure  Suspected nondisplaced fracture of the superior medial orbital wall on the right, I was informed that no need for inpatient evaluation at this point per ophthalmology, outpatient follow-up needed, plan to do referral at discharge.  Status post fall, generalized weakness, PT, OT  Hospice at home  History of A-fib, Eliquis  BPH, resume Flomax  Type 2 diabetes holding Jardiance  Hyperlipidemia, atorvastatin      Diet ADULT DIET; Regular; No Added Salt (3-4 gm); 2000 ml   DVT Prophylaxis [] Lovenox, []  Heparin, [] SCDs, [] Ambulation,  [] Eliquis, [] Xarelto  [] Coumadin   Code Status DNR-CC   Disposition From: Home  Expected Disposition: Home  Estimated Date of Discharge: To be determined  Patient requires continued admission due to    Surrogate Decision Maker/ POA       Personally reviewed labs, BMP with mild hyponatremia sodium 130, troponin elevation noted 74-53.  CT head reviewed  details as in the plan.  EKG rhythm strips evaluated, mild bradycardia noted.    Subjective:     Chief Complaint: Fall, HASMUKH, orbital fracture    Patient reports overall feeling better, denies any chest pain or shortness of breath  He feels generalized weakness, discomfort around right eye, he did not report any significant vision change today, vitals stable        Review of Systems:      Pertinent positives and negatives discussed in HPI    Objective:     Intake/Output Summary (Last 24 hours) at 4/30/2024 1450  Last data filed at 4/30/2024 1205  Gross per 24 hour   Intake 1080 ml   Output 700 ml   Net 380 ml      Vitals:   Vitals:    04/30/24 1103 04/30/24 1104 04/30/24 1151 04/30/24 1205   BP: 104/64 (!) 110/54 130/64    Pulse: 60 60 52    Resp:   16    Temp:   97.6 °F (36.4 °C)    TempSrc:   Oral    SpO2:   96% 95%   Weight:       Height:             Physical Exam:      Physical Exam Performed:    /64   Pulse 52   Temp 97.6 °F (36.4 °C) (Oral)   Resp 16   Ht 1.753 m (5' 9\")   Wt 52.4 kg (115 lb 8.3 oz)   SpO2 95%   BMI 17.06 kg/m²     General appearance: No apparent distress  Right eye ecchymosis  HEENT: Pupils equal, round  Neck: Supple  Respiratory:  Normal respiratory effort.  Cardiovascular: Regular rate and rhythm   Abdomen: Soft  Musculoskeletal: No clubbing  Alert, oriented x 2, answers basic questions of appropriately    Medications:   Medications:    ferrous sulfate  324 mg Oral Daily with breakfast    amiodarone  200 mg Oral Daily    apixaban  2.5 mg Oral BID    atorvastatin  10 mg Oral Daily    midodrine  5 mg Oral TID WC    tamsulosin  0.4 mg Oral Daily    sodium chloride flush  5-40 mL IntraVENous 2 times per day      Infusions:    sodium chloride 100 mL/hr at 04/30/24 0527    sodium chloride       PRN Meds: sodium chloride flush, 5-40 mL, PRN  sodium chloride, , PRN  ondansetron, 4 mg, Q8H PRN   Or  ondansetron, 4 mg, Q6H PRN  polyethylene glycol, 17 g, Daily PRN  acetaminophen, 650 mg,  ultrasound follow-up Stable pulmonary nodule right upper lobe     CT CERVICAL SPINE WO CONTRAST    Result Date: 4/29/2024  EXAMINATION: CT OF THE CERVICAL SPINE WITHOUT CONTRAST; CT OF THE HEAD WITHOUT CONTRAST 4/29/2024 10:57 am TECHNIQUE: CT of the cervical spine was performed without the administration of intravenous contrast. Multiplanar reformatted images are provided for review. Automated exposure control, iterative reconstruction, and/or weight based adjustment of the mA/kV was utilized to reduce the radiation dose to as low as reasonably achievable.; CT of the head was performed without the administration of intravenous contrast. Automated exposure control, iterative reconstruction, and/or weight based adjustment of the mA/kV was utilized to reduce the radiation dose to as low as reasonably achievable. COMPARISON: Head CT December 2023 Chest CT December 2023 HISTORY: ORDERING SYSTEM PROVIDED HISTORY: fall / head injury TECHNOLOGIST PROVIDED HISTORY: Reason for exam:->fall / head injury Decision Support Exception - unselect if not a suspected or confirmed emergency medical condition->Emergency Medical Condition (MA) Reason for Exam: fall, trauma; ORDERING SYSTEM PROVIDED HISTORY: fall / head injury / anticoag TECHNOLOGIST PROVIDED HISTORY: Reason for exam:->fall / head injury / anticoag Has a \"code stroke\" or \"stroke alert\" been called?->No Decision Support Exception - unselect if not a suspected or confirmed emergency medical condition->Emergency Medical Condition (MA) Reason for Exam: fall, head injury FINDINGS: Head: Ventricles are midline in position.  No intracerebral masses are identified. No mass effect.  No midline shift.  No acute intracranial hemorrhage is seen.Prominence of the sulci, compatible with atrophy. There is mild periventricular hypodensity seen. Scattered additional areas of hypodensity are seen throughout the frontal and parietal white matter. There is right periorbital soft tissue  swelling is seen.  Small air-fluid level seen in the right frontal sinus.  No significant mastoid opacification. Small air-fluid level seen in the left sphenoid cellule. Small amount of gas is seen in the right orbit, with a questionable nondisplaced fracture of the superomedial orbital wall on the right. Cervical spine: No significant mastoid opacification.  Bony ring of C1 appears intact. Moderate spurring is seen at the tip of the dens.  Scattered levels of disc space narrowing and disc space spur formation are seen, most pronounced C3-C4 through C5-C6.  Scattered levels of facet arthropathy are seen. Scattered levels of foraminal narrowing are seen.  No acute vertebral body fracture noted Hazy ground-glass opacity is seen in the left lung apex..  There is underlying emphysema.  There is a small pulmonary nodule in the right upper lobe, unchanged from prior chest CT measuring 5 mm Focal nodule seen in left lobe of thyroid gland measuring 2.9 cm     Head Periorbital soft tissue swelling is seen.  There is a suspected nondisplaced fracture of the superomedial orbital wall on the right given the gas seen in the right orbit. Atrophy and small-vessel ischemic change.  No acute traumatic intracranial abnormality. Cervical spine Multilevel degenerative disc disease and facet arthropathy.  No vertebral body fracture noted Dominant nodule in left lobe of thyroid.  Given its size, consider thyroid ultrasound follow-up Stable pulmonary nodule right upper lobe       CBC:   Recent Labs     04/29/24  1051 04/30/24  0600   WBC 4.4 4.9   HGB 8.8* 8.1*    181     BMP:    Recent Labs     04/29/24  1051 04/30/24  0600   * 130*   K 4.6 4.1   CL 95* 98*   CO2 23 23   BUN 19 18   CREATININE 1.5* 1.0   GLUCOSE 89 86     Hepatic:   Recent Labs     04/29/24  1051   AST 56*   ALT 37   BILITOT 0.9   ALKPHOS 86     Lipids:   Lab Results   Component Value Date/Time    CHOL 193 03/29/2018 09:15 AM    HDL 86 09/22/2022 11:30 AM

## 2024-04-30 NOTE — PROGRESS NOTES
Ophthalmology called and stated that they do not feel the need to come see the patient in the hospital. They will follow up outpatient.

## 2024-05-01 NOTE — PROGRESS NOTES
[facility]   Daily Progress Note      Admit Date:  4/29/2024    CC: \"  Benny Mosqueda is a 83 y.o. patient well-known to me with prior history of cardiomyopathy and congestive heart failure, paroxysmal atrial fibrillation, hypertension who presented to the hospital after he passed out at home.  He apparently was trying to take his garbage out and felt weak and his legs gave out on him and fell on his face.  He denied any chest tightness or pressure.  He presented to the emergency room and has been admitted for further evaluation and treatment.  He has been found to have urinary tract infection and also has drop in his hemoglobin     Interval history 5/1/2024  Pt with no acute overnight events.  Appears mildly confused    Objective:   /63   Pulse 61   Temp 98.2 °F (36.8 °C) (Oral)   Resp 14   Ht 1.753 m (5' 9\")   Wt 50.3 kg (110 lb 14.3 oz)   SpO2 95%   BMI 16.38 kg/m²     Intake/Output Summary (Last 24 hours) at 5/1/2024 0913  Last data filed at 4/30/2024 1808  Gross per 24 hour   Intake 1080 ml   Output 1000 ml   Net 80 ml     Wt Readings from Last 3 Encounters:   05/01/24 50.3 kg (110 lb 14.3 oz)   04/17/24 50.8 kg (112 lb)   02/13/24 48.5 kg (107 lb)     Telemetry:NSR    Physical Exam:  General:  NAD, Awake, alert but confused  Bruising around right thigh present but slowly improving  Skin:  Warm and dry  Neck:  Supple, no JVP appreciated, no bruit  Chest:  Clear to auscultation, no wheezes/rhonchi/rales  Cardiovascular:  Regular rate. S1S2  Abdomen:  Soft, nontender, +bowel sounds  Extremities:  No LE edema    Cardiac Diagnosis:  hypertension, CHF, and atrial fibrillation    Medications:    ferrous sulfate  324 mg Oral Daily with breakfast    amiodarone  200 mg Oral Daily    apixaban  2.5 mg Oral BID    atorvastatin  10 mg Oral Daily    midodrine  5 mg Oral TID WC    tamsulosin  0.4 mg Oral Daily    sodium chloride flush  5-40 mL IntraVENous 2 times per day      sodium  sinus rhythm on amiodarone and Eliquis therapy     Cardiomyopathy  -No clinical evidence of congestive heart failure  Patient was on small dose of Aldactone, Jardiance and valsartan therapy which are currently on hold  -Will resume Aldactone today           Electronically signed by Keith Rondon MD on 5/1/2024 at 9:13 AM

## 2024-05-01 NOTE — PROGRESS NOTES
V2.0    [facility] Progress Note      Name:  Benny Mosqueda /Age/Sex: 1940  (83 y.o. male)   MRN & CSN:  [id number] & [id number] Encounter Date/Time: 2024 2:50 PM EDT   Location:  [id number] PCP: Mauro Moreira MD     Attending:Kathy Hunt MD       Hospital Day: 3    Assessment and Recommendations   Benny Mosqueda is a 83 y.o. male with medical history of A-fib, cardiomyopathy, hypertension presented to hospital after sustained fall, found to have HASMUKH, right orbital fracture, hyponatremia, admitted for further evaluation.    Plan:   Acute renal failure likely prerenal.  Creatinine was 1.5 on admission.  Restart valsartan but continue to hold spironolactone.  Hyponatremia, acute likely due to dehydration.  His baseline sodium is around 140.  Troponin elevation, bradycardia, with history of A-fib, consulted cardiology for further evaluation, 2D echo from 2023 reviewed which showed severely reduced LV systemic function EF 25%, severe global hypokinesis, diastolic dysfunction. Repeat echo ordered.  Chronic systolic heart failure  Suspected nondisplaced fracture of the superior medial orbital wall on the right, I was informed that no need for inpatient evaluation at this point per ophthalmology, outpatient follow-up needed, plan to do referral at discharge.  Status post fall, generalized weakness, PT, OT  Hospice at home  History of A-fib, Eliquis  BPH, resume Flomax  Type 2 diabetes holding Jardiance  Hyperlipidemia, atorvastatin      Diet ADULT DIET; Regular; No Added Salt (3-4 gm); 2000 ml   DVT Prophylaxis [] Lovenox, []  Heparin, [] SCDs, [] Ambulation,  [] Eliquis, [] Xarelto  [] Coumadin   Code Status DNR-CC   Disposition From: Home  Expected Disposition: Home  Estimated Date of Discharge: To be determined  Patient requires continued admission due to    Surrogate Decision Maker/ POA       Personally reviewed labs, BMP with mild hyponatremia sodium 130, troponin elevation noted  frontal and parietal white matter. There is right periorbital soft tissue swelling is seen.  Small air-fluid level seen in the right frontal sinus.  No significant mastoid opacification. Small air-fluid level seen in the left sphenoid cellule. Small amount of gas is seen in the right orbit, with a questionable nondisplaced fracture of the superomedial orbital wall on the right. Cervical spine: No significant mastoid opacification.  Bony ring of C1 appears intact. Moderate spurring is seen at the tip of the dens.  Scattered levels of disc space narrowing and disc space spur formation are seen, most pronounced C3-C4 through C5-C6.  Scattered levels of facet arthropathy are seen. Scattered levels of foraminal narrowing are seen.  No acute vertebral body fracture noted Hazy ground-glass opacity is seen in the left lung apex..  There is underlying emphysema.  There is a small pulmonary nodule in the right upper lobe, unchanged from prior chest CT measuring 5 mm Focal nodule seen in left lobe of thyroid gland measuring 2.9 cm     Head Periorbital soft tissue swelling is seen.  There is a suspected nondisplaced fracture of the superomedial orbital wall on the right given the gas seen in the right orbit. Atrophy and small-vessel ischemic change.  No acute traumatic intracranial abnormality. Cervical spine Multilevel degenerative disc disease and facet arthropathy.  No vertebral body fracture noted Dominant nodule in left lobe of thyroid.  Given its size, consider thyroid ultrasound follow-up Stable pulmonary nodule right upper lobe       CBC:   Recent Labs     04/29/24  1051 04/30/24  0600 05/01/24  0541   WBC 4.4 4.9 5.8   HGB 8.8* 8.1* 8.9*    181 208       BMP:    Recent Labs     04/29/24  1051 04/30/24  0600 05/01/24  0541   * 130* 131*   K 4.6 4.1 3.8   CL 95* 98* 99   CO2 23 23 23   BUN 19 18 15   CREATININE 1.5* 1.0 0.9   GLUCOSE 89 86 96       Hepatic:   Recent Labs     04/29/24  1051   AST 56*   ALT 37

## 2024-05-01 NOTE — CARE COORDINATION
05/01/24 1219   Service Assessment   Patient Orientation Alert and Oriented;Person   Cognition Other (see comment)  (poor historian)   History Provided By Child/Family   Primary Caregiver Family   Accompanied By/Relationship no one   Support Systems Children;Family Members   Patient's Healthcare Decision Maker is: Legal Next of Kin   PCP Verified by CM Yes   Last Visit to PCP Within last 6 months   Prior Functional Level Independent in ADLs/IADLs   Current Functional Level Assistance with the following:;Mobility;Bathing;Dressing;Toileting;Cooking;Housework;Shopping   Can patient return to prior living arrangement Unknown at present   Ability to make needs known: Poor   Family able to assist with home care needs: Other (comment)  (son works - has cameras in home to monitor - son assists as neede and can work from home)   Would you like for me to discuss the discharge plan with any other family members/significant others, and if so, who? Yes  (anatoliy Turner)   Financial Resources Medicare  ([employer] Medicare)   Community Resources None   CM/SW Referral Other (see comment)  (dc needs)   Discharge Planning   Type of Residence House   Living Arrangements Children   Current Services Prior To Admission Durable Medical Equipment   Current DME Prior to Arrival Shower Chair;Other (Comment)  (grab bars - RW)   Potential Assistance Needed Skilled Nursing Facility   DME Ordered? No   Potential Assistance Purchasing Medications No   Type of Home Care Services None   Patient expects to be discharged to: Skilled nursing facility   Services At/After Discharge    Resource Information Provided? No   Mode of Transport at Discharge BLS   Condition of Participation: Discharge Planning   The Plan for Transition of Care is related to the following treatment goals: skilled faciliy   The Patient and/or Patient Representative was provided with a Choice of Provider? Patient   The Patient and/Or Patient Representative agree with the  hours but pt is at home alone during most days depending on his work schedule. He states he can monitor pt in the home through camers  Patient uses a walker to ambulate and was independent with his ADL's (showers )   Pt has been having increased weakness per the son  Pt is active w/ [facility] in the home     We discussed PT/OT recommendation for continued therapy in a skilled facility   is agreeable to this and will also discuss w/ his siblings  Agreeable to referrals to the following:  [facility]     SNF will require precert    Also explained to son that if a patient was using skilled services (in a skilled facility) under their Medicare then they would need to revoke the current hospice services until the skilled time was completed .  Call placed to Telly to discuss #[phone]-will await return call        The Plan for Transition of Care is related to the following treatment goals of Acute renal failure (ARF) (HCC) [N17.9]  Closed head injury, initial encounter [S09.90XA]  Fall, initial encounter [W19.XXXA]  Laceration of right eyebrow, initial encounter [S01.111A]  Acute kidney injury superimposed on CKD (HCC) [N17.9, N18.9]  Anemia, unspecified type [D64.9]     The Patient and/or patient representative Benny and his family were provided with a choice of provider and agrees with the discharge plan. Freedom of choice list with basic dialogue that supports the patient's individualized plan of care/goals and shares the quality data associated with the providers was provided to: (P) Patient   Patient Representative Name:   anatoliy Turner    The Patient and/or Patient Representative Agree with the Discharge Plan? (P) Yes    Cristy Wagner  Case Management Department  Ph: [phone] Fax: [phone]

## 2024-05-01 NOTE — PROGRESS NOTES
Physical Therapy  Facility/Department: [address] MED SURG  Physical Therapy Treatment Note    Name: Benny Mosqueda  : 1940  MRN: [id number]  Date of Service: 2024    Discharge Recommendations:  Continue to assess pending progress    Benny Mosqueda scored a 16/24 on the AM-PAC short mobility form. Current research shows that an AM-PAC score of 17 or less is typically not associated with a discharge to the patient's home setting. Based on the patient's AM-PAC score and their current functional mobility deficits, it is recommended that the patient have 3-5 sessions per week of Physical Therapy at d/c to increase the patient's independence.  Please see assessment section for further patient specific details.    If patient discharges prior to next session this note will serve as a discharge summary.  Please see below for the latest assessment towards goals.        Patient Diagnosis(es): The primary encounter diagnosis was Anemia, unspecified type. Diagnoses of Acute kidney injury superimposed on CKD (HCC), Fall, initial encounter, Laceration of right eyebrow, initial encounter, and Closed head injury, initial encounter were also pertinent to this visit.  Past Medical History:  has a past medical history of Atrial fibrillation (HCC), Cardiomyopathy (HCC), CHF (congestive heart failure) (HCC), Chronic tachycardia, Erectile dysfunction, Hypertension, Osteoarthritis, Pneumonia, Thymoma, benign, Tubulovillous adenoma polyp of colon, and Wears glasses.  Past Surgical History:  has a past surgical history that includes Prostate surgery; Hemorrhoid surgery; Thymectomy; Colonoscopy (N/A, 1/15/2020); Cardioversion (01/10/2020); and US BIOPSY THYROID (2021).    Assessment   Body Structures, Functions, Activity Limitations Requiring Skilled Therapeutic Intervention: Decreased functional mobility ;Decreased ADL status;Decreased strength;Decreased endurance;Decreased balance;Decreased safe awareness;Decreased  Commands: Within Functional Limits  Subjective  Subjective: Pt is agreeable to PT.         Social/Functional History  Social/Functional History  Lives With: Son  Type of Home: House  Has the patient had two or more falls in the past year or any fall with injury in the past year?: Yes  Ambulation Assistance: Independent (using RW)  Transfer Assistance: Independent  Additional Comments: Pt is a poor historian and not able to provide full history    Vision/Hearing  Hearing  Hearing: Within functional limits      Cognition   Orientation  Overall Orientation Status: Impaired  Orientation Level: Oriented to person;Oriented to place;Disoriented to situation;Disoriented to time (knew month only)  Cognition  Overall Cognitive Status: Exceptions  Arousal/Alertness: Appropriate responses to stimuli  Following Commands: Follows one step commands with repetition;Follows one step commands with increased time  Attention Span: Difficulty dividing attention;Difficulty attending to directions  Memory: Decreased recall of recent events;Decreased short term memory  Safety Judgement: Decreased awareness of need for safety;Decreased awareness of need for assistance  Problem Solving: Decreased awareness of errors  Insights: Decreased awareness of deficits  Initiation: Requires cues for some  Sequencing: Requires cues for some     Objective   Observation/Palpation  Observation: edema, brusing, dried blood to R orbital region  Gross Assessment  Strength: Generally decreased, functional        Bed mobility  Supine to Sit: Stand by assistance  Sit to Supine: Unable to assess (in recliner at end of session)  Transfers  Sit to Stand: Contact guard assistance  Stand to Sit: Contact guard assistance  Ambulation  Surface: Level tile  Device: Rolling Walker  Assistance: Minimal assistance  Gait Deviations: Slow Moni;Decreased step length;Decreased step height  Distance: 10' + 25' + 50'  Comments: Pt with multiple LOB, specifically with  turning.     Balance  Posture: Good  Sitting - Static: Good  Sitting - Dynamic: Good  Standing - Static: Fair  Standing - Dynamic: Fair;-           AM-[facility] - Mobility  AM-PAC Basic Mobility - Inpatient   How much help is needed turning from your back to your side while in a flat bed without using bedrails?: None  How much help is needed moving from lying on your back to sitting on the side of a flat bed without using bedrails?: A Little  How much help is needed moving to and from a bed to a chair?: A Lot  How much help is needed standing up from a chair using your arms?: A Little  How much help is needed walking in hospital room?: A Lot  How much help is needed climbing 3-5 steps with a railing?: A Lot  AM-PAC Inpatient Mobility Raw Score : 16  AM-PAC Inpatient T-Scale Score : 40.78  Mobility Inpatient CMS 0-100% Score: 54.16  Mobility Inpatient CMS G-Code Modifier : CK         Goals  Short Term Goals  Time Frame for Short Term Goals: by acute discharge - all goals ongoing as of 5/1/24  Short Term Goal 1: bed mobility mod I  Short Term Goal 2: sit<>stand mod I  Short Term Goal 3: ambulate > 40' mod I with RW  Patient Goals   Patient Goals : none stated       Education  Patient Education  Education Given To: Patient  Education Provided: Role of Therapy;Plan of Care  Education Method: Verbal  Barriers to Learning: Cognition  Education Outcome: Continued education needed      Therapy Time   Individual Concurrent Group Co-treatment   Time In 1305         Time Out 1345         Minutes 40         Timed Code Treatment Minutes: 40 Minutes       Perry Oseguera, PT

## 2024-05-01 NOTE — PLAN OF CARE
Problem: Discharge Planning  Goal: Discharge to home or other facility with appropriate resources  5/1/2024 0924 by Cinthia Issa RN  Outcome: Progressing  5/1/2024 0518 by Bee Casas RN  Outcome: Progressing  Flowsheets (Taken 4/30/2024 8408)  Discharge to home or other facility with appropriate resources: Identify discharge learning needs (meds, wound care, etc)     Problem: Safety - Adult  Goal: Free from fall injury  5/1/2024 0924 by Cinthia Issa RN  Outcome: Progressing  5/1/2024 0518 by Bee Casas RN  Outcome: Progressing     Problem: ABCDS Injury Assessment  Goal: Absence of physical injury  5/1/2024 0924 by Cinthia Issa RN  Outcome: Progressing  5/1/2024 0518 by Bee Casas RN  Outcome: Progressing     Problem: Skin/Tissue Integrity  Goal: Absence of new skin breakdown  Description: 1.  Monitor for areas of redness and/or skin breakdown  2.  Assess vascular access sites hourly  3.  Every 4-6 hours minimum:  Change oxygen saturation probe site  4.  Every 4-6 hours:  If on nasal continuous positive airway pressure, respiratory therapy assess nares and determine need for appliance change or resting period.  5/1/2024 0924 by Cinthia Issa RN  Outcome: Progressing  5/1/2024 0518 by Bee Casas RN  Outcome: Progressing     Problem: Pain  Goal: Verbalizes/displays adequate comfort level or baseline comfort level  5/1/2024 0924 by Cinthia Issa RN  Outcome: Progressing  5/1/2024 0518 by Bee Casas RN  Outcome: Progressing     Problem: Respiratory - Adult  Goal: Achieves optimal ventilation and oxygenation  5/1/2024 0924 by Cinthia Issa RN  Outcome: Progressing  5/1/2024 0518 by Bee Casas RN  Outcome: Progressing     Problem: Cardiovascular - Adult  Goal: Maintains optimal cardiac output and hemodynamic stability  5/1/2024 0924 by Cinthia Issa RN  Outcome: Progressing  5/1/2024 0518 by Bee Casas RN  Outcome: Progressing  Goal: Absence of cardiac dysrhythmias or at

## 2024-05-01 NOTE — PROGRESS NOTES
Occupational Therapy  Facility/Department: [address] MED SURG  Occupational Therapy Daily Treatment    Name: Benny Mosqueda  : 1940  MRN: [id number]  Date of Service: 2024    Discharge Recommendations:  3-5 sessions per week, Patient would benefit from continued therapy after discharge     Benny Mosqueda scored a 16/24 on the AM-PAC ADL Inpatient form. Current research shows that an AM-PAC score of 17 or less is typically not associated with a discharge to the patient's home setting. Based on the patient's AM-PAC score and their current ADL deficits, it is recommended that the patient have 3-5 sessions per week of Occupational Therapy at d/c to increase the patient's independence.  Please see assessment section for further patient specific details.    If patient discharges prior to next session this note will serve as a discharge summary.  Please see below for the latest assessment towards goals.      Patient Diagnosis(es): The primary encounter diagnosis was Anemia, unspecified type. Diagnoses of Acute kidney injury superimposed on CKD (HCC), Fall, initial encounter, Laceration of right eyebrow, initial encounter, and Closed head injury, initial encounter were also pertinent to this visit.  Past Medical History:  has a past medical history of Atrial fibrillation (HCC), Cardiomyopathy (HCC), CHF (congestive heart failure) (HCC), Chronic tachycardia, Erectile dysfunction, Hypertension, Osteoarthritis, Pneumonia, Thymoma, benign, Tubulovillous adenoma polyp of colon, and Wears glasses.  Past Surgical History:  has a past surgical history that includes Prostate surgery; Hemorrhoid surgery; Thymectomy; Colonoscopy (N/A, 1/15/2020); Cardioversion (01/10/2020); and US BIOPSY THYROID (2021).    Treatment Diagnosis: Decreased: ADLs, functional transfers/mobility      Assessment   Performance deficits / Impairments: Decreased functional mobility ;Decreased ADL status;Decreased cognition;Decreased  will complete LB dressing w/ min A  Long Term Goals  Time Frame for Long Term Goals : LTG=STG  Patient Goals   Patient goals : to return home       Therapy Time   Individual Concurrent Group Co-treatment   Time In 1305         Time Out 1335         Minutes 30                 Ana Betts OTR/L [id number]

## 2024-05-01 NOTE — PROGRESS NOTES
Pt with increased confusion, pulling at lines, set of bed alarm multiple times, no cameras available, placed on wait list. Has not slept all shift, perfect serve sent to MD.

## 2024-05-01 NOTE — PLAN OF CARE
Problem: Discharge Planning  Goal: Discharge to home or other facility with appropriate resources  Outcome: Progressing  Flowsheets (Taken 4/30/2024 2328)  Discharge to home or other facility with appropriate resources: Identify discharge learning needs (meds, wound care, etc)     Problem: Safety - Adult  Goal: Free from fall injury  Outcome: Progressing     Problem: ABCDS Injury Assessment  Goal: Absence of physical injury  Outcome: Progressing     Problem: Skin/Tissue Integrity  Goal: Absence of new skin breakdown  Description: 1.  Monitor for areas of redness and/or skin breakdown  2.  Assess vascular access sites hourly  3.  Every 4-6 hours minimum:  Change oxygen saturation probe site  4.  Every 4-6 hours:  If on nasal continuous positive airway pressure, respiratory therapy assess nares and determine need for appliance change or resting period.  Outcome: Progressing     Problem: Pain  Goal: Verbalizes/displays adequate comfort level or baseline comfort level  Outcome: Progressing     Problem: Respiratory - Adult  Goal: Achieves optimal ventilation and oxygenation  Outcome: Progressing     Problem: Cardiovascular - Adult  Goal: Maintains optimal cardiac output and hemodynamic stability  Outcome: Progressing     Problem: Metabolic/Fluid and Electrolytes - Adult  Goal: Electrolytes maintained within normal limits  Outcome: Progressing  Flowsheets (Taken 4/30/2024 2328)  Electrolytes maintained within normal limits: Monitor labs and assess patient for signs and symptoms of electrolyte imbalances

## 2024-05-02 NOTE — PROGRESS NOTES
[facility]   Daily Progress Note      Admit Date:  4/29/2024    CC: \"  Benny Mosqueda is a 83 y.o. patient well-known to me with prior history of cardiomyopathy and congestive heart failure, paroxysmal atrial fibrillation, hypertension who presented to the hospital after he passed out at home.  He apparently was trying to take his garbage out and felt weak and his legs gave out on him and fell on his face.  He denied any chest tightness or pressure.  He presented to the emergency room and has been admitted for further evaluation and treatment.  He has been found to have urinary tract infection and also has drop in his hemoglobin     Interval history 5/1/2024  Pt with no acute overnight events.  Appears mildly confused    Interval history 5/2/2024  Patient continues to remain confused.  Stays in sinus rhythm and blood pressure is better today  Iron studies reveal iron deficiency anemia    Objective:   BP (!) 145/77   Pulse 62   Temp 98 °F (36.7 °C) (Oral)   Resp 16   Ht 1.753 m (5' 9\")   Wt 51.6 kg (113 lb 12.1 oz)   SpO2 92%   BMI 16.80 kg/m²     Intake/Output Summary (Last 24 hours) at 5/2/2024 1031  Last data filed at 5/2/2024 0829  Gross per 24 hour   Intake 465 ml   Output 850 ml   Net -385 ml     Wt Readings from Last 3 Encounters:   05/02/24 51.6 kg (113 lb 12.1 oz)   04/17/24 50.8 kg (112 lb)   02/13/24 48.5 kg (107 lb)     Telemetry:NSR    Physical Exam:  General:  NAD, Awake, alert but confused  Bruising around right thigh present but slowly improving  Skin:  Warm and dry  Neck:  Supple, no JVP appreciated, no bruit  Chest:  Clear to auscultation, no wheezes/rhonchi/rales  Cardiovascular:  Regular rate. S1S2  Abdomen:  Soft, nontender, +bowel sounds  Extremities:  No LE edema    Cardiac Diagnosis:  hypertension, CHF, and atrial fibrillation    Medications:    spironolactone  25 mg Oral Daily    empagliflozin  10 mg Oral Daily    ferrous sulfate  324 mg Oral Daily with breakfast  and change his position slowly     Anemia  -Patient hemoglobin has dropped compared to before  -Stool guaiac is negative but will require anemia workup  Hemoglobin slightly improved from yesterday  Iron studies show fe deficiency  --CBC pending this morning  -Discussed with Dr. Hunt.  Since he is a hospice patient, will not pursue GI workup at this time  -Will be given high-dose Venofer today    Paroxysmal atrial fibrillation  -Currently in sinus rhythm on amiodarone and Eliquis therapy   -Patient has evidence of iron deficiency anemia but will not consider him for watchman therapy since he is under hospice care    Cardiomyopathy  -No clinical evidence of congestive heart failure  Patient was on small dose of Aldactone, Jardiance and valsartan therapy at home  -Patient is back on Jardiance and Aldactone therapy  -Will resume valsartan since blood pressure is stable    Patient is likely going to SNF with hospice Capabilities    Total visit time > 45 minutes; > 50% spend counseling / coordinating care. I reviewed interval history, physical exam, review of data including labs, imaging, development and implementation of treatment plan and coordination of complex care. Counseled on risk factor modifications.    Electronically signed by Keith Rondon MD on 5/2/2024 at 10:31 AM

## 2024-05-02 NOTE — DISCHARGE INSTR - COC
Continuity of Care Form    Patient Name: Benny Mosqueda   :  1940  MRN:  [id number]    Admit date:  2024  Discharge date:  ***    Code Status Order: DNR-CC   Advance Directives:     Admitting Physician:  Herve Issa MD  PCP: Mauro Moreira MD    Discharging Nurse: ***  Discharging Hospital Unit/Room#: P7X-8116/4251-01  Discharging Unit Phone Number: ***    Emergency Contact:   Extended Emergency Contact Information  Primary Emergency Contact: Johnny Mosqueda  Address: [address] [place] [address]  Home Phone: [phone]  Mobile Phone: [phone]  Relation: Child  Secondary Emergency Contact: Gomez Omalley   [facility]  Home Phone: [phone]  Mobile Phone: [phone]  Relation: Other    Past Surgical History:  Past Surgical History:   Procedure Laterality Date    CARDIOVERSION  01/10/2020    COLONOSCOPY N/A 1/15/2020    COLONOSCOPY performed by Gomez Arrington MD at [facility] ENDOSCOPY    HEMORRHOID SURGERY      PROSTATE SURGERY      S/P TURP    THYMECTOMY      US THYROID BIOPSY  2021    US THYROID BIOPSY 2021 [facility] ULTRASOUND       Immunization History:   Immunization History   Administered Date(s) Administered    COVID-19, PFIZER PURPLE top, DILUTE for use, (age 12 y+), 30mcg/0.3mL 2021, 2021, 2021    Influenza Virus Vaccine 2015, 2016, 2017    Influenza, FLUAD, (age 65 y+), Adjuvanted, 0.5mL 10/14/2020, 2021, 2022    Influenza, FLUARIX, FLULAVAL, FLUZONE (age 6 mo+) AND AFLURIA, (age 3 y+), PF, 0.5mL 2017    Influenza, FLUBLOK, (age 18 y+), PF, 0.5mL 10/01/2018    Influenza, High Dose (Fluzone 65 yrs and older) 2013    Influenza, Triv, inactivated, subunit, adjuvanted, IM (Fluad 65 yrs and older) 2019    Pneumococcal Conjugate 7-valent (Prevnar7) 2004    Pneumococcal, PCV-13, PREVNAR 13, (age 6w+), IM, 0.5mL 2017     Pneumococcal, PPSV23, PNEUMOVAX 23, (age 2y+), SC/IM, 0.5mL 12/02/2013    TDaP, ADACEL (age 10y-64y), BOOSTRIX (age 10y+), IM, 0.5mL 03/30/2018, 04/29/2024    Tetanus 02/03/2009    Zoster Live (Zostavax) 02/10/2015       Active Problems:  Patient Active Problem List   Diagnosis Code    OA (osteoarthritis) M19.90    Thymoma D49.89    Essential hypertension I10    Encounter to discuss treatment options Z71.89    Atrial fibrillation with rapid ventricular response ([facility]) I48.91    Chest wall mass R22.2    Septicemia ([facility]) A41.9    Cardiomyopathy ([facility]) I42.9    Paroxysmal atrial fibrillation ([facility]) I48.0    Acute on chronic systolic heart failure ([facility]) I50.23    S/P ablation of atrial fibrillation Z98.890, Z86.79    Persistent atrial fibrillation ([facility]) I48.19    Atrial tachycardia ([facility]) I47.19    Left atrial flutter by electrocardiogram ([facility]) I48.92    Acute respiratory failure with hypoxia ([facility]) J96.01    Severe malnutrition ([facility]) E43    Chronic diastolic heart failure ([facility]) I50.32    Encounter for monitoring flecainide therapy Z51.81, Z79.899    Nonrheumatic mitral valve regurgitation I34.0    Secondary hypercoagulable state ([facility]) D68.69    A-fib ([facility]) I48.91    Acute cystitis without hematuria N30.00    Coronary artery disease involving native coronary artery of native heart without angina pectoris I25.10    Primary hypertension I10    Nonischemic cardiomyopathy ([facility]) I42.8    Atrial fibrillation with RVR ([facility]) I48.91    Pleural effusion, bilateral J90    Hypokalemia E87.6    Hyponatremia E87.1    Chronic systolic heart failure ([facility]) I50.22    Acute renal failure (ARF) ([facility]) N17.9       Isolation/Infection:   Isolation            No Isolation          Patient Infection Status       None to display                     Nurse Assessment:  Last Vital Signs: BP (!) 152/61   Pulse 63   Temp 97.9 °F (36.6 °C) (Oral)   Resp 16   Ht 1.753 m (5' 9\")   Wt 51.6 kg (113 lb 12.1 oz)   SpO2 90%   BMI 16.80 kg/m²     Last  documented pain score (0-10 scale): Pain Level: 0  Last Weight:   Wt Readings from Last 1 Encounters:   05/02/24 51.6 kg (113 lb 12.1 oz)     Mental Status:  {IP PT MENTAL STATUS:20030}    IV Access:  { JOELLE IV ACCESS:567290711}    Nursing Mobility/ADLs:  Walking   {CHP DME ADLs:026213506}  Transfer  {CHP DME ADLs:161156117}  Bathing  {CHP DME ADLs:050967855}  Dressing  {CHP DME ADLs:032202945}  Toileting  {CHP DME ADLs:446025819}  Feeding  {CHP DME ADLs:785712381}  Med Admin  {P DME ADLs:305431711}  Med Delivery   { JOELLE MED Delivery:028696489}    Wound Care Documentation and Therapy:  Wound 04/30/24 Eye Right Eye laceration with stitches (Active)   Wound Etiology Traumatic 05/01/24 0800   Dressing/Treatment Open to air 05/01/24 0800   Wound Assessment Bleeding 05/01/24 0800   Drainage Amount Scant (moist but unmeasurable) 05/01/24 0800   Drainage Description Serosanguinous 05/01/24 0800   Odor None 05/01/24 0800   Juliana-wound Assessment Ecchymosis 05/01/24 0800   Number of days: 2       Incision 08/11/21 Femoral Anterior;Proximal;Right (Active)   Number of days: 994        Elimination:  Continence:   Bowel: {YES / NO:19727}  Bladder: {YES / NO:19727}  Urinary Catheter: {Urinary Catheter:749737737}   Colostomy/Ileostomy/Ileal Conduit: {YES / NO:19727}       Date of Last BM: ***    Intake/Output Summary (Last 24 hours) at 5/2/2024 1424  Last data filed at 5/2/2024 1102  Gross per 24 hour   Intake 265 ml   Output 650 ml   Net -385 ml     I/O last 3 completed shifts:  In: 695 [P.O.:690; I.V.:5]  Out: 850 [Urine:850]    Safety Concerns:     { JOELLE Safety Concerns:749700343}    Impairments/Disabilities:      { JOELLE Impairments/Disabilities:110031083}    Nutrition Therapy:  Current Nutrition Therapy:   { JOELLE Diet List:936355428}    Routes of Feeding: {CHP DME Other Feedings:953613854}  Liquids: {Slp liquid thickness:80787}  Daily Fluid Restriction: {CHP DME Yes amt example:892814439}  Last Modified Barium Swallow

## 2024-05-02 NOTE — CODE DOCUMENTATION
Pt is a DNR-CC. Family in room. Family stated they are willing to treat with medications. NO intubation, defib, or CPR

## 2024-05-02 NOTE — CARE COORDINATION
Recke LEVINE from [employer] asking about dc plan.  Called back and informed plan is [facility] if able to get insurance approval.  Insurance still pending  Electronically signed by HELDER Santoro on 5/2/2024 at 4:52 PM

## 2024-05-02 NOTE — PROGRESS NOTES
Physical Therapy  Daily Treatment Attempt    24    Name: Benny Mosqueda   : 1940    MRN: [id number]    PT follow-up attempt. Entered room to pt sleeping in bed. Briefly awakens, replaced tele stickers, PCA in to take vitals, followed by echo. Will follow later as time permits.    Total Treatment Time: 10 minutes    Electronically signed by Perry Oseguera, PT on 2024 at 9:15 AM

## 2024-05-02 NOTE — PROGRESS NOTES
Called to room by RN for low sats. Patient was on 6lpm 84%  switched for a HFNC 8lpm sats increased to 8lpm. Patient is a mouth breathing and when breathing in through the nose oxygen increased to 95%. Informed patients family at bedside of the switch in NC and answered all questions.    Electronically signed by Sarah Beckford on 5/2/2024 at 5:33 PM

## 2024-05-02 NOTE — CARE COORDINATION
Recd calls back from [facility], , and [facility].  None can accept.  [facility] is able to accept.  Spoke with son Jorge adkins this and he is agreeable to [facility] [place].  Requested precert be started.  Electronically signed by HELDER Santoro on 5/2/2024 at 2:23 PM

## 2024-05-02 NOTE — CARE COORDINATION
[facility]  PRE-CERT REQUEST SUBMITTED VIA ACCESS PORTAL    REQUESTED SNF:  [facility]    ANTICIPATED ADMIT DATE TO SNF:  05/02/2024      [facility] #:  [id number]      Electronically signed by Ana Tran on 5/2/2024 at 3:16 PM   Case Management Assistant  Phone:  [phone] - Fax:  [phone]

## 2024-05-02 NOTE — PROGRESS NOTES
Pt is hallucinating, confused at the middle of the night, wants to get out of the bed. Reoriented to the pt multiple times. Pt  is on Tele Camera. O2 continue at low flow, 1 lt/min. Pt is on fall precautions. Bed exit alarm on. Lewis in situ.

## 2024-05-02 NOTE — PLAN OF CARE
Problem: Discharge Planning  Goal: Discharge to home or other facility with appropriate resources  Outcome: Progressing  Flowsheets (Taken 5/1/2024 2350)  Discharge to home or other facility with appropriate resources: Identify barriers to discharge with patient and caregiver     Problem: Safety - Adult  Goal: Free from fall injury  Outcome: Progressing  Flowsheets (Taken 5/2/2024 0006)  Free From Fall Injury: Instruct family/caregiver on patient safety     Problem: ABCDS Injury Assessment  Goal: Absence of physical injury  Outcome: Progressing  Flowsheets (Taken 5/2/2024 0006)  Absence of Physical Injury: Implement safety measures based on patient assessment     Problem: Skin/Tissue Integrity  Goal: Absence of new skin breakdown  Description: 1.  Monitor for areas of redness and/or skin breakdown  2.  Assess vascular access sites hourly  3.  Every 4-6 hours minimum:  Change oxygen saturation probe site  4.  Every 4-6 hours:  If on nasal continuous positive airway pressure, respiratory therapy assess nares and determine need for appliance change or resting period.  Outcome: Progressing     Problem: Pain  Goal: Verbalizes/displays adequate comfort level or baseline comfort level  Outcome: Progressing     Problem: Respiratory - Adult  Goal: Achieves optimal ventilation and oxygenation  Outcome: Progressing  Flowsheets (Taken 5/1/2024 2350)  Achieves optimal ventilation and oxygenation: Assess for changes in respiratory status     Problem: Cardiovascular - Adult  Goal: Maintains optimal cardiac output and hemodynamic stability  Outcome: Progressing  Flowsheets (Taken 5/1/2024 2350)  Maintains optimal cardiac output and hemodynamic stability: Monitor blood pressure and heart rate  Goal: Absence of cardiac dysrhythmias or at baseline  Outcome: Progressing     Problem: Metabolic/Fluid and Electrolytes - Adult  Goal: Electrolytes maintained within normal limits  Outcome: Progressing  Flowsheets (Taken 5/1/2024  1900)  Electrolytes maintained within normal limits: Monitor labs and assess patient for signs and symptoms of electrolyte imbalances  Goal: Hemodynamic stability and optimal renal function maintained  Outcome: Progressing  Flowsheets (Taken 5/1/2024 2350)  Hemodynamic stability and optimal renal function maintained: Monitor labs and assess for signs and symptoms of volume excess or deficit

## 2024-05-02 NOTE — PROGRESS NOTES
V2.0    [facility] Progress Note      Name:  Benny Mosqueda /Age/Sex: 1940  (83 y.o. male)   MRN & CSN:  [id number] & [id number] Encounter Date/Time: 2024 2:50 PM EDT   Location:  [id number] PCP: Mauro Moreira MD     Attending:Kathy Hunt MD       Hospital Day: 4    Assessment and Recommendations   Benny Mosqueda is a 83 y.o. male with medical history of A-fib, cardiomyopathy, hypertension presented to hospital after sustained fall, found to have HASMUKH, right orbital fracture, hyponatremia, admitted for further evaluation.    Plan:   Acute renal failure likely prerenal.  Creatinine was 1.5 on admission.  Monitor while restarted on Aldactone and valsartan.  Hyponatremia, acute likely due to dehydration.  His baseline sodium is around 140.  Encourage oral fluid intake.  Chronic A-fib on Eliquis.  Chronic systolic heart failure with EF of 25% and severe global hypokinesia.  Cardiology is following.  Suspected nondisplaced fracture of the superior medial orbital wall on the right.  I was informed that no need for inpatient evaluation at this point per ophthalmology, outpatient follow-up needed, plan to do referral at discharge.  Fall/generalized weakness.  PT OT is recommending SNF on discharge.   is following.  Hospice at home   BPH.  On Flomax  Type 2 diabetes holding Jardiance  Hyperlipidemia.  Continue statin  Severe iron deficiency anemia.  No obvious source of bleeding.  Will give a IV Venofer 500 mg x 1.  Monitor H&H.  Depression.  Restart Celexa and monitor sodium.      Diet ADULT DIET; Regular; No Added Salt (3-4 gm); 2000 ml   DVT Prophylaxis [] Lovenox, []  Heparin, [] SCDs, [] Ambulation,  [] Eliquis, [] Xarelto  [] Coumadin   Code Status DNR-CC   Disposition From: Home  Expected Disposition: Home  Estimated Date of Discharge: PT OT is recommending SNF on discharge.  Pre-CERT is pending.   Surrogate Decision Maker/ POA       Personally reviewed labs, BMP with  that system including errors in grammar, punctuation, and spelling, as well as words and phrases that may be inappropriate. If there are any questions or concerns, please feel free to contact the dictating provider for clarification.

## 2024-05-02 NOTE — CARE COORDINATION
[employer] snf precert still pending.    Electronically signed by NURIS Davis, LISW, Case Management on 5/2/2024 at 3:30 PM  [place] [phone]

## 2024-05-02 NOTE — PLAN OF CARE
Problem: Safety - Adult  Goal: Free from fall injury  Flowsheets (Taken 5/2/2024 0006 by Lizzie Hernandez RN)  Free From Fall Injury: Instruct family/caregiver on patient safety     Problem: Skin/Tissue Integrity  Goal: Absence of new skin breakdown  Description: 1.  Monitor for areas of redness and/or skin breakdown  2.  Assess vascular access sites hourly  3.  Every 4-6 hours minimum:  Change oxygen saturation probe site  4.  Every 4-6 hours:  If on nasal continuous positive airway pressure, respiratory therapy assess nares and determine need for appliance change or resting period.  Note: Watch skin for break down

## 2024-05-03 NOTE — PROGRESS NOTES
Patient's family at bedside. Patient oxygen at 77 1949H. Doctor, supervisor and NP went to patient's room and it was decided to given comfort care/measures to patient. Lasix and Morphine given as ordered.     2330H patient's Family mentioned why they are not getting any explanation or interventions for their father and mentioned putting him to higher level of care. Ekta Currie NP informed.    Electronically signed by Christiana Amaro RN on 5/2/2024 at 11:36 PM

## 2024-05-03 NOTE — PROGRESS NOTES
Pt being transferred from CT, was bradycardic and had agonal breathing when brought to floor. Rapid response called and in process as entering the room.    Electronically signed by Vanesa Stewart RN on 5/3/2024 at 12:59 AM

## 2024-05-03 NOTE — PLAN OF CARE
[facility] RN called asking for pronouncing MD, unable to locate, transferred t security.Electronically signed by Natasha Gregory RN on 5/3/2024 at 1:40 PM]

## 2024-05-03 NOTE — PROGRESS NOTES
CC: Acute renal failure (ARF) ([facility])    Name:  Benny Mosqueda /Age/Sex: 1940  (83 y.o. male)   MRN & CSN:  [id number] & [id number] Encounter Date/Time: 2024 8:00 PM EDT   Location:  [id number] PCP: Mauro Moreira MD     Attending:Kathy Hunt MD       Admit date: 2024  Days in hospital:  4  Status:  Inpatient     Chief Admission Complaint:  Fall    Presenting Admission History:      83 y.o. male who presented to [facility] on 2024 for C/O Fall on 2024. Pt had 2.5 cm laceration above right eye which required suturing. Admitting diagnosis include HASMUKH, Hyponatremia, generalized weakness, anemia and s/p fall. Patient has PMHx significant for paroxysmal atrial fibrillation, cardiomyopathy, CHF, hypertension, osteoarthritis and benign thymoma.      2024   2126 paged from bedside RN requesting comfort medications  2135 order placed for 2 mg morphine every 1 hour as needed for air hunger or pain   2318 bedside RN reporting family considering higher level of care wanting to speak to someone about concerns  2322 supervisor to bedside  2345 this AUBREE to bedside for patient assessment/evaluation and discussion with family regarding care and concerns.  Patient laying in bed appearing restless with 100% non-rebreather on.  Patient moving head side-to-side moaning.   5/3/2024   0000 family collectively decided to change patient CODE STATUS from DNR CCA to LIMITED CODE: YES for intubation, YES for resuscitative medications YES for cardioversion and NO for chest compressions.  Orders:- limited CODE STATUS - transfer to PCU -stat CT head without contrast -stat labs:CMP, CBC, VBG, Mag and Phos      ROS:   Review of systems not obtained due to patient factors. Lethargy.        Physical Exam       GEN   -Lethargic cachectic and ill appearing male, lying in bed. Appears given age.  EYES   -No scleral erythema, discharge, or conjunctivitis.  HENT   -MM are dry. Oral pharynx without

## 2024-05-03 NOTE — CARE COORDINATION
Spoke with Paul gordillo and Cecy [phone] from [facility] notifying pt passed away.    Electronically signed by HELDER Santoro on 5/3/2024 at 1:07 PM

## 2024-05-03 NOTE — PROGRESS NOTES
Pt's family appear   05/02/24 2008   Encounter Summary   Encounter Overview/Reason Grief, Loss, and Adjustments   Service Provided For Patient and family together   Referral/Consult From Other (comment)  (Code)   Last Encounter  05/02/24  (support and prayer CL)   Begin Time 1944   End Time  2008   Total Time Calculated 25 min   Crisis   Type Code Blue   Spiritual/Emotional needs   Type Spiritual Support   Grief, Loss, and Adjustments   Type Adjustment to illness;End of Life   Assessment/Intervention/Outcome   Assessment Sad;Anxious;Tearful   Intervention Active listening;Discussed illness injury and it’s impact;Discussed belief system/Yazidism practices/olga;End of Life Care;Prayer (assurance of)/Nocatee   Outcome Coping;Engaged in conversation;Expressed Gratitude      to be supporting one another at this time and said they don't have any other spiritual needs at this time.

## 2024-05-03 NOTE — PROGRESS NOTES
Report given to PCU Vanesa-LELA, All necessary information given. RN verbalized no further questions.     Patient transported to CT Scan for Stat CT Head with oxygen support HFNC. Patient was bradycardic.      Electronically signed by Christiana Amaro RN on 5/3/2024 at 1:14 AM

## 2024-05-06 ENCOUNTER — TELEPHONE (OUTPATIENT)
Dept: PRIMARY CARE CLINIC | Age: 84
End: 2024-05-06

## 2024-05-06 NOTE — TELEPHONE ENCOUNTER
Per Dr Moreira:   they should [facility] [place] [facility] pt was [facility] and coded.   He was seeing Dr Rondon.         Left a detailed VM of this for Purnima lawrence/ Mehdi .

## 2024-05-30 NOTE — PROGRESS NOTES
Physician Progress Note      PATIENT:               SONYA PASCAL  [facility] #:                  [id number]  :                       1940  ADMIT DATE:       2024 10:28 AM  DISCH DATE:        5/3/2024 2:00 AM  RESPONDING  PROVIDER #:        Keith Rondon MD          QUERY TEXT:    Pt admitted with HASMUKH nd severe iron deficiency anemia. Pt noted to have   elevated troponin. If possible, please document in the progress notes and   discharge summary if you are evaluating and/or treating any of the following:    The medical record reflects the following:  Risk Factors: CHF, HASMUKH  Clinical Indicators: troponin 74, 43, 43  Treatment: cardiology consult, trending troponin  Options provided:  -- Non-ischemic myocardial injury due to CHF  -- Non-ischemic myocardial injury due to HASMUKH  -- Non-ischemic myocardial injury due to other, Please specify cause.  -- Other - I will add my own diagnosis  -- Disagree - Not applicable / Not valid  -- Disagree - Clinically unable to determine / Unknown  -- Refer to Clinical Documentation Reviewer    PROVIDER RESPONSE TEXT:    This patient has non-ischemic myocardial injury due to HASMUKH.    Query created by: Ana Flood on 2024 11:15 AM      Electronically signed by:  Keith Rondon MD 2024 1:32 PM

## 2024-12-21 NOTE — H&P
Cardiac Electrophysiology Consultation   Date: 8/11/2021  Reason for Consultation: Atrial fibrillation  Consult Lorraine Aguilera M.D. Primary R Meme Lim MD          Chief Complaint   Patient presents with    Follow-up       afib - no cardiac complaints      HPI: Julia Main is a 79 y.o. patient with a history of atrial fibrillation, hypertension, and systolic heart failure He was first diagnosed with atrial fibrillation in October of 2018 after presenting to the ED with c/p worsening shortness of breath and weakness. . In the ER he was noted to be in Atrial fibrillation with RVR and also evidence of pneumonia. He had an echocardiogram completed that showed an LVEF of 30-35%. He was treated with amiodarone but was discontinued due to abnormal LFTs. He was discharged home on a BB and Eliquis. He wore a 30 day event monitor that showed he was predominantly in normal sinus rhythm with 6 episodes of atrial fibrillation or atrial flutter. No symptoms were reported.      Interval History: Today, he states overall he is doing well. He denies any known recurrence of any atrial fibrillation. He denies any new cardiac complaints and states he continues to remain active without any exertional symptoms. He denies any chest pains or worsening shortness of breath. He reports compliance with his medications and tolerating. He denies any abnormal bleeding or bruising. He follows with Dr. Bre Ruiz on a routine basis for his general cardiology needs.  Patient denies exertional chest pain/pressure, dyspnea at rest, VILLELA, PND, orthopnea, palpitations, lightheadedness, weight changes, changes in LE edema, and syncope.     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     also sessile serrated adenoma    Wears glasses              Past Surgical History         Past Surgical History:   Procedure Laterality Date    CARDIOVERSION   01/10/2020    COLONOSCOPY N/A 1/15/2020     [facility]         S/P TURP    THYMECTOMY        US THYROID BIOPSY   6/18/2021     US THYROID BIOPSY 6/18/2021 [facility]TRAS[facility]         Allergies: Allergies   Allergen Reactions    Amiodarone        Medication:   Home Medications           Prior to Admission medications    Medication Sig Start Date End Date Taking? Authorizing Provider   furosemide (LASIX) 20 MG tablet   6/22/21   Yes Historical Provider, MD   apixaban (ELIQUIS) 5 MG TABS tablet Take 1 tablet by mouth 2 times daily 5/27/21   Yes Aguilar De La Torre MD   lisinopril (PRINIVIL;ZESTRIL) 10 MG tablet Take 1 tablet by mouth daily 8/24/20   Yes Olga Serrano MD   metoprolol succinate (TOPROL XL) 25 MG extended release tablet Take 0.5 tablets by mouth daily 8/24/20   Yes Esperanza Washington MD   triamcinolone (ARISTOCORT) 0.5 % cream Apply topically every 4 hours as needed (eczema) Apply topically 3 times daily. 7/9/19   Yes Aguilar De La Torre MD         Social History:   reports that he quit smoking about 28 years ago. His smoking use included cigarettes. He started smoking about 66 years ago. He smoked 1.00 pack per day. He has never used smokeless tobacco. He reports current alcohol use. He reports previous drug use. Family History:  family history includes Cancer in his brother and sister; Heart Disease in his brother, brother, and father. Reviewed.  Denies family history of sudden cardiac death, arrhythmia, premature CAD     Review of System:     · General ROS: negative for - chills, fever   · Psychological ROS: negative for - anxiety or depression  · Ophthalmic ROS: negative for - v-rate of 61 bpm with QRS duration 108 ms.      Studies:   1. Event monitor: 2/13/19  NSR. Few episodes if PSVT. 2. Event monitor: 6/11/21  Sinus rhythm, (>6) episodes of atrial fibrillation or atrial flutter, sustained and brief.      2. Echo: 5/15/19  Left ventricular cavity size is normal.  Normal left ventricular wall thickness. Ejection fraction is visually estimated to be 40-45%. There is severe hypokinesis of the septal walls. Abnormal (paradoxical) septal motion is present. Indeterminate diastolic function  Mitral valve leaflets appear thickened with mitral valve prolapse.   Moderate mitral regurgitation is present. The left atrium is mildly dilated. Tricuspid valve appear redundant. Mild to moderate tricuspid regurgitation. The right atrium is moderately dilated. IVC size is normal (<2.1 cm) but collapses < 50% with respiration consistent  with elevated RA pressure (8 mmHg). Estimated pulmonary artery systolic pressure is mildly elevated at 34 mmHg  assuming a right atrial pressure of 8 mmHg. Ejection fraction is visually estimated to be 40-45%. LA volume/Index: 69 ml /40 ml/m2     Echo:10/29/18  Left ventricular cavity size is normal.  Normal left ventricular wall thickness. Difficult to estimate EF due to increase rate and rhythm but appears depressed  Mitral valve leaflets appear mildly thickened. Mild mitral regurgitation. The left atrium is dilated. Aortic valve appears sclerotic but opens adequately. Trivial aortic regurgitation. Tricuspid valve is structurally normal.  Mild tricuspid regurgitation with RVSP of 26 mmHg. The right atrium is dilated. Suggest repeat limited echo with bubble study to r/o ASD or PFO     Echo limited 11/2/18   Left ventricular cavity size is normal.  Normal left ventricular wall thickness. Ejection fraction is visually estimated to be 30-35%, though difficult to adequately assess due to arrhythmia. Severe hypo to akinesis of the septum.   Abnormal (paradoxical) septal motion is present. Tricuspid valve appears thickened with redundancy. The right atrium is mildly dilated. IVC not well visualized. A bubble study was performed and fails to show evidence of shunting.     3. Stress Test:    12/21/18 no evidence of ischemia.      4. Cath:   None     I independently reviewed the ECG, MCOT, echocardiogram, stress test, and coronary angiography/PCI results and used them for my plan of care. Procedures:  1. Cardioversion 1/10/2020  2. Electrophysiology study with radiofrequency ablation of atrial fibrillation and pulmonary vein isolation   Additional focal ablation for Atrial fibrillation, outside the pulmonary vein along the posterior wall of left atrium, floor of left atrium and interatrial septum. Typical atrial flutter ablation on 5/8/2020 with Radu Scruggs MD      Assessment/Plan:      Paroxysmal atrial fibrillation/atrial flutter   -s/p cardioversion 1/10/2020  -typical atrial flutter ablation on 5/8/2020 with Radu Scruggs MD   -Event monitor showed episodes of Atrial fibrillation or atrial flutter, sustained and brief  -[id number] Score 4 (CHF, HTN, AGE)  -On Eliquis 5 mg BID for  thromboembolic risk reduction.  -Tolerating well no signs or symptoms of abnormal bruising or bleeding. -LA volume/Index: 69 ml /40 ml/m2 per Echo 5/15/2019  -Will await starting any antiarrhythmics at this time      We educated the patient that atrial fibrillation and atrial flutter are both worsening and progressive diseases, with more frequent episodes that will ensue. Subsequent episodes usually become more sustained to the extent that many individuals would then develop persistent atrial fibrillation/atrial flutter. Once persistence is reached, permanent atrial dysrhythmia is inevitable.  We also discussed the fact that atrial fibrillation and atrial flutter are associated with stroke, including life-threatening stroke, and therefore oral anticoagulation is warranted depending on the patient's [id number] score.      We discussed different management options for atrial fibrillation and atrial flutter. These options include the use of cardioversion, anti-arrhythmic medication therapy, rate control strategy, oral anticoagulation, and atrial fibrillation and atrial flutter ablation. Risks, benefits and alternative of each treatment options were explained. These options include use of cardioversion (mainly for persisting atrial fibrillation or atrial flutter) which provides an effective immediate therapy with success rates of 75% or higher, but it provides no short nor long term efficacy. Anti-arrhythmic medications provide a very effective short term therapy, but even with our most potent anti-arrhythmic medication there is limited long term efficacy (clinical studies have shown that 40% of patients remain atrial fibrillation-free after 4 years of follow-up after starting one of the more powerful anti-arrhythmic medication (amiodarone), and, if extrapolated, may have further diminishing success as time goes on). Against atrial flutter, any anti-arrhythmic medication would be nearly ineffective. Atrial fibrillation and atrial flutter ablation is a potentially curative therapy with very reasonable success rate after a first time procedure and with improving success rates with subsequent procedures.      The risks, benefits and alternatives of the ablation procedure were discussed with the patient. The risks including, but not limited to, the risks of bleeding, infection, radiation exposure, injury to vascular, cardiac and surrounding structures (including pneumothorax), stroke, cardiac perforation, tamponade, need for emergent open heart surgery, need for pacemaker implantation, injury to the phrenic nerve, injury to the esophagus, myocardial infarction and death were discussed in detail.  The patient was also counseled at length about the risks of arleth Covid-19 in the jennifer-operative and post-operative states including the recovery window of their procedure. The patient was made aware that arleth Covid-19 after a surgical procedure may worsen their prognosis for recovering from the virus and lend to a higher morbidity and or mortality risk. The patient was given the option of postponing their procedure. The patient was also presented reasonable alternatives to the proposed care, treatment, and services. The discussion I have had with the patient encompassed risks, benefits, and side effects related to the alternatives and the risks related to not receiving the proposed care, treatment and services.      I spent 40 minutes face to face with the patient, with greater than 50% of that time spent in counseling on the above.     The patient wishes to further discuss with his family and review educational information provided today prior to proceeding with an ablation.      Chronic systolic heart failure/NICM  -EF 30%, improved to 40-45%  -Pt appears euvolemic today  -Continue with medical therapy   -ACE-I, B-blocker, ASA, and statin  -Encouraged a low sodium diet. -Management per Dr. Emma Dinh to call the office if he would like to pursue the ablation. Follow up with EP as needed and with Dr. Tere Jara as scheduled.      Thank you for allowing me to participate in the care of [facility]. All questions and concerns were addressed to the patient/family. Alternatives to my treatment were discussed.      I have reviewed the history and physical and examined the patient and find no relevant changes. I have reviewed with the patient and/or family the risks and benefits to the proposed procedure. The patient was presented with the option of postponing the proposed procedure. The patient was also presented reasonable alternatives to the proposed care, treatment, and services.  The discussion I have had with the patient encompassed risks, benefits, and side effects related to the alternatives and the risks related to not receiving the proposed care, treatment and services.      Camille Maciel MD, [address], [facility] [place], [facility] Electrophysiology  [address], [address] [place] [address]  Jose Orosco [facility] [address]  [phone] yes

## (undated) DEVICE — ENDOSCOPY KIT: Brand: MEDLINE INDUSTRIES, INC.